# Patient Record
Sex: FEMALE | Race: WHITE | NOT HISPANIC OR LATINO | Employment: UNEMPLOYED | URBAN - METROPOLITAN AREA
[De-identification: names, ages, dates, MRNs, and addresses within clinical notes are randomized per-mention and may not be internally consistent; named-entity substitution may affect disease eponyms.]

---

## 2018-02-27 ENCOUNTER — APPOINTMENT (EMERGENCY)
Dept: CT IMAGING | Facility: HOSPITAL | Age: 28
End: 2018-02-27
Payer: COMMERCIAL

## 2018-02-27 ENCOUNTER — HOSPITAL ENCOUNTER (EMERGENCY)
Facility: HOSPITAL | Age: 28
Discharge: HOME | End: 2018-02-27
Attending: EMERGENCY MEDICINE | Admitting: EMERGENCY MEDICINE
Payer: COMMERCIAL

## 2018-02-27 VITALS
HEART RATE: 78 BPM | RESPIRATION RATE: 18 BRPM | SYSTOLIC BLOOD PRESSURE: 131 MMHG | OXYGEN SATURATION: 100 % | TEMPERATURE: 98.7 F | DIASTOLIC BLOOD PRESSURE: 60 MMHG

## 2018-02-27 DIAGNOSIS — T14.8XXA HEMATOMA: ICD-10-CM

## 2018-02-27 DIAGNOSIS — R10.9 ABDOMINAL PAIN: Primary | ICD-10-CM

## 2018-02-27 LAB
ALBUMIN SERPL BCP-MCNC: 3.7 G/DL (ref 3.5–5)
ALP SERPL-CCNC: 62 U/L (ref 46–116)
ALT SERPL W P-5'-P-CCNC: 41 U/L (ref 12–78)
ANION GAP SERPL CALCULATED.3IONS-SCNC: 9 MMOL/L (ref 4–13)
AST SERPL W P-5'-P-CCNC: 20 U/L (ref 5–45)
BASOPHILS # BLD AUTO: 0.02 THOUSANDS/ΜL (ref 0–0.1)
BASOPHILS NFR BLD AUTO: 0 % (ref 0–1)
BILIRUB SERPL-MCNC: 1 MG/DL (ref 0.2–1)
BILIRUB UR QL STRIP: NEGATIVE
BUN SERPL-MCNC: 9 MG/DL (ref 5–25)
CALCIUM SERPL-MCNC: 8.9 MG/DL (ref 8.3–10.1)
CHLORIDE SERPL-SCNC: 106 MMOL/L (ref 100–108)
CLARITY UR: CLEAR
CO2 SERPL-SCNC: 27 MMOL/L (ref 21–32)
COLOR UR: YELLOW
CREAT SERPL-MCNC: 0.79 MG/DL (ref 0.6–1.3)
EOSINOPHIL # BLD AUTO: 0.09 THOUSAND/ΜL (ref 0–0.61)
EOSINOPHIL NFR BLD AUTO: 1 % (ref 0–6)
ERYTHROCYTE [DISTWIDTH] IN BLOOD BY AUTOMATED COUNT: 13.4 % (ref 11.6–15.1)
EXT PREG TEST URINE: NEGATIVE
GFR SERPL CREATININE-BSD FRML MDRD: 104 ML/MIN/1.73SQ M
GLUCOSE SERPL-MCNC: 80 MG/DL (ref 65–140)
GLUCOSE UR STRIP-MCNC: NEGATIVE MG/DL
HCT VFR BLD AUTO: 37.5 % (ref 34.8–46.1)
HGB BLD-MCNC: 12.6 G/DL (ref 11.5–15.4)
HGB UR QL STRIP.AUTO: NEGATIVE
KETONES UR STRIP-MCNC: NEGATIVE MG/DL
LEUKOCYTE ESTERASE UR QL STRIP: NEGATIVE
LIPASE SERPL-CCNC: 97 U/L (ref 73–393)
LYMPHOCYTES # BLD AUTO: 1.97 THOUSANDS/ΜL (ref 0.6–4.47)
LYMPHOCYTES NFR BLD AUTO: 23 % (ref 14–44)
MCH RBC QN AUTO: 28.1 PG (ref 26.8–34.3)
MCHC RBC AUTO-ENTMCNC: 33.6 G/DL (ref 31.4–37.4)
MCV RBC AUTO: 84 FL (ref 82–98)
MONOCYTES # BLD AUTO: 0.57 THOUSAND/ΜL (ref 0.17–1.22)
MONOCYTES NFR BLD AUTO: 7 % (ref 4–12)
NEUTROPHILS # BLD AUTO: 5.81 THOUSANDS/ΜL (ref 1.85–7.62)
NEUTS SEG NFR BLD AUTO: 69 % (ref 43–75)
NITRITE UR QL STRIP: NEGATIVE
PH UR STRIP.AUTO: 5.5 [PH] (ref 4.5–8)
PLATELET # BLD AUTO: 247 THOUSANDS/UL (ref 149–390)
PMV BLD AUTO: 8.8 FL (ref 8.9–12.7)
POTASSIUM SERPL-SCNC: 4 MMOL/L (ref 3.5–5.3)
PROT SERPL-MCNC: 7.5 G/DL (ref 6.4–8.2)
PROT UR STRIP-MCNC: NEGATIVE MG/DL
RBC # BLD AUTO: 4.48 MILLION/UL (ref 3.81–5.12)
SODIUM SERPL-SCNC: 142 MMOL/L (ref 136–145)
SP GR UR STRIP.AUTO: 1.01 (ref 1–1.03)
UROBILINOGEN UR QL STRIP.AUTO: 0.2 E.U./DL
WBC # BLD AUTO: 8.46 THOUSAND/UL (ref 4.31–10.16)

## 2018-02-27 PROCEDURE — 96361 HYDRATE IV INFUSION ADD-ON: CPT

## 2018-02-27 PROCEDURE — 81025 URINE PREGNANCY TEST: CPT | Performed by: EMERGENCY MEDICINE

## 2018-02-27 PROCEDURE — 99284 EMERGENCY DEPT VISIT MOD MDM: CPT

## 2018-02-27 PROCEDURE — 96375 TX/PRO/DX INJ NEW DRUG ADDON: CPT

## 2018-02-27 PROCEDURE — 74177 CT ABD & PELVIS W/CONTRAST: CPT

## 2018-02-27 PROCEDURE — 83690 ASSAY OF LIPASE: CPT | Performed by: EMERGENCY MEDICINE

## 2018-02-27 PROCEDURE — 36415 COLL VENOUS BLD VENIPUNCTURE: CPT | Performed by: EMERGENCY MEDICINE

## 2018-02-27 PROCEDURE — 96374 THER/PROPH/DIAG INJ IV PUSH: CPT

## 2018-02-27 PROCEDURE — 85025 COMPLETE CBC W/AUTO DIFF WBC: CPT | Performed by: EMERGENCY MEDICINE

## 2018-02-27 PROCEDURE — 80053 COMPREHEN METABOLIC PANEL: CPT | Performed by: EMERGENCY MEDICINE

## 2018-02-27 PROCEDURE — 81003 URINALYSIS AUTO W/O SCOPE: CPT

## 2018-02-27 RX ORDER — ONDANSETRON 2 MG/ML
4 INJECTION INTRAMUSCULAR; INTRAVENOUS ONCE
Status: COMPLETED | OUTPATIENT
Start: 2018-02-27 | End: 2018-02-27

## 2018-02-27 RX ORDER — KETOROLAC TROMETHAMINE 30 MG/ML
15 INJECTION, SOLUTION INTRAMUSCULAR; INTRAVENOUS ONCE
Status: COMPLETED | OUTPATIENT
Start: 2018-02-27 | End: 2018-02-27

## 2018-02-27 RX ADMIN — IOHEXOL 100 ML: 350 INJECTION, SOLUTION INTRAVENOUS at 15:54

## 2018-02-27 RX ADMIN — SODIUM CHLORIDE 1000 ML: 0.9 INJECTION, SOLUTION INTRAVENOUS at 15:04

## 2018-02-27 RX ADMIN — ONDANSETRON 4 MG: 2 INJECTION INTRAMUSCULAR; INTRAVENOUS at 15:11

## 2018-02-27 RX ADMIN — KETOROLAC TROMETHAMINE 15 MG: 30 INJECTION, SOLUTION INTRAMUSCULAR at 15:11

## 2018-02-27 NOTE — ED PROVIDER NOTES
History  Chief Complaint   Patient presents with    Abdominal Pain     Pt  c/o right upper quadrant and left lower quadrant pain that radiates to back  Reports diarrhea and nausea since yesterday  History provided by:  Patient   used: No    Abdominal Pain   Associated symptoms: nausea    Associated symptoms: no chest pain, no chills, no cough, no diarrhea, no dysuria, no fever, no shortness of breath, no sore throat and no vomiting      Patient is a 43-year-old female presenting to emergency department with abdominal pain  Started yesterday  Her quadrant left lower quadrant  Associated with nausea  No vomiting  Episode of diarrhea yesterday  No bowel movement since then  No urine complaints  Has had multiple surgeries due to pregnancy and endometriosis  Had hysterectomy  No fevers  No Lightheadedness  No back pain  MDM will check abdominal labs to evaluate for pancreatitis, gallbladder pathology, CT abdomen pelvis to evaluate for obstructions as patient has had multiple surgeries in the past      None       Past Medical History:   Diagnosis Date    Endometriosis        Past Surgical History:   Procedure Laterality Date    HYSTERECTOMY         History reviewed  No pertinent family history  I have reviewed and agree with the history as documented  Social History   Substance Use Topics    Smoking status: Never Smoker    Smokeless tobacco: Never Used    Alcohol use No        Review of Systems   Constitutional: Negative for chills, diaphoresis and fever  HENT: Negative for congestion and sore throat  Respiratory: Negative for cough, shortness of breath, wheezing and stridor  Cardiovascular: Negative for chest pain, palpitations and leg swelling  Gastrointestinal: Positive for abdominal pain and nausea  Negative for blood in stool, diarrhea and vomiting  Genitourinary: Negative for dysuria, frequency and urgency     Musculoskeletal: Negative for neck pain and neck stiffness  Skin: Negative for pallor and rash  Neurological: Negative for dizziness, syncope, weakness, light-headedness and headaches  All other systems reviewed and are negative  Physical Exam  ED Triage Vitals [02/27/18 1343]   Temperature Pulse Respirations Blood Pressure SpO2   98 7 °F (37 1 °C) 78 18 131/60 100 %      Temp Source Heart Rate Source Patient Position - Orthostatic VS BP Location FiO2 (%)   Oral Monitor Sitting Left arm --      Pain Score       7           Orthostatic Vital Signs  Vitals:    02/27/18 1343   BP: 131/60   Pulse: 78   Patient Position - Orthostatic VS: Sitting       Physical Exam   Constitutional: She is oriented to person, place, and time  She appears well-developed and well-nourished  HENT:   Head: Normocephalic and atraumatic  Eyes: EOM are normal  Pupils are equal, round, and reactive to light  Neck: Normal range of motion  Neck supple  Cardiovascular: Normal rate, regular rhythm, normal heart sounds and intact distal pulses  Pulmonary/Chest: Effort normal and breath sounds normal  No respiratory distress  Abdominal: Soft  Bowel sounds are normal  There is tenderness  Left lower quadrant and right upper quadrant   Musculoskeletal: Normal range of motion  She exhibits no edema or tenderness  Neurological: She is alert and oriented to person, place, and time  Skin: Skin is warm and dry  Capillary refill takes less than 2 seconds  No rash noted  No erythema  Vitals reviewed        ED Medications  Medications   ondansetron (ZOFRAN) injection 4 mg (4 mg Intravenous Given 2/27/18 1511)   sodium chloride 0 9 % bolus 1,000 mL (0 mL Intravenous Stopped 2/27/18 1715)   ketorolac (TORADOL) injection 15 mg (15 mg Intravenous Given 2/27/18 1511)   iohexol (OMNIPAQUE) 350 MG/ML injection (SINGLE-DOSE) 100 mL (100 mL Intravenous Given 2/27/18 1554)       Diagnostic Studies  Results Reviewed     Procedure Component Value Units Date/Time    Comprehensive metabolic panel [[id number]] Collected:  02/27/18 1503    Lab Status:  Final result Specimen:  Blood from Arm, Right Updated:  02/27/18 1530     Sodium 142 mmol/L      Potassium 4 0 mmol/L      Chloride 106 mmol/L      CO2 27 mmol/L      Anion Gap 9 mmol/L      BUN 9 mg/dL      Creatinine 0 79 mg/dL      Glucose 80 mg/dL      Calcium 8 9 mg/dL      AST 20 U/L      ALT 41 U/L      Alkaline Phosphatase 62 U/L      Total Protein 7 5 g/dL      Albumin 3 7 g/dL      Total Bilirubin 1 00 mg/dL      eGFR 104 ml/min/1 73sq m     Narrative:         National Kidney Disease Education Program recommendations are as follows:  GFR calculation is accurate only with a steady state creatinine  Chronic Kidney disease less than 60 ml/min/1 73 sq  meters  Kidney failure less than 15 ml/min/1 73 sq  meters      Lipase [[id number]]  (Normal) Collected:  02/27/18 1503    Lab Status:  Final result Specimen:  Blood from Arm, Right Updated:  02/27/18 1530     Lipase 97 u/L     CBC and differential [[id number]]  (Abnormal) Collected:  02/27/18 1503    Lab Status:  Final result Specimen:  Blood from Arm, Right Updated:  02/27/18 1525     WBC 8 46 Thousand/uL      RBC 4 48 Million/uL      Hemoglobin 12 6 g/dL      Hematocrit 37 5 %      MCV 84 fL      MCH 28 1 pg      MCHC 33 6 g/dL      RDW 13 4 %      MPV 8 8 (L) fL      Platelets 166 Thousands/uL      Neutrophils Relative 69 %      Lymphocytes Relative 23 %      Monocytes Relative 7 %      Eosinophils Relative 1 %      Basophils Relative 0 %      Neutrophils Absolute 5 81 Thousands/µL      Lymphocytes Absolute 1 97 Thousands/µL      Monocytes Absolute 0 57 Thousand/µL      Eosinophils Absolute 0 09 Thousand/µL      Basophils Absolute 0 02 Thousands/µL     POCT pregnancy, urine [[id number]]  (Normal) Resulted:  02/27/18 1511    Lab Status:  Final result Updated:  02/27/18 1511     EXT PREG TEST UR (Ref: Negative) negative    ED Urine Macroscopic [[id number]]  (Normal) Collected:  02/27/18 1510    Lab Status:  Final result Specimen:  Urine Updated:  02/27/18 1509     Color, UA Yellow     Clarity, UA Clear     pH, UA 5 5     Leukocytes, UA Negative     Nitrite, UA Negative     Protein, UA Negative mg/dl      Glucose, UA Negative mg/dl      Ketones, UA Negative mg/dl      Urobilinogen, UA 0 2 E U /dl      Bilirubin, UA Negative     Blood, UA Negative     Specific Gravity, UA 1 010    Narrative:       CLINITEK RESULT                 CT abdomen pelvis with contrast   Final Result by Ronna Fernández MD (02/27 1617)      7 8 x 2 5 x 5 9 cm fluid pocket identified posterior to the left gluteus muscles  Tiny left lower pole renal nonobstructing calculus  No hydronephrosis  Workstation performed: [id number]                Patient had a fall over 10 days ago  Not on blood thinners  Procedures  Procedures       Phone Contacts  ED Phone Contact    ED Course  ED Course as of Feb 27 1756   Tue Feb 27, 2018   1701 Bruising over the left buttocks  No cellulitic skin changes  No drainage  MDM  CritCare Time    Disposition  Final diagnoses:   Abdominal pain   Hematoma - gluteal     Time reflects when diagnosis was documented in both MDM as applicable and the Disposition within this note     Time User Action Codes Description Comment    2/27/2018  4:57 PM Ratna Cerda Add [R10 9] Abdominal pain     2/27/2018  4:59 PM Ratna Cerda Add [T14  8XXA] Hematoma     2/27/2018  4:59 PM Ratna Cerda Modify [T14  8XXA] Hematoma gluteal      ED Disposition     ED Disposition Condition Comment    Discharge  Pily Galeano discharge to home/self care      Condition at discharge: Good        Follow-up Information     Follow up With Specialties Details Why Contact Info Additional [address] Emergency Department Emergency Medicine  As needed, If symptoms worsen, vomiting, fevers, skin changes over the buttocks area [address] [facility] [address]  [phone] [facility], [address], [place], [address], [place] Gastroenterology In 1 week Abdominal pain [address] [address]  [phone]           There are no discharge medications for this patient  No discharge procedures on file      ED Provider  Electronically Signed by           Kiran Hylton MD  02/27/18 3683

## 2018-02-27 NOTE — DISCHARGE INSTRUCTIONS
Abdominal Pain   WHAT YOU NEED TO KNOW:   Abdominal pain can be dull, achy, or sharp  You may have pain in one area of your abdomen, or in your entire abdomen  Your pain may be caused by a condition such as constipation, food sensitivity or poisoning, infection, or a blockage  Abdominal pain can also be from a hernia, appendicitis, or an ulcer  Liver, gallbladder, or kidney conditions can also cause abdominal pain  The cause of your abdominal pain may be unknown  DISCHARGE INSTRUCTIONS:   Return to the emergency department if:   · You have new chest pain or shortness of breath  · You have pulsing pain in your upper abdomen or lower back that suddenly becomes constant  · Your pain is in the right lower abdominal area and worsens with movement  · You have a fever over 100 4°F (38°C) or shaking chills  · You are vomiting and cannot keep food or liquids down  · Your pain does not improve or gets worse over the next 8 to 12 hours  · You see blood in your vomit or bowel movements, or they look black and tarry  · Your skin or the whites of your eyes turn yellow  · You are a woman and have a large amount of vaginal bleeding that is not your monthly period  Contact your healthcare provider if:   · You have pain in your lower back  · You are a man and have pain in your testicles  · You have pain when you urinate  · You have questions or concerns about your condition or care  Follow up with your healthcare provider within 24 hours or as directed:  Write down your questions so you remember to ask them during your visits  Medicines:   · Medicines  may be given to calm your stomach and prevent vomiting or to decrease pain  Ask how to take pain medicine safely  · Take your medicine as directed  Contact your healthcare provider if you think your medicine is not helping or if you have side effects  Tell him of her if you are allergic to any medicine   Keep a list of the medicines, vitamins, and herbs you take  Include the amounts, and when and why you take them  Bring the list or the pill bottles to follow-up visits  Carry your medicine list with you in case of an emergency  © 2017 [facility] Information is for End User's use only and may not be sold, redistributed or otherwise used for commercial purposes  All illustrations and images included in CareNotes® are the copyrighted property of A D A M , Inc  or Bradly Cabezas  The above information is an  only  It is not intended as medical advice for individual conditions or treatments  Talk to your doctor, nurse or pharmacist before following any medical regimen to see if it is safe and effective for you

## 2018-04-21 ENCOUNTER — HOSPITAL ENCOUNTER (EMERGENCY)
Facility: HOSPITAL | Age: 28
Discharge: HOME | End: 2018-04-22
Attending: EMERGENCY MEDICINE | Admitting: EMERGENCY MEDICINE
Payer: COMMERCIAL

## 2018-04-21 ENCOUNTER — APPOINTMENT (EMERGENCY)
Dept: CT IMAGING | Facility: HOSPITAL | Age: 28
End: 2018-04-21
Payer: COMMERCIAL

## 2018-04-21 DIAGNOSIS — R10.9 LEFT FLANK PAIN: Primary | ICD-10-CM

## 2018-04-21 DIAGNOSIS — J18.9 PNEUMONIA: ICD-10-CM

## 2018-04-21 LAB
ALBUMIN SERPL BCP-MCNC: 3.9 G/DL (ref 3.5–5)
ALP SERPL-CCNC: 64 U/L (ref 46–116)
ALT SERPL W P-5'-P-CCNC: 52 U/L (ref 12–78)
ANION GAP SERPL CALCULATED.3IONS-SCNC: 10 MMOL/L (ref 4–13)
AST SERPL W P-5'-P-CCNC: 23 U/L (ref 5–45)
BASOPHILS # BLD AUTO: 0.02 THOUSANDS/ΜL (ref 0–0.1)
BASOPHILS NFR BLD AUTO: 0 % (ref 0–1)
BILIRUB SERPL-MCNC: 1 MG/DL (ref 0.2–1)
BILIRUB UR QL STRIP: NEGATIVE
BUN SERPL-MCNC: 10 MG/DL (ref 5–25)
CALCIUM SERPL-MCNC: 8.9 MG/DL (ref 8.3–10.1)
CHLORIDE SERPL-SCNC: 104 MMOL/L (ref 100–108)
CLARITY UR: CLEAR
CO2 SERPL-SCNC: 28 MMOL/L (ref 21–32)
COLOR UR: YELLOW
CREAT SERPL-MCNC: 0.85 MG/DL (ref 0.6–1.3)
EOSINOPHIL # BLD AUTO: 0.15 THOUSAND/ΜL (ref 0–0.61)
EOSINOPHIL NFR BLD AUTO: 2 % (ref 0–6)
ERYTHROCYTE [DISTWIDTH] IN BLOOD BY AUTOMATED COUNT: 13.2 % (ref 11.6–15.1)
EXT PREG TEST URINE: NEGATIVE
GFR SERPL CREATININE-BSD FRML MDRD: 96 ML/MIN/1.73SQ M
GLUCOSE SERPL-MCNC: 80 MG/DL (ref 65–140)
GLUCOSE UR STRIP-MCNC: NEGATIVE MG/DL
HCT VFR BLD AUTO: 37.9 % (ref 34.8–46.1)
HGB BLD-MCNC: 12.9 G/DL (ref 11.5–15.4)
HGB UR QL STRIP.AUTO: NEGATIVE
KETONES UR STRIP-MCNC: NEGATIVE MG/DL
LEUKOCYTE ESTERASE UR QL STRIP: NEGATIVE
LIPASE SERPL-CCNC: 86 U/L (ref 73–393)
LYMPHOCYTES # BLD AUTO: 2.49 THOUSANDS/ΜL (ref 0.6–4.47)
LYMPHOCYTES NFR BLD AUTO: 25 % (ref 14–44)
MCH RBC QN AUTO: 27.9 PG (ref 26.8–34.3)
MCHC RBC AUTO-ENTMCNC: 34 G/DL (ref 31.4–37.4)
MCV RBC AUTO: 82 FL (ref 82–98)
MONOCYTES # BLD AUTO: 0.58 THOUSAND/ΜL (ref 0.17–1.22)
MONOCYTES NFR BLD AUTO: 6 % (ref 4–12)
NEUTROPHILS # BLD AUTO: 6.6 THOUSANDS/ΜL (ref 1.85–7.62)
NEUTS SEG NFR BLD AUTO: 67 % (ref 43–75)
NITRITE UR QL STRIP: NEGATIVE
PH UR STRIP.AUTO: 5.5 [PH] (ref 4.5–8)
PLATELET # BLD AUTO: 246 THOUSANDS/UL (ref 149–390)
PMV BLD AUTO: 8.6 FL (ref 8.9–12.7)
POTASSIUM SERPL-SCNC: 3.7 MMOL/L (ref 3.5–5.3)
PROT SERPL-MCNC: 7.6 G/DL (ref 6.4–8.2)
PROT UR STRIP-MCNC: NEGATIVE MG/DL
RBC # BLD AUTO: 4.62 MILLION/UL (ref 3.81–5.12)
SODIUM SERPL-SCNC: 142 MMOL/L (ref 136–145)
SP GR UR STRIP.AUTO: 1.02 (ref 1–1.03)
UROBILINOGEN UR QL STRIP.AUTO: 0.2 E.U./DL
WBC # BLD AUTO: 9.84 THOUSAND/UL (ref 4.31–10.16)

## 2018-04-21 PROCEDURE — 74176 CT ABD & PELVIS W/O CONTRAST: CPT

## 2018-04-21 PROCEDURE — 81025 URINE PREGNANCY TEST: CPT | Performed by: EMERGENCY MEDICINE

## 2018-04-21 PROCEDURE — 81003 URINALYSIS AUTO W/O SCOPE: CPT

## 2018-04-21 PROCEDURE — 96374 THER/PROPH/DIAG INJ IV PUSH: CPT

## 2018-04-21 PROCEDURE — 36415 COLL VENOUS BLD VENIPUNCTURE: CPT

## 2018-04-21 PROCEDURE — 83690 ASSAY OF LIPASE: CPT | Performed by: EMERGENCY MEDICINE

## 2018-04-21 PROCEDURE — 85025 COMPLETE CBC W/AUTO DIFF WBC: CPT | Performed by: EMERGENCY MEDICINE

## 2018-04-21 PROCEDURE — 80053 COMPREHEN METABOLIC PANEL: CPT | Performed by: EMERGENCY MEDICINE

## 2018-04-21 RX ORDER — KETOROLAC TROMETHAMINE 30 MG/ML
30 INJECTION, SOLUTION INTRAMUSCULAR; INTRAVENOUS ONCE
Status: COMPLETED | OUTPATIENT
Start: 2018-04-21 | End: 2018-04-21

## 2018-04-21 RX ADMIN — KETOROLAC TROMETHAMINE 30 MG: 30 INJECTION, SOLUTION INTRAMUSCULAR at 22:51

## 2018-04-22 VITALS
RESPIRATION RATE: 18 BRPM | SYSTOLIC BLOOD PRESSURE: 109 MMHG | HEIGHT: 65 IN | WEIGHT: 241 LBS | BODY MASS INDEX: 40.15 KG/M2 | TEMPERATURE: 98.4 F | OXYGEN SATURATION: 100 % | DIASTOLIC BLOOD PRESSURE: 65 MMHG | HEART RATE: 72 BPM

## 2018-04-22 PROCEDURE — 99284 EMERGENCY DEPT VISIT MOD MDM: CPT

## 2018-04-22 RX ORDER — AZITHROMYCIN 250 MG/1
500 TABLET, FILM COATED ORAL ONCE
Status: COMPLETED | OUTPATIENT
Start: 2018-04-22 | End: 2018-04-22

## 2018-04-22 RX ORDER — AZITHROMYCIN 250 MG/1
TABLET, FILM COATED ORAL
Qty: 4 TABLET | Refills: 0 | Status: SHIPPED | OUTPATIENT
Start: 2018-04-22 | End: 2018-04-26

## 2018-04-22 RX ORDER — ACETAMINOPHEN 325 MG/1
650 TABLET ORAL ONCE
Status: COMPLETED | OUTPATIENT
Start: 2018-04-22 | End: 2018-04-22

## 2018-04-22 RX ADMIN — ACETAMINOPHEN 650 MG: 325 TABLET ORAL at 00:34

## 2018-04-22 RX ADMIN — AZITHROMYCIN 500 MG: 250 TABLET, FILM COATED ORAL at 01:05

## 2018-04-22 NOTE — ED PROVIDER NOTES
History  Chief Complaint   Patient presents with    Flank Pain     C/o left flank pain x1 week  C/o nausea with pain exacerbation  Hx of kidney stones  Denies hematuria  Pt with c/o left flank pain x 1wk, occasionally radiating around to the front of her abdomen  She denies n/v/d  Pt states that the symptoms are similar to her previous hx of kidney stones  She has been taking ibuprofen/naproxen/tylenol intermittently for pain relief  None       Past Medical History:   Diagnosis Date    Endometriosis     Kidney stone on left side        Past Surgical History:   Procedure Laterality Date     SECTION      HYSTERECTOMY      TUBAL LIGATION         History reviewed  No pertinent family history  I have reviewed and agree with the history as documented  Social History   Substance Use Topics    Smoking status: Never Smoker    Smokeless tobacco: Never Used    Alcohol use No        Review of Systems   Constitutional: Negative for chills and fever  Gastrointestinal: Positive for abdominal pain  Negative for diarrhea, nausea and vomiting  Genitourinary: Positive for flank pain  Negative for difficulty urinating, dysuria, frequency, urgency and vaginal bleeding  All other systems reviewed and are negative  Physical Exam  ED Triage Vitals [18]   Temperature Pulse Respirations Blood Pressure SpO2   98 4 °F (36 9 °C) 73 18 119/67 100 %      Temp Source Heart Rate Source Patient Position - Orthostatic VS BP Location FiO2 (%)   Oral Monitor Sitting Left arm --      Pain Score       9           Orthostatic Vital Signs  Vitals:    18 2147 18 2300 18 0000   BP: 119/67 111/61 120/72 109/65   Pulse: 73 67 64 72   Patient Position - Orthostatic VS: Sitting Lying Sitting Sitting       Physical Exam   Constitutional: She appears well-developed and well-nourished  No distress  HENT:   Head: Normocephalic and atraumatic     Eyes: Conjunctivae are normal  Pupils are equal, round, and reactive to light  Neck: Normal range of motion  Neck supple  Cardiovascular: Normal rate, regular rhythm and normal heart sounds  No murmur heard  Pulmonary/Chest: Effort normal and breath sounds normal  No respiratory distress  Abdominal: Soft  Bowel sounds are normal  She exhibits no distension  There is no tenderness  Musculoskeletal: Normal range of motion  She exhibits no edema or deformity  Left CVA tenderness   Neurological: She is alert  No cranial nerve deficit  Skin: Skin is warm and dry  No rash noted  She is not diaphoretic  No pallor  Psychiatric: She has a normal mood and affect  Her behavior is normal    Nursing note and vitals reviewed  ED Medications  Medications   ketorolac (TORADOL) injection 30 mg (30 mg Intravenous Given 4/21/18 2251)   acetaminophen (TYLENOL) tablet 650 mg (650 mg Oral Given 4/22/18 0034)   azithromycin (ZITHROMAX) tablet 500 mg (500 mg Oral Given 4/22/18 0105)       Diagnostic Studies  Results Reviewed     Procedure Component Value Units Date/Time    Comprehensive metabolic panel [[id number]] Collected:  04/21/18 2147    Lab Status:  Final result Specimen:  Blood from Arm, Right Updated:  04/21/18 2213     Sodium 142 mmol/L      Potassium 3 7 mmol/L      Chloride 104 mmol/L      CO2 28 mmol/L      Anion Gap 10 mmol/L      BUN 10 mg/dL      Creatinine 0 85 mg/dL      Glucose 80 mg/dL      Calcium 8 9 mg/dL      AST 23 U/L      ALT 52 U/L      Alkaline Phosphatase 64 U/L      Total Protein 7 6 g/dL      Albumin 3 9 g/dL      Total Bilirubin 1 00 mg/dL      eGFR 96 ml/min/1 73sq m     Narrative:         National Kidney Disease Education Program recommendations are as follows:  GFR calculation is accurate only with a steady state creatinine  Chronic Kidney disease less than 60 ml/min/1 73 sq  meters  Kidney failure less than 15 ml/min/1 73 sq  meters      Lipase [[id number]]  (Normal) Collected:  04/21/18 2147    Lab Status: Final result Specimen:  Blood from Arm, Right Updated:  04/21/18 2213     Lipase 86 u/L     CBC and differential [[id number]]  (Abnormal) Collected:  04/21/18 2147    Lab Status:  Final result Specimen:  Blood from Arm, Right Updated:  04/21/18 2153     WBC 9 84 Thousand/uL      RBC 4 62 Million/uL      Hemoglobin 12 9 g/dL      Hematocrit 37 9 %      MCV 82 fL      MCH 27 9 pg      MCHC 34 0 g/dL      RDW 13 2 %      MPV 8 6 (L) fL      Platelets 465 Thousands/uL      Neutrophils Relative 67 %      Lymphocytes Relative 25 %      Monocytes Relative 6 %      Eosinophils Relative 2 %      Basophils Relative 0 %      Neutrophils Absolute 6 60 Thousands/µL      Lymphocytes Absolute 2 49 Thousands/µL      Monocytes Absolute 0 58 Thousand/µL      Eosinophils Absolute 0 15 Thousand/µL      Basophils Absolute 0 02 Thousands/µL     POCT pregnancy, urine [[id number]]  (Normal) Resulted:  04/21/18 2146    Lab Status:  Final result Specimen:  Urine Updated:  04/21/18 2146     EXT PREG TEST UR (Ref: Negative) negative    ED Urine Macroscopic [[id number]]  (Normal) Collected:  04/21/18 2146    Lab Status:  Final result Specimen:  Urine Updated:  04/21/18 2145     Color, UA Yellow     Clarity, UA Clear     pH, UA 5 5     Leukocytes, UA Negative     Nitrite, UA Negative     Protein, UA Negative mg/dl      Glucose, UA Negative mg/dl      Ketones, UA Negative mg/dl      Urobilinogen, UA 0 2 E U /dl      Bilirubin, UA Negative     Blood, UA Negative     Specific Gravity, UA 1 025    Narrative:       CLINITEK RESULT                 CT renal stone study abdomen pelvis without contrast    (Results Pending)              Procedures  Procedures       Phone Contacts  ED Phone Contact    ED Course  ED Course                                MDM  Number of Diagnoses or Management Options  Left flank pain:   Pneumonia:   Diagnosis management comments: FINDINGS:  Lung bases: Patchy basilar opacities include groundglass attenuation with differential of atelectasis  or pneumonia  ABDOMEN:  Liver: Hepatic steatosis is present  Gallbladder and bile ducts: Unremarkable  No calcified stones  No ductal dilation  Pancreas: Unremarkable  No ductal dilation  Spleen: Unremarkable  No splenomegaly  Adrenals: Unremarkable  No mass  Kidneys and ureters: Unremarkable  No obstructing stones  No hydronephrosis  Stomach and bowel: Unremarkable  No obstruction  No mucosal thickening  Appendix: No findings to suggest acute appendicitis  PELVIS:  Bladder: Unremarkable  No stones  Reproductive: Unremarkable as visualized  ABDOMEN and PELVIS:  Intraperitoneal space: Unremarkable  No free air  No significant fluid collection  Bones/joints: No acute fracture  No dislocation  Soft tissues: Subcutaneous opacity in the left gluteal region may reflect a subcutaneous contusion in  the setting of trauma/previous trauma  Vasculature: Unremarkable  No abdominal aortic aneurysm  Lymph nodes: Unremarkable  No enlarged lymph nodes  IMPRESSION:  1  Subcutaneous opacity in the left gluteal region may reflect a subcutaneous contusion in the setting  of trauma/previous trauma  Incidental findings that may require followup:  1  2  Patchy basilar opacities include groundglass attenuation with differential of atelectasis or  Pneumonia  Pt states that she fell down a flight of steps in feb and sustained a contusion to her left buttock  Pt denies any recent falls  She also states that she has had a cough/fever  Will start pt on zithromax  Will d/c to home  Recommended tylenol/motrin for pain          Amount and/or Complexity of Data Reviewed  Clinical lab tests: ordered and reviewed  Tests in the radiology section of CPT®: ordered and reviewed      CritCare Time    Disposition  Final diagnoses:   Left flank pain   Pneumonia     Time reflects when diagnosis was documented in both MDM as applicable and the Disposition within this note     Time User Action Codes Description Comment 4/22/2018 12:52 AM Kathy Moreno [R10 9] Left flank pain     4/22/2018 12:53 AM Kathy Moreno [J18 9] Pneumonia       ED Disposition     ED Disposition Condition Comment    Discharge  Calvin Solis discharge to home/self care  Condition at discharge: Stable        Follow-up Information     Follow up With Specialties Details Why Benton Oconnor  Call in 1 day for follow up, to get information on a primay care physician [phone]          Discharge Medication List as of 4/22/2018 12:55 AM      START taking these medications    Details   azithromycin (ZITHROMAX) 250 mg tablet Take 1 tablet daily x 4 days, Print           No discharge procedures on file      ED Provider  Electronically Signed by           Cam Sibley DO  04/22/18 5270

## 2018-04-22 NOTE — DISCHARGE INSTRUCTIONS
Flank Pain   WHAT YOU NEED TO KNOW:   Flank pain is felt in the area below your ribcage and above your hip bones, often in the lower back  Your pain may be dull or so severe that you cannot get comfortable  The pain may stay in one area or radiate to another area  It may worsen and lighten in waves  Flank pain is often a sign of problems with your urinary tract, such as a kidney stone or infection  DISCHARGE INSTRUCTIONS:   Return to the emergency department if:   · You have a fever  · Your heart is fluttering or jumping  · You see blood in your urine  · Your pain radiates into your lower abdomen and genital area  · You have intense pain in your low back next to your spine  · You are much more tired than usual and have no desire to eat  · You have a headache and your muscles jerk  Contact your healthcare provider if:   · You have an upset stomach and are vomiting  · You have to urinate more often, and with urgency  · Your pain worsens or does not improve, and you cannot get comfortable  · You pass a stone when you urinate  · You have questions or concerns about your condition or care  Medicines: The following medicines may be ordered for you:  · Pain medicine  may help decrease or relieve your pain  Do not wait until the pain is severe before you take your medicine  · Antibiotics  may help treat a urinary tract infection caused by bacteria  · Take your medicine as directed  Contact your healthcare provider if you think your medicine is not helping or if you have side effects  Tell him of her if you are allergic to any medicine  Keep a list of the medicines, vitamins, and herbs you take  Include the amounts, and when and why you take them  Bring the list or the pill bottles to follow-up visits  Carry your medicine list with you in case of an emergency    Follow up with your healthcare provider in 1 to 2 days or as directed:  Write down your questions so you remember to ask them during your visits  © 2017 [address] [employer] Information is for End User's use only and may not be sold, redistributed or otherwise used for commercial purposes  All illustrations and images included in CareNotes® are the copyrighted property of A D A M , Inc  or Bradly Cabez[employer]  The above information is an  only  It is not intended as medical advice for individual conditions or treatments  Talk to your doctor, nurse or pharmacist before following any medical regimen to see if it is safe and effective for you  Community Acquired Pneumonia   WHAT YOU NEED TO KNOW:   Community-acquired pneumonia (CAP) is a lung infection that you get outside of a hospital or nursing home setting  Your lungs become inflamed and cannot work well  CAP may be caused by bacteria, viruses, or fungi  DISCHARGE INSTRUCTIONS:   Return to the emergency department if:   · You are confused and cannot think clearly  · You have increased trouble breathing  · Your lips or fingernails turn gray or blue  Contact your healthcare provider if:   · Your symptoms do not get better, or they get worse  · You are urinating less, or not at all  · You have questions or concerns about your condition or care  Medicines:   · Medicines  may be given to treat a bacterial, viral, or fungal infection  You may also be given medicines to dilate your bronchial tubes to help you breathe more easily  · Take your medicine as directed  Contact your healthcare provider if you think your medicine is not helping or if you have side effects  Tell him or her if you are allergic to any medicine  Keep a list of the medicines, vitamins, and herbs you take  Include the amounts, and when and why you take them  Bring the list or the pill bottles to follow-up visits  Carry your medicine list with you in case of an emergency  Follow up with your healthcare provider within 3 days or as directed:   You may need another x-ray  Write down your questions so you remember to ask them during your visits  Deep breathing and coughing:  Deep breathing helps open the air passages in your lungs  Coughing helps bring up mucus from your lungs  Take a deep breath and hold the breath as long as you can  Then push the air out of your lungs with a deep, strong cough  Spit out any mucus you have coughed up  Take 10 deep breaths in a row every hour that you are awake  Remember to follow each deep breath with a cough  Do not smoke or allow others to smoke around you:  Nicotine and other chemicals in cigarettes and cigars can cause lung damage  Ask your healthcare provider for information if you currently smoke and need help to quit  E-cigarettes or smokeless tobacco still contain nicotine  Talk to your healthcare provider before you use these products  Manage CAP at home:   · Breathe warm, moist air  This helps loosen mucus  Loosely place a warm, wet washcloth over your nose and mouth  A room humidifier may also help make the air moist     · Drink liquids as directed  Ask your healthcare provider how much liquid to drink each day and which liquids to drink  Liquids help make mucus thin and easier to get out of your body  · Gently tap your chest   This helps loosen mucus so it is easier to cough  Lie with your head lower than your chest several times a day and tap your chest      · Get plenty of rest   Rest helps your body heal   Prevent CAP:   · Wash your hands often with soap and water  Carry germ-killing hand gel with you  You can use the gel to clean your hands when soap and water are not available  Do not touch your eyes, nose, or mouth unless you have washed your hands first      · Clean surfaces often  Clean doorknobs, countertops, cell phones, and other surfaces that are touched often  · Always cover your mouth when you cough    Cough into a tissue or your shirtsleeve so you do not spread germs from your hands     · Try to avoid people who have a cold or the flu  If you are sick, stay away from others as much as possible  · Ask about vaccines  You may need a vaccine to help prevent pneumonia  Get an influenza (flu) vaccine every year as soon as it becomes available  © 2017 [address] [employer] Information is for End User's use only and may not be sold, redistributed or otherwise used for commercial purposes  All illustrations and images included in CareNotes® are the copyrighted property of A D A [employer] , Inc  or [address]  The above information is an  only  It is not intended as medical advice for individual conditions or treatments  Talk to your doctor, nurse or pharmacist before following any medical regimen to see if it is safe and effective for you

## 2018-04-22 NOTE — ED NOTES
Pt stated "am I going to get anything else for pain when this (toradol) doesn't work?"  Made pt aware that was a provider decision  Pt stated "I've been here a while and if I'm going to wait around for nothing then I might as well go home and be in pain there "  Pt stated "I've had Toradol before and it does nothing for my pain "  Provider aware, no orders to follow       Susandavi Willson RN  04/21/18 [id number]

## 2019-04-02 ENCOUNTER — OFFICE VISIT (OUTPATIENT)
Dept: FAMILY MEDICINE CLINIC | Facility: CLINIC | Age: 29
End: 2019-04-02
Payer: COMMERCIAL

## 2019-04-02 VITALS
WEIGHT: 227.25 LBS | DIASTOLIC BLOOD PRESSURE: 68 MMHG | RESPIRATION RATE: 16 BRPM | SYSTOLIC BLOOD PRESSURE: 100 MMHG | BODY MASS INDEX: 37.86 KG/M2 | TEMPERATURE: 100.7 F | HEART RATE: 101 BPM | HEIGHT: 65 IN | OXYGEN SATURATION: 98 %

## 2019-04-02 DIAGNOSIS — R11.0 NAUSEA: ICD-10-CM

## 2019-04-02 DIAGNOSIS — E66.9 OBESITY (BMI 35.0-39.9 WITHOUT COMORBIDITY): ICD-10-CM

## 2019-04-02 DIAGNOSIS — J02.0 STREP THROAT: Primary | ICD-10-CM

## 2019-04-02 PROCEDURE — 3008F BODY MASS INDEX DOCD: CPT | Performed by: FAMILY MEDICINE

## 2019-04-02 PROCEDURE — 99203 OFFICE O/P NEW LOW 30 MIN: CPT | Performed by: FAMILY MEDICINE

## 2019-04-02 PROCEDURE — 1036F TOBACCO NON-USER: CPT | Performed by: FAMILY MEDICINE

## 2019-04-02 RX ORDER — AMOXICILLIN AND CLAVULANATE POTASSIUM 875; 125 MG/1; MG/1
1 TABLET, FILM COATED ORAL EVERY 12 HOURS SCHEDULED
Qty: 20 TABLET | Refills: 0 | Status: SHIPPED | OUTPATIENT
Start: 2019-04-02 | End: 2019-04-12

## 2019-04-02 RX ORDER — SUCRALFATE 1 G/1
1 TABLET ORAL
COMMUNITY
Start: 2018-08-22 | End: 2019-04-02

## 2019-04-02 RX ORDER — PANTOPRAZOLE SODIUM 20 MG/1
40 TABLET, DELAYED RELEASE ORAL
COMMUNITY
Start: 2018-08-22 | End: 2019-04-02

## 2019-04-02 RX ORDER — TRAMADOL HYDROCHLORIDE 50 MG/1
50 TABLET ORAL EVERY 6 HOURS PRN
COMMUNITY
End: 2020-03-09 | Stop reason: SDUPTHER

## 2019-04-02 RX ORDER — ONDANSETRON 4 MG/1
4 TABLET, ORALLY DISINTEGRATING ORAL EVERY 8 HOURS PRN
COMMUNITY
Start: 2018-08-21 | End: 2019-04-02 | Stop reason: SDUPTHER

## 2019-04-02 RX ORDER — RANITIDINE 150 MG/1
150 TABLET ORAL
COMMUNITY
Start: 2018-08-21 | End: 2019-04-02

## 2019-04-02 RX ORDER — ONDANSETRON 4 MG/1
4 TABLET, ORALLY DISINTEGRATING ORAL EVERY 8 HOURS PRN
Qty: 20 TABLET | Refills: 0 | Status: SHIPPED | OUTPATIENT
Start: 2019-04-02 | End: 2021-05-26

## 2019-04-02 RX ORDER — IBUPROFEN 600 MG/1
TABLET ORAL
COMMUNITY
Start: 2018-03-05 | End: 2020-09-16

## 2019-04-03 ENCOUNTER — TELEPHONE (OUTPATIENT)
Dept: FAMILY MEDICINE CLINIC | Facility: CLINIC | Age: 29
End: 2019-04-03

## 2019-05-06 RX ORDER — FLUCONAZOLE 150 MG/1
TABLET ORAL
Refills: 1 | COMMUNITY
Start: 2019-04-02 | End: 2021-05-26

## 2019-05-07 ENCOUNTER — HOSPITAL ENCOUNTER (OUTPATIENT)
Dept: RADIOLOGY | Facility: IMAGING CENTER | Age: 29
Discharge: HOME | End: 2019-05-07
Payer: COMMERCIAL

## 2019-05-07 ENCOUNTER — OFFICE VISIT (OUTPATIENT)
Dept: FAMILY MEDICINE CLINIC | Facility: CLINIC | Age: 29
End: 2019-05-07
Payer: COMMERCIAL

## 2019-05-07 ENCOUNTER — TRANSCRIBE ORDERS (OUTPATIENT)
Dept: ADMINISTRATIVE | Facility: HOSPITAL | Age: 29
End: 2019-05-07

## 2019-05-07 VITALS
WEIGHT: 229.13 LBS | SYSTOLIC BLOOD PRESSURE: 108 MMHG | HEART RATE: 74 BPM | OXYGEN SATURATION: 99 % | TEMPERATURE: 98.2 F | RESPIRATION RATE: 16 BRPM | BODY MASS INDEX: 38.17 KG/M2 | HEIGHT: 65 IN | DIASTOLIC BLOOD PRESSURE: 70 MMHG

## 2019-05-07 DIAGNOSIS — M25.562 CHRONIC ARTHRALGIAS OF KNEES AND HIPS: ICD-10-CM

## 2019-05-07 DIAGNOSIS — G89.29 CHRONIC ARTHRALGIAS OF KNEES AND HIPS: ICD-10-CM

## 2019-05-07 DIAGNOSIS — M54.41 CHRONIC BILATERAL LOW BACK PAIN WITH BILATERAL SCIATICA: ICD-10-CM

## 2019-05-07 DIAGNOSIS — G89.29 CHRONIC BILATERAL LOW BACK PAIN WITH BILATERAL SCIATICA: ICD-10-CM

## 2019-05-07 DIAGNOSIS — N80.9 ENDOMETRIOSIS: ICD-10-CM

## 2019-05-07 DIAGNOSIS — R53.83 FATIGUE, UNSPECIFIED TYPE: ICD-10-CM

## 2019-05-07 DIAGNOSIS — E78.49 OTHER HYPERLIPIDEMIA: ICD-10-CM

## 2019-05-07 DIAGNOSIS — M25.551 CHRONIC ARTHRALGIAS OF KNEES AND HIPS: ICD-10-CM

## 2019-05-07 DIAGNOSIS — M54.42 CHRONIC BILATERAL LOW BACK PAIN WITH BILATERAL SCIATICA: ICD-10-CM

## 2019-05-07 DIAGNOSIS — E55.9 VITAMIN D INSUFFICIENCY: ICD-10-CM

## 2019-05-07 DIAGNOSIS — Z00.00 HEALTHCARE MAINTENANCE: Primary | ICD-10-CM

## 2019-05-07 DIAGNOSIS — M25.561 CHRONIC ARTHRALGIAS OF KNEES AND HIPS: ICD-10-CM

## 2019-05-07 DIAGNOSIS — M25.552 CHRONIC ARTHRALGIAS OF KNEES AND HIPS: ICD-10-CM

## 2019-05-07 PROCEDURE — 72110 X-RAY EXAM L-2 SPINE 4/>VWS: CPT

## 2019-05-07 PROCEDURE — 3725F SCREEN DEPRESSION PERFORMED: CPT | Performed by: FAMILY MEDICINE

## 2019-05-07 PROCEDURE — 99395 PREV VISIT EST AGE 18-39: CPT | Performed by: FAMILY MEDICINE

## 2019-05-07 PROCEDURE — 73502 X-RAY EXAM HIP UNI 2-3 VIEWS: CPT

## 2019-05-07 RX ORDER — VALACYCLOVIR HYDROCHLORIDE 1 G/1
1000 TABLET, FILM COATED ORAL AS NEEDED
Refills: 3 | COMMUNITY
Start: 2019-04-30 | End: 2021-11-17

## 2019-05-07 RX ORDER — PREDNISONE 10 MG/1
TABLET ORAL
Qty: 32 TABLET | Refills: 0 | Status: SHIPPED | OUTPATIENT
Start: 2019-05-07 | End: 2019-06-06 | Stop reason: ALTCHOICE

## 2019-05-07 RX ORDER — FENOPROFEN CALCIUM 600 MG
600 TABLET ORAL 3 TIMES DAILY
Refills: 2 | COMMUNITY
Start: 2019-05-01 | End: 2021-05-26

## 2019-05-08 LAB
25(OH)D3 SERPL-MCNC: 24 NG/ML (ref 30–100)
ALBUMIN SERPL-MCNC: 4.2 G/DL (ref 3.6–5.1)
ALBUMIN/GLOB SERPL: 1.5 (CALC) (ref 1–2.5)
ALP SERPL-CCNC: 62 U/L (ref 33–115)
ALT SERPL-CCNC: 34 U/L (ref 6–29)
ANA SER QL IF: NEGATIVE
AST SERPL-CCNC: 23 U/L (ref 10–30)
BASOPHILS # BLD AUTO: 43 CELLS/UL (ref 0–200)
BASOPHILS NFR BLD AUTO: 0.5 %
BILIRUB SERPL-MCNC: 0.8 MG/DL (ref 0.2–1.2)
BUN SERPL-MCNC: 11 MG/DL (ref 7–25)
BUN/CREAT SERPL: ABNORMAL (CALC) (ref 6–22)
CALCIUM SERPL-MCNC: 9.3 MG/DL (ref 8.6–10.2)
CHLORIDE SERPL-SCNC: 105 MMOL/L (ref 98–110)
CHOLEST SERPL-MCNC: 142 MG/DL
CHOLEST/HDLC SERPL: 3.1 (CALC)
CO2 SERPL-SCNC: 28 MMOL/L (ref 20–32)
CREAT SERPL-MCNC: 0.67 MG/DL (ref 0.5–1.1)
CRP SERPL-MCNC: 24 MG/L
EOSINOPHIL # BLD AUTO: 102 CELLS/UL (ref 15–500)
EOSINOPHIL NFR BLD AUTO: 1.2 %
ERYTHROCYTE [DISTWIDTH] IN BLOOD BY AUTOMATED COUNT: 13.3 % (ref 11–15)
GLOBULIN SER CALC-MCNC: 2.8 G/DL (CALC) (ref 1.9–3.7)
GLUCOSE SERPL-MCNC: 80 MG/DL (ref 65–99)
HCT VFR BLD AUTO: 38 % (ref 35–45)
HDLC SERPL-MCNC: 46 MG/DL
HGB BLD-MCNC: 12.8 G/DL (ref 11.7–15.5)
LDLC SERPL CALC-MCNC: 76 MG/DL (CALC)
LYMPHOCYTES # BLD AUTO: 1921 CELLS/UL (ref 850–3900)
LYMPHOCYTES NFR BLD AUTO: 22.6 %
MCH RBC QN AUTO: 28.6 PG (ref 27–33)
MCHC RBC AUTO-ENTMCNC: 33.7 G/DL (ref 32–36)
MCV RBC AUTO: 84.8 FL (ref 80–100)
MONOCYTES # BLD AUTO: 485 CELLS/UL (ref 200–950)
MONOCYTES NFR BLD AUTO: 5.7 %
NEUTROPHILS # BLD AUTO: 5950 CELLS/UL (ref 1500–7800)
NEUTROPHILS NFR BLD AUTO: 70 %
NONHDLC SERPL-MCNC: 96 MG/DL (CALC)
PLATELET # BLD AUTO: 230 THOUSAND/UL (ref 140–400)
PMV BLD REES-ECKER: 9.4 FL (ref 7.5–12.5)
POTASSIUM SERPL-SCNC: 4.4 MMOL/L (ref 3.5–5.3)
PROT SERPL-MCNC: 7 G/DL (ref 6.1–8.1)
RBC # BLD AUTO: 4.48 MILLION/UL (ref 3.8–5.1)
RHEUMATOID FACT SERPL-ACNC: <14 IU/ML
SL AMB EGFR AFRICAN AMERICAN: 141 ML/MIN/1.73M2
SL AMB EGFR NON AFRICAN AMERICAN: 121 ML/MIN/1.73M2
SODIUM SERPL-SCNC: 140 MMOL/L (ref 135–146)
TRIGL SERPL-MCNC: 113 MG/DL
TSH SERPL-ACNC: 0.43 MIU/L
URATE SERPL-MCNC: 4.6 MG/DL (ref 2.5–7)
WBC # BLD AUTO: 8.5 THOUSAND/UL (ref 3.8–10.8)

## 2019-05-13 DIAGNOSIS — E55.9 VITAMIN D DEFICIENCY: Primary | ICD-10-CM

## 2019-05-15 RX ORDER — ERGOCALCIFEROL 1.25 MG/1
50000 CAPSULE ORAL WEEKLY
Qty: 12 CAPSULE | Refills: 1 | Status: SHIPPED | OUTPATIENT
Start: 2019-05-15 | End: 2021-05-26

## 2019-06-06 ENCOUNTER — OFFICE VISIT (OUTPATIENT)
Dept: FAMILY MEDICINE CLINIC | Facility: CLINIC | Age: 29
End: 2019-06-06
Payer: COMMERCIAL

## 2019-06-06 VITALS
HEART RATE: 88 BPM | RESPIRATION RATE: 16 BRPM | HEIGHT: 65 IN | OXYGEN SATURATION: 99 % | TEMPERATURE: 98.2 F | BODY MASS INDEX: 37.65 KG/M2 | SYSTOLIC BLOOD PRESSURE: 110 MMHG | WEIGHT: 226 LBS | DIASTOLIC BLOOD PRESSURE: 70 MMHG

## 2019-06-06 DIAGNOSIS — F41.9 ANXIETY: ICD-10-CM

## 2019-06-06 DIAGNOSIS — E55.9 VITAMIN D INSUFFICIENCY: ICD-10-CM

## 2019-06-06 DIAGNOSIS — E04.1 THYROID NODULE: Primary | ICD-10-CM

## 2019-06-06 PROCEDURE — 99214 OFFICE O/P EST MOD 30 MIN: CPT | Performed by: FAMILY MEDICINE

## 2019-06-06 RX ORDER — DULOXETIN HYDROCHLORIDE 20 MG/1
20 CAPSULE, DELAYED RELEASE ORAL DAILY
Qty: 30 CAPSULE | Refills: 0 | Status: SHIPPED | OUTPATIENT
Start: 2019-06-06 | End: 2021-05-26

## 2019-06-13 ENCOUNTER — OFFICE VISIT (OUTPATIENT)
Dept: FAMILY MEDICINE CLINIC | Facility: CLINIC | Age: 29
End: 2019-06-13
Payer: COMMERCIAL

## 2019-06-13 VITALS
WEIGHT: 224 LBS | DIASTOLIC BLOOD PRESSURE: 74 MMHG | HEIGHT: 65 IN | TEMPERATURE: 97 F | HEART RATE: 76 BPM | RESPIRATION RATE: 16 BRPM | OXYGEN SATURATION: 98 % | BODY MASS INDEX: 37.32 KG/M2 | SYSTOLIC BLOOD PRESSURE: 112 MMHG

## 2019-06-13 DIAGNOSIS — L25.9 CONTACT DERMATITIS, UNSPECIFIED CONTACT DERMATITIS TYPE, UNSPECIFIED TRIGGER: ICD-10-CM

## 2019-06-13 DIAGNOSIS — B37.89 CANDIDIASIS OF BREAST: Primary | ICD-10-CM

## 2019-06-13 PROCEDURE — 99214 OFFICE O/P EST MOD 30 MIN: CPT | Performed by: PHYSICIAN ASSISTANT

## 2019-06-13 PROCEDURE — 1036F TOBACCO NON-USER: CPT | Performed by: PHYSICIAN ASSISTANT

## 2019-06-13 PROCEDURE — 3008F BODY MASS INDEX DOCD: CPT | Performed by: PHYSICIAN ASSISTANT

## 2019-06-13 RX ORDER — CLOTRIMAZOLE 1 %
CREAM (GRAM) TOPICAL 2 TIMES DAILY
Qty: 30 G | Refills: 0 | Status: SHIPPED | OUTPATIENT
Start: 2019-06-13 | End: 2021-05-26

## 2019-06-13 RX ORDER — TRIAMCINOLONE ACETONIDE 5 MG/G
CREAM TOPICAL 2 TIMES DAILY
Qty: 30 G | Refills: 0 | Status: SHIPPED | OUTPATIENT
Start: 2019-06-13 | End: 2021-06-23

## 2019-07-16 ENCOUNTER — OFFICE VISIT (OUTPATIENT)
Dept: FAMILY MEDICINE CLINIC | Facility: CLINIC | Age: 29
End: 2019-07-16
Payer: COMMERCIAL

## 2019-07-16 VITALS
WEIGHT: 224 LBS | OXYGEN SATURATION: 98 % | RESPIRATION RATE: 16 BRPM | SYSTOLIC BLOOD PRESSURE: 120 MMHG | HEART RATE: 74 BPM | HEIGHT: 65 IN | BODY MASS INDEX: 37.32 KG/M2 | TEMPERATURE: 97.7 F | DIASTOLIC BLOOD PRESSURE: 76 MMHG

## 2019-07-16 DIAGNOSIS — E04.1 THYROID NODULE: ICD-10-CM

## 2019-07-16 DIAGNOSIS — F41.9 ANXIETY: Primary | ICD-10-CM

## 2019-07-16 DIAGNOSIS — E55.9 VITAMIN D DEFICIENCY: ICD-10-CM

## 2019-07-16 DIAGNOSIS — N94.10 DYSPAREUNIA IN FEMALE: ICD-10-CM

## 2019-07-16 DIAGNOSIS — N80.9 ENDOMETRIOSIS: ICD-10-CM

## 2019-07-16 PROCEDURE — 1036F TOBACCO NON-USER: CPT | Performed by: FAMILY MEDICINE

## 2019-07-16 PROCEDURE — 3008F BODY MASS INDEX DOCD: CPT | Performed by: FAMILY MEDICINE

## 2019-07-16 PROCEDURE — 99214 OFFICE O/P EST MOD 30 MIN: CPT | Performed by: FAMILY MEDICINE

## 2019-07-16 NOTE — PROGRESS NOTES
Assessment/Plan:      Diagnoses and all orders for this visit:    Anxiety  Comments:  She is going to try was Cymbalta again  Continue with psychiatrist and   Info on psychiatrists in the area given  Return in one month for follow-up    Thyroid nodule  Comments:  Script for U/S given  Orders:  -     US thyroid; Future    Vitamin D deficiency  Comments:  Uncontrolled  Encouraged pt to take 50,000 U of Vitamin D once a week    Endometriosis  Comments:  Continue to follow up with gynecologist   I will consider gabapentin  Dyspareunia in female  Comments:  Continue to follow up with Gynecology  Subjective:     Patient ID: Bryan Barksdale is a 34 y o  female  Pt is not taking the Cymbalta  She states that she took it for a week, and it made her feel "amped up" and "jittery"  She states that her anxiety is under control the most part, except for when she has to leave her house by herself  She is hesitant to try a different medication  She is looking for a new psychiatrist  She has been seeing her current one on and off for about a year  She does feel that seeing a  and counselor is helpful  She feels "stuck" in her mother's home because she has been taking care of her mom and is now thousands of dollars in debt  Pt lost the script of for the thyroid u/s  Requests a new one  Pt states that she still has joint pain in the knees and hips  She already is taking Tramadol for endometriosis that mildly helps with the joint pain  She would like to start getting the medication refilled here because of convenience  She previously had hysterectomy for endometriosis, but still get severe pain, especially in the area of her bowels and during intercourse  Bloodwork still shows low Vitamin D  She is not taking supplements, but is open to doing it  Review of Systems   Constitutional: Negative for chills and fever  HENT: Negative for trouble swallowing      Eyes: Negative for visual disturbance  Respiratory: Negative for cough and shortness of breath  Cardiovascular: Negative for chest pain, palpitations and leg swelling  Gastrointestinal: Negative for abdominal pain, constipation and diarrhea  Endocrine: Negative for cold intolerance and heat intolerance  Genitourinary: Negative for difficulty urinating and dysuria  Musculoskeletal: Positive for arthralgias and myalgias  Negative for gait problem  Skin: Negative for rash  Neurological: Negative for dizziness, tremors, seizures and headaches  Hematological: Negative for adenopathy  Psychiatric/Behavioral: Negative for behavioral problems  Objective:     Physical Exam   Constitutional: She is oriented to person, place, and time  She appears well-developed and well-nourished  HENT:   Head: Normocephalic and atraumatic  Eyes: Pupils are equal, round, and reactive to light  EOM are normal    Neck: Normal range of motion  Neck supple  Cardiovascular: Normal rate, regular rhythm and normal heart sounds  Pulmonary/Chest: Effort normal and breath sounds normal    Abdominal: Soft  Bowel sounds are normal    Musculoskeletal: Normal range of motion  She exhibits no edema  Lymphadenopathy:     She has no cervical adenopathy  Neurological: She is alert and oriented to person, place, and time  No cranial nerve deficit  Skin: Skin is warm  Psychiatric: She has a normal mood and affect  Nursing note and vitals reviewed

## 2019-11-20 ENCOUNTER — OFFICE VISIT (OUTPATIENT)
Dept: FAMILY MEDICINE CLINIC | Facility: CLINIC | Age: 29
End: 2019-11-20
Payer: COMMERCIAL

## 2019-11-20 VITALS
TEMPERATURE: 97.7 F | WEIGHT: 220.2 LBS | SYSTOLIC BLOOD PRESSURE: 110 MMHG | HEIGHT: 65 IN | BODY MASS INDEX: 36.69 KG/M2 | DIASTOLIC BLOOD PRESSURE: 76 MMHG | OXYGEN SATURATION: 98 % | HEART RATE: 85 BPM | RESPIRATION RATE: 16 BRPM

## 2019-11-20 DIAGNOSIS — J06.9 ACUTE UPPER RESPIRATORY INFECTION: Primary | ICD-10-CM

## 2019-11-20 PROBLEM — J02.0 STREP THROAT: Status: RESOLVED | Noted: 2019-04-02 | Resolved: 2019-11-20

## 2019-11-20 PROCEDURE — 1036F TOBACCO NON-USER: CPT | Performed by: PHYSICIAN ASSISTANT

## 2019-11-20 PROCEDURE — 99213 OFFICE O/P EST LOW 20 MIN: CPT | Performed by: PHYSICIAN ASSISTANT

## 2019-11-20 RX ORDER — PANTOPRAZOLE SODIUM 20 MG/1
40 TABLET, DELAYED RELEASE ORAL DAILY
COMMUNITY
Start: 2018-08-22 | End: 2021-06-23

## 2019-11-20 RX ORDER — FAMOTIDINE 40 MG/1
TABLET, FILM COATED ORAL
Refills: 0 | COMMUNITY
Start: 2019-11-06 | End: 2021-06-23

## 2019-11-20 RX ORDER — CETIRIZINE HYDROCHLORIDE 5 MG/1
5 TABLET ORAL DAILY
COMMUNITY
Start: 2019-06-21 | End: 2021-06-23

## 2019-11-20 RX ORDER — CONJUGATED ESTROGENS 0.62 MG/G
CREAM VAGINAL
Refills: 3 | COMMUNITY
Start: 2019-08-20 | End: 2021-01-19

## 2019-11-20 RX ORDER — ELAGOLIX 150 MG/1
1 TABLET, FILM COATED ORAL DAILY
Refills: 3 | COMMUNITY
Start: 2019-10-25 | End: 2021-09-08

## 2019-11-20 RX ORDER — ALUMINA, MAGNESIA, AND SIMETHICONE 2400; 2400; 240 MG/30ML; MG/30ML; MG/30ML
5 SUSPENSION ORAL EVERY 6 HOURS PRN
COMMUNITY
Start: 2019-11-06 | End: 2021-06-23

## 2019-11-20 RX ORDER — METHOCARBAMOL 750 MG/1
TABLET, FILM COATED ORAL
Refills: 0 | COMMUNITY
Start: 2019-10-09 | End: 2021-06-23

## 2019-11-20 RX ORDER — CYCLOBENZAPRINE HCL 10 MG
10 TABLET ORAL 3 TIMES DAILY
Refills: 1 | COMMUNITY
Start: 2019-10-11 | End: 2021-06-23

## 2019-11-20 NOTE — PROGRESS NOTES
Assessment/Plan:    -over-the-counter generic Sudafed 30 mg 2 tablets every 6 hours as needed  -over-the-counter generic Mucinex for cough as needed as package directs  -increase clear liquids  -nasal decongestant spray for 3-5 days  -patient advised to call if there is no improvement next week or if any symptoms increase    M*Modal software was used to dictate this note  It may contain errors with dictating incorrect words/spelling  Please contact provider directly for any questions  Diagnoses and all orders for this visit:    Acute upper respiratory infection    Other orders  -     aluminum-magnesium hydroxide-simethicone ([address] [place] [address]) 400-400-40 MG/5ML suspension; Take 5 mL by mouth every 6 (six) hours as needed  -     cetirizine (ZyrTEC) 5 MG tablet; Take 5 mg by mouth daily  -     cyclobenzaprine (FLEXERIL) 10 mg tablet; Take 10 mg by mouth 3 (three) times a day  -     ORILISSA 150 MG TABS; Take 1 tablet by mouth daily  -     PREMARIN vaginal cream; INSERT 1 2 (ONE HALF) APPLICATORFUL TWICE A WEEK BY VAGINAL ROUTE   -     famotidine (PEPCID) 40 MG tablet; TAKE 1 TABLET BY MOUTH TWICE DAILY AS NEEDED FOR HEARTBURN  -     methocarbamol (ROBAXIN) 750 mg tablet; TAKE 1 TABLET BY MOUTH 4 TIMES DAILY AS NEEDED FOR MUSCLE SPASM  -     pantoprazole (PROTONIX) 20 mg tablet; Take 40 mg by mouth daily          Subjective:      Patient ID: Guido Quinn is a 34 y o  female  Patient presents today for evaluation of upper respiratory symptoms that started over the past 4 days  She states that for she did have a fever of 101° which has now resolved  She continues with nasal congestion  She does have a cough  Intermittently she does notice some green mucus  She does have a scratchy throat  She states her daughter was recently ill who recently started         The following portions of the patient's history were reviewed and updated as appropriate:   She  has a past medical history of Anxiety, Endometriosis, and Kidney stone on left side  She   Patient Active Problem List    Diagnosis Date Noted    Acute upper respiratory infection 2019    Dyspareunia in female 2019    Candidiasis of breast 2019    Anxiety 2019    Vitamin D deficiency 2019    Thyroid nodule 2019    Healthcare maintenance 2019    Chronic arthralgias of knees and hips 2019    Fatigue 2019    Nausea 2019    Chronic pelvic pain in female 10/10/2016    Endometriosis 10/10/2016     She  has a past surgical history that includes Hysterectomy;  section; and Tubal ligation  Her family history includes Coronary artery disease in her mother; No Known Problems in her father  She  reports that she has never smoked  She has never used smokeless tobacco  She reports that she drinks alcohol  She reports that she does not use drugs    Current Outpatient Medications   Medication Sig Dispense Refill    aluminum-magnesium hydroxide-simethicone (MAALOX ADVANCED MAX ST) 400-400-40 MG/5ML suspension Take 5 mL by mouth every 6 (six) hours as needed      cetirizine (ZyrTEC) 5 MG tablet Take 5 mg by mouth daily      famotidine (PEPCID) 40 MG tablet TAKE 1 TABLET BY MOUTH TWICE DAILY AS NEEDED FOR HEARTBURN  0    ibuprofen (MOTRIN) 600 mg tablet       ondansetron (ZOFRAN-ODT) 4 mg disintegrating tablet Take 1 tablet (4 mg total) by mouth every 8 (eight) hours as needed for nausea 20 tablet 0    ORILISSA 150 MG TABS Take 1 tablet by mouth daily  3    traMADol (ULTRAM) 50 mg tablet Take 50 mg by mouth every 6 (six) hours as needed      valACYclovir (VALTREX) 1,000 mg tablet   3    clotrimazole (LOTRIMIN) 1 % cream Apply topically 2 (two) times a day (Patient not taking: Reported on 2019) 30 g 0    cyclobenzaprine (FLEXERIL) 10 mg tablet Take 10 mg by mouth 3 (three) times a day  1    DULoxetine (CYMBALTA) 20 mg capsule Take 1 capsule (20 mg total) by mouth daily (Patient not taking: Reported on 7/16/2019) 30 capsule 0    ergocalciferol (VITAMIN D2) 50,000 units Take 1 capsule (50,000 Units total) by mouth once a week (Patient not taking: Reported on 7/16/2019) 12 capsule 1    fenoprofen (NALFON) 600 MG Take 600 mg by mouth 3 (three) times a day  2    fluconazole (DIFLUCAN) 150 mg tablet   1    leuprolide (LUPRON DEPOT 3 MONTH KIT) 11 25 mg injection Inject 11 25 mg into a muscle every 3 (three) months      methocarbamol (ROBAXIN) 750 mg tablet TAKE 1 TABLET BY MOUTH 4 TIMES DAILY AS NEEDED FOR MUSCLE SPASM  0    pantoprazole (PROTONIX) 20 mg tablet Take 40 mg by mouth daily      PREMARIN vaginal cream INSERT 1 2 (ONE HALF) APPLICATORFUL TWICE A WEEK BY VAGINAL ROUTE   3    triamcinolone (KENALOG) 0 5 % cream Apply topically 2 (two) times a day to rash on both legs  Avoid face or genital region  Maximum use for 2 weeks  (Patient not taking: Reported on 7/16/2019) 30 g 0     No current facility-administered medications for this visit        Current Outpatient Medications on File Prior to Visit   Medication Sig    aluminum-magnesium hydroxide-simethicone (MAALOX ADVANCED MAX ST) 400-400-40 MG/5ML suspension Take 5 mL by mouth every 6 (six) hours as needed    cetirizine (ZyrTEC) 5 MG tablet Take 5 mg by mouth daily    famotidine (PEPCID) 40 MG tablet TAKE 1 TABLET BY MOUTH TWICE DAILY AS NEEDED FOR HEARTBURN    ibuprofen (MOTRIN) 600 mg tablet     ondansetron (ZOFRAN-ODT) 4 mg disintegrating tablet Take 1 tablet (4 mg total) by mouth every 8 (eight) hours as needed for nausea    ORILISSA 150 MG TABS Take 1 tablet by mouth daily    traMADol (ULTRAM) 50 mg tablet Take 50 mg by mouth every 6 (six) hours as needed    valACYclovir (VALTREX) 1,000 mg tablet     clotrimazole (LOTRIMIN) 1 % cream Apply topically 2 (two) times a day (Patient not taking: Reported on 7/16/2019)    cyclobenzaprine (FLEXERIL) 10 mg tablet Take 10 mg by mouth 3 (three) times a day    DULoxetine (CYMBALTA) 20 mg capsule Take 1 capsule (20 mg total) by mouth daily (Patient not taking: Reported on 7/16/2019)    ergocalciferol (VITAMIN D2) 50,000 units Take 1 capsule (50,000 Units total) by mouth once a week (Patient not taking: Reported on 7/16/2019)    fenoprofen (NALFON) 600 MG Take 600 mg by mouth 3 (three) times a day    fluconazole (DIFLUCAN) 150 mg tablet     leuprolide (LUPRON DEPOT 3 MONTH KIT) 11 25 mg injection Inject 11 25 mg into a muscle every 3 (three) months    methocarbamol (ROBAXIN) 750 mg tablet TAKE 1 TABLET BY MOUTH 4 TIMES DAILY AS NEEDED FOR MUSCLE SPASM    pantoprazole (PROTONIX) 20 mg tablet Take 40 mg by mouth daily    PREMARIN vaginal cream INSERT 1 2 (ONE HALF) APPLICATORFUL TWICE A WEEK BY VAGINAL ROUTE   triamcinolone (KENALOG) 0 5 % cream Apply topically 2 (two) times a day to rash on both legs  Avoid face or genital region  Maximum use for 2 weeks  (Patient not taking: Reported on 7/16/2019)     No current facility-administered medications on file prior to visit  She is allergic to other and reglan [metoclopramide]       Review of Systems   Constitutional:        As stated in HPI   HENT:        As stated in HPI   Respiratory:        As stated in HPI         Objective:      /76 (BP Location: Left arm, Patient Position: Sitting, Cuff Size: Large)   Pulse 85   Temp 97 7 °F (36 5 °C) (Tympanic)   Resp 16   Ht 5' 5" (1 651 m)   Wt 99 9 kg (220 lb 3 2 oz)   SpO2 98%   BMI 36 64 kg/m²          Physical Exam   Constitutional: She appears well-developed and well-nourished  No distress  HENT:   Head: Normocephalic and atraumatic  Right Ear: External ear normal    Left Ear: External ear normal    Mouth/Throat: Oropharynx is clear and moist    Neck: Neck supple  Cardiovascular: Normal rate, regular rhythm and normal heart sounds  No murmur heard  Pulmonary/Chest: Effort normal and breath sounds normal  No respiratory distress   She has no wheezes  She has no rales  Lymphadenopathy:     She has no cervical adenopathy  Neurological: She is alert  Skin: Skin is warm  Psychiatric: She has a normal mood and affect

## 2020-03-05 ENCOUNTER — TELEPHONE (OUTPATIENT)
Dept: OBGYN CLINIC | Facility: CLINIC | Age: 30
End: 2020-03-05

## 2020-03-05 NOTE — TELEPHONE ENCOUNTER
Patient sent a message via patient portal, regarding tramadol 50 mg tablet , Feb  Was the last month of refills, for the tramadol, Im not sure if you want to prescribe them the same and try to get the insurance  To cover them or something else, The prescription is due to be filled on the 7th or 8th,

## 2020-03-06 DIAGNOSIS — N80.9 ENDOMETRIOSIS: Primary | ICD-10-CM

## 2020-03-09 DIAGNOSIS — N80.9 ENDOMETRIOSIS: Primary | ICD-10-CM

## 2020-03-09 NOTE — TELEPHONE ENCOUNTER
Spoke with patient she states she called the pharmacy and checked her [facility] application, and their are no prescriptions their , she states the script she picked up in February states their are no refills

## 2020-03-10 DIAGNOSIS — N80.9 ENDOMETRIOSIS DETERMINED BY LAPAROSCOPY: Primary | ICD-10-CM

## 2020-03-10 RX ORDER — TRAMADOL HYDROCHLORIDE 50 MG/1
50 TABLET ORAL EVERY 6 HOURS PRN
Qty: 30 TABLET | Refills: 1 | Status: SHIPPED | OUTPATIENT
Start: 2020-03-10 | End: 2020-06-02 | Stop reason: SDUPTHER

## 2020-03-10 RX ORDER — TRAMADOL HYDROCHLORIDE 50 MG/1
50 TABLET ORAL EVERY 6 HOURS PRN
Qty: 30 TABLET | Refills: 3 | Status: SHIPPED | OUTPATIENT
Start: 2020-03-10 | End: 2020-04-27 | Stop reason: SDUPTHER

## 2020-03-10 NOTE — PROGRESS NOTES
I renewed her tramadol  We contacted her pharmacy  She did not have refills nor did she refill this medication

## 2020-03-10 NOTE — TELEPHONE ENCOUNTER
Spoke with Foster Gallardo at [facility], he did say they never received the prescription, for the tramadol

## 2020-03-12 ENCOUNTER — TELEPHONE (OUTPATIENT)
Dept: OBGYN CLINIC | Facility: CLINIC | Age: 30
End: 2020-03-12

## 2020-03-12 NOTE — TELEPHONE ENCOUNTER
[facility] called and left a voice message that the prescription for tramadol was , sent in twice so, they did cancel one of the orders

## 2020-04-06 ENCOUNTER — TELEPHONE (OUTPATIENT)
Dept: OBGYN CLINIC | Facility: CLINIC | Age: 30
End: 2020-04-06

## 2020-04-07 DIAGNOSIS — N80.9 ENDOMETRIOSIS DETERMINED BY LAPAROSCOPY: Primary | ICD-10-CM

## 2020-04-17 RX ORDER — TRAMADOL HYDROCHLORIDE 50 MG/1
50 TABLET ORAL EVERY 8 HOURS PRN
Qty: 90 TABLET | Refills: 0 | OUTPATIENT
Start: 2020-04-17

## 2020-04-27 ENCOUNTER — TELEPHONE (OUTPATIENT)
Dept: OBGYN CLINIC | Facility: CLINIC | Age: 30
End: 2020-04-27

## 2020-04-27 DIAGNOSIS — N80.9 ENDOMETRIOSIS DETERMINED BY LAPAROSCOPY: ICD-10-CM

## 2020-04-27 DIAGNOSIS — R10.2 PELVIC PAIN: Primary | ICD-10-CM

## 2020-04-27 DIAGNOSIS — N80.9 ENDOMETRIOSIS: ICD-10-CM

## 2020-04-27 RX ORDER — TRAMADOL HYDROCHLORIDE 50 MG/1
50 TABLET ORAL EVERY 6 HOURS PRN
Qty: 30 TABLET | Refills: 3 | Status: SHIPPED | OUTPATIENT
Start: 2020-04-27 | End: 2020-07-02 | Stop reason: SDUPTHER

## 2020-04-28 ENCOUNTER — TELEMEDICINE (OUTPATIENT)
Dept: OBGYN CLINIC | Facility: CLINIC | Age: 30
End: 2020-04-28
Payer: COMMERCIAL

## 2020-04-28 DIAGNOSIS — R10.2 CHRONIC PELVIC PAIN IN FEMALE: Primary | ICD-10-CM

## 2020-04-28 DIAGNOSIS — G89.29 CHRONIC PELVIC PAIN IN FEMALE: Primary | ICD-10-CM

## 2020-04-28 DIAGNOSIS — N80.9 ENDOMETRIOSIS DETERMINED BY LAPAROSCOPY: ICD-10-CM

## 2020-04-28 PROCEDURE — 99213 OFFICE O/P EST LOW 20 MIN: CPT | Performed by: OBSTETRICS & GYNECOLOGY

## 2020-05-29 DIAGNOSIS — N80.9 ENDOMETRIOSIS DETERMINED BY LAPAROSCOPY: ICD-10-CM

## 2020-05-29 DIAGNOSIS — R10.2 PELVIC PAIN: ICD-10-CM

## 2020-06-01 ENCOUNTER — TELEPHONE (OUTPATIENT)
Dept: OBGYN CLINIC | Facility: CLINIC | Age: 30
End: 2020-06-01

## 2020-06-02 DIAGNOSIS — N80.9 ENDOMETRIOSIS: ICD-10-CM

## 2020-06-02 RX ORDER — TRAMADOL HYDROCHLORIDE 50 MG/1
50 TABLET ORAL EVERY 6 HOURS PRN
Qty: 30 TABLET | Refills: 0 | Status: SHIPPED | OUTPATIENT
Start: 2020-06-02 | End: 2020-11-16 | Stop reason: SDUPTHER

## 2020-06-02 RX ORDER — TRAMADOL HYDROCHLORIDE 50 MG/1
TABLET ORAL
Qty: 30 TABLET | Refills: 0 | OUTPATIENT
Start: 2020-06-02

## 2020-06-11 DIAGNOSIS — R10.2 PELVIC PAIN: ICD-10-CM

## 2020-06-11 DIAGNOSIS — N80.9 ENDOMETRIOSIS: Primary | ICD-10-CM

## 2020-06-11 DIAGNOSIS — N80.9 ENDOMETRIOSIS DETERMINED BY LAPAROSCOPY: ICD-10-CM

## 2020-06-12 RX ORDER — TRAMADOL HYDROCHLORIDE 50 MG/1
50 TABLET ORAL EVERY 6 HOURS PRN
Qty: 30 TABLET | Refills: 3 | Status: CANCELLED | OUTPATIENT
Start: 2020-06-12

## 2020-06-15 ENCOUNTER — TELEPHONE (OUTPATIENT)
Dept: OBGYN CLINIC | Facility: CLINIC | Age: 30
End: 2020-06-15

## 2020-06-17 ENCOUNTER — TELEPHONE (OUTPATIENT)
Dept: OBGYN CLINIC | Facility: CLINIC | Age: 30
End: 2020-06-17

## 2020-06-19 DIAGNOSIS — R10.2 CHRONIC PELVIC PAIN IN FEMALE: Primary | ICD-10-CM

## 2020-06-19 DIAGNOSIS — G89.29 CHRONIC PELVIC PAIN IN FEMALE: Primary | ICD-10-CM

## 2020-06-19 RX ORDER — GABAPENTIN 300 MG/1
300 CAPSULE ORAL 3 TIMES DAILY
Qty: 90 CAPSULE | Refills: 6 | Status: SHIPPED | OUTPATIENT
Start: 2020-06-19 | End: 2021-02-12

## 2020-06-23 ENCOUNTER — TELEPHONE (OUTPATIENT)
Dept: OBGYN CLINIC | Facility: CLINIC | Age: 30
End: 2020-06-23

## 2020-06-30 DIAGNOSIS — R10.2 PELVIC PAIN: ICD-10-CM

## 2020-06-30 DIAGNOSIS — N80.9 ENDOMETRIOSIS: ICD-10-CM

## 2020-06-30 DIAGNOSIS — N80.9 ENDOMETRIOSIS DETERMINED BY LAPAROSCOPY: ICD-10-CM

## 2020-06-30 NOTE — TELEPHONE ENCOUNTER
Pt called and states she wants refills for tramadol  She is no longer taking the gabapentin as it was making her very tired and does not want to be like that when taking care of her kids  She was trying to get in with pain management but a lot of them do not deal with abd pain/endometriosis, or they were not taking her insurance  Not sure what else you recommend for her at this point  She states she would take tramdol 3x a day which helped with her symptoms  Please advise

## 2020-07-01 NOTE — TELEPHONE ENCOUNTER
Spoke with patient, reassured her  The she has been frustrated with this long-term pelvic pain    She agreed to see Dr Maggie Lopez

## 2020-07-01 NOTE — TELEPHONE ENCOUNTER
Pt called made aware of recommendation and gave information for Dr Grace Koo, pt is unhappy with her care here, feels like she has been abandoned  And is in severe pain and would like to speak with directly      Offered to schedule appt, pt declined

## 2020-07-02 RX ORDER — TRAMADOL HYDROCHLORIDE 50 MG/1
50 TABLET ORAL EVERY 6 HOURS PRN
Qty: 30 TABLET | Refills: 3 | Status: SHIPPED | OUTPATIENT
Start: 2020-07-02 | End: 2020-11-16 | Stop reason: CLARIF

## 2020-07-02 NOTE — TELEPHONE ENCOUNTER
Pt called stating, pharmacy said insurance denied the prescription for tramadol   I called [address], pharmacist said insurance is denying the medication for excess use

## 2020-07-02 NOTE — TELEPHONE ENCOUNTER
Patient will need to use Tylenol and Motrin over the weekend    She can also use some of the gabapentin I ordered

## 2020-09-16 ENCOUNTER — HOSPITAL ENCOUNTER (EMERGENCY)
Facility: HOSPITAL | Age: 30
Discharge: HOME | End: 2020-09-16
Attending: INTERNAL MEDICINE | Admitting: INTERNAL MEDICINE
Payer: COMMERCIAL

## 2020-09-16 VITALS
TEMPERATURE: 98.4 F | OXYGEN SATURATION: 100 % | DIASTOLIC BLOOD PRESSURE: 66 MMHG | RESPIRATION RATE: 20 BRPM | SYSTOLIC BLOOD PRESSURE: 117 MMHG | HEART RATE: 73 BPM | WEIGHT: 220 LBS | BODY MASS INDEX: 37.56 KG/M2 | HEIGHT: 64 IN

## 2020-09-16 DIAGNOSIS — K02.9 DENTAL CARIES: Primary | ICD-10-CM

## 2020-09-16 PROCEDURE — 99283 EMERGENCY DEPT VISIT LOW MDM: CPT

## 2020-09-16 PROCEDURE — 99284 EMERGENCY DEPT VISIT MOD MDM: CPT | Performed by: INTERNAL MEDICINE

## 2020-09-16 RX ORDER — IBUPROFEN 600 MG/1
600 TABLET ORAL EVERY 6 HOURS PRN
Qty: 30 TABLET | Refills: 0 | Status: SHIPPED | OUTPATIENT
Start: 2020-09-16 | End: 2021-05-26

## 2020-09-16 RX ORDER — IBUPROFEN 600 MG/1
600 TABLET ORAL ONCE
Status: COMPLETED | OUTPATIENT
Start: 2020-09-16 | End: 2020-09-16

## 2020-09-16 RX ORDER — AMOXICILLIN 250 MG/1
500 CAPSULE ORAL ONCE
Status: COMPLETED | OUTPATIENT
Start: 2020-09-16 | End: 2020-09-16

## 2020-09-16 RX ORDER — AMOXICILLIN 500 MG/1
500 CAPSULE ORAL 3 TIMES DAILY
Qty: 21 CAPSULE | Refills: 0 | Status: SHIPPED | OUTPATIENT
Start: 2020-09-16 | End: 2020-09-23

## 2020-09-16 RX ADMIN — IBUPROFEN 600 MG: 600 TABLET, FILM COATED ORAL at 17:23

## 2020-09-16 RX ADMIN — AMOXICILLIN 500 MG: 250 CAPSULE ORAL at 17:23

## 2020-09-16 NOTE — ED PROVIDER NOTES
History  Chief Complaint   Patient presents with    Dental Problem     pt presents to ed via walk in with left lower dental pain/jaw states she has some bad teeth but can't get into the dentist till november x2 days has been hurting really bad      This is a 34years old came for having dental pain  Patient stated that this is going on for few days and most of the pain is on the above left days  Patient denies any fever  Patient denies any facial swelling but she has pain on the left ear  Patient has no other symptoms denies any shortness of breath  Patient called have dentist before and she has appointment on November 27th  Prior to Admission Medications   Prescriptions Last Dose Informant Patient Reported? Taking?    DULoxetine (CYMBALTA) 20 mg capsule   No No   Sig: Take 1 capsule (20 mg total) by mouth daily   Patient not taking: Reported on 7/16/2019   ORILISSA 150 MG TABS   Yes No   Sig: Take 1 tablet by mouth daily   PREMARIN vaginal cream   Yes No   Sig: INSERT 1 2 (ONE HALF) APPLICATORFUL TWICE A WEEK BY VAGINAL ROUTE    aluminum-magnesium hydroxide-simethicone (MAALOX ADVANCED MAX ST) 400-400-40 MG/5ML suspension   Yes No   Sig: Take 5 mL by mouth every 6 (six) hours as needed   cetirizine (ZyrTEC) 5 MG tablet   Yes No   Sig: Take 5 mg by mouth daily   clotrimazole (LOTRIMIN) 1 % cream   No No   Sig: Apply topically 2 (two) times a day   Patient not taking: Reported on 7/16/2019   cyclobenzaprine (FLEXERIL) 10 mg tablet   Yes No   Sig: Take 10 mg by mouth 3 (three) times a day   ergocalciferol (VITAMIN D2) 50,000 units  Self No No   Sig: Take 1 capsule (50,000 Units total) by mouth once a week   Patient not taking: Reported on 7/16/2019   famotidine (PEPCID) 40 MG tablet   Yes No   Sig: TAKE 1 TABLET BY MOUTH TWICE DAILY AS NEEDED FOR HEARTBURN   fenoprofen (NALFON) 600 MG  Self Yes No   Sig: Take 600 mg by mouth 3 (three) times a day   fluconazole (DIFLUCAN) 150 mg tablet  Self Yes No gabapentin (NEURONTIN) 300 mg capsule   No No   Sig: Take 1 capsule (300 mg total) by mouth 3 (three) times a day Please instruct patient to start with one table PO daily for one day, then one tablet BID for one day, then she may do one tablet TID for maintenance  ibuprofen (MOTRIN) 600 mg tablet  Self Yes No   leuprolide (LUPRON DEPOT 3 MONTH KIT) 11 25 mg injection  Self Yes No   Sig: Inject 11 25 mg into a muscle every 3 (three) months   methocarbamol (ROBAXIN) 750 mg tablet   Yes No   Sig: TAKE 1 TABLET BY MOUTH 4 TIMES DAILY AS NEEDED FOR MUSCLE SPASM   ondansetron (ZOFRAN-ODT) 4 mg disintegrating tablet  Self No No   Sig: Take 1 tablet (4 mg total) by mouth every 8 (eight) hours as needed for nausea   pantoprazole (PROTONIX) 20 mg tablet   Yes No   Sig: Take 40 mg by mouth daily   traMADol (ULTRAM) 50 mg tablet   No No   Sig: Take 1 tablet (50 mg total) by mouth every 6 (six) hours as needed (as needed for pain)   traMADol (ULTRAM) 50 mg tablet   No No   Sig: Take 1 tablet (50 mg total) by mouth every 6 (six) hours as needed for moderate pain   triamcinolone (KENALOG) 0 5 % cream   No No   Sig: Apply topically 2 (two) times a day to rash on both legs  Avoid face or genital region  Maximum use for 2 weeks     Patient not taking: Reported on 2019   valACYclovir (VALTREX) 1,000 mg tablet  Self Yes No      Facility-Administered Medications: None       Past Medical History:   Diagnosis Date    Anxiety     Chicken pox     Depression     Endometriosis     GERD (gastroesophageal reflux disease)     Headache     Occasional     HSV-1 infection     IBS (irritable bowel syndrome)     Kidney stone on left side     Multiple thyroid nodules     Obesity     Renal calculi        Past Surgical History:   Procedure Laterality Date     SECTION      x2    HYSTERECTOMY      robotic total laparoscopic hysterectomy with BS    LAPAROSCOPIC ENDOMETRIOSIS FULGURATION  2018    laparoscopic excision of endometriosis, fulguration of endometriosis, lysis of adhesions    LAPAROSCOPY  05/06/2014    with I&D     MOLE REMOVAL      face - benign     SKIN CANCER EXCISION      abdomen    THYROID CYST EXCISION      TONSILLECTOMY      TUBAL LIGATION  02/15/2016    With lysis of adhesion and peritoneal biopsy    WISDOM TOOTH EXTRACTION         Family History   Problem Relation Age of Onset    Coronary artery disease Mother     Varicose Veins Mother     Other Mother         breast cyst - removed     Cholelithiasis Mother         had cholecystectomy    Alcohol abuse Father     Cholelithiasis Maternal Grandmother         had cholecystectomy    Uterine cancer Paternal Grandmother     Thyroid disease Maternal Aunt     Breast cancer Family         Before age 52    [place] Bryan Uterine cancer Family     Obesity Family     Hypertension Family     Brain cancer Family     Gallbladder disease Family      I have reviewed and agree with the history as documented  E-Cigarette/Vaping    E-Cigarette Use Never User      E-Cigarette/Vaping Substances     Social History     Tobacco Use    Smoking status: Never Smoker    Smokeless tobacco: Never Used   Substance Use Topics    Alcohol use: Yes     Comment: rarely    Drug use: No       Review of Systems   Constitutional: Negative for fatigue and fever  HENT: Positive for dental problem and ear pain  Negative for ear discharge, facial swelling, mouth sores, sinus pressure and sinus pain  Respiratory: Negative for cough and shortness of breath  Cardiovascular: Negative for chest pain  Physical Exam  Physical Exam  Constitutional:       Appearance: Normal appearance  HENT:      Head: Normocephalic  Nose: Nose normal       Mouth/Throat:      Mouth: Mucous membranes are moist       Pharynx: No oropharyngeal exudate or posterior oropharyngeal erythema  Comments: At tooth #15 tenderness of tooth and gum is swollen tender , inflamed    Neck: Musculoskeletal: Normal range of motion and neck supple  Skin:     General: Skin is warm  Neurological:      Mental Status: She is alert and oriented to person, place, and time  Vital Signs  ED Triage Vitals [09/16/20 1708]   Temperature Pulse Respirations Blood Pressure SpO2   98 4 °F (36 9 °C) 73 20 117/66 100 %      Temp Source Heart Rate Source Patient Position - Orthostatic VS BP Location FiO2 (%)   Tympanic Monitor Sitting Left arm --      Pain Score       8           Vitals:    09/16/20 1708   BP: 117/66   Pulse: 73   Patient Position - Orthostatic VS: Sitting         Visual Acuity      ED Medications  Medications   amoxicillin (AMOXIL) capsule 500 mg (500 mg Oral Given 9/16/20 1723)   ibuprofen (MOTRIN) tablet 600 mg (600 mg Oral Given 9/16/20 1723)       Diagnostic Studies  Results Reviewed     None                 No orders to display              Procedures  Procedures         ED Course                           SBIRT 20yo+      Most Recent Value   SBIRT (22 yo +)   In order to provide better care to our patients, we are screening all of our patients for alcohol and drug use  Would it be okay to ask you these screening questions? Yes Filed at: 09/16/2020 1724   Initial Alcohol Screen: US AUDIT-C    1  How often do you have a drink containing alcohol?  0 Filed at: 09/16/2020 1724   2  How many drinks containing alcohol do you have on a typical day you are drinking? 0 Filed at: 09/16/2020 1724   3a  Male UNDER 65: How often do you have five or more drinks on one occasion? 0 Filed at: 09/16/2020 1724   3b  FEMALE Any Age, or MALE 65+: How often do you have 4 or more drinks on one occassion? 0 Filed at: 09/16/2020 1724   Audit-C Score  0 Filed at: 09/16/2020 1724   GERARDO: How many times in the past year have you    Used an illegal drug or used a prescription medication for non-medical reasons?   Never Filed at: 09/16/2020 1724                  MDM    Disposition  Final diagnoses:   Dental caries     Time reflects when diagnosis was documented in both MDM as applicable and the Disposition within this note     Time User Action Codes Description Comment    9/16/2020  5:22 PM Geovanni De La Rosa Add [K02 9] Dental caries       ED Disposition     ED Disposition Condition Date/Time Comment    Discharge Stable Wed Sep 16, 2020  5:22 PM Alexus Shah discharge to home/self care              Follow-up Information     Follow up With Specialties Details Why Contact Info        follow up with your dentist           Discharge Medication List as of 9/16/2020  5:31 PM      START taking these medications    Details   amoxicillin (AMOXIL) 500 mg capsule Take 1 capsule (500 mg total) by mouth 3 (three) times a day for 7 days, Starting Wed 9/16/2020, Until Wed 9/23/2020, Normal         CONTINUE these medications which have CHANGED    Details   ibuprofen (MOTRIN) 600 mg tablet Take 1 tablet (600 mg total) by mouth every 6 (six) hours as needed for mild pain for up to 30 doses, Starting Wed 9/16/2020, Normal         CONTINUE these medications which have NOT CHANGED    Details   aluminum-magnesium hydroxide-simethicone (MAALOX ADVANCED MAX ST) 400-400-40 MG/5ML suspension Take 5 mL by mouth every 6 (six) hours as needed, Starting Wed 11/6/2019, Historical Med      cetirizine (ZyrTEC) 5 MG tablet Take 5 mg by mouth daily, Starting Fri 6/21/2019, Historical Med      clotrimazole (LOTRIMIN) 1 % cream Apply topically 2 (two) times a day, Starting Thu 6/13/2019, Normal      cyclobenzaprine (FLEXERIL) 10 mg tablet Take 10 mg by mouth 3 (three) times a day, Starting Fri 10/11/2019, Historical Med      DULoxetine (CYMBALTA) 20 mg capsule Take 1 capsule (20 mg total) by mouth daily, Starting Thu 6/6/2019, Normal      ergocalciferol (VITAMIN D2) 50,000 units Take 1 capsule (50,000 Units total) by mouth once a week, Starting Wed 5/15/2019, Normal      famotidine (PEPCID) 40 MG tablet TAKE 1 TABLET BY MOUTH TWICE DAILY AS NEEDED FOR HEARTBURN, Historical Med      fenoprofen (NALFON) 600 MG Take 600 mg by mouth 3 (three) times a day, Starting Wed 5/1/2019, Historical Med      fluconazole (DIFLUCAN) 150 mg tablet Starting Tue 4/2/2019, Historical Med      gabapentin (NEURONTIN) 300 mg capsule Take 1 capsule (300 mg total) by mouth 3 (three) times a day Please instruct patient to start with one table PO daily for one day, then one tablet BID for one day, then she may do one tablet TID for maintenance , Starting Fri 6/19/2020, Normal      leuprolide (LUPRON DEPOT 3 MONTH KIT) 11 25 mg injection Inject 11 25 mg into a muscle every 3 (three) months, Historical Med      methocarbamol (ROBAXIN) 750 mg tablet TAKE 1 TABLET BY MOUTH 4 TIMES DAILY AS NEEDED FOR MUSCLE SPASM, Historical Med      ondansetron (ZOFRAN-ODT) 4 mg disintegrating tablet Take 1 tablet (4 mg total) by mouth every 8 (eight) hours as needed for nausea, Starting Tue 4/2/2019, Normal      ORILISSA 150 MG TABS Take 1 tablet by mouth daily, Starting Fri 10/25/2019, Historical Med      pantoprazole (PROTONIX) 20 mg tablet Take 40 mg by mouth daily, Starting Wed 8/22/2018, Historical Med      PREMARIN vaginal cream INSERT 1 2 (ONE HALF) APPLICATORFUL TWICE A WEEK BY VAGINAL ROUTE , Historical Med      !! traMADol (ULTRAM) 50 mg tablet Take 1 tablet (50 mg total) by mouth every 6 (six) hours as needed (as needed for pain), Starting Tue 6/2/2020, Normal      !! traMADol (ULTRAM) 50 mg tablet Take 1 tablet (50 mg total) by mouth every 6 (six) hours as needed for moderate pain, Starting Thu 7/2/2020, Normal      triamcinolone (KENALOG) 0 5 % cream Apply topically 2 (two) times a day to rash on both legs  Avoid face or genital region  Maximum use for 2 weeks  , Starting Thu 6/13/2019, Normal      valACYclovir (VALTREX) 1,000 mg tablet Starting Tue 4/30/2019, Historical Med       !! - Potential duplicate medications found  Please discuss with provider          No discharge procedures on file     PDMP Review     None          ED Provider  Electronically Signed by           Adrian Cheatham MD  09/17/20 2171

## 2020-09-22 ENCOUNTER — TELEPHONE (OUTPATIENT)
Dept: FAMILY MEDICINE CLINIC | Facility: CLINIC | Age: 30
End: 2020-09-22

## 2020-11-16 ENCOUNTER — TELEPHONE (OUTPATIENT)
Dept: OBGYN CLINIC | Facility: CLINIC | Age: 30
End: 2020-11-16

## 2020-11-16 DIAGNOSIS — N80.9 ENDOMETRIOSIS: ICD-10-CM

## 2020-11-16 DIAGNOSIS — N80.9 ENDOMETRIOSIS DETERMINED BY LAPAROSCOPY: ICD-10-CM

## 2020-11-16 DIAGNOSIS — R10.2 PELVIC PAIN: ICD-10-CM

## 2020-11-16 RX ORDER — TRAMADOL HYDROCHLORIDE 50 MG/1
50 TABLET ORAL EVERY 6 HOURS PRN
Qty: 30 TABLET | Refills: 0 | Status: SHIPPED | OUTPATIENT
Start: 2020-11-16 | End: 2021-02-16 | Stop reason: SDUPTHER

## 2020-11-16 RX ORDER — TRAMADOL HYDROCHLORIDE 50 MG/1
50 TABLET ORAL EVERY 6 HOURS PRN
Qty: 30 TABLET | Refills: 0 | Status: SHIPPED | OUTPATIENT
Start: 2020-11-16 | End: 2020-11-16 | Stop reason: SDUPTHER

## 2020-12-23 ENCOUNTER — TELEPHONE (OUTPATIENT)
Dept: OBGYN CLINIC | Facility: CLINIC | Age: 30
End: 2020-12-23

## 2020-12-23 NOTE — TELEPHONE ENCOUNTER
As per Dr Francois Lu note patient need to go for 2nd opinion pain specialist secondary to her endometriosis and persistent pain I can prescribe naproxen for her I usually do not prescribe Ultram we can try Jadon Rodriguez if cover by insurance  Please ask patietn to check with her insurance

## 2020-12-28 NOTE — TELEPHONE ENCOUNTER
Pt called back and states Naproxen does not help with the pain  She states she did everything Dr Naima Livingston had recommend her to do, physical therapy, pain specialist, [facility]  She states when she saw the pain specialist they recommended for her to have surgery, which she does not want to do  She feels like nothing helps take the pain away  Not sure what else to do at this point and feels like everyone is just leaving her to deal with the pain  Not sure if you know of any doctors that specialize in treatment of endometriosis or where she should go from here?

## 2021-01-05 NOTE — TELEPHONE ENCOUNTER
Pt called requesting medication for pain, tried calling pt after speaking to the doctor, patient must be seen in office to discuss pain management

## 2021-01-15 ENCOUNTER — TELEPHONE (OUTPATIENT)
Dept: OBGYN CLINIC | Facility: CLINIC | Age: 31
End: 2021-01-15

## 2021-01-15 ENCOUNTER — OFFICE VISIT (OUTPATIENT)
Dept: FAMILY MEDICINE CLINIC | Facility: CLINIC | Age: 31
End: 2021-01-15
Payer: COMMERCIAL

## 2021-01-15 ENCOUNTER — TELEPHONE (OUTPATIENT)
Dept: FAMILY MEDICINE CLINIC | Facility: CLINIC | Age: 31
End: 2021-01-15

## 2021-01-15 VITALS
BODY MASS INDEX: 40.63 KG/M2 | SYSTOLIC BLOOD PRESSURE: 116 MMHG | OXYGEN SATURATION: 99 % | TEMPERATURE: 98 F | HEART RATE: 91 BPM | HEIGHT: 64 IN | WEIGHT: 238 LBS | DIASTOLIC BLOOD PRESSURE: 78 MMHG | RESPIRATION RATE: 18 BRPM

## 2021-01-15 DIAGNOSIS — F41.9 ANXIETY: ICD-10-CM

## 2021-01-15 DIAGNOSIS — G89.29 CHRONIC BILATERAL LOW BACK PAIN WITH BILATERAL SCIATICA: ICD-10-CM

## 2021-01-15 DIAGNOSIS — N80.9 ENDOMETRIOSIS: ICD-10-CM

## 2021-01-15 DIAGNOSIS — M54.41 CHRONIC BILATERAL LOW BACK PAIN WITH BILATERAL SCIATICA: ICD-10-CM

## 2021-01-15 DIAGNOSIS — E66.01 MORBID OBESITY WITH BMI OF 40.0-44.9, ADULT (HCC): ICD-10-CM

## 2021-01-15 DIAGNOSIS — N83.202 CYST OF LEFT OVARY: Primary | ICD-10-CM

## 2021-01-15 DIAGNOSIS — M54.42 CHRONIC BILATERAL LOW BACK PAIN WITH BILATERAL SCIATICA: ICD-10-CM

## 2021-01-15 DIAGNOSIS — Z00.00 HEALTHCARE MAINTENANCE: Primary | ICD-10-CM

## 2021-01-15 DIAGNOSIS — F32.A DEPRESSION, UNSPECIFIED DEPRESSION TYPE: ICD-10-CM

## 2021-01-15 PROCEDURE — 99395 PREV VISIT EST AGE 18-39: CPT | Performed by: NURSE PRACTITIONER

## 2021-01-15 RX ORDER — MELOXICAM 7.5 MG/1
7.5 TABLET ORAL DAILY
Qty: 30 TABLET | Refills: 0 | Status: SHIPPED | OUTPATIENT
Start: 2021-01-15 | End: 2021-05-26

## 2021-01-15 RX ORDER — GABAPENTIN 300 MG/1
300 CAPSULE ORAL
Qty: 30 CAPSULE | Refills: 0 | Status: SHIPPED | OUTPATIENT
Start: 2021-01-15 | End: 2021-02-12

## 2021-01-15 RX ORDER — KETOROLAC TROMETHAMINE 10 MG/1
10 TABLET, FILM COATED ORAL EVERY 6 HOURS PRN
Qty: 12 TABLET | Refills: 2 | Status: SHIPPED | OUTPATIENT
Start: 2021-01-15 | End: 2021-05-26

## 2021-01-15 RX ORDER — SERTRALINE HYDROCHLORIDE 25 MG/1
25 TABLET, FILM COATED ORAL DAILY
Qty: 30 TABLET | Refills: 5 | Status: SHIPPED | OUTPATIENT
Start: 2021-01-15 | End: 2021-05-26

## 2021-01-15 RX ORDER — SERTRALINE HYDROCHLORIDE 25 MG/1
25 TABLET, FILM COATED ORAL DAILY
Qty: 30 TABLET | Refills: 5 | Status: CANCELLED | OUTPATIENT
Start: 2021-01-15

## 2021-01-15 NOTE — TELEPHONE ENCOUNTER
Patient was seen at [facility] for ovarian cyst pain,  She was not prescribed any thing for pain and would like something called in to her pharmacy, please advise

## 2021-01-15 NOTE — ASSESSMENT & PLAN NOTE
- Not well controlled  - Will order Zoloft 25 mg  Advised patient to take half tablet for 3-4 days and then take whole tablet  - Information provided to patient about local therapists  - Referred placed for behavioral health  - Will follow up in 3-4 weeks

## 2021-01-15 NOTE — TELEPHONE ENCOUNTER
Pt called after hours answering service stating she was seen in [facility] ED today for ovarian cyst and she is having pain  Requests call from on call provider       Tiger connect message sent to Dr Dave Pal

## 2021-01-15 NOTE — TELEPHONE ENCOUNTER
Pt was seen by you today, left our office and went to the emergency room  She just called saying she has an ovarian cyst asking for pain medication  She said they gave her something for the pain in the ER but nothing else      [phone]

## 2021-01-15 NOTE — PROGRESS NOTES
This is a 16-year-old white female who is many years status post hysterectomy who is now was seen in the emergency department at [facility] for left lower quadrant pain looks to be in and ovarian cyst possibly hemorrhagic  Her pain scale is a 3-4  She is afebrile  She does not feel dizzy or lightheaded  We will now: The prescription for Toradol to take 1 tab every 6 hours for pain she has started to rest   Symptoms worsen she is go to the emergency department

## 2021-01-15 NOTE — ASSESSMENT & PLAN NOTE
- Not well controlled  - Will order gabapentin and meloxicam   - Advised patient to see pain management specialist    - Will continue to monitor

## 2021-01-15 NOTE — PROGRESS NOTES
Assessment/Plan:    Healthcare maintenance  - Discussed immunizations, screenings, healthy diet, exercise, and safety measures  Anxiety  - Not well controlled  - Will order Zoloft 25 mg  Advised patient to take half tablet for 3-4 days and then take whole tablet  - Information provided to patient about local therapists  - Referred placed for behavioral health  - Will follow up in 3-4 weeks  Depression  - Not well controlled  - Will order Zoloft 25 mg  Advised patient to take half tablet for 3-4 days and then take whole tablet  - Information provided to patient about local therapists  - Referred placed for behavioral health  - Will follow up in 3-4 weeks  Chronic bilateral low back pain with bilateral sciatica  - Not well controlled  - Will order gabapentin and meloxicam   - Advised patient to see pain management specialist    - Will continue to monitor  Endometriosis  - Not well controlled  - Continue seeing OB-GYN and pain management  - Will continue to monitor  Problem List Items Addressed This Visit        Nervous and Auditory    Chronic bilateral low back pain with bilateral sciatica     - Not well controlled  - Will order gabapentin and meloxicam   - Advised patient to see pain management specialist    - Will continue to monitor  Relevant Medications    gabapentin (NEURONTIN) 300 mg capsule    meloxicam (MOBIC) 7 5 mg tablet       Other    Endometriosis     - Not well controlled  - Continue seeing OB-GYN and pain management  - Will continue to monitor  Healthcare maintenance - Primary     - Discussed immunizations, screenings, healthy diet, exercise, and safety measures  Anxiety     - Not well controlled  - Will order Zoloft 25 mg  Advised patient to take half tablet for 3-4 days and then take whole tablet  - Information provided to patient about local therapists  - Referred placed for behavioral health  - Will follow up in 3-4 weeks  Relevant Medications    sertraline (ZOLOFT) 25 mg tablet    Other Relevant Orders    Ambulatory referral to Behavioral Health    Depression     - Not well controlled  - Will order Zoloft 25 mg  Advised patient to take half tablet for 3-4 days and then take whole tablet  - Information provided to patient about local therapists  - Referred placed for behavioral health  - Will follow up in 3-4 weeks  Relevant Medications    sertraline (ZOLOFT) 25 mg tablet      Other Visit Diagnoses     Morbid obesity with BMI of 40 0-44 9, adult ([facility] [address] )                Subjective:      Patient ID: Saundra Saldaña is a 31 y o  female  Patient presents today for annual visit  She has multiple medical problems  She is having a hard time recently because her best friend just passed away  She has been dealing with some anxiety and depression before this but this event exacerbated her symptoms  She states she is having trouble sleeping  She would like to see a psychiatrist but her insurance requires her to go to therapy first  She also has a history of back pain as well as pelvic pain related to endometriosis, status post hysterectomy  Her OB-GYN was managing her pain but referred her to a pain management specialist  She has an appointment next week  The following portions of the patient's history were reviewed and updated as appropriate: allergies, current medications, past family history, past medical history, past social history, past surgical history and problem list     Review of Systems   Constitutional: Negative for fatigue and fever  HENT: Negative for congestion and trouble swallowing  Eyes: Negative for pain and visual disturbance  Respiratory: Negative for cough and shortness of breath  Cardiovascular: Negative for chest pain and palpitations  Gastrointestinal: Negative for abdominal distention and blood in stool  Endocrine: Negative for cold intolerance and heat intolerance  Genitourinary: Positive for dysuria and pelvic pain (r/t endometriosis)  Negative for difficulty urinating  Musculoskeletal: Positive for arthralgias  Negative for gait problem  Skin: Negative for rash  Neurological: Negative for dizziness, syncope and headaches  Hematological: Negative for adenopathy  Psychiatric/Behavioral: Positive for dysphoric mood  Negative for self-injury and suicidal ideas  The patient is nervous/anxious  Objective:      /78   Pulse 91   Temp 98 °F (36 7 °C)   Resp 18   Ht 5' 4" (1 626 m)   Wt 108 kg (238 lb)   SpO2 99%   BMI 40 85 kg/m²          Physical Exam  Vitals signs and nursing note reviewed  Constitutional:       Appearance: Normal appearance  HENT:      Head: Normocephalic and atraumatic  Right Ear: Tympanic membrane and external ear normal       Left Ear: Tympanic membrane and external ear normal       Nose: No congestion  Eyes:      Extraocular Movements: Extraocular movements intact  Pupils: Pupils are equal, round, and reactive to light  Neck:      Musculoskeletal: Normal range of motion  Cardiovascular:      Rate and Rhythm: Normal rate and regular rhythm  Heart sounds: Normal heart sounds  Pulmonary:      Effort: Pulmonary effort is normal       Breath sounds: Normal breath sounds  Abdominal:      General: Bowel sounds are normal       Palpations: Abdomen is soft  Musculoskeletal: Normal range of motion  Right lower leg: No edema  Left lower leg: No edema  Lymphadenopathy:      Cervical: No cervical adenopathy  Skin:     General: Skin is warm and dry  Neurological:      Mental Status: She is alert and oriented to person, place, and time  Cranial Nerves: No cranial nerve deficit  Psychiatric:         Mood and Affect: Mood normal          Behavior: Behavior normal        BMI Counseling: Body mass index is 40 85 kg/m²   The BMI is above normal  Nutrition recommendations include reducing portion sizes, decreasing overall calorie intake and 3-5 servings of fruits/vegetables daily  Exercise recommendations include moderate aerobic physical activity for 150 minutes/week

## 2021-01-18 NOTE — TELEPHONE ENCOUNTER
Pt advised will discuss with pt at her appt tomorrow  States she ended up speaking to the doctor on call and they prescribed torodol for her

## 2021-01-18 NOTE — TELEPHONE ENCOUNTER
We cannot call any pain medication for the patient we will discuss with her management option at the visit if she would like pain  medication need to be seen by pain clinic

## 2021-01-19 ENCOUNTER — CONSULT (OUTPATIENT)
Dept: OBGYN CLINIC | Facility: CLINIC | Age: 31
End: 2021-01-19
Payer: COMMERCIAL

## 2021-01-19 VITALS
BODY MASS INDEX: 40.63 KG/M2 | SYSTOLIC BLOOD PRESSURE: 116 MMHG | HEIGHT: 64 IN | WEIGHT: 238 LBS | DIASTOLIC BLOOD PRESSURE: 74 MMHG

## 2021-01-19 DIAGNOSIS — N80.9 ENDOMETRIOSIS: Primary | ICD-10-CM

## 2021-01-19 PROCEDURE — 99213 OFFICE O/P EST LOW 20 MIN: CPT | Performed by: OBSTETRICS & GYNECOLOGY

## 2021-01-19 RX ORDER — LEVONORGESTREL AND ETHINYL ESTRADIOL 100-20(84)
1 KIT ORAL DAILY
Qty: 91 TABLET | Refills: 1 | Status: SHIPPED | OUTPATIENT
Start: 2021-01-19 | End: 2021-05-26

## 2021-01-19 NOTE — PROGRESS NOTES
Assessment/Plan:     Diagnoses and all orders for this visit:    Endometriosis  -     [place]-Eth [address] 91-Day 0 1-0 02 & 0 01 MG TABS; Take 1 tablet by mouth daily      17-year-old female  Prolonged history of endometriosis  Prior to   Had hysterectomy with bilateral salpingectomy secondary to endometriosis  Prior multiple laparoscopy  Was taking tramadol for her pain  Plan  Trial of continues OCP  Referred to Pain Management Clinic  Acupuncture considered  If continues OCP failed then consider trial of Aygestin for her endometriosis  Plan explained and discussed with patient in details all questions answered and patient was satisfied    Subjective:      Patient ID: Ratna Mc is a 31 y o  female      HPI  17-year-old female presents to the office today to discuss management for endometriosis patient has prior to  patient had hysterectomy with bilateral salpingectomy multiple laparoscopy confirm chocolate cyst and endometriosis patient recently was in the emergency room secondary to her pelvic pain ultrasound performed and show 3  5 cm cyst on the right ovary possibility of endometrioma explained and discussed with patient in details  Patient tried Lupron in the past   It secondary to insurance coverage was taking tramadol for her pain explained to the patient unable to continue tramadol and I would recommend to try continues OCP risk benefit side effect explained and discussed with patient in details if patient's symptoms felt to control then consider trial of Aygestin for 3 more months if fail then will consider BSO followed by HRT and dissection of endometriosis plan explained and discussed with patient in details all patient questions answered patient was satisfied time spent with patient was 20 minutes more than 50% of the time was face-to-face counseling reviewing her chart and discussing management option        The following portions of the patient's history were reviewed and updated as appropriate: allergies, current medications, past family history, past medical history, past social history, past surgical history and problem list     Review of Systems      Objective:      /74 (BP Location: Left arm, Patient Position: Sitting, Cuff Size: Standard)   Ht 5' 4" (1 626 m)   Wt 108 kg (238 lb)   BMI 40 85 kg/m²          Physical Exam

## 2021-01-22 ENCOUNTER — TELEPHONE (OUTPATIENT)
Dept: FAMILY MEDICINE CLINIC | Facility: CLINIC | Age: 31
End: 2021-01-22

## 2021-01-22 NOTE — TELEPHONE ENCOUNTER
Pt states her appt was with GYN to discuss pain management options  She did  see 2  pain management doctors in the past who were unable to help her with her GYN related pain  GYN offered her birthcontrol for her endometerosis as pt had surgery in the past that did not help  I informed pt per Sandrine Ball we do not order pain medication but I will check if there is another provider we might be able to refer her to     Please advise

## 2021-01-22 NOTE — TELEPHONE ENCOUNTER
Pc from pt states she went to her obgyn for Edometrosis  She states they can only prescibe birth control  Pt states they can't give her anything for the pain  Pt is requesting something for her pain  Please advise  [facility]  Pts ph #[phone]

## 2021-01-22 NOTE — TELEPHONE ENCOUNTER
Patricia Jolley, can you please review note below  You seen her on 1/15/21 for the pain   Please advise

## 2021-01-22 NOTE — TELEPHONE ENCOUNTER
I cannot give her anything for pain  She was given a prescription for Toradol 10 mg Q6H prn on 1/15 from an OB doctor  He gave her two refills of this medication  She should still have some  Also, she was supposed to have an appointment with pain management this week so they can manage/prescribe her medication

## 2021-01-25 DIAGNOSIS — N80.9 ENDOMETRIOSIS: Primary | ICD-10-CM

## 2021-01-25 DIAGNOSIS — R10.2 CHRONIC PELVIC PAIN IN FEMALE: ICD-10-CM

## 2021-01-25 DIAGNOSIS — G89.29 CHRONIC PELVIC PAIN IN FEMALE: ICD-10-CM

## 2021-01-25 NOTE — TELEPHONE ENCOUNTER
Patient has 2 active orders for referrals to pain management  I do not see in her chart that she has been seen by anyone, unless she went somewhere other than [facility] or [facility]  Please encourage patient to make appointment with pain management  Phone number is [phone]

## 2021-01-25 NOTE — TELEPHONE ENCOUNTER
Magalie Zelaya called back & stated the pain specialist we referred her to " does not treat the abdominal region or pelvis" Please advise pt what to do  [phone]

## 2021-01-25 NOTE — TELEPHONE ENCOUNTER
I spoke with pt and she is going to call the number given for pain management and see if they deal with endometriosis pain

## 2021-01-26 ENCOUNTER — TELEMEDICINE (OUTPATIENT)
Dept: FAMILY MEDICINE CLINIC | Facility: CLINIC | Age: 31
End: 2021-01-26
Payer: COMMERCIAL

## 2021-01-26 VITALS — HEIGHT: 64 IN | WEIGHT: 238 LBS | BODY MASS INDEX: 40.63 KG/M2

## 2021-01-26 DIAGNOSIS — J01.00 ACUTE NON-RECURRENT MAXILLARY SINUSITIS: Primary | ICD-10-CM

## 2021-01-26 PROCEDURE — 3008F BODY MASS INDEX DOCD: CPT | Performed by: FAMILY MEDICINE

## 2021-01-26 PROCEDURE — 99213 OFFICE O/P EST LOW 20 MIN: CPT | Performed by: FAMILY MEDICINE

## 2021-01-26 PROCEDURE — 1036F TOBACCO NON-USER: CPT | Performed by: FAMILY MEDICINE

## 2021-01-26 RX ORDER — AZITHROMYCIN 250 MG/1
TABLET, FILM COATED ORAL
Qty: 6 TABLET | Refills: 0 | Status: SHIPPED | OUTPATIENT
Start: 2021-01-26 | End: 2021-01-30

## 2021-01-26 RX ORDER — FLUTICASONE PROPIONATE 50 MCG
2 SPRAY, SUSPENSION (ML) NASAL DAILY
Qty: 16 G | Refills: 0 | Status: SHIPPED | OUTPATIENT
Start: 2021-01-26 | End: 2021-05-26

## 2021-01-26 NOTE — ASSESSMENT & PLAN NOTE
She was given prescriptions for  Flonase and HERMANN-Yeison  Was discussed about increase oral hydration and take Tylenol or ibuprofen  If symptoms worse call  Back  Luna Serum

## 2021-01-26 NOTE — PROGRESS NOTES
Virtual Regular Visit      Assessment/Plan:    Problem List Items Addressed This Visit        Respiratory    Acute non-recurrent maxillary sinusitis - Primary       She was given prescriptions for  Flonase and Z-Yeison  Was discussed about increase oral hydration and take Tylenol or ibuprofen  If symptoms worse call  Back            Relevant Medications    azithromycin (ZITHROMAX) 250 mg tablet    fluticasone (FLONASE) 50 mcg/act nasal spray               Reason for visit is   Chief Complaint   Patient presents with    Nasal Congestion    Cough    Sore Throat    Virtual Regular Visit        Encounter provider Bhavesh Villagomez MD    Provider located at [address] [place] [address] [place] [address]  [address] [facility],[address]   [address]  [address] [place] [id number]      Recent Visits  Date Type Provider Dept   01/22/21 Telephone Redia Estrin recent visits within past 7 days and meeting all other requirements     Today's Visits  Date Type Provider Dept   01/26/21 Les Delarosa MD [facility]   Showing today's visits and meeting all other requirements     Future Appointments  No visits were found meeting these conditions  Showing future appointments within next 150 days and meeting all other requirements        The patient was identified by name and date of birth  Hesham Weinstein was informed that this is a telemedicine visit and that the visit is being conducted through [address] [place] [address] and patient was informed that this is not a secure, HIPAA-compliant platform  She agrees to proceed     My office door was closed  No one else was in the room  She acknowledged consent and understanding of privacy and security of the video platform  The patient has agreed to participate and understands they can discontinue the visit at any time  Patient is aware this is a billable service       Subjective  Hesham Weinstein is a 31 y o  female    Complaint of upper respiratory symptoms including nasal congestion, postnasal drip and sinus pressure  Denies any fever chill  Only mild cough  Denies any shortness of breath  She was tested for COVID and came back negative         Past Medical History:   Diagnosis Date    Anxiety     Chicken pox     Depression     Endometriosis     GERD (gastroesophageal reflux disease)     Headache     Occasional     HSV-1 infection     IBS (irritable bowel syndrome)     Kidney stone on left side     Multiple thyroid nodules     Obesity     Renal calculi        Past Surgical History:   Procedure Laterality Date     SECTION      x2    HYSTERECTOMY      robotic total laparoscopic hysterectomy with BS    LAPAROSCOPIC ENDOMETRIOSIS FULGURATION  2018    laparoscopic excision of endometriosis, fulguration of endometriosis, lysis of adhesions    LAPAROSCOPY  2014    with I&D     MOLE REMOVAL      face - benign     SKIN CANCER EXCISION      abdomen    THYROID CYST EXCISION      TONSILLECTOMY      TUBAL LIGATION  02/15/2016    With lysis of adhesion and peritoneal biopsy    WISDOM TOOTH EXTRACTION         Current Outpatient Medications   Medication Sig Dispense Refill    aluminum-magnesium hydroxide-simethicone (MAALOX ADVANCED MAX ST) 400-400-40 MG/5ML suspension Take 5 mL by mouth every 6 (six) hours as needed      azithromycin (ZITHROMAX) 250 mg tablet Take 2 tablets today then 1 tablet daily x 4 days 6 tablet 0    cetirizine (ZyrTEC) 5 MG tablet Take 5 mg by mouth daily      clotrimazole (LOTRIMIN) 1 % cream Apply topically 2 (two) times a day (Patient not taking: Reported on 2019) 30 g 0    cyclobenzaprine (FLEXERIL) 10 mg tablet Take 10 mg by mouth 3 (three) times a day  1    DULoxetine (CYMBALTA) 20 mg capsule Take 1 capsule (20 mg total) by mouth daily (Patient not taking: Reported on 2019) 30 capsule 0    ergocalciferol (VITAMIN D2) 50,000 units Take 1 capsule (50,000 Units total) by mouth once a week (Patient not taking: Reported on 7/16/2019) 12 capsule 1    famotidine (PEPCID) 40 MG tablet TAKE 1 TABLET BY MOUTH TWICE DAILY AS NEEDED FOR HEARTBURN  0    fenoprofen (NALFON) 600 MG Take 600 mg by mouth 3 (three) times a day  2    fluconazole (DIFLUCAN) 150 mg tablet   1    fluticasone (FLONASE) 50 mcg/act nasal spray 2 sprays into each nostril daily 16 g 0    gabapentin (NEURONTIN) 300 mg capsule Take 1 capsule (300 mg total) by mouth 3 (three) times a day Please instruct patient to start with one table PO daily for one day, then one tablet BID for one day, then she may do one tablet TID for maintenance   (Patient not taking: Reported on 1/15/2021) 90 capsule 6    gabapentin (NEURONTIN) 300 mg capsule Take 1 capsule (300 mg total) by mouth daily at bedtime (Patient not taking: Reported on 1/26/2021) 30 capsule 0    ibuprofen (MOTRIN) 600 mg tablet Take 1 tablet (600 mg total) by mouth every 6 (six) hours as needed for mild pain for up to 30 doses (Patient not taking: Reported on 1/15/2021) 30 tablet 0    ketorolac (TORADOL) 10 mg tablet Take 1 tablet (10 mg total) by mouth every 6 (six) hours as needed for moderate pain (Patient not taking: Reported on 1/26/2021) 12 tablet 2    leuprolide (LUPRON DEPOT 3 MONTH KIT) 11 25 mg injection Inject 11 25 mg into a muscle every 3 (three) months      Levonorgest-Eth Estrad 91-Day 0 1-0 02 & 0 01 MG TABS Take 1 tablet by mouth daily (Patient not taking: Reported on 1/26/2021) 91 tablet 1    meloxicam (MOBIC) 7 5 mg tablet Take 1 tablet (7 5 mg total) by mouth daily (Patient not taking: Reported on 1/26/2021) 30 tablet 0    methocarbamol (ROBAXIN) 750 mg tablet TAKE 1 TABLET BY MOUTH 4 TIMES DAILY AS NEEDED FOR MUSCLE SPASM  0    ondansetron (ZOFRAN-ODT) 4 mg disintegrating tablet Take 1 tablet (4 mg total) by mouth every 8 (eight) hours as needed for nausea (Patient not taking: Reported on 1/15/2021) 20 tablet 0    ORILISSA 150 MG TABS Take 1 tablet by mouth daily  3    pantoprazole (PROTONIX) 20 mg tablet Take 40 mg by mouth daily      sertraline (ZOLOFT) 25 mg tablet Take 1 tablet (25 mg total) by mouth daily (Patient not taking: Reported on 1/26/2021) 30 tablet 5    traMADol (ULTRAM) 50 mg tablet Take 1 tablet (50 mg total) by mouth every 6 (six) hours as needed (as needed for pain) (Patient not taking: Reported on 1/15/2021) 30 tablet 0    triamcinolone (KENALOG) 0 5 % cream Apply topically 2 (two) times a day to rash on both legs  Avoid face or genital region  Maximum use for 2 weeks  (Patient not taking: Reported on 7/16/2019) 30 g 0    valACYclovir (VALTREX) 1,000 mg tablet   3     No current facility-administered medications for this visit  Allergies   Allergen Reactions    Other Blisters     Dermabond    Reglan [Metoclopramide]        Review of Systems   Constitutional: Negative for activity change, chills and fever  HENT: Positive for congestion, postnasal drip, rhinorrhea and sinus pressure  Respiratory: Positive for cough  Negative for apnea  Gastrointestinal: Negative for diarrhea and vomiting  Skin: Negative for rash  Neurological: Negative for dizziness  Video Exam    Vitals:    01/26/21 1652   Weight: 108 kg (238 lb)   Height: 5' 4" (1 626 m)       Physical Exam  Constitutional:       Appearance: Normal appearance  HENT:      Head: Normocephalic and atraumatic  Mouth/Throat:      Pharynx: Oropharynx is clear  Eyes:      Conjunctiva/sclera: Conjunctivae normal    Neck:      Musculoskeletal: Normal range of motion  Pulmonary:      Effort: No respiratory distress  Musculoskeletal:      Right lower leg: No edema  Left lower leg: No edema  Skin:     Findings: No rash  Neurological:      Mental Status: She is alert  Mental status is at baseline     Psychiatric:         Mood and Affect: Mood normal           I spent 15 minutes with patient today in which greater than 50% of the time was spent in counseling/coordination of care regarding   Possible side effect of medications and supportive management  VIRTUAL VISIT DISCLAIMER    Susan Faust acknowledges that she has consented to an online visit or consultation  She understands that the online visit is based solely on information provided by her, and that, in the absence of a face-to-face physical evaluation by the physician, the diagnosis she receives is both limited and provisional in terms of accuracy and completeness  This is not intended to replace a full medical face-to-face evaluation by the physician  Susan Faust understands and accepts these terms

## 2021-01-29 DIAGNOSIS — N80.9 ENDOMETRIOSIS: ICD-10-CM

## 2021-01-31 RX ORDER — TRAMADOL HYDROCHLORIDE 50 MG/1
TABLET ORAL
Qty: 30 TABLET | Refills: 0 | OUTPATIENT
Start: 2021-01-31

## 2021-02-05 ENCOUNTER — TELEPHONE (OUTPATIENT)
Dept: FAMILY MEDICINE CLINIC | Facility: CLINIC | Age: 31
End: 2021-02-05

## 2021-02-12 ENCOUNTER — OFFICE VISIT (OUTPATIENT)
Dept: FAMILY MEDICINE CLINIC | Facility: CLINIC | Age: 31
End: 2021-02-12
Payer: COMMERCIAL

## 2021-02-12 VITALS
OXYGEN SATURATION: 98 % | BODY MASS INDEX: 41.35 KG/M2 | RESPIRATION RATE: 16 BRPM | DIASTOLIC BLOOD PRESSURE: 78 MMHG | HEIGHT: 64 IN | TEMPERATURE: 98.5 F | HEART RATE: 65 BPM | SYSTOLIC BLOOD PRESSURE: 118 MMHG | WEIGHT: 242.2 LBS

## 2021-02-12 DIAGNOSIS — R10.2 CHRONIC PELVIC PAIN IN FEMALE: ICD-10-CM

## 2021-02-12 DIAGNOSIS — R19.8 ALTERNATING CONSTIPATION AND DIARRHEA: ICD-10-CM

## 2021-02-12 DIAGNOSIS — F41.9 ANXIETY AND DEPRESSION: Primary | ICD-10-CM

## 2021-02-12 DIAGNOSIS — G89.29 CHRONIC PELVIC PAIN IN FEMALE: ICD-10-CM

## 2021-02-12 DIAGNOSIS — F32.A ANXIETY AND DEPRESSION: Primary | ICD-10-CM

## 2021-02-12 PROCEDURE — 99214 OFFICE O/P EST MOD 30 MIN: CPT | Performed by: NURSE PRACTITIONER

## 2021-02-12 RX ORDER — HYDROXYZINE HYDROCHLORIDE 25 MG/1
25 TABLET, FILM COATED ORAL EVERY 6 HOURS PRN
Qty: 30 TABLET | Refills: 1 | Status: SHIPPED | OUTPATIENT
Start: 2021-02-12 | End: 2021-05-26

## 2021-02-12 RX ORDER — GABAPENTIN 100 MG/1
100 CAPSULE ORAL
Qty: 30 CAPSULE | Refills: 1 | Status: SHIPPED | OUTPATIENT
Start: 2021-02-12 | End: 2021-09-08

## 2021-02-12 RX ORDER — DICYCLOMINE HYDROCHLORIDE 10 MG/1
10 CAPSULE ORAL
Qty: 120 CAPSULE | Refills: 1 | Status: SHIPPED | OUTPATIENT
Start: 2021-02-12 | End: 2021-05-26

## 2021-02-12 NOTE — ASSESSMENT & PLAN NOTE
- Not well controlled  - Pain related to endometriosis vs IBS  - Prescription sent for Bentyl  - If no improvement of symptoms, consider referral to GI   - Will continue to monitor

## 2021-02-12 NOTE — ASSESSMENT & PLAN NOTE
- Not well controlled  - She was taking 300 mg gabapentin hs but reports it is difficult to wake up in the morning  Will decrease to 100 mg hs   - Encouraged patient to make another appointment with her GYN to discuss her pain control   - Will continue to follow up

## 2021-02-12 NOTE — PROGRESS NOTES
Assessment/Plan:    Anxiety and depression  - Not well controlled  - Suggested increasing Zoloft but she declines at this time  - She states that sometimes she needs to take benadryl when she is having anxiety so she can sleep  - Will add hydroxyzine prn  Advised of side effects   - Referral sent for behavioral health  Encouraged patient to call and make an appointment    - Recommended follow up in 1 month  Chronic pelvic pain in female  - Not well controlled  - She was taking 300 mg gabapentin hs but reports it is difficult to wake up in the morning  Will decrease to 100 mg hs   - Encouraged patient to make another appointment with her GYN to discuss her pain control   - Will continue to follow up  Alternating constipation and diarrhea  - Not well controlled  - Pain related to endometriosis vs IBS  - Prescription sent for Bentyl  - If no improvement of symptoms, consider referral to GI   - Will continue to monitor  Diagnoses and all orders for this visit:    Anxiety and depression  -     hydrOXYzine HCL (ATARAX) 25 mg tablet; Take 1 tablet (25 mg total) by mouth every 6 (six) hours as needed for itching  -     Ambulatory referral to [facility]; Future    Alternating constipation and diarrhea  -     dicyclomine (BENTYL) 10 mg capsule; Take 1 capsule (10 mg total) by mouth 4 (four) times a day (before meals and at bedtime)    Chronic pelvic pain in female  -     gabapentin (NEURONTIN) 100 mg capsule; Take 1 capsule (100 mg total) by mouth daily at bedtime        Subjective:      Patient ID: Alfred Valle is a 31 y o  female  Patient presents today for follow up visit  She was seen last with complaints of anxiety, depression, and pelvic pain related to endometriosis s/p hysterectomy  She was started on Zoloft  She is still taking but reports still having some episodes of anxiety  She is also still experiencing abdominal and pelvic pain   She states that her endometriosis extended to her bowel so whenever she has bowel issues like constipation or diarrhea, she has severe pain  Her GYN doctor was managing her pain but is on medical leave  The covering physician referred her to pain management  She was told by pain management that they don't manage any GYN or pelvic pain  She has tried pelvic PT in the past with no improvement in her pain  The following portions of the patient's history were reviewed and updated as appropriate: allergies, current medications, past family history, past medical history, past social history, past surgical history and problem list     Review of Systems   Constitutional: Negative for fatigue and fever  HENT: Negative for trouble swallowing  Eyes: Negative for visual disturbance  Respiratory: Negative for cough and shortness of breath  Cardiovascular: Negative for chest pain and palpitations  Gastrointestinal: Positive for abdominal pain, constipation and diarrhea  Endocrine: Negative for cold intolerance and heat intolerance  Genitourinary: Positive for pelvic pain  Negative for difficulty urinating and dysuria  Musculoskeletal: Negative for gait problem  Skin: Negative for rash  Neurological: Negative for dizziness, syncope and headaches  Hematological: Negative for adenopathy  Psychiatric/Behavioral: Negative for behavioral problems  Objective:      /78 (BP Location: Left arm, Patient Position: Sitting, Cuff Size: Large)   Pulse 65   Temp 98 5 °F (36 9 °C) (Tympanic)   Resp 16   Ht 5' 4" (1 626 m)   Wt 110 kg (242 lb 3 2 oz)   SpO2 98%   BMI 41 57 kg/m²          Physical Exam  Vitals signs and nursing note reviewed  Constitutional:       Appearance: Normal appearance  HENT:      Head: Normocephalic and atraumatic  Right Ear: External ear normal       Left Ear: External ear normal       Nose: No congestion  Eyes:      Extraocular Movements: Extraocular movements intact  Conjunctiva/sclera: Conjunctivae normal    Neck:      Musculoskeletal: Normal range of motion  Cardiovascular:      Rate and Rhythm: Normal rate and regular rhythm  Heart sounds: Normal heart sounds  Pulmonary:      Effort: Pulmonary effort is normal       Breath sounds: Normal breath sounds  Abdominal:      General: Bowel sounds are normal       Palpations: Abdomen is soft  Musculoskeletal: Normal range of motion  Lymphadenopathy:      Cervical: No cervical adenopathy  Skin:     General: Skin is warm and dry  Neurological:      Mental Status: She is alert and oriented to person, place, and time  Cranial Nerves: No cranial nerve deficit     Psychiatric:         Mood and Affect: Mood normal          Behavior: Behavior normal

## 2021-02-12 NOTE — ASSESSMENT & PLAN NOTE
- Not well controlled  - Suggested increasing Zoloft but she declines at this time  - She states that sometimes she needs to take benadryl when she is having anxiety so she can sleep  - Will add hydroxyzine prn  Advised of side effects   - Referral sent for behavioral health  Encouraged patient to call and make an appointment    - Recommended follow up in 1 month

## 2021-02-16 ENCOUNTER — OFFICE VISIT (OUTPATIENT)
Dept: OBGYN CLINIC | Facility: CLINIC | Age: 31
End: 2021-02-16
Payer: COMMERCIAL

## 2021-02-16 VITALS
SYSTOLIC BLOOD PRESSURE: 118 MMHG | BODY MASS INDEX: 41.32 KG/M2 | HEIGHT: 64 IN | DIASTOLIC BLOOD PRESSURE: 74 MMHG | WEIGHT: 242 LBS

## 2021-02-16 DIAGNOSIS — M25.552 CHRONIC ARTHRALGIAS OF KNEES AND HIPS: ICD-10-CM

## 2021-02-16 DIAGNOSIS — M25.562 CHRONIC ARTHRALGIAS OF KNEES AND HIPS: ICD-10-CM

## 2021-02-16 DIAGNOSIS — N83.202 CYST OF LEFT OVARY: ICD-10-CM

## 2021-02-16 DIAGNOSIS — N80.9 ENDOMETRIOSIS: Primary | ICD-10-CM

## 2021-02-16 DIAGNOSIS — M25.551 CHRONIC ARTHRALGIAS OF KNEES AND HIPS: ICD-10-CM

## 2021-02-16 DIAGNOSIS — G89.29 CHRONIC ARTHRALGIAS OF KNEES AND HIPS: ICD-10-CM

## 2021-02-16 DIAGNOSIS — M25.561 CHRONIC ARTHRALGIAS OF KNEES AND HIPS: ICD-10-CM

## 2021-02-16 PROCEDURE — 3008F BODY MASS INDEX DOCD: CPT | Performed by: OBSTETRICS & GYNECOLOGY

## 2021-02-16 PROCEDURE — 1036F TOBACCO NON-USER: CPT | Performed by: OBSTETRICS & GYNECOLOGY

## 2021-02-16 PROCEDURE — 99213 OFFICE O/P EST LOW 20 MIN: CPT | Performed by: OBSTETRICS & GYNECOLOGY

## 2021-02-16 RX ORDER — TRAMADOL HYDROCHLORIDE 50 MG/1
50 TABLET ORAL EVERY 6 HOURS PRN
Qty: 30 TABLET | Refills: 0 | Status: SHIPPED | OUTPATIENT
Start: 2021-02-16 | End: 2021-02-24 | Stop reason: SDUPTHER

## 2021-02-17 NOTE — PROGRESS NOTES
Assessment/Plan:         Diagnoses and all orders for this visit:    Endometriosis  -     traMADol (ULTRAM) 50 mg tablet; Take 1 tablet (50 mg total) by mouth every 6 (six) hours as needed (as needed for pain)  -     Ambulatory referral to Pain Management; Future    Chronic arthralgias of knees and hips  -     Sedimentation rate, automated; Future  -     TYREE Screen w/ Reflex to Titer/Pattern; Future  -     Rheumatoid Factor (IgA, IgG, IgM); Future  -     C-reactive protein; Future          Subjective:      Patient ID: Sadie Salcido is a 31 y o  female  The patient is a 82-year-old  2 para  who presents with an exacerbation of her chronic pelvic pain secondary to endometriosis  She has a longstanding history of endometriosis and pelvic pain which began shortly after menarche  She underwent hysterectomy approximately 3 years ago with conservation of the ovaries because of her very young age  She continues with pelvic pain which has not improved with the use of Lupron or orlissa  She does not tolerate birth control pills well, and because of her problems with hormones, she does not want Depo-Provera or Nexplanon  In  she was seen in the emergency room at [facility] for sudden onset of severe left lower quadrant pain  The pain is intermittently extremely severe causing her to double over  It causes dyspareunia and some nausea  She was found to have a left ovarian cyst, which appears to be hemorrhagic cyst consistent with endometriosis  She returns today for re-evaluation of the cyst and approximately 1 month out, the cyst is still present in the left lower quadrant  She was given a prescription for analgesia and told to return in 6-8 weeks for re-evaluation of the ovarian cyst     She also has noticed increasingly severe arthralgias and myalgias with back pain that is worse 1st thing in the morning    She says her joints are so stiff that it takes her almost 10 minutes to get out of bed and assumes standing position  This is a relatively recent problem, but she has been attributing her leg in knee ankle pain to the endometriosis  Although it is possible to have referred pain from endometriosis, it seems that her symptoms are more severe than would be expected from endometriosis pain alone  We will order some autoimmune rheumatoid blood tests for evaluation and possible referral       The following portions of the patient's history were reviewed and updated as appropriate: allergies, current medications, past family history, past medical history, past social history, past surgical history and problem list     Review of Systems   Constitutional: Negative for chills, diaphoresis, fatigue, fever and unexpected weight change  HENT: Negative for congestion, sinus pressure, sinus pain, tinnitus and trouble swallowing  Eyes: Negative for visual disturbance  Respiratory: Negative for cough, chest tightness and shortness of breath  Cardiovascular: Negative for chest pain, palpitations and leg swelling  Gastrointestinal: Negative for abdominal distention, abdominal pain, anal bleeding, constipation, diarrhea, nausea, rectal pain and vomiting  Endocrine: Negative for heat intolerance  Genitourinary: Positive for dyspareunia, pelvic pain and vaginal pain  Negative for difficulty urinating, dysuria, flank pain, frequency, genital sores, hematuria and urgency  Musculoskeletal: Negative for arthralgias, back pain and joint swelling  Skin: Negative for rash  Allergic/Immunologic: Negative for environmental allergies and food allergies  Neurological: Negative for headaches  Hematological: Negative for adenopathy  Does not bruise/bleed easily  Psychiatric/Behavioral: Negative for decreased concentration and dysphoric mood  The patient is not nervous/anxious            Objective:      /74 (BP Location: Left arm)   Ht 5' 4" (1 626 m)   Wt 110 kg (242 lb)   BMI 41 54 kg/m²          Physical Exam  Vitals signs and nursing note reviewed  Exam conducted with a chaperone present  Constitutional:       General: She is not in acute distress  Appearance: Normal appearance  She is normal weight  She is not ill-appearing  HENT:      Head: Normocephalic  Nose: Nose normal       Mouth/Throat:      Mouth: Mucous membranes are moist       Pharynx: Oropharynx is clear  Eyes:      Conjunctiva/sclera: Conjunctivae normal       Pupils: Pupils are equal, round, and reactive to light  Neck:      Musculoskeletal: Neck supple  Cardiovascular:      Rate and Rhythm: Normal rate and regular rhythm  Pulses: Normal pulses  Pulmonary:      Effort: Pulmonary effort is normal       Breath sounds: Normal breath sounds  Abdominal:      General: Abdomen is flat  Bowel sounds are normal       Palpations: Abdomen is soft  Genitourinary:     General: Normal vulva  Exam position: Lithotomy position  Avi stage (genital): 5       Vagina: Normal       Uterus: Absent  Adnexa: Right adnexa normal and left adnexa normal       Rectum: Normal    Musculoskeletal: Normal range of motion  Skin:     General: Skin is warm and dry  Neurological:      General: No focal deficit present  Mental Status: She is alert     Psychiatric:         Mood and Affect: Mood normal

## 2021-02-23 DIAGNOSIS — N80.9 ENDOMETRIOSIS: ICD-10-CM

## 2021-02-24 RX ORDER — TRAMADOL HYDROCHLORIDE 50 MG/1
50 TABLET ORAL EVERY 6 HOURS PRN
Qty: 30 TABLET | Refills: 0 | Status: SHIPPED | OUTPATIENT
Start: 2021-02-24 | End: 2021-03-08 | Stop reason: SDUPTHER

## 2021-03-08 DIAGNOSIS — N80.9 ENDOMETRIOSIS: ICD-10-CM

## 2021-03-08 RX ORDER — TRAMADOL HYDROCHLORIDE 50 MG/1
50 TABLET ORAL EVERY 6 HOURS PRN
Qty: 30 TABLET | Refills: 0 | Status: SHIPPED | OUTPATIENT
Start: 2021-03-08 | End: 2021-05-12 | Stop reason: SDUPTHER

## 2021-03-08 NOTE — TELEPHONE ENCOUNTER
Patient called and said she was supposed to have an appt for her endometriosis at [facility] in [place], but due to ride complications she is unable to go  She is asking for medication to be sent for pain up until her new appt on 3/22  Patient is aware this is the last time we will fill this medication

## 2021-03-16 ENCOUNTER — TELEPHONE (OUTPATIENT)
Dept: FAMILY MEDICINE CLINIC | Facility: CLINIC | Age: 31
End: 2021-03-16

## 2021-04-13 ENCOUNTER — OFFICE VISIT (OUTPATIENT)
Dept: FAMILY MEDICINE CLINIC | Facility: CLINIC | Age: 31
End: 2021-04-13
Payer: COMMERCIAL

## 2021-04-13 VITALS
SYSTOLIC BLOOD PRESSURE: 120 MMHG | WEIGHT: 250.4 LBS | OXYGEN SATURATION: 96 % | DIASTOLIC BLOOD PRESSURE: 60 MMHG | RESPIRATION RATE: 18 BRPM | TEMPERATURE: 99 F | HEART RATE: 77 BPM | HEIGHT: 64 IN | BODY MASS INDEX: 42.75 KG/M2

## 2021-04-13 DIAGNOSIS — F41.9 ANXIETY AND DEPRESSION: ICD-10-CM

## 2021-04-13 DIAGNOSIS — N80.9 ENDOMETRIOSIS: Primary | ICD-10-CM

## 2021-04-13 DIAGNOSIS — E04.1 THYROID NODULE: ICD-10-CM

## 2021-04-13 DIAGNOSIS — E78.49 OTHER HYPERLIPIDEMIA: ICD-10-CM

## 2021-04-13 DIAGNOSIS — F32.A ANXIETY AND DEPRESSION: ICD-10-CM

## 2021-04-13 PROCEDURE — 99213 OFFICE O/P EST LOW 20 MIN: CPT | Performed by: NURSE PRACTITIONER

## 2021-04-13 PROCEDURE — 3008F BODY MASS INDEX DOCD: CPT | Performed by: NURSE PRACTITIONER

## 2021-04-13 PROCEDURE — 1036F TOBACCO NON-USER: CPT | Performed by: NURSE PRACTITIONER

## 2021-04-13 NOTE — ASSESSMENT & PLAN NOTE
- Currently stable on no medications  - Patient does have "off" days  States she didn't notice a difference on Zoloft  Explained we can increase dose  She does not wish to restart at this time  Advised patient to contact office if she changes her mind  - Will continue to monitor

## 2021-04-13 NOTE — ASSESSMENT & PLAN NOTE
- Stable  - Advised patient to follow up with [facility] and her current GYN regarding surgery options  She is also due for her yearly GYN visit  - Will continue to monitor

## 2021-04-13 NOTE — PROGRESS NOTES
Assessment/Plan:    Endometriosis  - Stable  - Advised patient to follow up with [facility] and her current GYN regarding surgery options  She is also due for her yearly GYN visit  - Will continue to monitor  Anxiety and depression  - Currently stable on no medications  - Patient does have "off" days  States she didn't notice a difference on Zoloft  Explained we can increase dose  She does not wish to restart at this time  Advised patient to contact office if she changes her mind  - Will continue to monitor  Diagnoses and all orders for this visit:    Endometriosis  -     CBC and differential; Future  -     Comprehensive metabolic panel; Future    Anxiety and depression    Thyroid nodule  -     TSH, 3rd generation with Free T4 reflex; Future    Other hyperlipidemia  -     Lipid Panel with Direct LDL reflex; Future        Subjective:      Patient ID: Ricardo Zhang is a 31 y o  female  Patient presents today for follow up appointment  She has a history of chronic pelvic pain related to endometriosis s/p hysterectomy  She was seen at [facility] for a second opinion  They recommended she see pain management  Patient has tried to pursue this in the past but pain management does not deal with pelvic/GYN pain  They also recommended she try Lupron injections  Patient is currently waiting to see if her insurance will cover it  She states her pain is currently a 7/10 but is manageable with prescribed tramadol  She is also willing to pursue additional surgery if it will make her pain better  The following portions of the patient's history were reviewed and updated as appropriate: allergies, current medications, past family history, past medical history, past social history, past surgical history and problem list     Review of Systems   Constitutional: Negative for fatigue and fever  HENT: Negative for trouble swallowing  Eyes: Negative for visual disturbance  Respiratory: Negative for cough and shortness of breath  Cardiovascular: Negative for chest pain and palpitations  Gastrointestinal: Positive for abdominal pain  Negative for blood in stool  Endocrine: Negative for cold intolerance and heat intolerance  Genitourinary: Positive for pelvic pain  Negative for difficulty urinating and dysuria  Musculoskeletal: Negative for gait problem  Skin: Negative for rash  Neurological: Negative for dizziness, syncope and headaches  Hematological: Negative for adenopathy  Psychiatric/Behavioral: Negative for behavioral problems  Objective:      /60 (BP Location: Left arm, Patient Position: Sitting, Cuff Size: Large)   Pulse 77   Temp 99 °F (37 2 °C) (Tympanic)   Resp 18   Ht 5' 4" (1 626 m)   Wt 114 kg (250 lb 6 4 oz)   SpO2 96%   BMI 42 98 kg/m²          Physical Exam  Vitals signs and nursing note reviewed  Constitutional:       Appearance: Normal appearance  She is well-developed  HENT:      Head: Normocephalic and atraumatic  Right Ear: External ear normal       Left Ear: External ear normal    Eyes:      Conjunctiva/sclera: Conjunctivae normal    Neck:      Musculoskeletal: Normal range of motion  Cardiovascular:      Rate and Rhythm: Normal rate and regular rhythm  Heart sounds: Normal heart sounds  Pulmonary:      Effort: Pulmonary effort is normal       Breath sounds: Normal breath sounds  Abdominal:      General: Bowel sounds are normal       Palpations: Abdomen is soft  Musculoskeletal: Normal range of motion  Skin:     General: Skin is warm and dry  Neurological:      Mental Status: She is alert and oriented to person, place, and time  Cranial Nerves: No cranial nerve deficit     Psychiatric:         Mood and Affect: Mood normal          Behavior: Behavior normal

## 2021-05-12 ENCOUNTER — TELEPHONE (OUTPATIENT)
Dept: OBGYN CLINIC | Facility: CLINIC | Age: 31
End: 2021-05-12

## 2021-05-12 DIAGNOSIS — N80.9 ENDOMETRIOSIS: ICD-10-CM

## 2021-05-12 RX ORDER — TRAMADOL HYDROCHLORIDE 50 MG/1
50 TABLET ORAL EVERY 6 HOURS PRN
Qty: 30 TABLET | Refills: 0 | Status: SHIPPED | OUTPATIENT
Start: 2021-05-12 | End: 2021-05-26

## 2021-05-12 NOTE — TELEPHONE ENCOUNTER
Patient called regarding pain management for Endometriosis  Patient recently had appendectomy on 05/04/2021- which showed endometriosis spreading to there as well, patient has hysterectomy in the past     Patient has seen specialist at [facility] with options for lupron ( which insurance denied) or possibility another surgery  They did give  her one script for tramadal than and will not refill it  Patient stated she is seeing pain management with [facility] Issac Dnucan in two weeks  Patient stated [facility] a [facility]  We will assist with that       Please advise

## 2021-05-19 ENCOUNTER — OFFICE VISIT (OUTPATIENT)
Dept: OBGYN CLINIC | Facility: CLINIC | Age: 31
End: 2021-05-19
Payer: COMMERCIAL

## 2021-05-19 VITALS
WEIGHT: 250 LBS | BODY MASS INDEX: 42.68 KG/M2 | DIASTOLIC BLOOD PRESSURE: 80 MMHG | HEIGHT: 64 IN | SYSTOLIC BLOOD PRESSURE: 130 MMHG

## 2021-05-19 DIAGNOSIS — N80.9 ENDOMETRIOSIS DETERMINED BY LAPAROSCOPY: Primary | ICD-10-CM

## 2021-05-19 PROCEDURE — 99213 OFFICE O/P EST LOW 20 MIN: CPT | Performed by: OBSTETRICS & GYNECOLOGY

## 2021-05-19 NOTE — PROGRESS NOTES
Assessment/Plan:         There are no diagnoses linked to this encounter  Subjective:      Patient ID: Kole Ahn is a 31 y o  female  The following portions of the patient's history were reviewed and updated as appropriate: allergies, current medications, past family history, past medical history, past social history, past surgical history and problem list       Patient is a 71-year-old  2 para 2 who has undergone a hysterectomy for severe endometriosis  Since her last visit in the office she was found to have appendicitis and endometriosis was noted on the mucosal surface of the appendix  She continues to complain of pain  She was seen at the [facility] and an attempt was made to prescribe Lupron, but her insurance did not approve the 6 month course  They are now in the process of trying to establish or orlyssa, which she has taken in the past with mixed results  She complains of inguinal and rectal pain which is becoming more and more severe  She is on a waiting list to be seen by pain management, and hopefully they will be able to help her  She is considering additional surgery, as she was told that there was a implantations of endometriosis on her bowel  She should return in 3 months or as needed  Pelvic Pain  The patient's primary symptoms include pelvic pain  Pertinent negatives include no abdominal pain, back pain, chills, constipation, diarrhea, dysuria, fever, flank pain, frequency, headaches, hematuria, nausea, rash, urgency or vomiting  Review of Systems   Constitutional: Negative for chills, diaphoresis, fatigue, fever and unexpected weight change  HENT: Negative for congestion, sinus pressure, sinus pain, tinnitus and trouble swallowing  Eyes: Negative for visual disturbance  Respiratory: Negative for cough, chest tightness and shortness of breath  Cardiovascular: Negative for chest pain, palpitations and leg swelling  Gastrointestinal: Negative for abdominal distention, abdominal pain, anal bleeding, constipation, diarrhea, nausea, rectal pain and vomiting  Endocrine: Negative for heat intolerance  Genitourinary: Positive for pelvic pain  Negative for difficulty urinating, dysuria, flank pain, frequency, genital sores, hematuria and urgency  Musculoskeletal: Negative for arthralgias, back pain and joint swelling  Skin: Negative for rash  Allergic/Immunologic: Negative for environmental allergies and food allergies  Neurological: Negative for headaches  Hematological: Negative for adenopathy  Does not bruise/bleed easily  Psychiatric/Behavioral: Negative for decreased concentration and dysphoric mood  The patient is not nervous/anxious  Objective:      /80 (BP Location: Left arm)   Ht 5' 4" (1 626 m)   Wt 113 kg (250 lb)   BMI 42 91 kg/m²          Physical Exam  Vitals signs and nursing note reviewed  Exam conducted with a chaperone present  Constitutional:       Appearance: Normal appearance  She is normal weight  HENT:      Head: Normocephalic and atraumatic  Nose: Nose normal    Eyes:      Conjunctiva/sclera: Conjunctivae normal    Pulmonary:      Effort: Pulmonary effort is normal    Abdominal:      General: Abdomen is flat  Palpations: Abdomen is soft  Musculoskeletal: Normal range of motion  Skin:     General: Skin is warm and dry  Neurological:      General: No focal deficit present  Mental Status: She is alert  Mental status is at baseline  Psychiatric:         Mood and Affect: Mood normal          Behavior: Behavior normal          Thought Content:  Thought content normal          Judgment: Judgment normal

## 2021-05-21 ENCOUNTER — IMMUNIZATIONS (OUTPATIENT)
Dept: FAMILY MEDICINE CLINIC | Facility: HOSPITAL | Age: 31
End: 2021-05-21

## 2021-05-21 DIAGNOSIS — Z23 ENCOUNTER FOR IMMUNIZATION: Primary | ICD-10-CM

## 2021-05-21 PROCEDURE — 0001A SARS-COV-2 / COVID-19 MRNA VACCINE (PFIZER-BIONTECH) 30 MCG: CPT

## 2021-05-21 PROCEDURE — 91300 SARS-COV-2 / COVID-19 MRNA VACCINE (PFIZER-BIONTECH) 30 MCG: CPT

## 2021-05-26 ENCOUNTER — TELEPHONE (OUTPATIENT)
Dept: INTERNAL MEDICINE CLINIC | Age: 31
End: 2021-05-26

## 2021-05-26 ENCOUNTER — OFFICE VISIT (OUTPATIENT)
Dept: INTERNAL MEDICINE CLINIC | Age: 31
End: 2021-05-26
Payer: COMMERCIAL

## 2021-05-26 VITALS
WEIGHT: 245 LBS | HEIGHT: 66 IN | DIASTOLIC BLOOD PRESSURE: 64 MMHG | TEMPERATURE: 98.3 F | HEART RATE: 87 BPM | SYSTOLIC BLOOD PRESSURE: 116 MMHG | OXYGEN SATURATION: 99 % | BODY MASS INDEX: 39.37 KG/M2

## 2021-05-26 DIAGNOSIS — L03.90 CELLULITIS OF SKIN: Primary | ICD-10-CM

## 2021-05-26 DIAGNOSIS — N80.9 ENDOMETRIOSIS: ICD-10-CM

## 2021-05-26 DIAGNOSIS — E04.1 THYROID NODULE: ICD-10-CM

## 2021-05-26 DIAGNOSIS — M25.552 PAIN OF BOTH HIP JOINTS: ICD-10-CM

## 2021-05-26 DIAGNOSIS — M25.551 PAIN OF BOTH HIP JOINTS: ICD-10-CM

## 2021-05-26 DIAGNOSIS — R53.83 FATIGUE, UNSPECIFIED TYPE: ICD-10-CM

## 2021-05-26 PROCEDURE — 99214 OFFICE O/P EST MOD 30 MIN: CPT | Performed by: PHYSICIAN ASSISTANT

## 2021-05-26 PROCEDURE — 1036F TOBACCO NON-USER: CPT | Performed by: PHYSICIAN ASSISTANT

## 2021-05-26 PROCEDURE — 3725F SCREEN DEPRESSION PERFORMED: CPT | Performed by: PHYSICIAN ASSISTANT

## 2021-05-26 PROCEDURE — 3008F BODY MASS INDEX DOCD: CPT | Performed by: PHYSICIAN ASSISTANT

## 2021-05-26 RX ORDER — TRAMADOL HYDROCHLORIDE 50 MG/1
50 TABLET ORAL EVERY 12 HOURS PRN
Qty: 60 TABLET | Refills: 0 | Status: SHIPPED | OUTPATIENT
Start: 2021-05-26 | End: 2021-06-23 | Stop reason: SDUPTHER

## 2021-05-26 RX ORDER — CEPHALEXIN 500 MG/1
500 CAPSULE ORAL EVERY 6 HOURS SCHEDULED
Qty: 40 CAPSULE | Refills: 0 | Status: SHIPPED | OUTPATIENT
Start: 2021-05-26 | End: 2021-06-05

## 2021-05-26 NOTE — TELEPHONE ENCOUNTER
Patient called and stated that [address] in [place] [address] will not fill her Tramadol Prescription until we call and speak with the pharmacist  Can someone please look into this?

## 2021-05-26 NOTE — PROGRESS NOTES
Assessment/Plan:         Diagnoses and all orders for this visit:    Cellulitis of skin  Comments:  mild erythema of skin - keflex course, pt to call surgery to report this  f/u in 3-4 days if not improved  probiotic daily    Orders:  -     Comprehensive metabolic panel; Future  -     TSH, 3rd generation; Future  -     cephalexin (KEFLEX) 500 mg capsule; Take 1 capsule (500 mg total) by mouth every 6 (six) hours for 10 days    Endometriosis  Comments:  discussed tx and risk with narcotics  goal to wean off, decrease to q 12hrs prn  continue to follow with [facility]  Orders:  -     TSH, 3rd generation; Future  -     Lyme Total Antibody Profile with reflex to WB; Future  -     Cortisol Level, AM Specimen; Future  -     traMADol (ULTRAM) 50 mg tablet; Take 1 tablet (50 mg total) by mouth every 12 (twelve) hours as needed for moderate pain    Fatigue, unspecified type  -     TSH, 3rd generation; Future  -     Lyme Total Antibody Profile with reflex to WB; Future  -     Cortisol Level, AM Specimen; Future    Pain of both hip joints    Thyroid nodule  Comments:  no recent u/s  overdue, pt to schedule   Orders:  -     US thyroid; Future        Discussed goals of tx for endometriosis that does not include narcotics, to wean down on tramadol  Start [facility] and f/u with [facility]  If narcotics unable to be weaned she will need to find pain management but we can taper off in mean time  F/u in 3-4 weeks after labs and u/s done       Subjective:      Patient ID: Aretha Chavez is a 31 y o  female      30 y/o with hx of endometriosis, s/p appendectomy (5/3/21) with endometriosis  (recently dx surgically on biopsy)  Pt has been on tramadol in past due to this, follows with [facility] and sees gyn locally (Dr Nancy Kelly)    S/p hysterectomy (2016) for endometriosis    Both ovaries still present    C/o mild redness around incision site on L side lower abdomen  Itching sensation, "divit" in skin in area of surgery  Pt to report to surgeon   May use benadryl cream topically       The following portions of the patient's history were reviewed and updated as appropriate: allergies, current medications, past family history, past medical history, past social history, past surgical history and problem list     Review of Systems   Constitutional: Negative for appetite change, chills, diaphoresis, fatigue and fever  HENT: Negative for congestion, sinus pressure and sore throat  Respiratory: Negative for cough, shortness of breath and wheezing  Cardiovascular: Negative for chest pain and leg swelling  Gastrointestinal: Positive for abdominal pain (pelvic lower pain)  Genitourinary: Positive for menstrual problem and pelvic pain  Musculoskeletal: Positive for arthralgias (b/l hip pain, R buttock pain)  Negative for back pain  Skin: Negative for rash  Neurological: Negative for dizziness, light-headedness and headaches (ha noted after covid vaccine - resolved)  Psychiatric/Behavioral: Negative for dysphoric mood and sleep disturbance  The patient is not nervous/anxious            Past Medical History:   Diagnosis Date    Anxiety     Chicken pox     Depression     Endometriosis     GERD (gastroesophageal reflux disease)     Headache     Occasional     HSV-1 infection     IBS (irritable bowel syndrome)     Kidney stone on left side     Multiple thyroid nodules     Obesity     Renal calculi          Current Outpatient Medications:     aluminum-magnesium hydroxide-simethicone (MAALOX ADVANCED MAX ST) 400-400-40 MG/5ML suspension, Take 5 mL by mouth every 6 (six) hours as needed, Disp: , Rfl:     cephalexin (KEFLEX) 500 mg capsule, Take 1 capsule (500 mg total) by mouth every 6 (six) hours for 10 days, Disp: 40 capsule, Rfl: 0    cetirizine (ZyrTEC) 5 MG tablet, Take 5 mg by mouth daily, Disp: , Rfl:     cyclobenzaprine (FLEXERIL) 10 mg tablet, Take 10 mg by mouth 3 (three) times a day, Disp: , Rfl: 1    famotidine (PEPCID) 40 MG tablet, TAKE 1 TABLET BY MOUTH TWICE DAILY AS NEEDED FOR HEARTBURN, Disp: , Rfl: 0    gabapentin (NEURONTIN) 100 mg capsule, Take 1 capsule (100 mg total) by mouth daily at bedtime, Disp: 30 capsule, Rfl: 1    methocarbamol (ROBAXIN) 750 mg tablet, TAKE 1 TABLET BY MOUTH 4 TIMES DAILY AS NEEDED FOR MUSCLE SPASM, Disp: , Rfl: 0    ORILISSA 150 MG TABS, Take 1 tablet by mouth daily, Disp: , Rfl: 3    pantoprazole (PROTONIX) 20 mg tablet, Take 40 mg by mouth daily, Disp: , Rfl:     traMADol (ULTRAM) 50 mg tablet, Take 1 tablet (50 mg total) by mouth every 12 (twelve) hours as needed for moderate pain, Disp: 60 tablet, Rfl: 0    triamcinolone (KENALOG) 0 5 % cream, Apply topically 2 (two) times a day to rash on both legs  Avoid face or genital region  Maximum use for 2 weeks   (Patient not taking: Reported on 2019), Disp: 30 g, Rfl: 0    valACYclovir (VALTREX) 1,000 mg tablet, , Disp: , Rfl: 3    Allergies   Allergen Reactions    Other Blisters     Dermabond    Reglan [Metoclopramide]        Social History   Past Surgical History:   Procedure Laterality Date     SECTION      x2    HYSTERECTOMY      robotic total laparoscopic hysterectomy with BS    LAPAROSCOPIC ENDOMETRIOSIS FULGURATION  2018    laparoscopic excision of endometriosis, fulguration of endometriosis, lysis of adhesions    LAPAROSCOPY  2014    with I&D     MOLE REMOVAL      face - benign     SKIN CANCER EXCISION      abdomen    THYROID CYST EXCISION      TONSILLECTOMY      TUBAL LIGATION  02/15/2016    With lysis of adhesion and peritoneal biopsy    WISDOM TOOTH EXTRACTION       Family History   Problem Relation Age of Onset    Coronary artery disease Mother     Varicose Veins Mother     Other Mother         breast cyst - removed     Cholelithiasis Mother         had cholecystectomy    Alcohol abuse Father    Liz [place]ira Cholelithiasis Maternal Grandmother         had cholecystectomy    Uterine cancer Paternal Grandmother     Breast cancer Family         Before age 52     Uterine cancer Family     Obesity Family     Hypertension Family     Brain cancer Family     Gallbladder disease Family        Objective:  /64 (BP Location: Left arm, Patient Position: Sitting, Cuff Size: Large)   Pulse 87   Temp 98 3 °F (36 8 °C) (Temporal)   Ht 5' 5 55" (1 665 m) Comment: shoes off  Wt 111 kg (245 lb) Comment: shoes off  SpO2 99%   BMI 40 09 kg/m²        Physical Exam  Vitals signs reviewed  Constitutional:       General: She is not in acute distress  HENT:      Head: Normocephalic and atraumatic  Right Ear: Tympanic membrane, ear canal and external ear normal       Left Ear: Tympanic membrane, ear canal and external ear normal    Eyes:      General:         Right eye: No discharge  Left eye: No discharge  Extraocular Movements: Extraocular movements intact  Conjunctiva/sclera: Conjunctivae normal       Pupils: Pupils are equal, round, and reactive to light  Neck:      Musculoskeletal: Normal range of motion  Cardiovascular:      Rate and Rhythm: Normal rate and regular rhythm  Pulmonary:      Effort: Pulmonary effort is normal       Breath sounds: Normal breath sounds  No wheezing, rhonchi or rales  Abdominal:      General: Bowel sounds are normal  There is no distension  Musculoskeletal: Normal range of motion  Right lower leg: No edema  Left lower leg: No edema  Lymphadenopathy:      Cervical: No cervical adenopathy  Skin:     Findings: Erythema (mild erythema around healed incision site (L abdominal wall) ) present  No rash  Neurological:      General: No focal deficit present  Mental Status: She is alert and oriented to person, place, and time     Psychiatric:         Mood and Affect: Mood normal          Behavior: Behavior normal

## 2021-06-16 ENCOUNTER — IMMUNIZATIONS (OUTPATIENT)
Dept: FAMILY MEDICINE CLINIC | Facility: HOSPITAL | Age: 31
End: 2021-06-16

## 2021-06-16 DIAGNOSIS — Z23 ENCOUNTER FOR IMMUNIZATION: Primary | ICD-10-CM

## 2021-06-16 PROCEDURE — 0002A SARS-COV-2 / COVID-19 MRNA VACCINE (PFIZER-BIONTECH) 30 MCG: CPT

## 2021-06-16 PROCEDURE — 91300 SARS-COV-2 / COVID-19 MRNA VACCINE (PFIZER-BIONTECH) 30 MCG: CPT

## 2021-06-23 ENCOUNTER — OFFICE VISIT (OUTPATIENT)
Dept: INTERNAL MEDICINE CLINIC | Age: 31
End: 2021-06-23
Payer: COMMERCIAL

## 2021-06-23 VITALS
WEIGHT: 251 LBS | TEMPERATURE: 98 F | HEART RATE: 86 BPM | OXYGEN SATURATION: 100 % | BODY MASS INDEX: 39.39 KG/M2 | HEIGHT: 67 IN | SYSTOLIC BLOOD PRESSURE: 114 MMHG | DIASTOLIC BLOOD PRESSURE: 78 MMHG

## 2021-06-23 DIAGNOSIS — G43.009 MIGRAINE WITHOUT AURA AND WITHOUT STATUS MIGRAINOSUS, NOT INTRACTABLE: Primary | ICD-10-CM

## 2021-06-23 DIAGNOSIS — N80.9 ENDOMETRIOSIS: ICD-10-CM

## 2021-06-23 PROCEDURE — 1036F TOBACCO NON-USER: CPT | Performed by: PHYSICIAN ASSISTANT

## 2021-06-23 PROCEDURE — 3008F BODY MASS INDEX DOCD: CPT | Performed by: PHYSICIAN ASSISTANT

## 2021-06-23 PROCEDURE — 99214 OFFICE O/P EST MOD 30 MIN: CPT | Performed by: PHYSICIAN ASSISTANT

## 2021-06-23 RX ORDER — SUMATRIPTAN 50 MG/1
50 TABLET, FILM COATED ORAL ONCE AS NEEDED
Qty: 9 TABLET | Refills: 0 | Status: SHIPPED | OUTPATIENT
Start: 2021-06-23 | End: 2021-11-17

## 2021-06-23 RX ORDER — TRAMADOL HYDROCHLORIDE 50 MG/1
50 TABLET ORAL EVERY 12 HOURS PRN
Qty: 60 TABLET | Refills: 0 | Status: SHIPPED | OUTPATIENT
Start: 2021-06-23 | End: 2021-07-21 | Stop reason: SDUPTHER

## 2021-06-23 RX ORDER — TRAMADOL HYDROCHLORIDE 50 MG/1
50 TABLET ORAL EVERY 6 HOURS PRN
COMMUNITY
End: 2021-06-23 | Stop reason: SDUPTHER

## 2021-06-23 NOTE — PROGRESS NOTES
Assessment/Plan:         Diagnoses and all orders for this visit:    Migraine without aura and without status migrainosus, not intractable  Comments:  trial imitrex  may use excedrin if mild/moderate   Orders:  -     SUMAtriptan (IMITREX) 50 mg tablet; Take 1 tablet (50 mg total) by mouth once as needed for migraine for up to 1 dose    Endometriosis  Comments:  discussed tx and risk with narcotics  goal to wean off, decrease to q 12hrs prn  continue to follow with [place] [facility]  Orders:  -     traMADol (ULTRAM) 50 mg tablet; Take 1 tablet (50 mg total) by mouth every 12 (twelve) hours as needed for moderate pain    Other orders  -     Discontinue: traMADol (ULTRAM) 50 mg tablet; Take 50 mg by mouth every 6 (six) hours as needed (Patient not taking: Reported on 6/23/2021)          Subjective:      Patient ID: Lisa Odonnell is a 31 y o  female  F/u for endometriosis, recent appendicitis with appendectomy  She is currently taking orlissa per her gynecologist  Had discussed lupron but was not approved by insurance     Pt with hx of migraines, pt states they began after having c section with her son "spinal headaches"   Ever since then reports migraines occur   Less than 1 per month when it is severe enough to stop what she is doing and go rest      The following portions of the patient's history were reviewed and updated as appropriate: allergies, current medications, past family history, past medical history, past social history, past surgical history and problem list     Review of Systems   Constitutional: Negative for activity change, appetite change, diaphoresis and fatigue  HENT: Negative for congestion and sore throat  Eyes: Negative for photophobia and redness  Respiratory: Negative for cough and shortness of breath  Cardiovascular: Negative for chest pain and leg swelling  Gastrointestinal: Negative for abdominal pain, constipation, diarrhea and nausea     Genitourinary: Negative for dysuria and frequency  Musculoskeletal: Negative for arthralgias, back pain and gait problem  Skin: Negative for rash  Neurological: Negative for dizziness, light-headedness and headaches  Psychiatric/Behavioral: Negative for sleep disturbance  The patient is not nervous/anxious            Past Medical History:   Diagnosis Date    Anxiety     Chicken pox     Depression     Endometriosis     GERD (gastroesophageal reflux disease)     Headache     Occasional     HSV-1 infection     IBS (irritable bowel syndrome)     Kidney stone on left side     Multiple thyroid nodules     Obesity     Renal calculi          Current Outpatient Medications:     ORILISSA 150 MG TABS, Take 1 tablet by mouth daily, Disp: , Rfl: 3    traMADol (ULTRAM) 50 mg tablet, Take 1 tablet (50 mg total) by mouth every 12 (twelve) hours as needed for moderate pain, Disp: 60 tablet, Rfl: 0    valACYclovir (VALTREX) 1,000 mg tablet, , Disp: , Rfl: 3    gabapentin (NEURONTIN) 100 mg capsule, Take 1 capsule (100 mg total) by mouth daily at bedtime, Disp: 30 capsule, Rfl: 1    SUMAtriptan (IMITREX) 50 mg tablet, Take 1 tablet (50 mg total) by mouth once as needed for migraine for up to 1 dose, Disp: 9 tablet, Rfl: 0    Allergies   Allergen Reactions    Other Blisters     Dermabond    Reglan [Metoclopramide]        Social History   Past Surgical History:   Procedure Laterality Date     SECTION      x2    HYSTERECTOMY      robotic total laparoscopic hysterectomy with BS    LAPAROSCOPIC ENDOMETRIOSIS FULGURATION  2018    laparoscopic excision of endometriosis, fulguration of endometriosis, lysis of adhesions    LAPAROSCOPY  2014    with I&D     MOLE REMOVAL      face - benign     SKIN CANCER EXCISION      abdomen    THYROID CYST EXCISION      TONSILLECTOMY      TUBAL LIGATION  02/15/2016    With lysis of adhesion and peritoneal biopsy    WISDOM TOOTH EXTRACTION       Family History   Problem Relation Age of Onset    Coronary artery disease Mother     Varicose Veins Mother     Other Mother         breast cyst - removed     Cholelithiasis Mother         had cholecystectomy    Alcohol abuse Father     Cholelithiasis Maternal Grandmother         had cholecystectomy    Uterine cancer Paternal Grandmother     Breast cancer Family         Before age 52     Uterine cancer Family     Obesity Family     Hypertension Family     Brain cancer Family     Gallbladder disease Family        Objective:  /78 (BP Location: Left arm, Patient Position: Sitting, Cuff Size: Standard)   Pulse 86   Temp 98 °F (36 7 °C) (Temporal)   Ht 5' 6 5" (1 689 m)   Wt 114 kg (251 lb)   SpO2 100%   BMI 39 91 kg/m²        Physical Exam  Vitals reviewed  Constitutional:       General: She is not in acute distress  HENT:      Head: Normocephalic  Right Ear: Tympanic membrane, ear canal and external ear normal       Left Ear: Tympanic membrane, ear canal and external ear normal    Eyes:      General:         Right eye: No discharge  Left eye: No discharge  Extraocular Movements: Extraocular movements intact  Conjunctiva/sclera: Conjunctivae normal       Pupils: Pupils are equal, round, and reactive to light  Cardiovascular:      Rate and Rhythm: Normal rate and regular rhythm  Pulmonary:      Effort: Pulmonary effort is normal       Breath sounds: Normal breath sounds  No wheezing, rhonchi or rales  Abdominal:      General: There is no distension  Comments: L abdominal wall incision healed, no erythema or rash   Musculoskeletal:         General: Normal range of motion  Right lower leg: No edema  Left lower leg: No edema  Skin:     General: Skin is warm  Findings: No erythema or rash  Neurological:      General: No focal deficit present  Mental Status: She is alert and oriented to person, place, and time     Psychiatric:         Mood and Affect: Mood normal  Behavior: Behavior normal

## 2021-07-07 ENCOUNTER — TELEPHONE (OUTPATIENT)
Dept: FAMILY MEDICINE CLINIC | Facility: CLINIC | Age: 31
End: 2021-07-07

## 2021-07-07 ENCOUNTER — APPOINTMENT (OUTPATIENT)
Dept: LAB | Facility: IMAGING CENTER | Age: 31
End: 2021-07-07
Payer: COMMERCIAL

## 2021-07-07 DIAGNOSIS — M25.551 CHRONIC ARTHRALGIAS OF KNEES AND HIPS: ICD-10-CM

## 2021-07-07 DIAGNOSIS — N80.9 ENDOMETRIOSIS: ICD-10-CM

## 2021-07-07 DIAGNOSIS — M25.561 CHRONIC ARTHRALGIAS OF KNEES AND HIPS: ICD-10-CM

## 2021-07-07 DIAGNOSIS — R53.83 FATIGUE, UNSPECIFIED TYPE: ICD-10-CM

## 2021-07-07 DIAGNOSIS — L03.90 CELLULITIS OF SKIN: ICD-10-CM

## 2021-07-07 DIAGNOSIS — E04.1 THYROID NODULE: ICD-10-CM

## 2021-07-07 DIAGNOSIS — G89.29 CHRONIC ARTHRALGIAS OF KNEES AND HIPS: ICD-10-CM

## 2021-07-07 DIAGNOSIS — E78.49 OTHER HYPERLIPIDEMIA: ICD-10-CM

## 2021-07-07 DIAGNOSIS — M25.562 CHRONIC ARTHRALGIAS OF KNEES AND HIPS: ICD-10-CM

## 2021-07-07 DIAGNOSIS — M25.552 CHRONIC ARTHRALGIAS OF KNEES AND HIPS: ICD-10-CM

## 2021-07-07 LAB
ALBUMIN SERPL BCP-MCNC: 3.4 G/DL (ref 3.5–5)
ALP SERPL-CCNC: 52 U/L (ref 46–116)
ALT SERPL W P-5'-P-CCNC: 25 U/L (ref 12–78)
ANION GAP SERPL CALCULATED.3IONS-SCNC: 5 MMOL/L (ref 4–13)
AST SERPL W P-5'-P-CCNC: 11 U/L (ref 5–45)
BASOPHILS # BLD AUTO: 0.01 THOUSANDS/ΜL (ref 0–0.1)
BASOPHILS NFR BLD AUTO: 0 % (ref 0–1)
BILIRUB SERPL-MCNC: 0.92 MG/DL (ref 0.2–1)
BUN SERPL-MCNC: 15 MG/DL (ref 5–25)
CALCIUM ALBUM COR SERPL-MCNC: 9.2 MG/DL (ref 8.3–10.1)
CALCIUM SERPL-MCNC: 8.7 MG/DL (ref 8.3–10.1)
CHLORIDE SERPL-SCNC: 112 MMOL/L (ref 100–108)
CHOLEST SERPL-MCNC: 127 MG/DL (ref 50–200)
CO2 SERPL-SCNC: 24 MMOL/L (ref 21–32)
CORTIS AM PEAK SERPL-MCNC: 19.1 UG/DL (ref 4.2–22.4)
CREAT SERPL-MCNC: 0.74 MG/DL (ref 0.6–1.3)
CRP SERPL QL: 10.3 MG/L
EOSINOPHIL # BLD AUTO: 0.01 THOUSAND/ΜL (ref 0–0.61)
EOSINOPHIL NFR BLD AUTO: 0 % (ref 0–6)
ERYTHROCYTE [DISTWIDTH] IN BLOOD BY AUTOMATED COUNT: 13.5 % (ref 11.6–15.1)
ERYTHROCYTE [SEDIMENTATION RATE] IN BLOOD: 14 MM/HOUR (ref 0–19)
GFR SERPL CREATININE-BSD FRML MDRD: 111 ML/MIN/1.73SQ M
GLUCOSE P FAST SERPL-MCNC: 85 MG/DL (ref 65–99)
HCT VFR BLD AUTO: 36.9 % (ref 34.8–46.1)
HDLC SERPL-MCNC: 43 MG/DL
HGB BLD-MCNC: 12.2 G/DL (ref 11.5–15.4)
IMM GRANULOCYTES # BLD AUTO: 0.02 THOUSAND/UL (ref 0–0.2)
IMM GRANULOCYTES NFR BLD AUTO: 0 % (ref 0–2)
LDLC SERPL CALC-MCNC: 69 MG/DL (ref 0–100)
LYMPHOCYTES # BLD AUTO: 2 THOUSANDS/ΜL (ref 0.6–4.47)
LYMPHOCYTES NFR BLD AUTO: 26 % (ref 14–44)
MCH RBC QN AUTO: 29.8 PG (ref 26.8–34.3)
MCHC RBC AUTO-ENTMCNC: 33.1 G/DL (ref 31.4–37.4)
MCV RBC AUTO: 90 FL (ref 82–98)
MONOCYTES # BLD AUTO: 0.52 THOUSAND/ΜL (ref 0.17–1.22)
MONOCYTES NFR BLD AUTO: 7 % (ref 4–12)
NEUTROPHILS # BLD AUTO: 5.07 THOUSANDS/ΜL (ref 1.85–7.62)
NEUTS SEG NFR BLD AUTO: 67 % (ref 43–75)
NRBC BLD AUTO-RTO: 0 /100 WBCS
PLATELET # BLD AUTO: 204 THOUSANDS/UL (ref 149–390)
PMV BLD AUTO: 9.1 FL (ref 8.9–12.7)
POTASSIUM SERPL-SCNC: 3.9 MMOL/L (ref 3.5–5.3)
PROT SERPL-MCNC: 6.7 G/DL (ref 6.4–8.2)
RBC # BLD AUTO: 4.09 MILLION/UL (ref 3.81–5.12)
SODIUM SERPL-SCNC: 141 MMOL/L (ref 136–145)
TRIGL SERPL-MCNC: 77 MG/DL
TSH SERPL DL<=0.05 MIU/L-ACNC: 0.81 UIU/ML (ref 0.36–3.74)
WBC # BLD AUTO: 7.63 THOUSAND/UL (ref 4.31–10.16)

## 2021-07-07 PROCEDURE — 36415 COLL VENOUS BLD VENIPUNCTURE: CPT

## 2021-07-07 PROCEDURE — 85025 COMPLETE CBC W/AUTO DIFF WBC: CPT

## 2021-07-07 PROCEDURE — 80053 COMPREHEN METABOLIC PANEL: CPT

## 2021-07-07 PROCEDURE — 82533 TOTAL CORTISOL: CPT

## 2021-07-07 PROCEDURE — 86430 RHEUMATOID FACTOR TEST QUAL: CPT

## 2021-07-07 PROCEDURE — 86618 LYME DISEASE ANTIBODY: CPT

## 2021-07-07 PROCEDURE — 86140 C-REACTIVE PROTEIN: CPT

## 2021-07-07 PROCEDURE — 80061 LIPID PANEL: CPT

## 2021-07-07 PROCEDURE — 85652 RBC SED RATE AUTOMATED: CPT

## 2021-07-07 PROCEDURE — 86431 RHEUMATOID FACTOR QUANT: CPT

## 2021-07-07 PROCEDURE — 86038 ANTINUCLEAR ANTIBODIES: CPT

## 2021-07-07 PROCEDURE — 84443 ASSAY THYROID STIM HORMONE: CPT

## 2021-07-08 ENCOUNTER — PATIENT MESSAGE (OUTPATIENT)
Dept: INTERNAL MEDICINE CLINIC | Age: 31
End: 2021-07-08

## 2021-07-08 LAB
B BURGDOR IGG+IGM SER-ACNC: -1
CRYOGLOB RF SER-ACNC: ABNORMAL [IU]/ML
RHEUMATOID FACT SER QL LA: POSITIVE

## 2021-07-09 LAB — RYE IGE QN: NEGATIVE

## 2021-07-21 DIAGNOSIS — N80.9 ENDOMETRIOSIS: ICD-10-CM

## 2021-07-22 RX ORDER — TRAMADOL HYDROCHLORIDE 50 MG/1
50 TABLET ORAL EVERY 12 HOURS PRN
Qty: 60 TABLET | Refills: 0 | Status: SHIPPED | OUTPATIENT
Start: 2021-07-22 | End: 2021-08-19 | Stop reason: SDUPTHER

## 2021-08-19 DIAGNOSIS — N80.9 ENDOMETRIOSIS: ICD-10-CM

## 2021-08-23 RX ORDER — TRAMADOL HYDROCHLORIDE 50 MG/1
50 TABLET ORAL EVERY 12 HOURS PRN
Qty: 60 TABLET | Refills: 0 | Status: SHIPPED | OUTPATIENT
Start: 2021-08-23 | End: 2021-09-08

## 2021-09-08 ENCOUNTER — OFFICE VISIT (OUTPATIENT)
Dept: INTERNAL MEDICINE CLINIC | Age: 31
End: 2021-09-08
Payer: COMMERCIAL

## 2021-09-08 VITALS
SYSTOLIC BLOOD PRESSURE: 112 MMHG | HEART RATE: 62 BPM | WEIGHT: 260 LBS | TEMPERATURE: 97.7 F | BODY MASS INDEX: 41.34 KG/M2 | DIASTOLIC BLOOD PRESSURE: 68 MMHG | OXYGEN SATURATION: 99 %

## 2021-09-08 DIAGNOSIS — R76.8 RHEUMATOID FACTOR POSITIVE: ICD-10-CM

## 2021-09-08 DIAGNOSIS — N80.9 ENDOMETRIOSIS: Primary | ICD-10-CM

## 2021-09-08 DIAGNOSIS — K21.9 GASTROESOPHAGEAL REFLUX DISEASE, UNSPECIFIED WHETHER ESOPHAGITIS PRESENT: ICD-10-CM

## 2021-09-08 DIAGNOSIS — M25.50 ARTHRALGIA, UNSPECIFIED JOINT: ICD-10-CM

## 2021-09-08 PROCEDURE — 99214 OFFICE O/P EST MOD 30 MIN: CPT | Performed by: PHYSICIAN ASSISTANT

## 2021-09-08 RX ORDER — ACETAMINOPHEN AND CODEINE PHOSPHATE 300; 30 MG/1; MG/1
1 TABLET ORAL EVERY 8 HOURS PRN
Qty: 45 TABLET | Refills: 0 | Status: SHIPPED | OUTPATIENT
Start: 2021-09-08 | End: 2021-09-21 | Stop reason: SDUPTHER

## 2021-09-08 RX ORDER — MELOXICAM 15 MG/1
15 TABLET ORAL DAILY
Qty: 30 TABLET | Refills: 1 | Status: SHIPPED | OUTPATIENT
Start: 2021-09-08 | End: 2021-10-26

## 2021-09-08 RX ORDER — OMEPRAZOLE 20 MG/1
20 CAPSULE, DELAYED RELEASE ORAL DAILY
Qty: 30 CAPSULE | Refills: 3 | Status: SHIPPED | OUTPATIENT
Start: 2021-09-08 | End: 2021-10-26

## 2021-09-08 NOTE — PROGRESS NOTES
Assessment/Plan:         Diagnoses and all orders for this visit:    Endometriosis  Comments:  f/u with pain mgmt as scheduled  urged to f/u with gyn   d/c tramadol as this was not helpful per pt  trial tylenol #3    Orders:  -     acetaminophen-codeine (TYLENOL #3) 300-30 mg per tablet; Take 1 tablet by mouth every 8 (eight) hours as needed for moderate pain    Arthralgia, unspecified joint  -     meloxicam (MOBIC) 15 mg tablet; Take 1 tablet (15 mg total) by mouth daily  -     acetaminophen-codeine (TYLENOL #3) 300-30 mg per tablet; Take 1 tablet by mouth every 8 (eight) hours as needed for moderate pain    Rheumatoid factor positive  Comments:  awaiting rheum consultation   Orders:  -     meloxicam (MOBIC) 15 mg tablet; Take 1 tablet (15 mg total) by mouth daily    Gastroesophageal reflux disease, unspecified whether esophagitis present  Comments:  +ppi, await gi eval  hgb stable   Orders:  -     omeprazole (PriLOSEC) 20 mg delayed release capsule; Take 1 capsule (20 mg total) by mouth daily        Pt urged to f/u with gyn to discuss add'l tx options   She stopped orlissa 2-3 weeks ago     Subjective:      Patient ID: Minerva Arenas is a 31 y o  female      30 Y/O female with hx of endometriosis s/p hysterectomy 2016, s/p appy with endometrial tissue (3/21)  Pt continues to struggle with pain - lower abdominal   Pt states she stopped the orlissa a few days ago bc it wasn't helping   She has been on tramadol for a year or so, started at [facility] and at that time pt reported it was helpful for pain  She is tearful during exam discussing this   Improved with lupron in past but not covered by insurance  No relief with gabapentin      Pt will be following with Dr Freire Ours (dr Saw Dsouza retiring)  Was following with gyn at [facility] endometriosis center but has not had f/u there   Pain mgmt at [facility] apt scheduled   Pt went through pelvic floor PT at [facility] approx 1-1 5 yrs ago but states it didn't help     C/o lower abdominal pain, persistent and daily   Went to ER in august for the same  Pt notes BRBPR intermittent - denies constipation  Still ongoing at times  hgb stable (*8/21)      The following portions of the patient's history were reviewed and updated as appropriate: allergies, current medications, past family history, past medical history, past social history, past surgical history and problem list     Review of Systems   Constitutional: Negative for activity change, appetite change, chills, fatigue and fever  HENT: Negative for congestion, postnasal drip and sore throat  Eyes: Negative for redness  Respiratory: Negative for cough, shortness of breath and wheezing  Cardiovascular: Negative for chest pain and leg swelling  Gastrointestinal: Positive for abdominal distention, abdominal pain and blood in stool  Negative for constipation, diarrhea and nausea  Genitourinary: Negative for dysuria and frequency  Musculoskeletal: Positive for arthralgias, back pain and neck pain  Skin: Negative for rash  Neurological: Negative for dizziness, light-headedness and headaches  Psychiatric/Behavioral: Positive for dysphoric mood  The patient is nervous/anxious            Past Medical History:   Diagnosis Date    Anxiety     Chicken pox     Depression     Endometriosis     GERD (gastroesophageal reflux disease)     Headache     Occasional     HSV-1 infection     IBS (irritable bowel syndrome)     Kidney stone on left side     Multiple thyroid nodules     Obesity     Renal calculi          Current Outpatient Medications:     SUMAtriptan (IMITREX) 50 mg tablet, Take 1 tablet (50 mg total) by mouth once as needed for migraine for up to 1 dose, Disp: 9 tablet, Rfl: 0    valACYclovir (VALTREX) 1,000 mg tablet, , Disp: , Rfl: 3    acetaminophen-codeine (TYLENOL #3) 300-30 mg per tablet, Take 1 tablet by mouth every 8 (eight) hours as needed for moderate pain, Disp: 45 tablet, Rfl: 0    meloxicam (MOBIC) 15 mg tablet, Take 1 tablet (15 mg total) by mouth daily, Disp: 30 tablet, Rfl: 1    omeprazole (PriLOSEC) 20 mg delayed release capsule, Take 1 capsule (20 mg total) by mouth daily, Disp: 30 capsule, Rfl: 3    Allergies   Allergen Reactions    Other Blisters     Dermabond    Reglan [Metoclopramide]        Social History   Past Surgical History:   Procedure Laterality Date     SECTION      x2    HYSTERECTOMY      robotic total laparoscopic hysterectomy with BS    LAPAROSCOPIC ENDOMETRIOSIS FULGURATION  2018    laparoscopic excision of endometriosis, fulguration of endometriosis, lysis of adhesions    LAPAROSCOPY  2014    with I&D     MOLE REMOVAL      face - benign     SKIN CANCER EXCISION      abdomen    THYROID CYST EXCISION      TONSILLECTOMY      TUBAL LIGATION  02/15/2016    With lysis of adhesion and peritoneal biopsy    WISDOM TOOTH EXTRACTION       Family History   Problem Relation Age of Onset    Coronary artery disease Mother     Varicose Veins Mother     Other Mother         breast cyst - removed     Cholelithiasis Mother         had cholecystectomy    Alcohol abuse Father     Cholelithiasis Maternal Grandmother         had cholecystectomy    Uterine cancer Paternal Grandmother     Breast cancer Family         Before age 52    Keara Cardona Uterine cancer Family     Obesity Family     Hypertension Family     Brain cancer Family     Gallbladder disease Family        Objective:  /68 (BP Location: Left arm, Patient Position: Sitting, Cuff Size: Large)   Pulse 62   Temp 97 7 °F (36 5 °C) (Temporal)   Wt 118 kg (260 lb)   SpO2 99%   BMI 41 34 kg/m²        Physical Exam  Vitals reviewed  Constitutional:       General: She is not in acute distress  HENT:      Head: Normocephalic and atraumatic  Eyes:      General:         Right eye: No discharge  Left eye: No discharge        Conjunctiva/sclera: Conjunctivae normal       Pupils: Pupils are equal, round, and reactive to light  Cardiovascular:      Rate and Rhythm: Normal rate and regular rhythm  Pulmonary:      Effort: Pulmonary effort is normal  No respiratory distress  Breath sounds: No wheezing or rhonchi  Musculoskeletal:         General: Swelling (b/l hands) present  Normal range of motion  Right lower leg: No edema  Left lower leg: No edema  Skin:     Findings: No erythema or rash  Neurological:      General: No focal deficit present  Mental Status: She is alert and oriented to person, place, and time

## 2021-09-21 DIAGNOSIS — M25.50 ARTHRALGIA, UNSPECIFIED JOINT: ICD-10-CM

## 2021-09-21 DIAGNOSIS — N80.9 ENDOMETRIOSIS: ICD-10-CM

## 2021-09-22 RX ORDER — ACETAMINOPHEN AND CODEINE PHOSPHATE 300; 30 MG/1; MG/1
1 TABLET ORAL EVERY 8 HOURS PRN
Qty: 45 TABLET | Refills: 0 | Status: SHIPPED | OUTPATIENT
Start: 2021-09-22 | End: 2021-10-05 | Stop reason: SDUPTHER

## 2021-10-05 DIAGNOSIS — M25.50 ARTHRALGIA, UNSPECIFIED JOINT: ICD-10-CM

## 2021-10-05 DIAGNOSIS — N80.9 ENDOMETRIOSIS: ICD-10-CM

## 2021-10-05 RX ORDER — ACETAMINOPHEN AND CODEINE PHOSPHATE 300; 30 MG/1; MG/1
1 TABLET ORAL EVERY 8 HOURS PRN
Qty: 45 TABLET | Refills: 0 | Status: SHIPPED | OUTPATIENT
Start: 2021-10-05 | End: 2021-10-19 | Stop reason: SDUPTHER

## 2021-10-11 ENCOUNTER — CONSULT (OUTPATIENT)
Dept: GASTROENTEROLOGY | Facility: CLINIC | Age: 31
End: 2021-10-11
Payer: COMMERCIAL

## 2021-10-11 VITALS
SYSTOLIC BLOOD PRESSURE: 112 MMHG | HEIGHT: 66 IN | OXYGEN SATURATION: 98 % | BODY MASS INDEX: 41.78 KG/M2 | DIASTOLIC BLOOD PRESSURE: 70 MMHG | WEIGHT: 260 LBS | HEART RATE: 80 BPM | TEMPERATURE: 98.9 F

## 2021-10-11 DIAGNOSIS — K92.1 HEMATOCHEZIA: ICD-10-CM

## 2021-10-11 DIAGNOSIS — K92.2 LOWER GI BLEED: Primary | ICD-10-CM

## 2021-10-11 PROCEDURE — 99244 OFF/OP CNSLTJ NEW/EST MOD 40: CPT | Performed by: PHYSICIAN ASSISTANT

## 2021-10-19 DIAGNOSIS — M25.50 ARTHRALGIA, UNSPECIFIED JOINT: ICD-10-CM

## 2021-10-19 DIAGNOSIS — N80.9 ENDOMETRIOSIS: ICD-10-CM

## 2021-10-20 RX ORDER — ACETAMINOPHEN AND CODEINE PHOSPHATE 300; 30 MG/1; MG/1
1 TABLET ORAL EVERY 8 HOURS PRN
Qty: 45 TABLET | Refills: 0 | Status: SHIPPED | OUTPATIENT
Start: 2021-10-20 | End: 2021-11-02 | Stop reason: SDUPTHER

## 2021-10-26 ENCOUNTER — OFFICE VISIT (OUTPATIENT)
Dept: INTERNAL MEDICINE CLINIC | Facility: CLINIC | Age: 31
End: 2021-10-26
Payer: COMMERCIAL

## 2021-10-26 ENCOUNTER — TELEPHONE (OUTPATIENT)
Dept: INTERNAL MEDICINE CLINIC | Age: 31
End: 2021-10-26

## 2021-10-26 VITALS
BODY MASS INDEX: 41.62 KG/M2 | SYSTOLIC BLOOD PRESSURE: 110 MMHG | DIASTOLIC BLOOD PRESSURE: 76 MMHG | WEIGHT: 259 LBS | RESPIRATION RATE: 16 BRPM | HEART RATE: 68 BPM | OXYGEN SATURATION: 98 % | TEMPERATURE: 99.1 F | HEIGHT: 66 IN

## 2021-10-26 DIAGNOSIS — R10.84 GENERALIZED ABDOMINAL PAIN: Primary | ICD-10-CM

## 2021-10-26 DIAGNOSIS — G89.29 CHRONIC PELVIC PAIN IN FEMALE: ICD-10-CM

## 2021-10-26 DIAGNOSIS — R10.2 CHRONIC PELVIC PAIN IN FEMALE: ICD-10-CM

## 2021-10-26 DIAGNOSIS — N80.9 ENDOMETRIOSIS: ICD-10-CM

## 2021-10-26 PROBLEM — R11.0 NAUSEA: Status: RESOLVED | Noted: 2019-04-02 | Resolved: 2021-10-26

## 2021-10-26 PROBLEM — J06.9 ACUTE UPPER RESPIRATORY INFECTION: Status: RESOLVED | Noted: 2019-11-20 | Resolved: 2021-10-26

## 2021-10-26 PROBLEM — J01.00 ACUTE NON-RECURRENT MAXILLARY SINUSITIS: Status: RESOLVED | Noted: 2021-01-26 | Resolved: 2021-10-26

## 2021-10-26 LAB
SL AMB  POCT GLUCOSE, UA: NORMAL
SL AMB LEUKOCYTE ESTERASE,UA: NORMAL
SL AMB POCT BILIRUBIN,UA: NORMAL
SL AMB POCT BLOOD,UA: NORMAL
SL AMB POCT CLARITY,UA: CLEAR
SL AMB POCT COLOR,UA: YELLOW
SL AMB POCT KETONES,UA: NORMAL
SL AMB POCT NITRITE,UA: NORMAL
SL AMB POCT PH,UA: 6
SL AMB POCT SPECIFIC GRAVITY,UA: 1.02
SL AMB POCT URINE PROTEIN: NORMAL
SL AMB POCT UROBILINOGEN: 0.2

## 2021-10-26 PROCEDURE — 81003 URINALYSIS AUTO W/O SCOPE: CPT | Performed by: INTERNAL MEDICINE

## 2021-10-26 PROCEDURE — 99213 OFFICE O/P EST LOW 20 MIN: CPT | Performed by: INTERNAL MEDICINE

## 2021-10-26 RX ORDER — METHOCARBAMOL 500 MG/1
500 TABLET, FILM COATED ORAL EVERY 6 HOURS PRN
Qty: 20 TABLET | Refills: 0 | Status: SHIPPED | OUTPATIENT
Start: 2021-10-26 | End: 2021-11-17

## 2021-10-26 RX ORDER — MELOXICAM 15 MG/1
15 TABLET ORAL DAILY PRN
Qty: 20 TABLET | Refills: 0 | Status: SHIPPED | OUTPATIENT
Start: 2021-10-26 | End: 2021-11-17

## 2021-10-26 RX ORDER — PREGABALIN 50 MG/1
CAPSULE ORAL
COMMUNITY
Start: 2021-10-19 | End: 2021-11-30

## 2021-10-26 RX ORDER — ONDANSETRON 4 MG/1
4 TABLET, ORALLY DISINTEGRATING ORAL EVERY 6 HOURS PRN
Qty: 20 TABLET | Refills: 0 | Status: SHIPPED | OUTPATIENT
Start: 2021-10-26 | End: 2021-11-30

## 2021-11-02 DIAGNOSIS — M25.50 ARTHRALGIA, UNSPECIFIED JOINT: ICD-10-CM

## 2021-11-02 DIAGNOSIS — N80.9 ENDOMETRIOSIS: ICD-10-CM

## 2021-11-02 RX ORDER — ACETAMINOPHEN AND CODEINE PHOSPHATE 300; 30 MG/1; MG/1
1 TABLET ORAL EVERY 8 HOURS PRN
Qty: 45 TABLET | Refills: 0 | Status: SHIPPED | OUTPATIENT
Start: 2021-11-02 | End: 2021-11-17

## 2021-11-11 ENCOUNTER — TELEPHONE (OUTPATIENT)
Dept: INTERNAL MEDICINE CLINIC | Age: 31
End: 2021-11-11

## 2021-11-17 ENCOUNTER — OFFICE VISIT (OUTPATIENT)
Dept: INTERNAL MEDICINE CLINIC | Age: 31
End: 2021-11-17
Payer: COMMERCIAL

## 2021-11-17 VITALS
SYSTOLIC BLOOD PRESSURE: 106 MMHG | WEIGHT: 261 LBS | TEMPERATURE: 98.6 F | DIASTOLIC BLOOD PRESSURE: 66 MMHG | HEIGHT: 66 IN | HEART RATE: 95 BPM | BODY MASS INDEX: 41.95 KG/M2 | OXYGEN SATURATION: 99 %

## 2021-11-17 DIAGNOSIS — G89.29 CHRONIC PELVIC PAIN IN FEMALE: ICD-10-CM

## 2021-11-17 DIAGNOSIS — R10.84 GENERALIZED ABDOMINAL PAIN: ICD-10-CM

## 2021-11-17 DIAGNOSIS — R10.2 CHRONIC PELVIC PAIN IN FEMALE: ICD-10-CM

## 2021-11-17 DIAGNOSIS — N80.9 ENDOMETRIOSIS: Primary | ICD-10-CM

## 2021-11-17 DIAGNOSIS — S39.012D LUMBAR STRAIN, SUBSEQUENT ENCOUNTER: ICD-10-CM

## 2021-11-17 PROCEDURE — 99213 OFFICE O/P EST LOW 20 MIN: CPT | Performed by: PHYSICIAN ASSISTANT

## 2021-11-17 RX ORDER — LIDOCAINE 50 MG/G
1 PATCH TOPICAL EVERY 24 HOURS
COMMUNITY
Start: 2021-11-14 | End: 2021-11-21

## 2021-11-17 RX ORDER — ETODOLAC 400 MG/1
400 TABLET, FILM COATED ORAL 2 TIMES DAILY
Qty: 60 TABLET | Refills: 1 | Status: SHIPPED | OUTPATIENT
Start: 2021-11-17 | End: 2021-11-23 | Stop reason: SDUPTHER

## 2021-11-17 RX ORDER — METHOCARBAMOL 750 MG/1
750 TABLET, FILM COATED ORAL EVERY 6 HOURS PRN
Qty: 50 TABLET | Refills: 0 | Status: SHIPPED | OUTPATIENT
Start: 2021-11-17 | End: 2022-04-14 | Stop reason: SDUPTHER

## 2021-11-19 ENCOUNTER — TELEPHONE (OUTPATIENT)
Dept: INTERNAL MEDICINE CLINIC | Age: 31
End: 2021-11-19

## 2021-11-23 ENCOUNTER — TELEPHONE (OUTPATIENT)
Dept: INTERNAL MEDICINE CLINIC | Age: 31
End: 2021-11-23

## 2021-11-23 DIAGNOSIS — S39.012D LUMBAR STRAIN, SUBSEQUENT ENCOUNTER: ICD-10-CM

## 2021-11-23 DIAGNOSIS — N80.9 ENDOMETRIOSIS: ICD-10-CM

## 2021-11-23 RX ORDER — ETODOLAC 400 MG/1
400 TABLET, FILM COATED ORAL 2 TIMES DAILY
Qty: 60 TABLET | Refills: 1 | Status: SHIPPED | OUTPATIENT
Start: 2021-11-23

## 2021-11-29 ENCOUNTER — TELEPHONE (OUTPATIENT)
Dept: GASTROENTEROLOGY | Facility: AMBULARY SURGERY CENTER | Age: 31
End: 2021-11-29

## 2021-11-30 ENCOUNTER — HOSPITAL ENCOUNTER (OUTPATIENT)
Dept: GASTROENTEROLOGY | Facility: AMBULARY SURGERY CENTER | Age: 31
Setting detail: OUTPATIENT SURGERY
Discharge: HOME | End: 2021-11-30
Payer: COMMERCIAL

## 2021-11-30 ENCOUNTER — ANESTHESIA EVENT (OUTPATIENT)
Dept: GASTROENTEROLOGY | Facility: AMBULARY SURGERY CENTER | Age: 31
End: 2021-11-30

## 2021-11-30 ENCOUNTER — ANESTHESIA (OUTPATIENT)
Dept: GASTROENTEROLOGY | Facility: AMBULARY SURGERY CENTER | Age: 31
End: 2021-11-30

## 2021-11-30 VITALS
SYSTOLIC BLOOD PRESSURE: 109 MMHG | DIASTOLIC BLOOD PRESSURE: 71 MMHG | HEIGHT: 65 IN | RESPIRATION RATE: 18 BRPM | TEMPERATURE: 96.9 F | WEIGHT: 252 LBS | BODY MASS INDEX: 41.99 KG/M2 | HEART RATE: 62 BPM | OXYGEN SATURATION: 99 %

## 2021-11-30 DIAGNOSIS — K92.2 LOWER GI BLEED: ICD-10-CM

## 2021-11-30 PROBLEM — E66.01 MORBID OBESITY (HCC): Chronic | Status: ACTIVE | Noted: 2021-11-30

## 2021-11-30 PROCEDURE — 45380 COLONOSCOPY AND BIOPSY: CPT | Performed by: INTERNAL MEDICINE

## 2021-11-30 PROCEDURE — 45385 COLONOSCOPY W/LESION REMOVAL: CPT | Performed by: INTERNAL MEDICINE

## 2021-11-30 PROCEDURE — 88305 TISSUE EXAM BY PATHOLOGIST: CPT | Performed by: PATHOLOGY

## 2021-11-30 RX ORDER — PROPOFOL 10 MG/ML
INJECTION, EMULSION INTRAVENOUS CONTINUOUS PRN
Status: DISCONTINUED | OUTPATIENT
Start: 2021-11-30 | End: 2021-11-30

## 2021-11-30 RX ORDER — SODIUM CHLORIDE, SODIUM LACTATE, POTASSIUM CHLORIDE, CALCIUM CHLORIDE 600; 310; 30; 20 MG/100ML; MG/100ML; MG/100ML; MG/100ML
INJECTION, SOLUTION INTRAVENOUS CONTINUOUS PRN
Status: DISCONTINUED | OUTPATIENT
Start: 2021-11-30 | End: 2021-11-30

## 2021-11-30 RX ORDER — LIDOCAINE HYDROCHLORIDE 10 MG/ML
INJECTION, SOLUTION EPIDURAL; INFILTRATION; INTRACAUDAL; PERINEURAL AS NEEDED
Status: DISCONTINUED | OUTPATIENT
Start: 2021-11-30 | End: 2021-11-30

## 2021-11-30 RX ORDER — PROPOFOL 10 MG/ML
INJECTION, EMULSION INTRAVENOUS AS NEEDED
Status: DISCONTINUED | OUTPATIENT
Start: 2021-11-30 | End: 2021-11-30

## 2021-11-30 RX ADMIN — LIDOCAINE HYDROCHLORIDE 100 MG: 10 INJECTION, SOLUTION EPIDURAL; INFILTRATION; INTRACAUDAL at 11:34

## 2021-11-30 RX ADMIN — SODIUM CHLORIDE, SODIUM LACTATE, POTASSIUM CHLORIDE, AND CALCIUM CHLORIDE: .6; .31; .03; .02 INJECTION, SOLUTION INTRAVENOUS at 11:21

## 2021-11-30 RX ADMIN — PROPOFOL 120 MG: 10 INJECTION, EMULSION INTRAVENOUS at 11:34

## 2021-11-30 RX ADMIN — PROPOFOL 150 MCG/KG/MIN: 10 INJECTION, EMULSION INTRAVENOUS at 11:34

## 2021-12-08 ENCOUNTER — OFFICE VISIT (OUTPATIENT)
Dept: INTERNAL MEDICINE CLINIC | Age: 31
End: 2021-12-08
Payer: COMMERCIAL

## 2021-12-08 VITALS
OXYGEN SATURATION: 98 % | TEMPERATURE: 98 F | SYSTOLIC BLOOD PRESSURE: 100 MMHG | HEART RATE: 64 BPM | BODY MASS INDEX: 42.82 KG/M2 | HEIGHT: 65 IN | DIASTOLIC BLOOD PRESSURE: 64 MMHG | WEIGHT: 257 LBS

## 2021-12-08 DIAGNOSIS — Z11.59 ENCOUNTER FOR HEPATITIS C SCREENING TEST FOR LOW RISK PATIENT: ICD-10-CM

## 2021-12-08 DIAGNOSIS — R10.2 CHRONIC PELVIC PAIN IN FEMALE: ICD-10-CM

## 2021-12-08 DIAGNOSIS — M25.50 ARTHRALGIA, UNSPECIFIED JOINT: ICD-10-CM

## 2021-12-08 DIAGNOSIS — N80.9 ENDOMETRIOSIS: Primary | ICD-10-CM

## 2021-12-08 DIAGNOSIS — G89.29 CHRONIC PELVIC PAIN IN FEMALE: ICD-10-CM

## 2021-12-08 DIAGNOSIS — Z13.228 SCREENING FOR METABOLIC DISORDER: ICD-10-CM

## 2021-12-08 DIAGNOSIS — S39.012A STRAIN OF LUMBAR REGION, INITIAL ENCOUNTER: ICD-10-CM

## 2021-12-08 PROCEDURE — 99214 OFFICE O/P EST MOD 30 MIN: CPT | Performed by: PHYSICIAN ASSISTANT

## 2021-12-08 RX ORDER — TRAMADOL HYDROCHLORIDE 50 MG/1
50 TABLET ORAL EVERY 12 HOURS PRN
Qty: 15 TABLET | Refills: 0 | Status: SHIPPED | OUTPATIENT
Start: 2021-12-08 | End: 2021-12-14 | Stop reason: SDUPTHER

## 2021-12-14 DIAGNOSIS — R10.2 CHRONIC PELVIC PAIN IN FEMALE: ICD-10-CM

## 2021-12-14 DIAGNOSIS — G89.29 CHRONIC PELVIC PAIN IN FEMALE: ICD-10-CM

## 2021-12-16 RX ORDER — TRAMADOL HYDROCHLORIDE 50 MG/1
50 TABLET ORAL EVERY 12 HOURS PRN
Qty: 20 TABLET | Refills: 0 | Status: SHIPPED | OUTPATIENT
Start: 2021-12-16 | End: 2021-12-28 | Stop reason: SDUPTHER

## 2021-12-28 DIAGNOSIS — G89.29 CHRONIC PELVIC PAIN IN FEMALE: ICD-10-CM

## 2021-12-28 DIAGNOSIS — R10.2 CHRONIC PELVIC PAIN IN FEMALE: ICD-10-CM

## 2021-12-29 RX ORDER — TRAMADOL HYDROCHLORIDE 50 MG/1
50 TABLET ORAL EVERY 12 HOURS PRN
Qty: 30 TABLET | Refills: 0 | Status: SHIPPED | OUTPATIENT
Start: 2021-12-29 | End: 2022-01-11 | Stop reason: SDUPTHER

## 2022-01-11 DIAGNOSIS — R10.2 CHRONIC PELVIC PAIN IN FEMALE: ICD-10-CM

## 2022-01-11 DIAGNOSIS — G89.29 CHRONIC PELVIC PAIN IN FEMALE: ICD-10-CM

## 2022-01-12 RX ORDER — TRAMADOL HYDROCHLORIDE 50 MG/1
50 TABLET ORAL EVERY 12 HOURS PRN
Qty: 30 TABLET | Refills: 0 | Status: SHIPPED | OUTPATIENT
Start: 2022-01-12 | End: 2022-01-25 | Stop reason: SDUPTHER

## 2022-01-17 ENCOUNTER — HOSPITAL ENCOUNTER (EMERGENCY)
Facility: HOSPITAL | Age: 32
Discharge: HOME | End: 2022-01-17
Attending: EMERGENCY MEDICINE
Payer: COMMERCIAL

## 2022-01-17 VITALS
DIASTOLIC BLOOD PRESSURE: 70 MMHG | SYSTOLIC BLOOD PRESSURE: 154 MMHG | HEART RATE: 97 BPM | OXYGEN SATURATION: 99 % | TEMPERATURE: 99.7 F | RESPIRATION RATE: 18 BRPM

## 2022-01-17 DIAGNOSIS — M54.50 ACUTE BILATERAL LOW BACK PAIN WITHOUT SCIATICA: Primary | ICD-10-CM

## 2022-01-17 PROCEDURE — 99284 EMERGENCY DEPT VISIT MOD MDM: CPT | Performed by: EMERGENCY MEDICINE

## 2022-01-17 PROCEDURE — 99283 EMERGENCY DEPT VISIT LOW MDM: CPT

## 2022-01-17 RX ORDER — ACETAMINOPHEN 325 MG/1
975 TABLET ORAL ONCE
Status: COMPLETED | OUTPATIENT
Start: 2022-01-17 | End: 2022-01-17

## 2022-01-17 RX ORDER — IBUPROFEN 600 MG/1
600 TABLET ORAL ONCE
Status: COMPLETED | OUTPATIENT
Start: 2022-01-17 | End: 2022-01-17

## 2022-01-17 RX ADMIN — IBUPROFEN 600 MG: 600 TABLET, FILM COATED ORAL at 17:49

## 2022-01-17 RX ADMIN — ACETAMINOPHEN 975 MG: 325 TABLET, FILM COATED ORAL at 17:49

## 2022-01-17 NOTE — ED PROVIDER NOTES
History  Chief Complaint   Patient presents with    Back Pain     PT injured back while carring an air conditioner down stairs a few days ago  36year old female reports that she injured her back approximately 1 month ago and had midline lower back pain that resolved on its own, 2 days ago while carrying an air conditioner she had a recurrence of her lower back pain described as pain that radiates to both legs but not below her knee is, patient denies any saddle anesthesia, focal weakness or deficits, no traumatic injury to her back, patient has never used IV drugs, is not running a fever, is not on any blood thinners, has no other red flags for back pain  Patient denies any constipation or diarrhea, incontinence of urine or stool, retention of urine or stool, and no other associated complaints including no chest pain, cough, shortness of breath, dizziness, headaches abdominal pain, nausea, vomiting, or other complaints  Patient is currently taking Robaxin for her lower back and has a Lidoderm patch, and was given tramadol as well  Prior to Admission Medications   Prescriptions Last Dose Informant Patient Reported?  Taking?   etodolac (LODINE) 400 MG tablet   No No   Sig: Take 1 tablet (400 mg total) by mouth 2 (two) times a day Take with food   methocarbamol (ROBAXIN) 750 mg tablet   No No   Sig: Take 1 tablet (750 mg total) by mouth every 6 (six) hours as needed for muscle spasms   traMADol (ULTRAM) 50 mg tablet   No No   Sig: Take 1 tablet (50 mg total) by mouth every 12 (twelve) hours as needed for moderate pain      Facility-Administered Medications: None       Past Medical History:   Diagnosis Date    Anxiety     Chicken pox     Depression     Endometriosis     GERD (gastroesophageal reflux disease)     Headache     Occasional     HSV-1 infection     IBS (irritable bowel syndrome)     Kidney stone     Kidney stone on left side     Multiple thyroid nodules     Obesity     Renal calculi        Past Surgical History:   Procedure Laterality Date     SECTION      x2    HYSTERECTOMY      robotic total laparoscopic hysterectomy with BS    LAPAROSCOPIC ENDOMETRIOSIS FULGURATION  2018    laparoscopic excision of endometriosis, fulguration of endometriosis, lysis of adhesions    LAPAROSCOPY  2014    with I&D     MOLE REMOVAL      face - benign     SKIN CANCER EXCISION      abdomen    THYROID CYST EXCISION      TONSILLECTOMY      TUBAL LIGATION  02/15/2016    With lysis of adhesion and peritoneal biopsy    US GUIDED INJECTION FOR RESEARCH STUDY  2015    WISDOM TOOTH EXTRACTION         Family History   Problem Relation Age of Onset    Coronary artery disease Mother     Varicose Veins Mother     Other Mother         breast cyst - removed     Cholelithiasis Mother         had cholecystectomy    Alcohol abuse Father     Cholelithiasis Maternal Grandmother         had cholecystectomy    Uterine cancer Paternal Grandmother     Breast cancer Family         Before age 52    Lyndsey Wests Uterine cancer Family     Obesity Family     Hypertension Family     Brain cancer Family     Gallbladder disease Family      I have reviewed and agree with the history as documented  E-Cigarette/Vaping    E-Cigarette Use Never User      E-Cigarette/Vaping Substances    Nicotine No     THC No     CBD No     Flavoring No     Other No     Unknown No      Social History     Tobacco Use    Smoking status: Never Smoker    Smokeless tobacco: Never Used   Vaping Use    Vaping Use: Never used   Substance Use Topics    Alcohol use: Yes     Comment: rarely    Drug use: No       Review of Systems    Physical Exam  Physical Exam  Vitals and nursing note reviewed  Constitutional:       Appearance: Normal appearance  HENT:      Head: Normocephalic and atraumatic        Right Ear: External ear normal       Left Ear: External ear normal       Mouth/Throat:      Mouth: Mucous membranes are moist       Pharynx: Oropharynx is clear  Eyes:      Extraocular Movements: Extraocular movements intact  Conjunctiva/sclera: Conjunctivae normal    Cardiovascular:      Rate and Rhythm: Normal rate and regular rhythm  Heart sounds: Normal heart sounds  Pulmonary:      Effort: Pulmonary effort is normal       Breath sounds: Normal breath sounds  Abdominal:      General: Abdomen is flat  There is no distension  Palpations: Abdomen is soft  Musculoskeletal:         General: No deformity  Normal range of motion  Cervical back: Normal range of motion  Comments: Patient has paraspinal muscle tenderness to palpation of the lumbar spinal region, no midline tenderness step-offs or crepitus noted  Skin:     General: Skin is warm and dry  Neurological:      General: No focal deficit present  Mental Status: She is alert  Gait: Gait normal       Comments: Circulation, sensation, motor function is intact in bilateral lower extremities, with straight leg raise test positive on both lower extremities, does not travel below her knees     Psychiatric:         Mood and Affect: Mood normal          Behavior: Behavior normal          Vital Signs  ED Triage Vitals [01/17/22 1612]   Temperature Pulse Respirations Blood Pressure SpO2   99 7 °F (37 6 °C) 97 18 154/70 99 %      Temp Source Heart Rate Source Patient Position - Orthostatic VS BP Location FiO2 (%)   Oral Monitor Standing Left arm --      Pain Score       --           Vitals:    01/17/22 1612   BP: 154/70   Pulse: 97   Patient Position - Orthostatic VS: Standing         Visual Acuity      ED Medications  Medications - No data to display    Diagnostic Studies  Results Reviewed     None                 No orders to display              Procedures  Procedures         ED Course         SBIRT 22yo+      Most Recent Value   SBIRT (22 yo +)    In order to provide better care to our patients, we are screening all of our patients for alcohol and drug use  Would it be okay to ask you these screening questions? Unable to answer at this time Filed at: 01/17/2022 1703              MDM  Number of Diagnoses or Management Options  Acute bilateral low back pain without sciatica  Diagnosis management comments: Patient has nontraumatic lower back pain with no red flags for back pain, exam is benign, patient has already been managing her symptoms with muscle relaxers, Lidoderm patch, and tramadol, will be encouraged to use ibuprofen Tylenol more frequently, follow-up with orthopedic surgery for physical therapy and further management  Patient is safe for discharge home, under these circumstances imaging is inappropriate and risks for steroids outweighs and potential benefit  Disposition  Final diagnoses:   None     ED Disposition     None      Follow-up Information    None         Patient's Medications   Discharge Prescriptions    No medications on file       No discharge procedures on file      PDMP Review       Value Time User    PDMP Reviewed  Yes 12/8/2021 10:08 AM Elias Kamara PA-C          ED Provider  Electronically Signed by           Hunter Arechiga MD  01/23/22 7808

## 2022-01-25 DIAGNOSIS — G89.29 CHRONIC PELVIC PAIN IN FEMALE: ICD-10-CM

## 2022-01-25 DIAGNOSIS — R10.2 CHRONIC PELVIC PAIN IN FEMALE: ICD-10-CM

## 2022-01-26 RX ORDER — TRAMADOL HYDROCHLORIDE 50 MG/1
50 TABLET ORAL EVERY 12 HOURS PRN
Qty: 30 TABLET | Refills: 0 | Status: SHIPPED | OUTPATIENT
Start: 2022-01-26 | End: 2022-02-08 | Stop reason: SDUPTHER

## 2022-02-08 DIAGNOSIS — G89.29 CHRONIC PELVIC PAIN IN FEMALE: ICD-10-CM

## 2022-02-08 DIAGNOSIS — R10.2 CHRONIC PELVIC PAIN IN FEMALE: ICD-10-CM

## 2022-02-09 RX ORDER — TRAMADOL HYDROCHLORIDE 50 MG/1
50 TABLET ORAL EVERY 12 HOURS PRN
Qty: 30 TABLET | Refills: 0 | Status: SHIPPED | OUTPATIENT
Start: 2022-02-09 | End: 2022-02-22 | Stop reason: SDUPTHER

## 2022-02-22 DIAGNOSIS — R10.2 CHRONIC PELVIC PAIN IN FEMALE: ICD-10-CM

## 2022-02-22 DIAGNOSIS — G89.29 CHRONIC PELVIC PAIN IN FEMALE: ICD-10-CM

## 2022-02-23 RX ORDER — TRAMADOL HYDROCHLORIDE 50 MG/1
50 TABLET ORAL EVERY 12 HOURS PRN
Qty: 30 TABLET | Refills: 0 | Status: SHIPPED | OUTPATIENT
Start: 2022-02-23 | End: 2022-03-10 | Stop reason: SDUPTHER

## 2022-03-08 ENCOUNTER — APPOINTMENT (OUTPATIENT)
Dept: LAB | Facility: IMAGING CENTER | Age: 32
End: 2022-03-08
Payer: COMMERCIAL

## 2022-03-08 DIAGNOSIS — N80.9 ENDOMETRIOSIS: ICD-10-CM

## 2022-03-08 DIAGNOSIS — R10.2 CHRONIC PELVIC PAIN IN FEMALE: ICD-10-CM

## 2022-03-08 DIAGNOSIS — Z11.59 ENCOUNTER FOR HEPATITIS C SCREENING TEST FOR LOW RISK PATIENT: ICD-10-CM

## 2022-03-08 DIAGNOSIS — M25.50 ARTHRALGIA, UNSPECIFIED JOINT: ICD-10-CM

## 2022-03-08 DIAGNOSIS — Z13.228 SCREENING FOR METABOLIC DISORDER: ICD-10-CM

## 2022-03-08 DIAGNOSIS — G89.29 CHRONIC PELVIC PAIN IN FEMALE: ICD-10-CM

## 2022-03-08 LAB
ALBUMIN SERPL BCP-MCNC: 3.7 G/DL (ref 3.5–5)
ALP SERPL-CCNC: 56 U/L (ref 46–116)
ALT SERPL W P-5'-P-CCNC: 36 U/L (ref 12–78)
ANION GAP SERPL CALCULATED.3IONS-SCNC: 4 MMOL/L (ref 4–13)
AST SERPL W P-5'-P-CCNC: 17 U/L (ref 5–45)
BASOPHILS # BLD AUTO: 0.01 THOUSANDS/ΜL (ref 0–0.1)
BASOPHILS NFR BLD AUTO: 0 % (ref 0–1)
BILIRUB SERPL-MCNC: 1.23 MG/DL (ref 0.2–1)
BUN SERPL-MCNC: 13 MG/DL (ref 5–25)
CALCIUM SERPL-MCNC: 8.7 MG/DL (ref 8.3–10.1)
CHLORIDE SERPL-SCNC: 109 MMOL/L (ref 100–108)
CHOLEST SERPL-MCNC: 146 MG/DL
CO2 SERPL-SCNC: 26 MMOL/L (ref 21–32)
CREAT SERPL-MCNC: 0.81 MG/DL (ref 0.6–1.3)
EOSINOPHIL # BLD AUTO: 0.03 THOUSAND/ΜL (ref 0–0.61)
EOSINOPHIL NFR BLD AUTO: 0 % (ref 0–6)
ERYTHROCYTE [DISTWIDTH] IN BLOOD BY AUTOMATED COUNT: 13.2 % (ref 11.6–15.1)
GFR SERPL CREATININE-BSD FRML MDRD: 98 ML/MIN/1.73SQ M
GLUCOSE P FAST SERPL-MCNC: 93 MG/DL (ref 65–99)
HCT VFR BLD AUTO: 37.7 % (ref 34.8–46.1)
HCV AB SER QL: NORMAL
HDLC SERPL-MCNC: 37 MG/DL
HGB BLD-MCNC: 12.7 G/DL (ref 11.5–15.4)
IMM GRANULOCYTES # BLD AUTO: 0.03 THOUSAND/UL (ref 0–0.2)
IMM GRANULOCYTES NFR BLD AUTO: 0 % (ref 0–2)
LDLC SERPL CALC-MCNC: 81 MG/DL (ref 0–100)
LYMPHOCYTES # BLD AUTO: 1.72 THOUSANDS/ΜL (ref 0.6–4.47)
LYMPHOCYTES NFR BLD AUTO: 22 % (ref 14–44)
MCH RBC QN AUTO: 28.3 PG (ref 26.8–34.3)
MCHC RBC AUTO-ENTMCNC: 33.7 G/DL (ref 31.4–37.4)
MCV RBC AUTO: 84 FL (ref 82–98)
MONOCYTES # BLD AUTO: 0.59 THOUSAND/ΜL (ref 0.17–1.22)
MONOCYTES NFR BLD AUTO: 7 % (ref 4–12)
NEUTROPHILS # BLD AUTO: 5.56 THOUSANDS/ΜL (ref 1.85–7.62)
NEUTS SEG NFR BLD AUTO: 71 % (ref 43–75)
NONHDLC SERPL-MCNC: 109 MG/DL
NRBC BLD AUTO-RTO: 0 /100 WBCS
PLATELET # BLD AUTO: 222 THOUSANDS/UL (ref 149–390)
PMV BLD AUTO: 9.2 FL (ref 8.9–12.7)
POTASSIUM SERPL-SCNC: 3.7 MMOL/L (ref 3.5–5.3)
PROT SERPL-MCNC: 7.8 G/DL (ref 6.4–8.2)
RBC # BLD AUTO: 4.48 MILLION/UL (ref 3.81–5.12)
SODIUM SERPL-SCNC: 139 MMOL/L (ref 136–145)
TRIGL SERPL-MCNC: 138 MG/DL
TSH SERPL DL<=0.05 MIU/L-ACNC: 0.55 UIU/ML (ref 0.36–3.74)
WBC # BLD AUTO: 7.94 THOUSAND/UL (ref 4.31–10.16)

## 2022-03-08 PROCEDURE — 80053 COMPREHEN METABOLIC PANEL: CPT

## 2022-03-08 PROCEDURE — 86803 HEPATITIS C AB TEST: CPT

## 2022-03-08 PROCEDURE — 85025 COMPLETE CBC W/AUTO DIFF WBC: CPT

## 2022-03-08 PROCEDURE — 36415 COLL VENOUS BLD VENIPUNCTURE: CPT

## 2022-03-08 PROCEDURE — 84443 ASSAY THYROID STIM HORMONE: CPT

## 2022-03-08 PROCEDURE — 80061 LIPID PANEL: CPT

## 2022-03-10 ENCOUNTER — OFFICE VISIT (OUTPATIENT)
Dept: INTERNAL MEDICINE CLINIC | Age: 32
End: 2022-03-10
Payer: COMMERCIAL

## 2022-03-10 VITALS
TEMPERATURE: 98.7 F | DIASTOLIC BLOOD PRESSURE: 76 MMHG | WEIGHT: 252 LBS | OXYGEN SATURATION: 98 % | SYSTOLIC BLOOD PRESSURE: 128 MMHG | BODY MASS INDEX: 41.99 KG/M2 | HEART RATE: 89 BPM | HEIGHT: 65 IN

## 2022-03-10 DIAGNOSIS — G89.29 CHRONIC PELVIC PAIN IN FEMALE: ICD-10-CM

## 2022-03-10 DIAGNOSIS — R00.2 PALPITATION: Primary | ICD-10-CM

## 2022-03-10 DIAGNOSIS — N80.9 ENDOMETRIOSIS: ICD-10-CM

## 2022-03-10 DIAGNOSIS — R10.2 CHRONIC PELVIC PAIN IN FEMALE: ICD-10-CM

## 2022-03-10 PROCEDURE — 99214 OFFICE O/P EST MOD 30 MIN: CPT | Performed by: PHYSICIAN ASSISTANT

## 2022-03-10 RX ORDER — TRAMADOL HYDROCHLORIDE 50 MG/1
50 TABLET ORAL EVERY 12 HOURS PRN
Qty: 60 TABLET | Refills: 0 | Status: SHIPPED | OUTPATIENT
Start: 2022-03-10 | End: 2022-04-07 | Stop reason: SDUPTHER

## 2022-03-10 NOTE — PATIENT INSTRUCTIONS
Potassium Content of Foods List   WHAT YOU NEED TO KNOW:   Potassium is a mineral that is found in most foods  Potassium helps to balance fluids and minerals in your body  It also helps your body maintain a normal blood pressure  Potassium helps your muscles contract and your nerves function normally  DISCHARGE INSTRUCTIONS:   Why you may need to change the amount of potassium you eat:   · You may need more potassium  if you have hypokalemia (low potassium levels) or high blood pressure  You may also need more potassium if you are taking diuretics  Diuretics and certain medicines cause your body to lose potassium  · You may need less potassium  in your diet if you have hyperkalemia (high potassium levels) or kidney disease  Potassium content of fruit:  The amount of potassium in milligrams (mg) contained in each fruit or serving of fruit is listed beside the item  · High-potassium foods (more than 200 mg per serving):      ? 1 medium banana (425)    ? ½ of a papaya (390)    ? ½ cup of prune juice (370)    ? ¼ cup of raisins (270)    ? 1 medium mark (325) or kiwi (240)    ? 1 small orange (240) or ½ cup of orange juice (235)    ? ½ cup of cubed cantaloupe (215) or diced honeydew melon (200)    ? 1 medium pear (200)    · Medium-potassium foods (50 to 200 mg per serving):      ? 1 medium peach (185)    ? 1 small apple or ½ cup of apple juice (150)    ? ½ cup of peaches canned in juice (120)    ? ½ cup of canned pineapple (100)    ? ½ cup of fresh, sliced strawberries (931)    ? ½ cup of watermelon (85)    · Low-potassium foods (less than 50 mg per serving):      ? ½ cup of cranberries (45) or cranberry juice cocktail (20)    ? ½ cup of nectar of papaya, mark, or pear (35)    Potassium content of vegetables:   · High-potassium foods (more than 200 mg per serving):      ? 1 medium baked potato, with skin (925)    ? 1 baked medium sweet potato, with skin (450)    ?  ½ cup of tomato or vegetable juice (275), or 1 medium raw tomato (290)    ? ½ cup of mushrooms (280)    ? ½ cup of fresh brussels sprouts (250)    ? ½ cup of cooked zucchini (220) or winter squash (250)    ? ¼ of a medium avocado (245)    ? ½ cup of broccoli (230)    · Medium-potassium foods (50 to 200 mg per serving):      ? ½ cup of corn (195)    ? ½ cup of fresh or cooked carrots (180)    ? ½ cup of fresh cauliflower (150)    ? ½ cup of asparagus (155)    ? ½ cup of canned peas (90)     ? 1 cup of lettuce, all types (100)    ? ½ cup of fresh green beans (90)    ? ½ cup of frozen green beans (85)    ? ½ cup of cucumber (80)    Potassium content of protein foods:   · High-potassium foods (more than 200 mg per serving):      ? ½ cup of cooked rodriguez beans (400) or lentils (365)    ? 1 cup of soy milk (300)    ? 3 ounces of baked or broiled salmon (319)    ? 3 ounces of roasted turkey, dark meat (250)    ? ¼ cup of sunflower seeds (241)    ? 3 ounces of cooked lean beef (224)    ? 2 tablespoons of smooth peanut butter (210)    · Medium-potassium foods (50 to 200 mg per serving):      ? 1 ounce of salted peanuts, almonds, or cashews (200)    ? 1 large egg (60 mg)    Potassium content of dairy foods:   · High-potassium foods (more than 200 mg per serving):      ? 6 ounces of yogurt (260 to 435)    ? 1 cup of nonfat, low-fat, or whole milk (350 to 380)    · Medium-potassium foods (50 to 200 mg per serving):      ? ½ cup of ricotta cheese (154)    ? ½ cup of vanilla ice cream (131)    ?  ½ cup of low-fat (2%) cottage cheese (110)    · Low-potassium foods (less than 50 mg per serving):      ? 1 ounce of cheese (20 to 30)      Potassium content of grains:   · 1 slice of white bread (30)    · ½ cup of white or brown rice (50)    · ½ cup of spaghetti or macaroni (30)    · 1 flour or corn tortilla (50)    · 1 four-inch waffle (50)    Potassium content of other foods:   · 1 tablespoon of molasses (295)    · 1½ ounces of chocolate (165)    · Some salt substitutes may contain a high amount of potassium  Check the food label to find the amount of potassium it contains  © Copyright [address] 2022 Information is for End User's use only and may not be sold, redistributed or otherwise used for commercial purposes  All illustrations and images included in CareNotes® are the copyrighted property of [employer]  or [facility] Tara Estrada   The above information is an  only  It is not intended as medical advice for individual conditions or treatments  Talk to your doctor, nurse or pharmacist before following any medical regimen to see if it is safe and effective for you          Probiotic   florastor or align

## 2022-03-10 NOTE — PROGRESS NOTES
Assessment/Plan:         Diagnoses and all orders for this visit:    Palpitation  Comments:  avoids caffiene  increase K+ in diet  increase water intake   check holter due to sx and fam hx svt  Orders:  -     Holter monitor; Future    Chronic pelvic pain in female  Comments:  reviewed controlled substances contract  pt with chronic pain endometriosis  Orders:  -     traMADol (ULTRAM) 50 mg tablet; Take 1 tablet (50 mg total) by mouth every 12 (twelve) hours as needed for moderate pain          Subjective:      Patient ID: Manfred Cabrera is a 36 y o  female  35 y/o female with hx of endometriosis and chronic pelvic pain  F/u today for pain management contract, pt has been taking tramadol and tries to limit use when able  She has has been compliant with medication   Pt recently saw pain management regarding the same   She is to make f/u with Dr Olman Parks  Was seen at [address] [place] [address] in past but pt states she has tried to make f/u     C/o intermittent palpitations, not daily over the past few weeks  Feels this could be stress related  Feels like "flip flop" denies sensation of racing heart  She states her mother has hx of svt and she is concerned for this reason  Denies etoh use  Denies caffeine use        The following portions of the patient's history were reviewed and updated as appropriate: allergies, current medications, past family history, past medical history, past social history, past surgical history and problem list     Review of Systems   Constitutional: Negative for appetite change, chills, diaphoresis, fatigue and fever  HENT: Negative for congestion and sore throat  Respiratory: Negative for cough and shortness of breath  Cardiovascular: Negative for chest pain and leg swelling  Gastrointestinal: Positive for abdominal distention (bloating after eating )  Negative for constipation, diarrhea and nausea  Musculoskeletal: Negative for arthralgias, back pain and gait problem     Skin: Negative for rash  Neurological: Negative for dizziness, light-headedness and headaches  Psychiatric/Behavioral: Negative for sleep disturbance  The patient is not nervous/anxious            Past Medical History:   Diagnosis Date    Anxiety     Chicken pox     Depression     Endometriosis     GERD (gastroesophageal reflux disease)     Headache     Occasional     HSV-1 infection     IBS (irritable bowel syndrome)     Kidney stone     Kidney stone on left side     Multiple thyroid nodules     Obesity     Renal calculi          Current Outpatient Medications:     etodolac (LODINE) 400 MG tablet, Take 1 tablet (400 mg total) by mouth 2 (two) times a day Take with food (Patient taking differently: Take 400 mg by mouth 2 (two) times a day Take with food- prn ), Disp: 60 tablet, Rfl: 1    methocarbamol (ROBAXIN) 750 mg tablet, Take 1 tablet (750 mg total) by mouth every 6 (six) hours as needed for muscle spasms, Disp: 50 tablet, Rfl: 0    traMADol (ULTRAM) 50 mg tablet, Take 1 tablet (50 mg total) by mouth every 12 (twelve) hours as needed for moderate pain, Disp: 60 tablet, Rfl: 0    Allergies   Allergen Reactions    Other Blisters     Dermabond    Reglan [Metoclopramide]        Social History   Past Surgical History:   Procedure Laterality Date     SECTION      x2    HYSTERECTOMY      robotic total laparoscopic hysterectomy with BS    LAPAROSCOPIC ENDOMETRIOSIS FULGURATION  2018    laparoscopic excision of endometriosis, fulguration of endometriosis, lysis of adhesions    LAPAROSCOPY  2014    with I&D     MOLE REMOVAL      face - benign     SKIN CANCER EXCISION      abdomen    THYROID CYST EXCISION      TONSILLECTOMY      TUBAL LIGATION  02/15/2016    With lysis of adhesion and peritoneal biopsy    US GUIDED INJECTION FOR RESEARCH STUDY  2015    WISDOM TOOTH EXTRACTION       Family History   Problem Relation Age of Onset    Coronary artery disease Mother    Bry Pert Varicose Veins Mother     Other Mother         breast cyst - removed     Cholelithiasis Mother         had cholecystectomy    Alcohol abuse Father     Cholelithiasis Maternal Grandmother         had cholecystectomy    Uterine cancer Paternal Grandmother     Breast cancer Family         Before age 52     Uterine cancer Family     Obesity Family     Hypertension Family     Brain cancer Family     Gallbladder disease Family        Objective:  /76   Pulse 89   Temp 98 7 °F (37 1 °C) (Temporal)   Ht 5' 5" (1 651 m)   Wt 114 kg (252 lb)   SpO2 98%   BMI 41 93 kg/m²        Physical Exam  Vitals reviewed  Constitutional:       General: She is not in acute distress  HENT:      Head: Normocephalic  Nose: Nose normal    Eyes:      Extraocular Movements: Extraocular movements intact  Conjunctiva/sclera: Conjunctivae normal       Pupils: Pupils are equal, round, and reactive to light  Cardiovascular:      Rate and Rhythm: Normal rate and regular rhythm  Heart sounds: No murmur heard  Pulmonary:      Effort: Pulmonary effort is normal  No respiratory distress  Breath sounds: No wheezing or rales  Musculoskeletal:      Right lower leg: No edema  Left lower leg: No edema  Skin:     General: Skin is warm and dry  Findings: No erythema or rash  Neurological:      General: No focal deficit present  Mental Status: She is alert and oriented to person, place, and time     Psychiatric:         Mood and Affect: Mood normal          Behavior: Behavior normal

## 2022-03-24 ENCOUNTER — HOSPITAL ENCOUNTER (OUTPATIENT)
Dept: NON INVASIVE DIAGNOSTICS | Facility: CLINIC | Age: 32
Discharge: HOME | End: 2022-03-24
Payer: COMMERCIAL

## 2022-03-24 DIAGNOSIS — R00.2 PALPITATION: ICD-10-CM

## 2022-03-24 PROCEDURE — 93225 XTRNL ECG REC<48 HRS REC: CPT

## 2022-03-24 PROCEDURE — 93226 XTRNL ECG REC<48 HR SCAN A/R: CPT

## 2022-03-31 PROCEDURE — 93227 XTRNL ECG REC<48 HR R&I: CPT | Performed by: INTERNAL MEDICINE

## 2022-04-06 ENCOUNTER — HOSPITAL ENCOUNTER (OUTPATIENT)
Dept: RADIOLOGY | Facility: IMAGING CENTER | Age: 32
Discharge: HOME | End: 2022-04-06
Payer: COMMERCIAL

## 2022-04-06 DIAGNOSIS — S39.012A STRAIN OF LUMBAR REGION, INITIAL ENCOUNTER: ICD-10-CM

## 2022-04-06 PROCEDURE — 72110 X-RAY EXAM L-2 SPINE 4/>VWS: CPT

## 2022-04-07 DIAGNOSIS — R10.2 CHRONIC PELVIC PAIN IN FEMALE: ICD-10-CM

## 2022-04-07 DIAGNOSIS — G89.29 CHRONIC PELVIC PAIN IN FEMALE: ICD-10-CM

## 2022-04-07 RX ORDER — TRAMADOL HYDROCHLORIDE 50 MG/1
50 TABLET ORAL EVERY 12 HOURS PRN
Qty: 60 TABLET | Refills: 0 | Status: SHIPPED | OUTPATIENT
Start: 2022-04-07 | End: 2022-05-04 | Stop reason: SDUPTHER

## 2022-04-14 DIAGNOSIS — R10.2 CHRONIC PELVIC PAIN IN FEMALE: ICD-10-CM

## 2022-04-14 DIAGNOSIS — G89.29 CHRONIC PELVIC PAIN IN FEMALE: ICD-10-CM

## 2022-04-14 DIAGNOSIS — R10.84 GENERALIZED ABDOMINAL PAIN: ICD-10-CM

## 2022-04-14 DIAGNOSIS — N80.9 ENDOMETRIOSIS: ICD-10-CM

## 2022-04-14 RX ORDER — METHOCARBAMOL 750 MG/1
750 TABLET, FILM COATED ORAL EVERY 6 HOURS PRN
Qty: 50 TABLET | Refills: 0 | Status: SHIPPED | OUTPATIENT
Start: 2022-04-14 | End: 2022-07-14 | Stop reason: SDUPTHER

## 2022-04-25 ENCOUNTER — TELEPHONE (OUTPATIENT)
Dept: INTERNAL MEDICINE CLINIC | Facility: OTHER | Age: 32
End: 2022-04-25

## 2022-05-04 ENCOUNTER — OFFICE VISIT (OUTPATIENT)
Dept: INTERNAL MEDICINE CLINIC | Age: 32
End: 2022-05-04
Payer: COMMERCIAL

## 2022-05-04 VITALS
SYSTOLIC BLOOD PRESSURE: 110 MMHG | TEMPERATURE: 98.1 F | BODY MASS INDEX: 41.48 KG/M2 | HEART RATE: 68 BPM | WEIGHT: 249 LBS | HEIGHT: 65 IN | OXYGEN SATURATION: 98 % | DIASTOLIC BLOOD PRESSURE: 76 MMHG

## 2022-05-04 DIAGNOSIS — G89.29 CHRONIC PELVIC PAIN IN FEMALE: ICD-10-CM

## 2022-05-04 DIAGNOSIS — N80.9 ENDOMETRIOSIS: ICD-10-CM

## 2022-05-04 DIAGNOSIS — R10.2 CHRONIC PELVIC PAIN IN FEMALE: ICD-10-CM

## 2022-05-04 DIAGNOSIS — R10.32 LLQ PAIN: Primary | ICD-10-CM

## 2022-05-04 PROCEDURE — 99213 OFFICE O/P EST LOW 20 MIN: CPT | Performed by: PHYSICIAN ASSISTANT

## 2022-05-04 RX ORDER — TRAMADOL HYDROCHLORIDE 50 MG/1
50 TABLET ORAL EVERY 12 HOURS PRN
Qty: 60 TABLET | Refills: 0 | Status: SHIPPED | OUTPATIENT
Start: 2022-05-04 | End: 2022-06-02 | Stop reason: SDUPTHER

## 2022-05-04 NOTE — PROGRESS NOTES
Assessment/Plan:         Diagnoses and all orders for this visit:    LLQ pain  Comments:  no acute findings on CT   recommend MRI pelvis due to hx endometriosis  Orders:  -     MRI pelvis w wo contrast; Future    Endometriosis  -     MRI pelvis w wo contrast; Future    Chronic pelvic pain in female  Comments:  reviewed controlled substances contract  pt with chronic pain endometriosis  Orders:  -     traMADol (ULTRAM) 50 mg tablet; Take 1 tablet (50 mg total) by mouth every 12 (twelve) hours as needed for moderate pain          Subjective:      Patient ID: Bella Maguire is a 36 y o  female  37 y/o female with hx of endometriosis s/p partial hysterectomy (age 23)  Pt had severe LLQ pain during her cycle and went to ER  CT ab/pelvis negative for acute findings    Pt reports intermittent low back pain - has had episodes of numbness in the R thigh associated with this   Xray 4/22 negative  Pt states it is painful when she sits on hard surface it is numb in her groin   States the pain is not present currently       The following portions of the patient's history were reviewed and updated as appropriate: allergies, current medications, past family history, past medical history, past social history, past surgical history and problem list     Review of Systems   Constitutional: Negative for activity change, appetite change, fatigue and fever  HENT: Negative for congestion and sore throat  Respiratory: Negative for cough, shortness of breath and wheezing  Cardiovascular: Negative for chest pain and leg swelling  Gastrointestinal: Positive for abdominal pain (improved)  Negative for constipation, diarrhea and nausea  Musculoskeletal: Negative for arthralgias and back pain  Skin: Negative for rash  Neurological: Negative for dizziness, light-headedness and headaches  Psychiatric/Behavioral: Negative for sleep disturbance  The patient is not nervous/anxious            Past Medical History:   Diagnosis Date    Anxiety     Chicken pox     Depression     Endometriosis     GERD (gastroesophageal reflux disease)     Headache     Occasional     HSV-1 infection     IBS (irritable bowel syndrome)     Kidney stone     Kidney stone on left side     Multiple thyroid nodules     Obesity     Renal calculi          Current Outpatient Medications:     etodolac (LODINE) 400 MG tablet, Take 1 tablet (400 mg total) by mouth 2 (two) times a day Take with food (Patient taking differently: Take 400 mg by mouth 2 (two) times a day Take with food- prn ), Disp: 60 tablet, Rfl: 1    methocarbamol (ROBAXIN) 750 mg tablet, Take 1 tablet (750 mg total) by mouth every 6 (six) hours as needed for muscle spasms, Disp: 50 tablet, Rfl: 0    traMADol (ULTRAM) 50 mg tablet, Take 1 tablet (50 mg total) by mouth every 12 (twelve) hours as needed for moderate pain, Disp: 60 tablet, Rfl: 0    Allergies   Allergen Reactions    Other Blisters     Dermabond    Reglan [Metoclopramide]        Social History   Past Surgical History:   Procedure Laterality Date     SECTION      x2    HYSTERECTOMY      robotic total laparoscopic hysterectomy with BS    LAPAROSCOPIC ENDOMETRIOSIS FULGURATION  2018    laparoscopic excision of endometriosis, fulguration of endometriosis, lysis of adhesions    LAPAROSCOPY  2014    with I&D     MOLE REMOVAL      face - benign     SKIN CANCER EXCISION      abdomen    THYROID CYST EXCISION      TONSILLECTOMY      TUBAL LIGATION  02/15/2016    With lysis of adhesion and peritoneal biopsy    US GUIDED INJECTION FOR RESEARCH STUDY  2015    WISDOM TOOTH EXTRACTION       Family History   Problem Relation Age of Onset    Coronary artery disease Mother     Varicose Veins Mother     Other Mother         breast cyst - removed     Cholelithiasis Mother         had cholecystectomy    Alcohol abuse Father     Cholelithiasis Maternal Grandmother         had cholecystectomy    Uterine cancer Paternal Grandmother     Breast cancer Family         Before age 52     Uterine cancer Family     Obesity Family     Hypertension Family     Brain cancer Family     Gallbladder disease Family        Objective:  /76 (BP Location: Left arm, Patient Position: Sitting, Cuff Size: Large)   Pulse 68   Temp 98 1 °F (36 7 °C) (Temporal)   Ht 5' 5" (1 651 m)   Wt 113 kg (249 lb)   SpO2 98% Comment: RA  BMI 41 44 kg/m²        Physical Exam  Vitals reviewed  Constitutional:       General: She is not in acute distress  HENT:      Head: Normocephalic and atraumatic  Eyes:      Extraocular Movements: Extraocular movements intact  Conjunctiva/sclera: Conjunctivae normal       Pupils: Pupils are equal, round, and reactive to light  Cardiovascular:      Rate and Rhythm: Normal rate and regular rhythm  Pulmonary:      Effort: Pulmonary effort is normal  No respiratory distress  Breath sounds: Normal breath sounds  No wheezing or rales  Musculoskeletal:         General: Normal range of motion  Right lower leg: No edema  Left lower leg: No edema  Neurological:      General: No focal deficit present  Mental Status: She is alert and oriented to person, place, and time

## 2022-06-02 DIAGNOSIS — R10.2 CHRONIC PELVIC PAIN IN FEMALE: ICD-10-CM

## 2022-06-02 DIAGNOSIS — G89.29 CHRONIC PELVIC PAIN IN FEMALE: ICD-10-CM

## 2022-06-02 RX ORDER — TRAMADOL HYDROCHLORIDE 50 MG/1
50 TABLET ORAL EVERY 12 HOURS PRN
Qty: 60 TABLET | Refills: 0 | Status: SHIPPED | OUTPATIENT
Start: 2022-06-02 | End: 2022-06-29 | Stop reason: SDUPTHER

## 2022-06-29 DIAGNOSIS — G89.29 CHRONIC PELVIC PAIN IN FEMALE: ICD-10-CM

## 2022-06-29 DIAGNOSIS — R10.2 CHRONIC PELVIC PAIN IN FEMALE: ICD-10-CM

## 2022-06-30 RX ORDER — TRAMADOL HYDROCHLORIDE 50 MG/1
50 TABLET ORAL EVERY 12 HOURS PRN
Qty: 60 TABLET | Refills: 0 | Status: SHIPPED | OUTPATIENT
Start: 2022-06-30 | End: 2022-07-29 | Stop reason: SDUPTHER

## 2022-07-12 ENCOUNTER — TELEPHONE (OUTPATIENT)
Dept: INTERNAL MEDICINE CLINIC | Age: 32
End: 2022-07-12

## 2022-07-12 DIAGNOSIS — G89.29 CHRONIC PELVIC PAIN IN FEMALE: ICD-10-CM

## 2022-07-12 DIAGNOSIS — R10.84 GENERALIZED ABDOMINAL PAIN: ICD-10-CM

## 2022-07-12 DIAGNOSIS — R10.2 CHRONIC PELVIC PAIN IN FEMALE: ICD-10-CM

## 2022-07-12 DIAGNOSIS — N80.9 ENDOMETRIOSIS: ICD-10-CM

## 2022-07-12 NOTE — TELEPHONE ENCOUNTER
Pt req Methocarbamol but has appt in 2 days  Spoek to pt  She is ok to wait until appt to get refills

## 2022-07-14 DIAGNOSIS — R10.84 GENERALIZED ABDOMINAL PAIN: ICD-10-CM

## 2022-07-14 DIAGNOSIS — R10.2 CHRONIC PELVIC PAIN IN FEMALE: ICD-10-CM

## 2022-07-14 DIAGNOSIS — G89.29 CHRONIC PELVIC PAIN IN FEMALE: ICD-10-CM

## 2022-07-14 DIAGNOSIS — N80.9 ENDOMETRIOSIS: ICD-10-CM

## 2022-07-14 RX ORDER — METHOCARBAMOL 750 MG/1
750 TABLET, FILM COATED ORAL EVERY 6 HOURS PRN
Qty: 60 TABLET | Refills: 1 | Status: SHIPPED | OUTPATIENT
Start: 2022-07-14 | End: 2022-10-26 | Stop reason: SDUPTHER

## 2022-07-14 NOTE — TELEPHONE ENCOUNTER
Pt  Rescheduled appt today since she was in too much pain  Pt   Asking for robaxin to be refilled    Please advise      Thank you

## 2022-07-20 ENCOUNTER — TELEPHONE (OUTPATIENT)
Dept: ADMINISTRATIVE | Facility: OTHER | Age: 32
End: 2022-07-20

## 2022-07-20 ENCOUNTER — OFFICE VISIT (OUTPATIENT)
Dept: INTERNAL MEDICINE CLINIC | Age: 32
End: 2022-07-20
Payer: COMMERCIAL

## 2022-07-20 ENCOUNTER — PATIENT MESSAGE (OUTPATIENT)
Dept: INTERNAL MEDICINE CLINIC | Age: 32
End: 2022-07-20

## 2022-07-20 VITALS
SYSTOLIC BLOOD PRESSURE: 110 MMHG | DIASTOLIC BLOOD PRESSURE: 78 MMHG | TEMPERATURE: 98.2 F | OXYGEN SATURATION: 100 % | BODY MASS INDEX: 41.48 KG/M2 | WEIGHT: 249 LBS | HEIGHT: 65 IN | HEART RATE: 55 BPM

## 2022-07-20 DIAGNOSIS — R53.83 FATIGUE, UNSPECIFIED TYPE: ICD-10-CM

## 2022-07-20 DIAGNOSIS — N80.9 ENDOMETRIOSIS: Primary | ICD-10-CM

## 2022-07-20 DIAGNOSIS — S39.012A STRAIN OF LUMBAR REGION, INITIAL ENCOUNTER: ICD-10-CM

## 2022-07-20 PROCEDURE — 99213 OFFICE O/P EST LOW 20 MIN: CPT | Performed by: PHYSICIAN ASSISTANT

## 2022-07-20 NOTE — LETTER
Procedure Request Form: Hysterectomy      Date Requested: 22  Patient: Cody Catena  Patient : 1990   Referring Provider: Art Lay, PA-C        Date of Procedure ______________________________       The above patient has informed us that they have completed their   most recent Hysterectomy at your facility  Please complete   this form and attach all corresponding procedure reports/results  Comments __________________________________________________________  ____________________________________________________________________  ____________________________________________________________________  ____________________________________________________________________    Facility Completing Procedure _________________________________________    Form Completed By (print name) _______________________________________      Signature __________________________________________________________      These reports are needed for  compliance  Please fax this completed form and a copy of the procedure report to our office located at [place] [address] as soon as possible to [phone] rickie Mccauley: Phone [phone]    We thank you for your assistance in treating our mutual patient

## 2022-07-20 NOTE — PROGRESS NOTES
Assessment/Plan:         Diagnoses and all orders for this visit:    Endometriosis  Comments:  pt needs MRI to further evaluate pain, would benefit from endo f/u - pt has had difficulty following up at [facility]  tramadol prn  Orders:  -     CBC and differential; Future  -     Comprehensive metabolic panel; Future    Strain of lumbar region, initial encounter  -     CBC and differential; Future  -     Comprehensive metabolic panel; Future    Fatigue, unspecified type  -     CBC and differential; Future  -     Comprehensive metabolic panel; Future            I would not like to increase pt pain meds, need to further eval location of endo and have further tx options for this reviewed      Subjective:      Patient ID: Ly Reyes is a 36 y o  female  Chief Complaint   Patient presents with    Follow-up     Continuous abd/pelvic pain  Feels that Tramadol is not helping anymore  Pt has hx hysterectomy  Never got MRI done from May 2022  Patient is a 36year old woman presenting for continued abdominal pain, without additional acute complaints  Patient describes the same sharp near constant abdominal pain located diffusely through her abdomen and pelvic region without radiation  The pain has remained approximately the same over the last few months  Patient has had minimal relief from tramadol, and has not found anything else that provides relief  Pain was noted to be worsened during bowel movements, ambulating, and lying down  Patient otherwise denies nausea, vomiting, diarrhea, constipation, melena, dizziness, lightheadedness, syncope, numbness, vaginal discharge, or other sites of pain  An order for a pelvic MRI was placed in a previous visit to assess possible endometrial growths but was not completed  Patient states she was not able to attend the first scheduled meeting due to familial obligations, and never scheduled a second appointment   Patient has not seen on OBGYN since 5/21 -  Ramón Tex  Unsure about f/u for apt with her  Pt has not returned to endo specialist at [facility] in recent past      Patient was provided with contact information for MRI scheduled, and was agreeable to completing MRI before management is altered  Otherwise, health screening is up to date  Tetanus booster was offered in office, patient declined at this time  The following portions of the patient's history were reviewed and updated as appropriate: allergies, current medications, past family history, past medical history, past social history, past surgical history and problem list     Review of Systems   Constitutional: Negative for chills and fever  HENT: Negative for ear pain and sore throat  Eyes: Negative for pain and visual disturbance  Respiratory: Negative for cough and shortness of breath  Cardiovascular: Negative for chest pain and palpitations  Gastrointestinal: Positive for abdominal pain  Negative for abdominal distention, blood in stool, constipation, diarrhea and vomiting  Endocrine: Negative for cold intolerance and heat intolerance  Genitourinary: Negative for dysuria and hematuria  Musculoskeletal: Negative for arthralgias and back pain  Skin: Negative for color change and rash  Allergic/Immunologic: Negative for immunocompromised state  Neurological: Negative for dizziness, seizures and syncope  All other systems reviewed and are negative          Past Medical History:   Diagnosis Date    Anxiety     Chicken pox     Depression     Endometriosis     GERD (gastroesophageal reflux disease)     Headache     Occasional     HSV-1 infection     IBS (irritable bowel syndrome)     Kidney stone     Kidney stone on left side     Multiple thyroid nodules     Obesity     Renal calculi          Current Outpatient Medications:     methocarbamol (ROBAXIN) 750 mg tablet, Take 1 tablet (750 mg total) by mouth every 6 (six) hours as needed for muscle spasms, Disp: 60 tablet, Rfl: 1    traMADol (ULTRAM) 50 mg tablet, Take 1 tablet (50 mg total) by mouth every 12 (twelve) hours as needed for moderate pain, Disp: 60 tablet, Rfl: 0    etodolac (LODINE) 400 MG tablet, Take 1 tablet (400 mg total) by mouth 2 (two) times a day Take with food (Patient not taking: Reported on 2022), Disp: 60 tablet, Rfl: 1    Allergies   Allergen Reactions    Other Blisters     Dermabond    Reglan [Metoclopramide]        Social History   Past Surgical History:   Procedure Laterality Date    APPENDECTOMY  2021     SECTION      x2    HYSTERECTOMY      robotic total laparoscopic hysterectomy with BS    LAPAROSCOPIC ENDOMETRIOSIS FULGURATION  2018    laparoscopic excision of endometriosis, fulguration of endometriosis, lysis of adhesions    LAPAROSCOPY  2014    with I&D     MOLE REMOVAL      face - benign     SKIN CANCER EXCISION      abdomen    THYROID CYST EXCISION      TONSILLECTOMY      TUBAL LIGATION  02/15/2016    With lysis of adhesion and peritoneal biopsy    US GUIDED INJECTION FOR RESEARCH STUDY  2015    WISDOM TOOTH EXTRACTION       Family History   Problem Relation Age of Onset    Coronary artery disease Mother     Varicose Veins Mother     Other Mother         breast cyst - removed     Cholelithiasis Mother         had cholecystectomy    Alcohol abuse Father     Cholelithiasis Maternal Grandmother         had cholecystectomy    Uterine cancer Paternal Grandmother     Breast cancer Family         Before age 52    [place] Uterine cancer Family     Obesity Family     Hypertension Family     Brain cancer Family     Gallbladder disease Family        Objective:  /78 (BP Location: Left arm, Patient Position: Sitting, Cuff Size: Large)   Pulse 55   Temp 98 2 °F (36 8 °C) (Temporal)   Ht 5' 5" (1 651 m)   Wt 113 kg (249 lb)   SpO2 100% Comment: RA  BMI 41 44 kg/m²     No results found for this or any previous visit (from the past 1344 hour(s))  Physical Exam  Vitals reviewed  Constitutional:       General: She is not in acute distress  Appearance: She is obese  She is not ill-appearing or diaphoretic  HENT:      Head: Normocephalic and atraumatic  Mouth/Throat:      Mouth: Mucous membranes are moist       Pharynx: No oropharyngeal exudate or posterior oropharyngeal erythema  Eyes:      General: No scleral icterus  Right eye: No discharge  Left eye: No discharge  Extraocular Movements: Extraocular movements intact  Conjunctiva/sclera: Conjunctivae normal    Cardiovascular:      Rate and Rhythm: Normal rate and regular rhythm  Pulses: Normal pulses  Heart sounds: Normal heart sounds  No murmur heard  No gallop  Pulmonary:      Effort: Pulmonary effort is normal       Breath sounds: Normal breath sounds  No wheezing, rhonchi or rales  Abdominal:      General: Bowel sounds are normal       Palpations: Abdomen is soft  There is no shifting dullness, hepatomegaly or splenomegaly  Tenderness: There is abdominal tenderness in the right upper quadrant, right lower quadrant and suprapubic area  There is no right CVA tenderness or left CVA tenderness  Hernia: No hernia is present  Musculoskeletal:      Cervical back: Normal range of motion  No rigidity or tenderness  Right lower leg: No edema  Left lower leg: No edema  Lymphadenopathy:      Cervical: No cervical adenopathy  Skin:     General: Skin is warm  Capillary Refill: Capillary refill takes less than 2 seconds  Neurological:      Mental Status: She is alert and oriented to person, place, and time

## 2022-07-20 NOTE — TELEPHONE ENCOUNTER
Upon review of the In Basket request and the patient's chart, initial outreach has been made via fax, please see Contacts section for details       Thank you  Diana Briseno

## 2022-07-20 NOTE — TELEPHONE ENCOUNTER
----- Message from Mo Hernandez sent at 7/20/2022 10:16 AM EDT -----  Regarding: pap  07/20/22 10:17 AM    Hello, our patient Rory Roca has had total abdominal hysterectomy completed/performed  Please assist in obtaining the exclusion documentation by making an External outreach to [address] [place] [address] located in [place]  The date of service is [id number]       Thank you,  Bala Alvares MA  [address] [facility]

## 2022-07-21 NOTE — TELEPHONE ENCOUNTER
Upon review of the In Basket request we received fax back form from [address] [facility],[address] stating they do not have any records of patient having procedure done  Any additional questions or concerns should be emailed to the Practice Liaisons via [email or web address] email, please do not reply via In Basket      Thank you  Nadege Rendon

## 2022-07-29 DIAGNOSIS — R10.2 CHRONIC PELVIC PAIN IN FEMALE: ICD-10-CM

## 2022-07-29 DIAGNOSIS — G89.29 CHRONIC PELVIC PAIN IN FEMALE: ICD-10-CM

## 2022-07-29 RX ORDER — TRAMADOL HYDROCHLORIDE 50 MG/1
50 TABLET ORAL EVERY 8 HOURS PRN
Qty: 90 TABLET | Refills: 0 | Status: SHIPPED | OUTPATIENT
Start: 2022-07-29 | End: 2022-08-30 | Stop reason: SDUPTHER

## 2022-07-30 ENCOUNTER — APPOINTMENT (OUTPATIENT)
Dept: LAB | Facility: IMAGING CENTER | Age: 32
End: 2022-07-30
Payer: COMMERCIAL

## 2022-07-30 DIAGNOSIS — S39.012A STRAIN OF LUMBAR REGION, INITIAL ENCOUNTER: ICD-10-CM

## 2022-07-30 DIAGNOSIS — R53.83 FATIGUE, UNSPECIFIED TYPE: ICD-10-CM

## 2022-07-30 DIAGNOSIS — N80.9 ENDOMETRIOSIS: ICD-10-CM

## 2022-07-30 LAB
ALBUMIN SERPL BCP-MCNC: 3.7 G/DL (ref 3.5–5)
ALP SERPL-CCNC: 52 U/L (ref 46–116)
ALT SERPL W P-5'-P-CCNC: 23 U/L (ref 12–78)
ANION GAP SERPL CALCULATED.3IONS-SCNC: 4 MMOL/L (ref 4–13)
AST SERPL W P-5'-P-CCNC: 15 U/L (ref 5–45)
BASOPHILS # BLD AUTO: 0.01 THOUSANDS/ΜL (ref 0–0.1)
BASOPHILS NFR BLD AUTO: 0 % (ref 0–1)
BILIRUB SERPL-MCNC: 1.02 MG/DL (ref 0.2–1)
BUN SERPL-MCNC: 11 MG/DL (ref 5–25)
CALCIUM SERPL-MCNC: 8.8 MG/DL (ref 8.3–10.1)
CHLORIDE SERPL-SCNC: 110 MMOL/L (ref 96–108)
CO2 SERPL-SCNC: 24 MMOL/L (ref 21–32)
CREAT SERPL-MCNC: 0.87 MG/DL (ref 0.6–1.3)
EOSINOPHIL # BLD AUTO: 0 THOUSAND/ΜL (ref 0–0.61)
EOSINOPHIL NFR BLD AUTO: 0 % (ref 0–6)
ERYTHROCYTE [DISTWIDTH] IN BLOOD BY AUTOMATED COUNT: 13.3 % (ref 11.6–15.1)
GFR SERPL CREATININE-BSD FRML MDRD: 90 ML/MIN/1.73SQ M
GLUCOSE P FAST SERPL-MCNC: 82 MG/DL (ref 65–99)
HCT VFR BLD AUTO: 39.3 % (ref 34.8–46.1)
HGB BLD-MCNC: 12.8 G/DL (ref 11.5–15.4)
IMM GRANULOCYTES # BLD AUTO: 0.06 THOUSAND/UL (ref 0–0.2)
IMM GRANULOCYTES NFR BLD AUTO: 1 % (ref 0–2)
LYMPHOCYTES # BLD AUTO: 2.44 THOUSANDS/ΜL (ref 0.6–4.47)
LYMPHOCYTES NFR BLD AUTO: 23 % (ref 14–44)
MCH RBC QN AUTO: 29.8 PG (ref 26.8–34.3)
MCHC RBC AUTO-ENTMCNC: 32.6 G/DL (ref 31.4–37.4)
MCV RBC AUTO: 91 FL (ref 82–98)
MONOCYTES # BLD AUTO: 0.69 THOUSAND/ΜL (ref 0.17–1.22)
MONOCYTES NFR BLD AUTO: 7 % (ref 4–12)
NEUTROPHILS # BLD AUTO: 7.49 THOUSANDS/ΜL (ref 1.85–7.62)
NEUTS SEG NFR BLD AUTO: 69 % (ref 43–75)
NRBC BLD AUTO-RTO: 0 /100 WBCS
PLATELET # BLD AUTO: 240 THOUSANDS/UL (ref 149–390)
PMV BLD AUTO: 9.4 FL (ref 8.9–12.7)
POTASSIUM SERPL-SCNC: 4 MMOL/L (ref 3.5–5.3)
PROT SERPL-MCNC: 7.1 G/DL (ref 6.4–8.4)
RBC # BLD AUTO: 4.3 MILLION/UL (ref 3.81–5.12)
SODIUM SERPL-SCNC: 138 MMOL/L (ref 135–147)
WBC # BLD AUTO: 10.69 THOUSAND/UL (ref 4.31–10.16)

## 2022-07-30 PROCEDURE — 80053 COMPREHEN METABOLIC PANEL: CPT

## 2022-07-30 PROCEDURE — 36415 COLL VENOUS BLD VENIPUNCTURE: CPT

## 2022-07-30 PROCEDURE — 85025 COMPLETE CBC W/AUTO DIFF WBC: CPT

## 2022-08-01 ENCOUNTER — HOSPITAL ENCOUNTER (OUTPATIENT)
Dept: RADIOLOGY | Facility: HOSPITAL | Age: 32
Discharge: HOME | End: 2022-08-01
Payer: COMMERCIAL

## 2022-08-01 DIAGNOSIS — N80.9 ENDOMETRIOSIS: ICD-10-CM

## 2022-08-01 DIAGNOSIS — R10.32 LLQ PAIN: ICD-10-CM

## 2022-08-01 PROCEDURE — A9585 GADOBUTROL INJECTION: HCPCS | Performed by: PHYSICIAN ASSISTANT

## 2022-08-01 PROCEDURE — G1004 CDSM NDSC: HCPCS

## 2022-08-01 PROCEDURE — 72197 MRI PELVIS W/O & W/DYE: CPT

## 2022-08-01 RX ADMIN — GADOBUTROL 11 ML: 604.72 INJECTION INTRAVENOUS at 14:48

## 2022-08-30 DIAGNOSIS — R10.2 CHRONIC PELVIC PAIN IN FEMALE: ICD-10-CM

## 2022-08-30 DIAGNOSIS — G89.29 CHRONIC PELVIC PAIN IN FEMALE: ICD-10-CM

## 2022-08-30 RX ORDER — TRAMADOL HYDROCHLORIDE 50 MG/1
50 TABLET ORAL EVERY 8 HOURS PRN
Qty: 90 TABLET | Refills: 0 | Status: SHIPPED | OUTPATIENT
Start: 2022-08-30 | End: 2022-09-28 | Stop reason: SDUPTHER

## 2022-09-07 ENCOUNTER — ANNUAL EXAM (OUTPATIENT)
Dept: OBGYN CLINIC | Facility: CLINIC | Age: 32
End: 2022-09-07
Payer: COMMERCIAL

## 2022-09-07 VITALS
WEIGHT: 241 LBS | DIASTOLIC BLOOD PRESSURE: 74 MMHG | BODY MASS INDEX: 40.15 KG/M2 | SYSTOLIC BLOOD PRESSURE: 124 MMHG | HEIGHT: 65 IN

## 2022-09-07 DIAGNOSIS — Z01.411 ENCOUNTER FOR GYNECOLOGICAL EXAMINATION WITH ABNORMAL FINDING: Primary | ICD-10-CM

## 2022-09-07 DIAGNOSIS — N80.9 ENDOMETRIOSIS DETERMINED BY LAPAROSCOPY: ICD-10-CM

## 2022-09-07 PROCEDURE — G0145 SCR C/V CYTO,THINLAYER,RESCR: HCPCS | Performed by: OBSTETRICS & GYNECOLOGY

## 2022-09-07 PROCEDURE — 99395 PREV VISIT EST AGE 18-39: CPT | Performed by: OBSTETRICS & GYNECOLOGY

## 2022-09-07 PROCEDURE — 0503F POSTPARTUM CARE VISIT: CPT | Performed by: OBSTETRICS & GYNECOLOGY

## 2022-09-07 NOTE — PROGRESS NOTES
Assessment/Plan:         There are no diagnoses linked to this encounter  Subjective:      Patient ID: Mina Dickens is a 36 y o  female  The patient is a 27-year-old  2 para 2002 patient was status post hysterectomy for severe endometriosis  Following hysterectomy she had a procedure to ablate endometrial implants throughout the pelvis  She then had a procedure to remove the appendix which was found to contain implants of endometriosis  She continues to have intermittently severe pelvic pain  She is on tramadol prescribed by her family physician which is sometimes not adequate to control episodic pain  She also uses NSAIDs, but they also are not sufficient to control intermittent very severe pain  She does on occasion go to the emergency room for analgesia, but would prefer not to continue doing that on a monthly basis  She has not done well on hormone therapies in the past and prefers not to restart something  She has taken Lupron prior to her hysterectomy and felt that she did the best on the Lupron treatment  She states that her insurance will no longer pay for Lupron because she has had extended treatment periods of Lupron and has already had a hysterectomy  She also has taken [place] the but has not had such positive results  That also is extremely expensive  She did not feel she improved significantly  Her mother has a history of pulmonary emboli which she believes was related to hormone treatment possibly Depo-Provera  The patient does not know if she has been tested for any of the inherited thrombophilias  She does not feel well on hormones, at any rate  In addition to hormones and tramadol, other treatment might include surgical intervention up to and including oophorectomy  She understands there is increasing risk to surgical intervention with each subsequent surgery    And the oophorectomy would not be guaranteed to relieve all of the pain as significant scarring in the pelvis can occur with endometriosis which might not be amenable to any hormonal manipulation or oophorectomy  In summary, her choices of treatment to be used alone or in combination include     1) hormone based therapy, including birth control pills, Depo-Provera, progesterone only birth control pills, Lupron or Orlyssa  She does not feel well on any of these treatments other than Lupron, and she is concerned that she may have an inherited thrombophilia  2) surgical intervention could be considered either in a repeat laporoscopic ablation or bilateral oophorectomy  Microscopic endometriosis was noted on the appendix at the time of removal   She is hesitant to undertake further surgery because of the risk and lack of guarantee  3) attempts could be made to improve her pain management with tramadol and or NSAIDs, either by changing doses or possibly with another pain management consult  Shlomo Harding had a normal examination today with a Pap smear of her cuff  Self-breast exam was reviewed  She should return in 1 year or as needed  The following portions of the patient's history were reviewed and updated as appropriate: allergies, current medications, past family history, past medical history, past social history, past surgical history and problem list     Review of Systems   Constitutional: Negative for chills, diaphoresis, fatigue, fever and unexpected weight change  HENT: Negative for congestion, sinus pressure, sinus pain, tinnitus and trouble swallowing  Eyes: Negative for visual disturbance  Respiratory: Negative for cough, chest tightness and shortness of breath  Cardiovascular: Negative for chest pain, palpitations and leg swelling  Gastrointestinal: Negative for abdominal distention, abdominal pain, anal bleeding, constipation, diarrhea, nausea, rectal pain and vomiting  Endocrine: Negative for heat intolerance  Genitourinary: Positive for pelvic pain   Negative for difficulty urinating, dysuria, flank pain, frequency, genital sores, hematuria and urgency  Musculoskeletal: Negative for arthralgias, back pain and joint swelling  Skin: Negative for rash  Allergic/Immunologic: Negative for environmental allergies and food allergies  Neurological: Negative for headaches  Hematological: Negative for adenopathy  Does not bruise/bleed easily  Psychiatric/Behavioral: Negative for decreased concentration and dysphoric mood  The patient is not nervous/anxious  Objective:      /74 (BP Location: Left arm)   Ht 5' 5" (1 651 m)   Wt 109 kg (241 lb)   BMI 40 10 kg/m²          Physical Exam  Vitals and nursing note reviewed  Exam conducted with a chaperone present  Constitutional:       General: She is not in acute distress  Appearance: Normal appearance  She is not ill-appearing  HENT:      Head: Normocephalic and atraumatic  Nose: Nose normal       Mouth/Throat:      Mouth: Mucous membranes are moist       Pharynx: Oropharynx is clear  Eyes:      Extraocular Movements: Extraocular movements intact  Cardiovascular:      Rate and Rhythm: Normal rate and regular rhythm  Pulses: Normal pulses  Heart sounds: Normal heart sounds  No murmur heard  No friction rub  Pulmonary:      Effort: Pulmonary effort is normal       Breath sounds: Normal breath sounds  Chest:   Breasts:      Right: Normal  No mass, tenderness or axillary adenopathy  Left: Normal  No mass, tenderness or axillary adenopathy  Abdominal:      General: Abdomen is flat  Bowel sounds are normal  There is no distension  Palpations: Abdomen is soft  There is no mass  Tenderness: There is no abdominal tenderness  There is no guarding  Hernia: No hernia is present  Genitourinary:     General: Normal vulva  Exam position: Lithotomy position  Avi stage (genital): 5  Labia:         Right: No rash, tenderness or lesion  Left: No rash, tenderness or lesion  Vagina: Normal       Uterus: Absent  Adnexa: Right adnexa normal and left adnexa normal         Right: No mass or tenderness  Left: No mass  Musculoskeletal:      Cervical back: Normal range of motion and neck supple  Lymphadenopathy:      Upper Body:      Right upper body: No axillary adenopathy  Left upper body: No axillary adenopathy  Skin:     General: Skin is warm and dry  Neurological:      General: No focal deficit present  Mental Status: She is alert and oriented to person, place, and time  Psychiatric:         Mood and Affect: Mood normal          Behavior: Behavior normal          Thought Content:  Thought content normal          Judgment: Judgment normal

## 2022-09-13 LAB
LAB AP GYN PRIMARY INTERPRETATION: NORMAL
Lab: NORMAL

## 2022-09-14 ENCOUNTER — TELEPHONE (OUTPATIENT)
Dept: OBGYN CLINIC | Facility: CLINIC | Age: 32
End: 2022-09-14

## 2022-09-14 NOTE — TELEPHONE ENCOUNTER
Pt called seen you in 09/07/22-  Discuss options to help treat endometriosis and pain management-    Pt stated she does not want surgery or go on hormones  pcp will not prescribed any other narcotics but tramadol ( she has a pain management contract with her) she is out of tramadol and in pain-  Suggested she call her pcp for refill ( tramadol 50mg prescribed on 08/30/22 quant 90#)  She stated that the contract was only for tramadol and that another provider could prescribe another narcotic to help     Also stated she has an appt with another gyn for second opinion    Please advise if you have any other recommendations

## 2022-09-22 ENCOUNTER — TELEPHONE (OUTPATIENT)
Dept: INTERNAL MEDICINE CLINIC | Facility: OTHER | Age: 32
End: 2022-09-22

## 2022-09-22 DIAGNOSIS — N80.9 ENDOMETRIOSIS: Primary | ICD-10-CM

## 2022-09-22 DIAGNOSIS — R10.2 CHRONIC PELVIC PAIN IN FEMALE: ICD-10-CM

## 2022-09-22 DIAGNOSIS — G89.29 CHRONIC PELVIC PAIN IN FEMALE: ICD-10-CM

## 2022-09-22 RX ORDER — KETOROLAC TROMETHAMINE 10 MG/1
10 TABLET, FILM COATED ORAL EVERY 8 HOURS PRN
Qty: 45 TABLET | Refills: 2 | Status: SHIPPED | OUTPATIENT
Start: 2022-09-22 | End: 2023-03-16

## 2022-09-28 DIAGNOSIS — R10.2 CHRONIC PELVIC PAIN IN FEMALE: ICD-10-CM

## 2022-09-28 DIAGNOSIS — G89.29 CHRONIC PELVIC PAIN IN FEMALE: ICD-10-CM

## 2022-09-28 RX ORDER — TRAMADOL HYDROCHLORIDE 50 MG/1
50 TABLET ORAL EVERY 8 HOURS PRN
Qty: 90 TABLET | Refills: 0 | Status: SHIPPED | OUTPATIENT
Start: 2022-09-28 | End: 2022-10-26 | Stop reason: SDUPTHER

## 2022-10-21 DIAGNOSIS — G89.29 CHRONIC PELVIC PAIN IN FEMALE: ICD-10-CM

## 2022-10-21 DIAGNOSIS — R10.2 CHRONIC PELVIC PAIN IN FEMALE: ICD-10-CM

## 2022-10-21 DIAGNOSIS — N80.9 ENDOMETRIOSIS: Primary | ICD-10-CM

## 2022-10-21 RX ORDER — AMITRIPTYLINE HYDROCHLORIDE 25 MG/1
25 TABLET, FILM COATED ORAL
Qty: 30 TABLET | Refills: 1 | Status: SHIPPED | OUTPATIENT
Start: 2022-10-21 | End: 2022-12-15 | Stop reason: SDUPTHER

## 2022-10-24 NOTE — TELEPHONE ENCOUNTER
I spoke to a colleague of mine Dr Alcides Llanes who would be willing to see her for workup and pain management    His phone number is [phone] independent

## 2022-10-26 DIAGNOSIS — G89.29 CHRONIC PELVIC PAIN IN FEMALE: ICD-10-CM

## 2022-10-26 DIAGNOSIS — N80.9 ENDOMETRIOSIS: ICD-10-CM

## 2022-10-26 DIAGNOSIS — R10.2 CHRONIC PELVIC PAIN IN FEMALE: ICD-10-CM

## 2022-10-26 DIAGNOSIS — R10.84 GENERALIZED ABDOMINAL PAIN: ICD-10-CM

## 2022-10-26 RX ORDER — METHOCARBAMOL 750 MG/1
750 TABLET, FILM COATED ORAL EVERY 6 HOURS PRN
Qty: 60 TABLET | Refills: 0 | Status: SHIPPED | OUTPATIENT
Start: 2022-10-26

## 2022-10-27 RX ORDER — TRAMADOL HYDROCHLORIDE 50 MG/1
50 TABLET ORAL EVERY 8 HOURS PRN
Qty: 90 TABLET | Refills: 0 | Status: SHIPPED | OUTPATIENT
Start: 2022-10-27

## 2022-11-16 DIAGNOSIS — G89.29 CHRONIC PELVIC PAIN IN FEMALE: ICD-10-CM

## 2022-11-16 DIAGNOSIS — N80.9 ENDOMETRIOSIS: ICD-10-CM

## 2022-11-16 DIAGNOSIS — R10.2 CHRONIC PELVIC PAIN IN FEMALE: ICD-10-CM

## 2022-11-16 DIAGNOSIS — R10.84 GENERALIZED ABDOMINAL PAIN: ICD-10-CM

## 2022-11-16 RX ORDER — METHOCARBAMOL 750 MG/1
750 TABLET, FILM COATED ORAL EVERY 6 HOURS PRN
Qty: 60 TABLET | Refills: 0 | Status: SHIPPED | OUTPATIENT
Start: 2022-11-16

## 2022-12-07 DIAGNOSIS — N80.9 ENDOMETRIOSIS: ICD-10-CM

## 2022-12-07 DIAGNOSIS — R10.84 GENERALIZED ABDOMINAL PAIN: ICD-10-CM

## 2022-12-07 DIAGNOSIS — G89.29 CHRONIC PELVIC PAIN IN FEMALE: ICD-10-CM

## 2022-12-07 DIAGNOSIS — R10.2 CHRONIC PELVIC PAIN IN FEMALE: ICD-10-CM

## 2022-12-07 RX ORDER — METHOCARBAMOL 750 MG/1
750 TABLET, FILM COATED ORAL EVERY 6 HOURS PRN
Qty: 60 TABLET | Refills: 0 | Status: SHIPPED | OUTPATIENT
Start: 2022-12-07

## 2022-12-09 ENCOUNTER — HOSPITAL ENCOUNTER (EMERGENCY)
Facility: HOSPITAL | Age: 32
Discharge: HOME | End: 2022-12-09
Attending: EMERGENCY MEDICINE

## 2022-12-09 ENCOUNTER — APPOINTMENT (EMERGENCY)
Dept: CT IMAGING | Facility: HOSPITAL | Age: 32
End: 2022-12-09

## 2022-12-09 VITALS
RESPIRATION RATE: 18 BRPM | SYSTOLIC BLOOD PRESSURE: 112 MMHG | TEMPERATURE: 97.9 F | HEART RATE: 79 BPM | DIASTOLIC BLOOD PRESSURE: 64 MMHG | OXYGEN SATURATION: 99 %

## 2022-12-09 DIAGNOSIS — K52.9 GASTROENTERITIS: Primary | ICD-10-CM

## 2022-12-09 LAB
ALBUMIN SERPL BCP-MCNC: 4.2 G/DL (ref 3.5–5)
ALP SERPL-CCNC: 46 U/L (ref 34–104)
ALT SERPL W P-5'-P-CCNC: 16 U/L (ref 7–52)
ANION GAP SERPL CALCULATED.3IONS-SCNC: 6 MMOL/L (ref 4–13)
AST SERPL W P-5'-P-CCNC: 13 U/L (ref 13–39)
BASOPHILS # BLD AUTO: 0.01 THOUSANDS/ÂΜL (ref 0–0.1)
BASOPHILS NFR BLD AUTO: 0 % (ref 0–1)
BILIRUB SERPL-MCNC: 0.76 MG/DL (ref 0.2–1)
BILIRUB UR QL STRIP: NEGATIVE
BUN SERPL-MCNC: 11 MG/DL (ref 5–25)
CALCIUM SERPL-MCNC: 8.8 MG/DL (ref 8.4–10.2)
CHLORIDE SERPL-SCNC: 108 MMOL/L (ref 96–108)
CLARITY UR: CLEAR
CO2 SERPL-SCNC: 25 MMOL/L (ref 21–32)
COLOR UR: YELLOW
CREAT SERPL-MCNC: 0.75 MG/DL (ref 0.6–1.3)
EOSINOPHIL # BLD AUTO: 0 THOUSAND/ÂΜL (ref 0–0.61)
EOSINOPHIL NFR BLD AUTO: 0 % (ref 0–6)
ERYTHROCYTE [DISTWIDTH] IN BLOOD BY AUTOMATED COUNT: 12.8 % (ref 11.6–15.1)
GFR SERPL CREATININE-BSD FRML MDRD: 108 ML/MIN/1.73SQ M
GLUCOSE SERPL-MCNC: 82 MG/DL (ref 65–140)
GLUCOSE UR STRIP-MCNC: NEGATIVE MG/DL
HCT VFR BLD AUTO: 37.1 % (ref 34.8–46.1)
HGB BLD-MCNC: 12.4 G/DL (ref 11.5–15.4)
HGB UR QL STRIP.AUTO: NEGATIVE
IMM GRANULOCYTES # BLD AUTO: 0.04 THOUSAND/UL (ref 0–0.2)
IMM GRANULOCYTES NFR BLD AUTO: 0 % (ref 0–2)
KETONES UR STRIP-MCNC: NEGATIVE MG/DL
LEUKOCYTE ESTERASE UR QL STRIP: NEGATIVE
LIPASE SERPL-CCNC: 17 U/L (ref 11–82)
LYMPHOCYTES # BLD AUTO: 1.73 THOUSANDS/ÂΜL (ref 0.6–4.47)
LYMPHOCYTES NFR BLD AUTO: 19 % (ref 14–44)
MCH RBC QN AUTO: 29.6 PG (ref 26.8–34.3)
MCHC RBC AUTO-ENTMCNC: 33.4 G/DL (ref 31.4–37.4)
MCV RBC AUTO: 89 FL (ref 82–98)
MONOCYTES # BLD AUTO: 0.59 THOUSAND/ÂΜL (ref 0.17–1.22)
MONOCYTES NFR BLD AUTO: 6 % (ref 4–12)
NEUTROPHILS # BLD AUTO: 6.93 THOUSANDS/ÂΜL (ref 1.85–7.62)
NEUTS SEG NFR BLD AUTO: 75 % (ref 43–75)
NITRITE UR QL STRIP: NEGATIVE
NRBC BLD AUTO-RTO: 0 /100 WBCS
PH UR STRIP.AUTO: 5.5 [PH]
PLATELET # BLD AUTO: 215 THOUSANDS/UL (ref 149–390)
PMV BLD AUTO: 8.7 FL (ref 8.9–12.7)
POTASSIUM SERPL-SCNC: 4 MMOL/L (ref 3.5–5.3)
PROT SERPL-MCNC: 7.1 G/DL (ref 6.4–8.4)
PROT UR STRIP-MCNC: NEGATIVE MG/DL
RBC # BLD AUTO: 4.19 MILLION/UL (ref 3.81–5.12)
SODIUM SERPL-SCNC: 139 MMOL/L (ref 135–147)
SP GR UR STRIP.AUTO: 1.03 (ref 1–1.03)
UROBILINOGEN UR STRIP-ACNC: <2 MG/DL
WBC # BLD AUTO: 9.3 THOUSAND/UL (ref 4.31–10.16)

## 2022-12-09 RX ORDER — MORPHINE SULFATE 4 MG/ML
4 INJECTION, SOLUTION INTRAMUSCULAR; INTRAVENOUS ONCE
Status: COMPLETED | OUTPATIENT
Start: 2022-12-09 | End: 2022-12-09

## 2022-12-09 RX ORDER — OXYCODONE HYDROCHLORIDE 5 MG/1
5 TABLET ORAL ONCE
Status: COMPLETED | OUTPATIENT
Start: 2022-12-09 | End: 2022-12-09

## 2022-12-09 RX ORDER — NAPROXEN 500 MG/1
500 TABLET ORAL 2 TIMES DAILY WITH MEALS
Qty: 30 TABLET | Refills: 0 | Status: SHIPPED | OUTPATIENT
Start: 2022-12-09

## 2022-12-09 RX ORDER — ONDANSETRON 4 MG/1
4 TABLET, FILM COATED ORAL EVERY 6 HOURS
Qty: 12 TABLET | Refills: 0 | Status: SHIPPED | OUTPATIENT
Start: 2022-12-09

## 2022-12-09 RX ORDER — ONDANSETRON 2 MG/ML
4 INJECTION INTRAMUSCULAR; INTRAVENOUS ONCE
Status: COMPLETED | OUTPATIENT
Start: 2022-12-09 | End: 2022-12-09

## 2022-12-09 RX ADMIN — ONDANSETRON 4 MG: 2 INJECTION INTRAMUSCULAR; INTRAVENOUS at 19:24

## 2022-12-09 RX ADMIN — MORPHINE SULFATE 4 MG: 4 INJECTION INTRAVENOUS at 19:25

## 2022-12-09 RX ADMIN — OXYCODONE HYDROCHLORIDE 5 MG: 5 TABLET ORAL at 21:56

## 2022-12-09 RX ADMIN — IOHEXOL 100 ML: 350 INJECTION, SOLUTION INTRAVENOUS at 21:06

## 2022-12-10 NOTE — ED PROVIDER NOTES
History  Chief Complaint   Patient presents with   • Abdominal Pain     Pt arrives c/o L lower abd pain since  today  Reports nausea without vomiting  81-year-old female presenting with abdominal pain that started at 3:30 in the afternoon today  Pain is in the lower left quadrant as well on the left flank  She states that the left flank pain comes and goes but the left lower quadrant abdominal pain is constant  Has been constant since earlier this afternoon  Denies vomiting but states that she is nauseous  Denies eating or drinking anything after lunch  Denies any traumas, urinary symptoms like burning or difficulty going  Denies any diarrhea, constipation, blood in the stool or urine, chest pain, shortness of breath  Denies any headaches or fevers  Prior to Admission Medications   Prescriptions Last Dose Informant Patient Reported?  Taking?   amitriptyline (ELAVIL) 25 mg tablet   No No   Sig: Take 1 tablet (25 mg total) by mouth daily at bedtime   ketorolac (TORADOL) 10 mg tablet   No No   Sig: Take 1 tablet (10 mg total) by mouth every 8 (eight) hours as needed for moderate pain   methocarbamol (ROBAXIN) 750 mg tablet   No No   Sig: Take 1 tablet (750 mg total) by mouth every 6 (six) hours as needed for muscle spasms   traMADol HCl 100 MG TABS   No No   Sig: Take 1 tablet by mouth 2 (two) times a day as needed (pelvic pain)      Facility-Administered Medications: None       Past Medical History:   Diagnosis Date   • Anxiety    • Chicken pox    • Depression    • Endometriosis    • GERD (gastroesophageal reflux disease)    • Headache     Occasional    • HSV-1 infection    • IBS (irritable bowel syndrome)    • Kidney stone    • Kidney stone on left side    • Multiple thyroid nodules    • Obesity    • Renal calculi        Past Surgical History:   Procedure Laterality Date   • APPENDECTOMY  2021   •  SECTION      x2   • HYSTERECTOMY      robotic total laparoscopic hysterectomy with BS   • LAPAROSCOPIC ENDOMETRIOSIS FULGURATION  09/04/2018    laparoscopic excision of endometriosis, fulguration of endometriosis, lysis of adhesions   • LAPAROSCOPY  05/06/2014    with I&D    • MOLE REMOVAL      face - benign    • SKIN CANCER EXCISION      abdomen   • THYROID CYST EXCISION     • TONSILLECTOMY     • TUBAL LIGATION  02/15/2016    With lysis of adhesion and peritoneal biopsy   • US GUIDED INJECTION FOR RESEARCH STUDY  05/30/2015   • WISDOM TOOTH EXTRACTION         Family History   Problem Relation Age of Onset   • Coronary artery disease Mother    • Varicose Veins Mother    • Other Mother         breast cyst - removed    • Cholelithiasis Mother         had cholecystectomy   • Alcohol abuse Father    • Cholelithiasis Maternal Grandmother         had cholecystectomy   • Uterine cancer Paternal Grandmother    • Breast cancer Family         Before age 52    • Uterine cancer Family    • Obesity Family    • Hypertension Family    • Brain cancer Family    • Gallbladder disease Family      I have reviewed and agree with the history as documented  E-Cigarette/Vaping   • E-Cigarette Use Never User      E-Cigarette/Vaping Substances   • Nicotine No    • THC No    • CBD No    • Flavoring No    • Other No    • Unknown No      Social History     Tobacco Use   • Smoking status: Never   • Smokeless tobacco: Never   Vaping Use   • Vaping Use: Never used   Substance Use Topics   • Alcohol use: Not Currently     Comment: rarely   • Drug use: No       Review of Systems   Constitutional: Negative for chills and fever  HENT: Negative for ear pain and sore throat  Eyes: Negative for pain and visual disturbance  Respiratory: Negative for cough and shortness of breath  Cardiovascular: Negative for chest pain, palpitations and leg swelling  Gastrointestinal: Positive for abdominal pain (L left lower quadrant, wrapping up to left flank) and nausea   Negative for abdominal distention, anal bleeding, blood in stool, constipation, diarrhea, rectal pain and vomiting  Genitourinary: Positive for flank pain (Left)  Negative for decreased urine volume, difficulty urinating, dyspareunia, dysuria, frequency, hematuria, pelvic pain, urgency, vaginal bleeding, vaginal discharge and vaginal pain  Musculoskeletal: Negative for arthralgias and back pain  Skin: Negative for color change and rash  Neurological: Negative for dizziness, seizures, syncope, weakness and headaches  All other systems reviewed and are negative  Physical Exam  Physical Exam  Vitals and nursing note reviewed  Constitutional:       General: She is not in acute distress  Appearance: She is well-developed  She is ill-appearing  HENT:      Head: Normocephalic and atraumatic  Eyes:      Conjunctiva/sclera: Conjunctivae normal    Cardiovascular:      Rate and Rhythm: Normal rate and regular rhythm  Heart sounds: Normal heart sounds  No murmur heard  No friction rub  No gallop  Pulmonary:      Effort: Pulmonary effort is normal  No respiratory distress  Breath sounds: Normal breath sounds  No stridor  No wheezing, rhonchi or rales  Chest:      Chest wall: No tenderness  Abdominal:      General: There is distension (Due to obesity)  There are no signs of injury  Palpations: Abdomen is soft  There is no shifting dullness, fluid wave, hepatomegaly, splenomegaly, mass or pulsatile mass  Tenderness: There is generalized abdominal tenderness and tenderness in the left upper quadrant and left lower quadrant  There is left CVA tenderness and guarding  There is no right CVA tenderness  Hernia: No hernia is present  Musculoskeletal:         General: No swelling  Cervical back: Neck supple  Skin:     General: Skin is warm and dry  Capillary Refill: Capillary refill takes less than 2 seconds  Coloration: Skin is not cyanotic, jaundiced, mottled or pale  Findings: No erythema or rash  Neurological:      Mental Status: She is alert     Psychiatric:         Mood and Affect: Mood normal          Vital Signs  ED Triage Vitals   Temperature Pulse Respirations Blood Pressure SpO2   12/09/22 1727 12/09/22 1727 12/09/22 1727 12/09/22 1727 12/09/22 1727   97 9 °F (36 6 °C) 76 16 112/64 100 %      Temp Source Heart Rate Source Patient Position - Orthostatic VS BP Location FiO2 (%)   12/09/22 1727 12/09/22 1727 12/09/22 1727 12/09/22 1727 --   Oral Monitor Sitting Right arm       Pain Score       12/09/22 1925       8           Vitals:    12/09/22 1727 12/09/22 2157   BP: 112/64    Pulse: 76 79   Patient Position - Orthostatic VS: Sitting          Visual Acuity      ED Medications  Medications   morphine injection 4 mg (4 mg Intravenous Given 12/9/22 1925)   ondansetron (ZOFRAN) injection 4 mg (4 mg Intravenous Given 12/9/22 1924)   iohexol (OMNIPAQUE) 350 MG/ML injection (SINGLE-DOSE) 100 mL (100 mL Intravenous Given 12/9/22 2106)   oxyCODONE (ROXICODONE) IR tablet 5 mg (5 mg Oral Given 12/9/22 2156)       Diagnostic Studies  Results Reviewed     Procedure Component Value Units Date/Time    UA w Reflex to Microscopic w Reflex to Culture [[id number]] Collected: 12/09/22 2126    Lab Status: Final result Specimen: Urine, Clean Catch Updated: 12/09/22 2136     Color, UA Yellow     Clarity, UA Clear     Specific [place], UA 1 030     pH, UA 5 5     Leukocytes, UA Negative     Nitrite, UA Negative     Protein, UA Negative mg/dl      Glucose, UA Negative mg/dl      Ketones, UA Negative mg/dl      Urobilinogen, UA <2 0 mg/dl      Bilirubin, UA Negative     Occult Blood, UA Negative    Comprehensive metabolic panel [[id number]] Collected: 12/09/22 1933    Lab Status: Final result Specimen: Blood from Arm, Right Updated: 12/09/22 2011     Sodium 139 mmol/L      Potassium 4 0 mmol/L      Chloride 108 mmol/L      CO2 25 mmol/L      ANION GAP 6 mmol/L      BUN 11 mg/dL      Creatinine 0 75 mg/dL      Glucose 82 mg/dL Calcium 8 8 mg/dL      AST 13 U/L      ALT 16 U/L      Alkaline Phosphatase 46 U/L      Total Protein 7 1 g/dL      Albumin 4 2 g/dL      Total Bilirubin 0 76 mg/dL      eGFR 108 ml/min/1 73sq m     Narrative:      [place] guidelines for Chronic Kidney Disease (CKD):   •  Stage 1 with normal or high GFR (GFR > 90 mL/min/1 73 square meters)  •  Stage 2 Mild CKD (GFR = 60-89 mL/min/1 73 square meters)  •  Stage 3A Moderate CKD (GFR = 45-59 mL/min/1 73 square meters)  •  Stage 3B Moderate CKD (GFR = 30-44 mL/min/1 73 square meters)  •  Stage 4 Severe CKD (GFR = 15-29 mL/min/1 73 square meters)  •  Stage 5 End Stage CKD (GFR <15 mL/min/1 73 square meters)  Note: GFR calculation is accurate only with a steady state creatinine    Lipase [[id number]]  (Normal) Collected: 12/09/22 1933    Lab Status: Final result Specimen: Blood from Arm, Right Updated: 12/09/22 2011     Lipase 17 u/L     CBC and differential [[id number]]  (Abnormal) Collected: 12/09/22 1933    Lab Status: Final result Specimen: Blood from Arm, Right Updated: 12/09/22 1945     WBC 9 30 Thousand/uL      RBC 4 19 Million/uL      Hemoglobin 12 4 g/dL      Hematocrit 37 1 %      MCV 89 fL      MCH 29 6 pg      MCHC 33 4 g/dL      RDW 12 8 %      MPV 8 7 fL      Platelets 037 Thousands/uL      nRBC 0 /100 WBCs      Neutrophils Relative 75 %      Immat GRANS % 0 %      Lymphocytes Relative 19 %      Monocytes Relative 6 %      Eosinophils Relative 0 %      Basophils Relative 0 %      Neutrophils Absolute 6 93 Thousands/µL      Immature Grans Absolute 0 04 Thousand/uL      Lymphocytes Absolute 1 73 Thousands/µL      Monocytes Absolute 0 59 Thousand/µL      Eosinophils Absolute 0 00 Thousand/µL      Basophils Absolute 0 01 Thousands/µL                  CT abdomen pelvis with contrast   Final Result by Ирниа Dixon MD (12/09 2136)      Mild splenomegaly otherwise no evidence of acute intra-abdominal or pelvic pathology Workstation performed: [id number]                    Procedures  Procedures         ED Course                                             MDM  Number of Diagnoses or Management Options  Gastroenteritis  Diagnosis management comments: 79-year-old female presenting with left lower quadrant pain and left flank pain that started this afternoon  Patient states that she is nauseous but has not vomited  Denies any other symptoms  Pain controlled with Zofran and morphine  Abdominal labs CT abdomen pelvis with contrast for diagnosis  Lab work benign - patient in severe pain will give her oxycodone for pain  Patient is not driving and informed that this medication may make her drowsy so she should not drive or operate heavy machinery  CT showed no acute process  Patient does admit to have a history of endometriosis that she feels is what is causing this pain  Will have patient follow up with new gynecologist for pain relief or possible surgical intervention  Patient's vitals, lab/imaging results, diagnosis, and treatment plan were discussed with the patient  All new/changed medications were discussed with patient, specifically, route of administration, how often and when to take, and where they can be picked up  Strict return precautions as well as close follow up with PCP was discussed with the patient and the patient was agreeable to my recommendations  Patient verbally acknowledged understanding of the above communications         Amount and/or Complexity of Data Reviewed  Clinical lab tests: ordered and reviewed  Tests in the radiology section of CPT®: ordered  Tests in the medicine section of CPT®: ordered and reviewed        Disposition  Final diagnoses:   Gastroenteritis     Time reflects when diagnosis was documented in both MDM as applicable and the Disposition within this note     Time User Action Codes Description Comment    12/9/2022  9:42 PM Isaura Dugan Add [K52 9] Gastroenteritis       ED Disposition     ED Disposition   Discharge    Condition   Stable    Date/Time   Fri Dec 9, 2022  9:41 PM    Comment   Benton Kalina discharge to home/self care                 Follow-up Information     Follow up With Specialties Details Why Contact Info Additional [address] Emergency Department Emergency Medicine Go to  If symptoms worsen [address] [facility] [address] [facility] Emergency Department, [address], [place], [place], [address] [facility], PA-C Physician Assistant Schedule an appointment as soon as possible for a visit  As needed [address] [phone]       [address]  at [facility] Gynecology Schedule an appointment as soon as possible for a visit   [address] [address] [facility] [address] [phone]  [facility], Curt Saldana [facility] [address], [place], [place], [address] [facility]          Discharge Medication List as of 12/9/2022  9:56 PM      START taking these medications    Details   naproxen (Naprosyn) 500 mg tablet Take 1 tablet (500 mg total) by mouth 2 (two) times a day with meals, Starting Fri 12/9/2022, Normal      ondansetron (ZOFRAN) 4 mg tablet Take 1 tablet (4 mg total) by mouth every 6 (six) hours, Starting Fri 12/9/2022, Normal         CONTINUE these medications which have NOT CHANGED    Details   ketorolac (TORADOL) 10 mg tablet Take 1 tablet (10 mg total) by mouth every 8 (eight) hours as needed for moderate pain, Starting Thu 9/22/2022, Normal      amitriptyline (ELAVIL) 25 mg tablet Take 1 tablet (25 mg total) by mouth daily at bedtime, Starting Fri 10/21/2022, Normal      methocarbamol (ROBAXIN) 750 mg tablet Take 1 tablet (750 mg total) by mouth every 6 (six) hours as needed for muscle spasms, Starting Wed 12/7/2022, Normal      traMADol HCl 100 MG TABS Take 1 tablet by mouth 2 (two) times a day as needed (pelvic pain), Starting Fri 11/18/2022, Normal                 PDMP Review       Value Time User    PDMP Reviewed  Yes 10/27/2022 10:03 AM Rayna Martin PA-C          ED Provider  Electronically Signed by           Diana Jaffe PA-C  12/14/22 0856

## 2022-12-15 ENCOUNTER — OFFICE VISIT (OUTPATIENT)
Dept: INTERNAL MEDICINE CLINIC | Age: 32
End: 2022-12-15

## 2022-12-15 VITALS
HEART RATE: 63 BPM | BODY MASS INDEX: 40.32 KG/M2 | DIASTOLIC BLOOD PRESSURE: 72 MMHG | WEIGHT: 242 LBS | SYSTOLIC BLOOD PRESSURE: 100 MMHG | TEMPERATURE: 98.3 F | HEIGHT: 65 IN | OXYGEN SATURATION: 98 %

## 2022-12-15 DIAGNOSIS — R10.2 CHRONIC PELVIC PAIN IN FEMALE: ICD-10-CM

## 2022-12-15 DIAGNOSIS — F32.A ANXIETY AND DEPRESSION: ICD-10-CM

## 2022-12-15 DIAGNOSIS — G89.29 CHRONIC PELVIC PAIN IN FEMALE: ICD-10-CM

## 2022-12-15 DIAGNOSIS — N80.9 ENDOMETRIOSIS: Primary | ICD-10-CM

## 2022-12-15 DIAGNOSIS — F41.9 ANXIETY AND DEPRESSION: ICD-10-CM

## 2022-12-15 RX ORDER — AMITRIPTYLINE HYDROCHLORIDE 25 MG/1
25 TABLET, FILM COATED ORAL
Qty: 30 TABLET | Refills: 3 | Status: SHIPPED | OUTPATIENT
Start: 2022-12-15

## 2022-12-15 RX ORDER — TRAMADOL HYDROCHLORIDE 100 MG/1
1 TABLET, COATED ORAL EVERY 12 HOURS PRN
Qty: 60 TABLET | Refills: 0 | Status: SHIPPED | OUTPATIENT
Start: 2022-12-15

## 2022-12-15 RX ORDER — TRAMADOL HYDROCHLORIDE 100 MG/1
1 TABLET, COATED ORAL 2 TIMES DAILY PRN
Qty: 60 TABLET | Refills: 0 | Status: CANCELLED | OUTPATIENT
Start: 2022-12-15

## 2022-12-15 NOTE — PROGRESS NOTES
Assessment/Plan:         Diagnoses and all orders for this visit:    Endometriosis  Comments:  pain medicine contract reviewed and signed with pt  awaiting pelvic pain clinic apt - on wait list  reviewed pdmp  Orders:  -     amitriptyline (ELAVIL) 25 mg tablet; Take 1 tablet (25 mg total) by mouth daily at bedtime    Chronic pelvic pain in female  -     traMADol HCl 100 MG TABS; Take 1 tablet by mouth every 12 (twelve) hours as needed (pelvic pain)  -     amitriptyline (ELAVIL) 25 mg tablet; Take 1 tablet (25 mg total) by mouth daily at bedtime    Anxiety and depression  Comments:  continue low dose elavil for depression and endometriosis          BMI Counseling: Body mass index is 40 27 kg/m²  The BMI is above normal  Nutrition recommendations include encouraging healthy choices of fruits and vegetables and consuming healthier snacks  Exercise recommendations include exercising 3-5 times per week  Rationale for BMI follow-up plan is due to patient being overweight or obese  Subjective:      Patient ID: Ly Reyes is a 36 y o  female      37 y/o female with hx of endometriosis, IBS, gerd    Pt went to ER for L lower abdominal pain / L flank pain which started suddenly when she was grocery shopping   She states the pain started in a small area but then suddenly extended to lower abdomen   Pt had CT which showed L sided non obstructing calculus and no acute abnl   Pt states the ER dr said he thought it was that she passed a small kidney stone   Pt states since that time her pain has improved significantly    She has not been able to get in with pelvic pain clinic and states she was told she was on a waiting list   She has been taking the tramadol bid prn         The following portions of the patient's history were reviewed and updated as appropriate: allergies, current medications, past family history, past medical history, past social history, past surgical history and problem list     Review of Systems   Constitutional: Negative for activity change, appetite change, chills, diaphoresis, fatigue and fever  HENT: Negative for congestion and sore throat  Eyes: Negative for pain and redness  Respiratory: Negative for cough, shortness of breath and wheezing  Cardiovascular: Negative for chest pain and palpitations  Gastrointestinal: Positive for abdominal pain  Negative for constipation, diarrhea, nausea and vomiting  Skin: Negative for rash  Neurological: Negative for dizziness, light-headedness and headaches  Hematological: Does not bruise/bleed easily  Psychiatric/Behavioral: Positive for dysphoric mood (grief over loss of grandfather ) and sleep disturbance  The patient is not nervous/anxious            Past Medical History:   Diagnosis Date   • Anxiety    • Chicken pox    • Depression    • Endometriosis    • GERD (gastroesophageal reflux disease)    • Headache     Occasional    • HSV-1 infection    • IBS (irritable bowel syndrome)    • Kidney stone    • Kidney stone on left side    • Multiple thyroid nodules    • Obesity    • Renal calculi          Current Outpatient Medications:   •  amitriptyline (ELAVIL) 25 mg tablet, Take 1 tablet (25 mg total) by mouth daily at bedtime, Disp: 30 tablet, Rfl: 3  •  ketorolac (TORADOL) 10 mg tablet, Take 1 tablet (10 mg total) by mouth every 8 (eight) hours as needed for moderate pain, Disp: 45 tablet, Rfl: 2  •  methocarbamol (ROBAXIN) 750 mg tablet, Take 1 tablet (750 mg total) by mouth every 6 (six) hours as needed for muscle spasms, Disp: 60 tablet, Rfl: 0  •  naproxen (Naprosyn) 500 mg tablet, Take 1 tablet (500 mg total) by mouth 2 (two) times a day with meals, Disp: 30 tablet, Rfl: 0  •  ondansetron (ZOFRAN) 4 mg tablet, Take 1 tablet (4 mg total) by mouth every 6 (six) hours, Disp: 12 tablet, Rfl: 0  •  traMADol HCl 100 MG TABS, Take 1 tablet by mouth every 12 (twelve) hours as needed (pelvic pain), Disp: 60 tablet, Rfl: 0    Allergies Allergen Reactions   • Other Blisters     Dermabond   • Reglan [Metoclopramide]        Social History   Past Surgical History:   Procedure Laterality Date   • APPENDECTOMY  2021   •  SECTION      x2   • HYSTERECTOMY      robotic total laparoscopic hysterectomy with BS   • LAPAROSCOPIC ENDOMETRIOSIS FULGURATION  2018    laparoscopic excision of endometriosis, fulguration of endometriosis, lysis of adhesions   • LAPAROSCOPY  2014    with I&D    • MOLE REMOVAL      face - benign    • SKIN CANCER EXCISION      abdomen   • THYROID CYST EXCISION     • TONSILLECTOMY     • TUBAL LIGATION  02/15/2016    With lysis of adhesion and peritoneal biopsy   • US GUIDED INJECTION FOR RESEARCH STUDY  2015   • WISDOM TOOTH EXTRACTION       Family History   Problem Relation Age of Onset   • Coronary artery disease Mother    • Varicose Veins Mother    • Other Mother         breast cyst - removed    • Cholelithiasis Mother         had cholecystectomy   • Alcohol abuse Father    • Cholelithiasis Maternal Grandmother         had cholecystectomy   • Uterine cancer Paternal Grandmother    • Breast cancer Family         Before age 52    • Uterine cancer Family    • Obesity Family    • Hypertension Family    • Brain cancer Family    • Gallbladder disease Family        Objective:  /72 (BP Location: Left arm, Patient Position: Sitting, Cuff Size: Large)   Pulse 63   Temp 98 3 °F (36 8 °C) (Temporal)   Ht 5' 5" (1 651 m)   Wt 110 kg (242 lb)   SpO2 98% Comment: room air  BMI 40 27 kg/m²        Physical Exam  Vitals reviewed  Constitutional:       General: She is not in acute distress  Eyes:      General:         Right eye: No discharge  Left eye: No discharge  Conjunctiva/sclera: Conjunctivae normal    Cardiovascular:      Rate and Rhythm: Normal rate and regular rhythm  Pulmonary:      Effort: Pulmonary effort is normal  No respiratory distress  Breath sounds:  No wheezing or rales    Skin:     General: Skin is warm  Findings: No erythema or rash  Neurological:      General: No focal deficit present  Mental Status: She is alert and oriented to person, place, and time

## 2022-12-27 DIAGNOSIS — G89.29 CHRONIC PELVIC PAIN IN FEMALE: ICD-10-CM

## 2022-12-27 DIAGNOSIS — R10.2 CHRONIC PELVIC PAIN IN FEMALE: ICD-10-CM

## 2022-12-27 DIAGNOSIS — R10.84 GENERALIZED ABDOMINAL PAIN: ICD-10-CM

## 2022-12-27 DIAGNOSIS — N80.9 ENDOMETRIOSIS: ICD-10-CM

## 2022-12-27 RX ORDER — METHOCARBAMOL 750 MG/1
750 TABLET, FILM COATED ORAL EVERY 6 HOURS PRN
Qty: 60 TABLET | Refills: 0 | Status: SHIPPED | OUTPATIENT
Start: 2022-12-27

## 2023-01-13 DIAGNOSIS — R10.2 CHRONIC PELVIC PAIN IN FEMALE: ICD-10-CM

## 2023-01-13 DIAGNOSIS — G89.29 CHRONIC PELVIC PAIN IN FEMALE: ICD-10-CM

## 2023-01-13 DIAGNOSIS — N80.9 ENDOMETRIOSIS: ICD-10-CM

## 2023-01-13 DIAGNOSIS — R10.84 GENERALIZED ABDOMINAL PAIN: ICD-10-CM

## 2023-01-13 RX ORDER — METHOCARBAMOL 750 MG/1
750 TABLET, FILM COATED ORAL EVERY 6 HOURS PRN
Qty: 60 TABLET | Refills: 0 | Status: SHIPPED | OUTPATIENT
Start: 2023-01-13

## 2023-01-13 RX ORDER — AMITRIPTYLINE HYDROCHLORIDE 25 MG/1
25 TABLET, FILM COATED ORAL
Qty: 30 TABLET | Refills: 0 | Status: CANCELLED | OUTPATIENT
Start: 2023-01-13

## 2023-01-13 RX ORDER — TRAMADOL HYDROCHLORIDE 100 MG/1
1 TABLET, COATED ORAL EVERY 12 HOURS PRN
Qty: 60 TABLET | Refills: 0 | Status: SHIPPED | OUTPATIENT
Start: 2023-01-13

## 2023-01-26 DIAGNOSIS — N80.9 ENDOMETRIOSIS: ICD-10-CM

## 2023-01-26 DIAGNOSIS — G89.29 CHRONIC PELVIC PAIN IN FEMALE: ICD-10-CM

## 2023-01-26 DIAGNOSIS — R10.2 CHRONIC PELVIC PAIN IN FEMALE: ICD-10-CM

## 2023-01-26 DIAGNOSIS — R76.8 RHEUMATOID FACTOR POSITIVE: Primary | ICD-10-CM

## 2023-01-26 DIAGNOSIS — R53.83 FATIGUE, UNSPECIFIED TYPE: ICD-10-CM

## 2023-01-30 DIAGNOSIS — N80.9 ENDOMETRIOSIS: ICD-10-CM

## 2023-01-30 DIAGNOSIS — R10.84 GENERALIZED ABDOMINAL PAIN: ICD-10-CM

## 2023-01-30 DIAGNOSIS — R10.2 CHRONIC PELVIC PAIN IN FEMALE: ICD-10-CM

## 2023-01-30 DIAGNOSIS — G89.29 CHRONIC PELVIC PAIN IN FEMALE: ICD-10-CM

## 2023-01-31 RX ORDER — METHOCARBAMOL 750 MG/1
750 TABLET, FILM COATED ORAL EVERY 6 HOURS PRN
Qty: 60 TABLET | Refills: 0 | Status: SHIPPED | OUTPATIENT
Start: 2023-01-31

## 2023-02-09 ENCOUNTER — TELEPHONE (OUTPATIENT)
Dept: INTERNAL MEDICINE CLINIC | Facility: OTHER | Age: 33
End: 2023-02-09

## 2023-02-10 DIAGNOSIS — R10.2 CHRONIC PELVIC PAIN IN FEMALE: ICD-10-CM

## 2023-02-10 DIAGNOSIS — G89.29 CHRONIC PELVIC PAIN IN FEMALE: ICD-10-CM

## 2023-02-10 RX ORDER — TRAMADOL HYDROCHLORIDE 100 MG/1
1 TABLET, COATED ORAL EVERY 12 HOURS PRN
Qty: 60 TABLET | Refills: 0 | Status: SHIPPED | OUTPATIENT
Start: 2023-02-10

## 2023-02-15 DIAGNOSIS — R10.2 CHRONIC PELVIC PAIN IN FEMALE: ICD-10-CM

## 2023-02-15 DIAGNOSIS — N80.9 ENDOMETRIOSIS: ICD-10-CM

## 2023-02-15 DIAGNOSIS — G89.29 CHRONIC PELVIC PAIN IN FEMALE: ICD-10-CM

## 2023-02-15 DIAGNOSIS — R10.84 GENERALIZED ABDOMINAL PAIN: ICD-10-CM

## 2023-02-15 RX ORDER — METHOCARBAMOL 750 MG/1
750 TABLET, FILM COATED ORAL EVERY 6 HOURS PRN
Qty: 60 TABLET | Refills: 0 | Status: SHIPPED | OUTPATIENT
Start: 2023-02-15

## 2023-03-01 DIAGNOSIS — N80.9 ENDOMETRIOSIS: ICD-10-CM

## 2023-03-01 DIAGNOSIS — R10.84 GENERALIZED ABDOMINAL PAIN: ICD-10-CM

## 2023-03-01 DIAGNOSIS — G89.29 CHRONIC PELVIC PAIN IN FEMALE: ICD-10-CM

## 2023-03-01 DIAGNOSIS — R10.2 CHRONIC PELVIC PAIN IN FEMALE: ICD-10-CM

## 2023-03-01 RX ORDER — METHOCARBAMOL 750 MG/1
750 TABLET, FILM COATED ORAL EVERY 6 HOURS PRN
Qty: 60 TABLET | Refills: 0 | Status: SHIPPED | OUTPATIENT
Start: 2023-03-01 | End: 2023-03-14 | Stop reason: SDUPTHER

## 2023-03-02 ENCOUNTER — OFFICE VISIT (OUTPATIENT)
Dept: INTERNAL MEDICINE CLINIC | Age: 33
End: 2023-03-02

## 2023-03-02 VITALS
WEIGHT: 251.1 LBS | TEMPERATURE: 98.5 F | BODY MASS INDEX: 41.84 KG/M2 | HEART RATE: 75 BPM | SYSTOLIC BLOOD PRESSURE: 122 MMHG | HEIGHT: 65 IN | DIASTOLIC BLOOD PRESSURE: 78 MMHG | OXYGEN SATURATION: 99 %

## 2023-03-02 DIAGNOSIS — G89.29 CHRONIC PELVIC PAIN IN FEMALE: ICD-10-CM

## 2023-03-02 DIAGNOSIS — N94.10 DYSPAREUNIA IN FEMALE: Primary | ICD-10-CM

## 2023-03-02 DIAGNOSIS — R10.2 CHRONIC PELVIC PAIN IN FEMALE: ICD-10-CM

## 2023-03-02 DIAGNOSIS — E66.01 MORBID OBESITY (HCC): Chronic | ICD-10-CM

## 2023-03-02 NOTE — PROGRESS NOTES
Assessment/Plan:     Diagnoses and all orders for this visit:    Dyspareunia in female  Pain due to pelvic inflammatory or pelvic endometrial problems  Morbid obesity ([address] )  Discussed about the weight loss  Chronic pelvic pain in female    Continue with the present management and follow-up with the gynecologist         [employer] software was used to dictate this note  It may contain errors with dictating incorrect words or incorrect spelling  Please contact the provider directly with any questions  Subjective:   Chief Complaint   Patient presents with   • Follow-up     F/u per Keturah  Looking for help with pain management with endometriosis   Been to multiple different GYN offices, [place], and [facility], and pain management   Currently taking tramadol since 2012 and it is not helping anymore   Not interested in hormone treatments or use of medical marijuana         Patient ID: Javi Mcdonald is a 29 y o  female  Patient is here to get a stronger pain medication for her chronic pelvic pain which she was told it is because of the endometriosis she has seen multiple pain management doctor and gynecologist and although they said that it is endometriosis that did not wanted to give any narcotic pain medication I had a detailed discussion with her and also I reviewed the notes from the previous visits  I have a pelvic pain when she gets constipation, urinate or with the sexual intercourse, also the area is tender to touch  Denying any fever or chills he is a status post hysterectomy is followed up by the gynecologist regularly  He is scheduled to get the pelvic ultrasound  Physical examination she has some tenderness but no guarding or rigidity in the suprapubic and right and lower lower quadrant  I discussed with her that I cannot give any further narcotics to her she should take her tramadol only as needed and in between she can try Aleve or other nonsteroidal anti-inflammatory medications  Follow-up with the pain management if she can  Said she was seen in the emergency room and they gave her the oxycodone that helped her a lot and she is looking for this prescription at this point I do not see any reason for that  Also she tried medical marijuana but she was not able to tolerated    Highly suggest that she should lose weight and follow-up with the PCP today's ultrasound which she is telling also followed up by the gynecologist we will not be able to prescribe narcotics for this problem      The following portions of the patient's history were reviewed and updated as appropriate: allergies, current medications, past family history, past medical history, past social history, past surgical history and problem list     Review of Systems   Constitutional: Positive for fatigue  Negative for chills  HENT: Negative for congestion, ear pain, hearing loss, postnasal drip, sinus pressure, sore throat and voice change  Eyes: Negative for pain, discharge and visual disturbance  Respiratory: Negative for cough, chest tightness and shortness of breath  Cardiovascular: Negative for chest pain, palpitations and leg swelling  Gastrointestinal: Negative for abdominal pain, blood in stool, diarrhea, nausea and rectal pain  Genitourinary: Positive for pelvic pain (Chronic pelvic pain secondary to endometriosis seen multiple gynecologist pain management)  Negative for difficulty urinating, dysuria and urgency  Musculoskeletal: Positive for back pain  Negative for arthralgias and joint swelling  Skin: Negative for rash  Allergic/Immunologic: Negative for environmental allergies and food allergies  Neurological: Negative for dizziness, tremors, weakness, numbness and headaches  Hematological: Negative for adenopathy  Psychiatric/Behavioral: Negative for behavioral problems and hallucinations           Past Medical History:   Diagnosis Date   • Anxiety    • Chicken pox    • Depression    • Endometriosis    • GERD (gastroesophageal reflux disease)    • Headache     Occasional    • HSV-1 infection    • IBS (irritable bowel syndrome)    • Kidney stone    • Kidney stone on left side    • Multiple thyroid nodules    • Obesity    • Renal calculi          Current Outpatient Medications:   •  ketorolac (TORADOL) 10 mg tablet, Take 1 tablet (10 mg total) by mouth every 8 (eight) hours as needed for moderate pain, Disp: 45 tablet, Rfl: 2  •  methocarbamol (ROBAXIN) 750 mg tablet, Take 1 tablet (750 mg total) by mouth every 6 (six) hours as needed for muscle spasms, Disp: 60 tablet, Rfl: 0  •  ondansetron (ZOFRAN) 4 mg tablet, Take 1 tablet (4 mg total) by mouth every 6 (six) hours, Disp: 12 tablet, Rfl: 0  •  traMADol HCl 100 MG TABS, Take 1 tablet by mouth every 12 (twelve) hours as needed (pelvic pain), Disp: 60 tablet, Rfl: 0  •  amitriptyline (ELAVIL) 25 mg tablet, Take 1 tablet (25 mg total) by mouth daily at bedtime (Patient not taking: Reported on 3/2/2023), Disp: 30 tablet, Rfl: 3  •  naproxen (Naprosyn) 500 mg tablet, Take 1 tablet (500 mg total) by mouth 2 (two) times a day with meals (Patient not taking: Reported on 3/2/2023), Disp: 30 tablet, Rfl: 0    Allergies   Allergen Reactions   • Other Blisters     Dermabond   • Reglan [Metoclopramide]        Social History   Past Surgical History:   Procedure Laterality Date   • APPENDECTOMY  2021   •  SECTION      x2   • HYSTERECTOMY      robotic total laparoscopic hysterectomy with BS   • LAPAROSCOPIC ENDOMETRIOSIS FULGURATION  2018    laparoscopic excision of endometriosis, fulguration of endometriosis, lysis of adhesions   • LAPAROSCOPY  2014    with I&D    • MOLE REMOVAL      face - benign    • SKIN CANCER EXCISION      abdomen   • THYROID CYST EXCISION     • TONSILLECTOMY     • TUBAL LIGATION  02/15/2016    With lysis of adhesion and peritoneal biopsy   • [address] [place] [address] STUDY  2015   • WISDOM TOOTH EXTRACTION       Family History   Problem Relation Age of Onset   • Coronary artery disease Mother    • Varicose Veins Mother    • Other Mother         breast cyst - removed    • Cholelithiasis Mother         had cholecystectomy   • Alcohol abuse Father    • Cholelithiasis Maternal Grandmother         had cholecystectomy   • Uterine cancer Paternal Grandmother    • Breast cancer Family         Before age 52    • Uterine cancer Family    • Obesity Family    • Hypertension Family    • Brain cancer Family    • Gallbladder disease Family        Objective:  /78 (BP Location: Right arm, Patient Position: Sitting, Cuff Size: Standard)   Pulse 75   Temp 98 5 °F (36 9 °C) (Temporal)   Ht 5' 5" (1 651 m)   Wt 114 kg (251 lb 1 6 oz)   SpO2 99% Comment: room air  BMI 41 79 kg/m²        Physical Exam  Constitutional:       Appearance: Normal appearance  She is well-developed  She is obese  HENT:      Right Ear: External ear normal    Eyes:      Conjunctiva/sclera: Conjunctivae normal       Pupils: Pupils are equal, round, and reactive to light  Neck:      Thyroid: No thyromegaly  Vascular: No JVD  Cardiovascular:      Rate and Rhythm: Normal rate and regular rhythm  Heart sounds: Normal heart sounds  Pulmonary:      Breath sounds: Normal breath sounds  Abdominal:      General: Bowel sounds are normal       Palpations: Abdomen is soft  Tenderness: There is abdominal tenderness in the suprapubic area  Musculoskeletal:         General: Normal range of motion  Cervical back: Normal range of motion  Lymphadenopathy:      Cervical: No cervical adenopathy  Skin:     General: Skin is dry  Neurological:      Mental Status: She is alert and oriented to person, place, and time  Deep Tendon Reflexes: Reflexes are normal and symmetric     Psychiatric:         Mood and Affect: Mood normal          Behavior: Behavior normal

## 2023-03-07 ENCOUNTER — TELEPHONE (OUTPATIENT)
Dept: INTERNAL MEDICINE CLINIC | Age: 33
End: 2023-03-07

## 2023-03-07 DIAGNOSIS — N80.9 ENDOMETRIOSIS: Primary | ICD-10-CM

## 2023-03-07 DIAGNOSIS — R10.2 CHRONIC PELVIC PAIN IN FEMALE: ICD-10-CM

## 2023-03-07 DIAGNOSIS — G89.29 CHRONIC PELVIC PAIN IN FEMALE: ICD-10-CM

## 2023-03-07 RX ORDER — OXYCODONE HYDROCHLORIDE AND ACETAMINOPHEN 5; 325 MG/1; MG/1
1 TABLET ORAL EVERY 8 HOURS PRN
Qty: 30 TABLET | Refills: 0 | Status: SHIPPED | OUTPATIENT
Start: 2023-03-07 | End: 2023-03-16 | Stop reason: SDUPTHER

## 2023-03-07 NOTE — TELEPHONE ENCOUNTER
Patient is calling to let you know that she did get the Ultrasound of the pelvis done  The results are in care everywhere for you to see      Patient was asking if she can get something stronger for the pain that she is having since the Tramadol doesn't work for her at all    Please call her back at the mobile phone [phone] [facility]

## 2023-03-07 NOTE — TELEPHONE ENCOUNTER
Reviewed the ultrasound results and patient will be seen in the gynecologist ultrasound was recommended by the gynecologist at the [facility]  Continue to have this pelvic pain and the review of this ultrasound as the tramadol is not helping her I will prescribe her Percocet 1 every 8 hours as needed for the pain and she can stop taking the tramadol

## 2023-03-08 ENCOUNTER — TELEPHONE (OUTPATIENT)
Dept: GYNECOLOGIC ONCOLOGY | Facility: CLINIC | Age: 33
End: 2023-03-08

## 2023-03-08 ENCOUNTER — OFFICE VISIT (OUTPATIENT)
Dept: OBGYN CLINIC | Facility: CLINIC | Age: 33
End: 2023-03-08

## 2023-03-08 VITALS
DIASTOLIC BLOOD PRESSURE: 76 MMHG | SYSTOLIC BLOOD PRESSURE: 110 MMHG | WEIGHT: 244 LBS | HEIGHT: 64 IN | BODY MASS INDEX: 41.66 KG/M2

## 2023-03-08 DIAGNOSIS — N80.359: Primary | ICD-10-CM

## 2023-03-08 NOTE — PROGRESS NOTES
Assessment/Plan:         Diagnoses and all orders for this visit:    Endometriosis of pelvic sidewall  -     Ambulatory Referral to Gynecologic Oncology; Future          Subjective:      Patient ID: Marcela Clark is a 29 y o  female  The patient is a 72-year-old  2 para 2-0-0-2 female who underwent robotic hysterectomy in 2016 for endometriosis  She had completed childbearing, but because of her young age, her ovaries were conserved  This was followed with 6 months of Lupron  She had increasingly severe pelvic pain since that time  She is not eligible through her insurance for additional Lupron treatments  An ultrasound performed at [facility] revealed a left ovarian mass with an ovary that appeared to be adherent to the pelvic sidewall  The right ovary appeared to be adhered to the bowel  The lesion was also noted on the rectosigmoid which included the serosa and possibly the muscularis of the bowel  Patient came to this office to discuss surgical intervention to resect the endometriosis  She continues to hope to conserve her ovaries  A copy of the ultrasound is attached below  After discussion, it was recommended that she seek consultation with GYN oncology because of the involvement of the bowel and possible involvement of the ureter and the disease process  She was amenable and a GYN oncology referral was generated  INDICATIONS     pelvic pain   endometriosis   dyspareunia   hysterectomy     ADNEXA     The left ovary appeared normal and measured 3 2 x 3 1 x 3 6 cm with a   volume of 18 7 cc  It was observed adhering to the sidewall  adhered   to bladder The right ovary appeared normal and measured 1 4 x 0 9 x   0 6 cm with a volume of 0 4 cc  It was observed adhering to the   sidewall  adhered to bowel     MASSES     1)  Left ovarian mass  The mass measured 2 80 x 2 60 x 2 30 cm  Color   doppler flow was performed   endometrioma     EXAM TYPES transvaginal GYN (43773) (Quantity: 1)   pelvic, GYN complete (11659) (Quantity: 1)     GENERAL COMMENT     Thank you for allowing me to see your patient, Frank Soler   at the [facility]  As you know she is   a  34 y o  [id number]   Indication(s): Known extensive endometriosis, status post   hysterectomy, status post multiple laparoscopic surgeries; chronic   pelvic pain/dyspareunia/dyschezia  Pertinent past medical/surgical/reproductive history: Prior    delivery x2     The pelvis was evaluated by transabdominal and transvaginal imaging  The where available, images were compared to prior studies  Comprehensive imaging of the pelvis was performed utilizing  2D/3D   imaging with multiple sagittal/axial/coronal planes interrogating all   pelvic structures  Anatomic targets/views included the uterus   (size/contour, myometrial characteristics,  junctional zone,   endometrial echo, uterine cavity), cervix, Pouch of Curtis (cul de   sac), bilateral adnexal regions and bladder   When appropriate,   uterine and ovarian sliding signs were performed to assess for pelvic   adhesive disease and the anterior/posterior compartments were   assessed for the presence of superficial or deep infiltrating   endometriosis  When appropriate, care was taken to assess/document   areas of tenderness to transducer pressure  Pertinent findings noted:     Uterus and cervix: Surgically absent     Vaginal cuff: No evidence of endometriosis within the vaginal cuff   proper   Immediately posterior  and inferior to the vaginal cuff, in   the region of the vaginal fornix, a hypoechoic stellate shaped lesion   is noted, compatible with a focal region of endometriosis, measuring   7 x 4 x 6 mm ("[place] lesion")       Right ovary: Morphologically normal     Left ovary: Unilocular cystic mass, measuring 2 8 x 2 6 x 2 3 cm with   groundglass internal echoes, compatible with an endometrioma  Residual ovarian tissue is clearly seen  Posterior compartment: A hypoechoic, elliptical shaped lesion is   noted along the anterior rectosigmoid, immediately posterior and   superior to the vaginal cuff, measuring 3 1 x 0 9 x 1 2 cm   This   lesion appears to emanate from the anterior rectosigmoid and, at a   minimum, involves the bowel serosa and possibly the muscularis  Furthermore, this region is densely adhesed to the vaginal cuff  Interestingly, there are no endometrial implants noted in the region   of the uterosacral ligaments  Anterior compartment: No discrete sonographic evidence of   endometriosis involving the bladder base or dome  Kidneys/Ureters: No evidence of hydronephrosis or hydroureter     Pain assessment: Tenderness is elicited to transducer pressure in the   region of the left and right ovaries, moderate to severe nature  Mobility assessment: Significant pelvic adhesive disease is   suspected, with sonographic suspicion of adhesion of the right ovary   to the bowel and bladder; adhesion of the left ovary to the bladder   and pelvic sidewall; and, as noted above, adhesion of the anterior   rectosigmoid to the vaginal cuff with a large collection of   endometriosis along the anterior rectosigmoid (see further   description above)           Summary of sonographic findings:   Comprehensive pelvic imaging today is suggestive of a significant   recurrence of endometriosis, along with associated pelvic adhesive   disease   An endometrioma is noted in the left ovary as well as an   endometriotic "[place] lesion" in the region of the vaginal fornix   and, finally, a large collection of endometriosis along the anterior   rectosigmoid with involvement of the intestinal serosa, and possibly,   muscularis   Associated pelvic adhesive disease is noted involving   both ovaries, as well as the anterior rectosigmoid to the region of   the vaginal cuff   Tenderness is elicited to transducer pressure in   all of the aforementioned regions, not unexpectedly  The following portions of the patient's history were reviewed and updated as appropriate: allergies, current medications, past family history, past medical history, past social history, past surgical history and problem list     Review of Systems   Constitutional: Negative for chills, diaphoresis, fatigue, fever and unexpected weight change  HENT: Negative for congestion, sinus pressure, sinus pain, tinnitus and trouble swallowing  Eyes: Negative for visual disturbance  Respiratory: Negative for cough, chest tightness and shortness of breath  Cardiovascular: Negative for chest pain, palpitations and leg swelling  Gastrointestinal: Negative for abdominal distention, abdominal pain, anal bleeding, constipation, diarrhea, nausea, rectal pain and vomiting  Endocrine: Negative for heat intolerance  Genitourinary: Negative for difficulty urinating, dysuria, flank pain, frequency, genital sores, hematuria and urgency  Musculoskeletal: Negative for arthralgias, back pain and joint swelling  Skin: Negative for rash  Allergic/Immunologic: Negative for environmental allergies and food allergies  Neurological: Negative for headaches  Hematological: Negative for adenopathy  Does not bruise/bleed easily  Psychiatric/Behavioral: Negative for decreased concentration and dysphoric mood  The patient is not nervous/anxious  Objective:      /76 (BP Location: Left arm, Patient Position: Sitting, Cuff Size: Large)   Ht 5' 4" (1 626 m)   Wt 111 kg (244 lb)   BMI 41 88 kg/m²          Physical Exam  Vitals and nursing note reviewed  Exam conducted with a chaperone present  Constitutional:       Appearance: Normal appearance  She is normal weight  HENT:      Head: Normocephalic and atraumatic        Nose: Nose normal    Eyes:      Conjunctiva/sclera: Conjunctivae normal    Pulmonary:      Effort: Pulmonary effort is normal    Abdominal:      General: Abdomen is flat  Palpations: Abdomen is soft  Musculoskeletal:         General: Normal range of motion  Skin:     General: Skin is warm and dry  Neurological:      General: No focal deficit present  Mental Status: She is alert  Mental status is at baseline  Psychiatric:         Mood and Affect: Mood normal          Behavior: Behavior normal          Thought Content:  Thought content normal          Judgment: Judgment normal

## 2023-03-08 NOTE — TELEPHONE ENCOUNTER
Left patient message to set up consult 4/7 with NT in [facility] or next available in [facility], which is likely late April   Reason:Endometriosis of pelvic sidewall

## 2023-03-14 DIAGNOSIS — R10.84 GENERALIZED ABDOMINAL PAIN: ICD-10-CM

## 2023-03-14 DIAGNOSIS — G89.29 CHRONIC PELVIC PAIN IN FEMALE: ICD-10-CM

## 2023-03-14 DIAGNOSIS — N80.9 ENDOMETRIOSIS: ICD-10-CM

## 2023-03-14 DIAGNOSIS — R10.2 CHRONIC PELVIC PAIN IN FEMALE: ICD-10-CM

## 2023-03-14 RX ORDER — METHOCARBAMOL 750 MG/1
750 TABLET, FILM COATED ORAL EVERY 6 HOURS PRN
Qty: 60 TABLET | Refills: 0 | Status: SHIPPED | OUTPATIENT
Start: 2023-03-14

## 2023-03-15 ENCOUNTER — TELEPHONE (OUTPATIENT)
Dept: INTERNAL MEDICINE CLINIC | Age: 33
End: 2023-03-15

## 2023-03-15 DIAGNOSIS — N80.9 ENDOMETRIOSIS: ICD-10-CM

## 2023-03-15 DIAGNOSIS — R10.2 CHRONIC PELVIC PAIN IN FEMALE: ICD-10-CM

## 2023-03-15 DIAGNOSIS — G89.29 CHRONIC PELVIC PAIN IN FEMALE: ICD-10-CM

## 2023-03-15 RX ORDER — OXYCODONE HYDROCHLORIDE AND ACETAMINOPHEN 5; 325 MG/1; MG/1
1 TABLET ORAL EVERY 8 HOURS PRN
Qty: 30 TABLET | Refills: 0 | Status: CANCELLED | OUTPATIENT
Start: 2023-03-15

## 2023-03-15 NOTE — TELEPHONE ENCOUNTER
3/15/23  Pt LMOM  On refill line - to have percocet refilled- refused by Felicia Mehta- hernán Rebollar pt should see OBGYN- pt states they will not fill- OB states pt shoud have filled by family dr or pain management -- since percocet was just filled by Dr Sharda Cerrato - message sent to him to decide ds

## 2023-03-15 NOTE — TELEPHONE ENCOUNTER
Patient requested a refill through My chart yesterday for the refill of this medication  Per Ross Quiroga she will not give her any refills of this medication  She only gave her the refill on the of the Robaxin that she wanted  Dr Griffith Cancer it is up to you if you want this patient to have this refill or not      Please advise

## 2023-03-15 NOTE — TELEPHONE ENCOUNTER
Patient stating that her GYN will not fill it for her and stated that she needs to get it filled through her PCP  She stated that Dr Farrah Petty filled it for her last time

## 2023-03-16 DIAGNOSIS — R10.2 CHRONIC PELVIC PAIN IN FEMALE: ICD-10-CM

## 2023-03-16 DIAGNOSIS — N80.9 ENDOMETRIOSIS: ICD-10-CM

## 2023-03-16 DIAGNOSIS — G89.29 CHRONIC PELVIC PAIN IN FEMALE: ICD-10-CM

## 2023-03-16 RX ORDER — OXYCODONE HYDROCHLORIDE AND ACETAMINOPHEN 5; 325 MG/1; MG/1
1 TABLET ORAL EVERY 8 HOURS PRN
Qty: 30 TABLET | Refills: 0 | Status: CANCELLED | OUTPATIENT
Start: 2023-03-16

## 2023-03-16 RX ORDER — OXYCODONE HYDROCHLORIDE AND ACETAMINOPHEN 5; 325 MG/1; MG/1
1 TABLET ORAL EVERY 8 HOURS PRN
Qty: 30 TABLET | Refills: 0 | Status: SHIPPED | OUTPATIENT
Start: 2023-03-16 | End: 2023-03-23 | Stop reason: SDUPTHER

## 2023-03-23 DIAGNOSIS — R10.2 CHRONIC PELVIC PAIN IN FEMALE: ICD-10-CM

## 2023-03-23 DIAGNOSIS — G89.29 CHRONIC PELVIC PAIN IN FEMALE: ICD-10-CM

## 2023-03-23 DIAGNOSIS — N80.9 ENDOMETRIOSIS: ICD-10-CM

## 2023-03-27 DIAGNOSIS — R10.2 CHRONIC PELVIC PAIN IN FEMALE: ICD-10-CM

## 2023-03-27 DIAGNOSIS — G89.29 CHRONIC PELVIC PAIN IN FEMALE: ICD-10-CM

## 2023-03-27 DIAGNOSIS — N80.9 ENDOMETRIOSIS: ICD-10-CM

## 2023-03-27 DIAGNOSIS — R10.84 GENERALIZED ABDOMINAL PAIN: ICD-10-CM

## 2023-03-27 RX ORDER — METHOCARBAMOL 750 MG/1
750 TABLET, FILM COATED ORAL EVERY 6 HOURS PRN
Qty: 60 TABLET | Refills: 0 | Status: SHIPPED | OUTPATIENT
Start: 2023-03-27

## 2023-03-27 RX ORDER — OXYCODONE HYDROCHLORIDE AND ACETAMINOPHEN 5; 325 MG/1; MG/1
1 TABLET ORAL EVERY 8 HOURS PRN
Qty: 30 TABLET | Refills: 0 | Status: SHIPPED | OUTPATIENT
Start: 2023-03-27 | End: 2023-04-06 | Stop reason: SDUPTHER

## 2023-03-31 ENCOUNTER — CLINICAL SUPPORT (OUTPATIENT)
Dept: INTERNAL MEDICINE CLINIC | Facility: OTHER | Age: 33
End: 2023-03-31

## 2023-03-31 DIAGNOSIS — F11.90 CHRONIC, CONTINUOUS USE OF OPIOIDS: Primary | ICD-10-CM

## 2023-04-04 DIAGNOSIS — G89.29 CHRONIC PELVIC PAIN IN FEMALE: ICD-10-CM

## 2023-04-04 DIAGNOSIS — N80.9 ENDOMETRIOSIS: ICD-10-CM

## 2023-04-04 DIAGNOSIS — R10.2 CHRONIC PELVIC PAIN IN FEMALE: ICD-10-CM

## 2023-04-04 LAB
7AMINOCLONAZEPAM SAL QL CFM: NEGATIVE NG/ML
AMPHET SAL QL CFM: NEGATIVE NG/ML
BUPRENORPHINE SAL QL SCN: NEGATIVE NG/ML
CARBOXYTHC SAL QL CFM: NEGATIVE NG/ML
CCP IGG SERPL-ACNC: NEGATIVE
COCAINE SAL QL CFM: NEGATIVE NG/ML
CODEINE SAL QL CFM: NEGATIVE NG/ML
DXO+LEVORPHANOL SAL QL CFM: NEGATIVE NG/ML
EDDP SAL QL CFM: NEGATIVE NG/ML
GABAPENTIN SAL QL CFM: NEGATIVE NG/ML
HYDROCODONE SAL QL CFM: NEGATIVE NG/ML
LEUKEMIA MARKERS BLD-IMP: NEGATIVE NG/ML
M PROTEIN 3 UR ELPH-MCNC: NORMAL NG/ML
M TB TUBERC IGNF/MITOGEN IGNF CONTROL: NEGATIVE NG/ML
METHADONE SAL QL CFM: NEGATIVE NG/ML
MORPHINE SAL QL CFM: NEGATIVE NG/ML
NALTREXOL SAL QL CFM: NEGATIVE NG/ML
NALTREXONE SAL QL CFM: NEGATIVE NG/ML
OXYMORPHONE SAL QL CFM: NEGATIVE NG/ML
OXYMORPHONE SAL QL CFM: NEGATIVE NG/ML
PREGABALIN SAL QL CFM: NEGATIVE NG/ML
RESULT ALL_PRESCRIBED MEDS AND SPECIAL INSTRUCTIONS: NORMAL
SL AMB 6-MAM (HEROIN METABOLITE) QUANTIFICATION: NEGATIVE NG/ML
SL AMB ALPRAZOLAM QUANTIFICATION: NEGATIVE NG/ML
SL AMB CLONAZEPAM QUANTIFICATION: NEGATIVE NG/ML
SL AMB DIAZEPAM QUANTIFICATION: NEGATIVE NG/ML
SL AMB FENTANYL QUANTIFICATION: NEGATIVE NG/ML
SL AMB N-DESMETHYL-TRAMADOL QUANTIFICATION SALIVA: NEGATIVE NG/ML
SL AMB NORBUPRENORPHINE QUANTIFICATION: NEGATIVE NG/ML
SL AMB NORDIAZEPAM QUANTIFICATION: NEGATIVE NG/ML
SL AMB NORFENTANYL QUANTIFICATION: NEGATIVE NG/ML
SL AMB NORHYDROCODONE QUANTIFICATION: NEGATIVE NG/ML
SL AMB NORMEPERIDINE QUANTIFICATION: NEGATIVE NG/ML
SL AMB NOROXYCODONE QUANTIFICATION: NORMAL NG/ML
SL AMB OXAZEPAM QUANTIFICATION: NEGATIVE NG/ML
SL AMB RITALINIC ACID QUANTIFICATION: NEGATIVE
SL AMB TEMAZEPAM QUANTIFICATION: NEGATIVE NG/ML
SL AMB TEMAZEPAM QUANTIFICATION: NEGATIVE NG/ML
SL AMB TRAMADOL QUANTIFICATION: NEGATIVE NG/ML
SQUAMOUS #/AREA URNS HPF: NEGATIVE NG/ML
TAPENTADOL SAL QL CFM: NEGATIVE NG/ML

## 2023-04-04 RX ORDER — OXYCODONE HYDROCHLORIDE AND ACETAMINOPHEN 5; 325 MG/1; MG/1
1 TABLET ORAL EVERY 8 HOURS PRN
Qty: 30 TABLET | Refills: 0 | Status: CANCELLED | OUTPATIENT
Start: 2023-04-04

## 2023-04-05 ENCOUNTER — TELEPHONE (OUTPATIENT)
Dept: GYNECOLOGIC ONCOLOGY | Facility: CLINIC | Age: 33
End: 2023-04-05

## 2023-04-05 DIAGNOSIS — R10.2 CHRONIC PELVIC PAIN IN FEMALE: ICD-10-CM

## 2023-04-05 DIAGNOSIS — N80.9 ENDOMETRIOSIS: ICD-10-CM

## 2023-04-05 DIAGNOSIS — G89.29 CHRONIC PELVIC PAIN IN FEMALE: ICD-10-CM

## 2023-04-05 RX ORDER — OXYCODONE HYDROCHLORIDE AND ACETAMINOPHEN 5; 325 MG/1; MG/1
1 TABLET ORAL EVERY 8 HOURS PRN
Qty: 90 TABLET | Refills: 0 | Status: CANCELLED | OUTPATIENT
Start: 2023-04-05 | End: 2023-05-05

## 2023-04-06 RX ORDER — OXYCODONE HYDROCHLORIDE AND ACETAMINOPHEN 5; 325 MG/1; MG/1
1 TABLET ORAL EVERY 8 HOURS PRN
Qty: 30 TABLET | Refills: 0 | Status: SHIPPED | OUTPATIENT
Start: 2023-04-06

## 2023-04-07 ENCOUNTER — TELEPHONE (OUTPATIENT)
Age: 33
End: 2023-04-07

## 2023-04-07 ENCOUNTER — CONSULT (OUTPATIENT)
Dept: GYNECOLOGIC ONCOLOGY | Facility: CLINIC | Age: 33
End: 2023-04-07

## 2023-04-07 VITALS
HEIGHT: 64 IN | WEIGHT: 250 LBS | SYSTOLIC BLOOD PRESSURE: 130 MMHG | HEART RATE: 85 BPM | DIASTOLIC BLOOD PRESSURE: 72 MMHG | TEMPERATURE: 97.4 F | BODY MASS INDEX: 42.68 KG/M2 | OXYGEN SATURATION: 99 %

## 2023-04-07 DIAGNOSIS — N80.9 ENDOMETRIOSIS: Primary | ICD-10-CM

## 2023-04-07 DIAGNOSIS — R10.2 CHRONIC PELVIC PAIN IN FEMALE: ICD-10-CM

## 2023-04-07 DIAGNOSIS — Z01.818 PRE-OP TESTING: ICD-10-CM

## 2023-04-07 DIAGNOSIS — Z12.11 COLON CANCER SCREENING: ICD-10-CM

## 2023-04-07 DIAGNOSIS — N80.359: ICD-10-CM

## 2023-04-07 DIAGNOSIS — G89.29 CHRONIC PELVIC PAIN IN FEMALE: ICD-10-CM

## 2023-04-07 RX ORDER — SODIUM CHLORIDE, SODIUM LACTATE, POTASSIUM CHLORIDE, CALCIUM CHLORIDE 600; 310; 30; 20 MG/100ML; MG/100ML; MG/100ML; MG/100ML
125 INJECTION, SOLUTION INTRAVENOUS CONTINUOUS
OUTPATIENT
Start: 2023-04-07

## 2023-04-07 RX ORDER — METRONIDAZOLE 500 MG/1
500 TABLET ORAL ONCE
Qty: 1 TABLET | Refills: 0 | Status: SHIPPED | OUTPATIENT
Start: 2023-04-07 | End: 2023-04-07

## 2023-04-07 RX ORDER — NEOMYCIN SULFATE 500 MG/1
1000 TABLET ORAL 3 TIMES DAILY
Qty: 6 TABLET | Refills: 0 | Status: SHIPPED | OUTPATIENT
Start: 2023-04-07 | End: 2023-04-08

## 2023-04-07 RX ORDER — GABAPENTIN 100 MG/1
200 CAPSULE ORAL ONCE
OUTPATIENT
Start: 2023-04-07 | End: 2023-04-07

## 2023-04-07 RX ORDER — HEPARIN SODIUM 5000 [USP'U]/ML
5000 INJECTION, SOLUTION INTRAVENOUS; SUBCUTANEOUS
OUTPATIENT
Start: 2023-04-07 | End: 2023-04-08

## 2023-04-07 RX ORDER — CEFAZOLIN SODIUM 2 G/50ML
2000 SOLUTION INTRAVENOUS ONCE
OUTPATIENT
Start: 2023-04-07 | End: 2023-04-07

## 2023-04-07 RX ORDER — POLYETHYLENE GLYCOL 3350 17 G/17G
POWDER, FOR SOLUTION ORAL
Qty: 238 G | Refills: 0 | Status: SHIPPED | OUTPATIENT
Start: 2023-04-07

## 2023-04-07 RX ORDER — ACETAMINOPHEN 325 MG/1
975 TABLET ORAL ONCE
OUTPATIENT
Start: 2023-04-07 | End: 2023-04-07

## 2023-04-07 RX ORDER — METRONIDAZOLE 500 MG/100ML
500 INJECTION, SOLUTION INTRAVENOUS EVERY 8 HOURS
OUTPATIENT
Start: 2023-04-07

## 2023-04-07 NOTE — ASSESSMENT & PLAN NOTE
72-year-old para 2 with morbid obesity, BMI 42 kg/m², longstanding endometriosis, chronic pelvic pain  She has had 2  sections, 3 previous laparoscopies with fulguration of endometriosis, robotic assisted laparoscopic hysterectomy in 2016, Lupron [place], hormonal suppression  Recent ultrasound is suggestive of rectosigmoid endometriosis, pelvic adhesive disease  She is interested in definitive operative management  I reviewed the ultrasound, CT abdomen pelvis images  Her performance status is 0   1   We reviewed definitive surgical management for endometriosis which is inclusive of bilateral oophorectomy, resection of pelvic endometriosis  2   Referral to colorectal surgery for preoperative colonoscopy to assess transmural involvement of the rectosigmoid colon  3   I discussed the risks and benefits of robotic assisted total laparoscopic bilateral oophorectomy, possible exploratory laparotomy, possible colectomy, all other indicated procedures  She understands the risks and benefits of the operation and agrees to proceed as outlined  She will require a preoperative antibiotic and mechanical bowel prep  4   We discussed postoperative hormone replacement therapy  She understands the benefits of adjuvant hormone replacement therapy to reduce menopausal symptoms, improve cardiovascular health and bone health  She is amenable to starting postoperative HRT  We will consider combined versus estrogen only HRT depending on endometriosis burden  Thank you for the courtesy of this consultation  All questions were answered by the end of the visit

## 2023-04-07 NOTE — PROGRESS NOTES
Assessment/Plan:    Problem List Items Addressed This Visit        Other    Chronic pelvic pain in female    Relevant Orders    Case request operating room: SALPINGO-OOPHORECTOMY, LAPAROSCOPIC W/ROBOTICS, POSSIBLE EXPLORATORY LAPAROTOMY (Completed)    Ambulatory referral to Colorectal Surgery    Endometriosis - Primary     29-year-old para 2 with morbid obesity, BMI 42 kg/m², longstanding endometriosis, chronic pelvic pain  She has had 2  sections, 3 previous laparoscopies with fulguration of endometriosis, robotic assisted laparoscopic hysterectomy in 2016, Lupron, Orilissa, hormonal suppression  Recent ultrasound is suggestive of rectosigmoid endometriosis, pelvic adhesive disease  She is interested in definitive operative management  I reviewed the ultrasound, CT abdomen pelvis images  Her performance status is 0   1   We reviewed definitive surgical management for endometriosis which is inclusive of bilateral oophorectomy, resection of pelvic endometriosis  2   Referral to colorectal surgery for preoperative colonoscopy to assess transmural involvement of the rectosigmoid colon  3   I discussed the risks and benefits of robotic assisted total laparoscopic bilateral oophorectomy, possible exploratory laparotomy, possible colectomy, all other indicated procedures  She understands the risks and benefits of the operation and agrees to proceed as outlined  She will require a preoperative antibiotic and mechanical bowel prep  4   We discussed postoperative hormone replacement therapy  She understands the benefits of adjuvant hormone replacement therapy to reduce menopausal symptoms, improve cardiovascular health and bone health  She is amenable to starting postoperative HRT  We will consider combined versus estrogen only HRT depending on endometriosis burden  Thank you for the courtesy of this consultation  All questions were answered by the end of the visit           Relevant Medications neomycin (MYCIFRADIN) 500 mg tablet    metroNIDAZOLE (FLAGYL) 500 mg tablet    polyethylene glycol (MiraLax) 17 GM/SCOOP powder    Other Relevant Orders    Case request operating room: SALPINGO-OOPHORECTOMY, LAPAROSCOPIC W/ROBOTICS, POSSIBLE EXPLORATORY LAPAROTOMY (Completed)    Type and screen    Comprehensive metabolic panel    CBC and Platelet    Protime-INR    HEMOGLOBIN A1C W/ EAG ESTIMATION        EKG 12 lead    XR chest pa & lateral    Ambulatory referral to Colorectal Surgery   Other Visit Diagnoses     Endometriosis of pelvic sidewall                  CHIEF COMPLAINT: Endometriosis, chronic pelvic pain          Patient ID: Raeann Solis is a 29 y o  female  29-year-old para 2 with a long history of endometriosis and chronic pelvic pain  She currently follows with Dr Eleanor Broussard at [facility]  She has had multiple prior therapies for endometriosis including 3 prior laparoscopic surgeries with fulguration, Lupron for 6 months, Orilissa, pelvic PT, vaginal Valium, hormonal suppression  She has significant pain with bowel movements, no vaginal bleeding  Her quality of life is poor due to pelvic pain  She takes oxycodone and Robaxin for low back pain  Pelvic pain focused ultrasound on 3/7/2023 revealed significant recurrence of endometriosis with an anterior rectosigmoid lesion, ovaries scarred to the pelvic sidewall and bladder  Prior pelvic imaging on 2022 with CT abdomen pelvis did not reveal measurable disease  There was splenomegaly  MRI pelvis 2022 for pelvic pain revealed a chronic focal irregularity of the bladder wall which is unchanged from 2016  No visible evidence of recurrent endometriosis  She has had other pelvic surgeries including laparoscopic appendectomy which was positive for endometriosis, 2 previous  sections  She is just looking for some relief of the pain and is willing to undergo definitive operative management    She is referred as a consultation from Dr Moraima Johnson to discuss treatment options for her severe pelvic endometriosis  Review of Systems   Constitutional: Negative for activity change and unexpected weight change  HENT: Negative  Eyes: Negative  Respiratory: Negative  Cardiovascular: Negative  Gastrointestinal: Positive for abdominal distention and abdominal pain  Endocrine: Negative  Genitourinary: Positive for pelvic pain  Negative for vaginal bleeding  Musculoskeletal: Positive for back pain  Skin: Negative  Allergic/Immunologic: Negative  Neurological: Negative  Hematological: Negative  Psychiatric/Behavioral: Negative  Current Outpatient Medications   Medication Sig Dispense Refill   • methocarbamol (ROBAXIN) 750 mg tablet Take 1 tablet (750 mg total) by mouth every 6 (six) hours as needed for muscle spasms 60 tablet 0   • metroNIDAZOLE (FLAGYL) 500 mg tablet Take 1 tablet (500 mg total) by mouth once for 1 dose Take at 9 PM the night before the procedure 1 tablet 0   • neomycin (MYCIFRADIN) 500 mg tablet Take 2 tablets (1,000 mg total) by mouth 3 (three) times a day for 3 doses Take at 1 PM, 4 PM, and 9 PM the day before procedure  6 tablet 0   • ondansetron (ZOFRAN) 4 mg tablet Take 1 tablet (4 mg total) by mouth every 6 (six) hours 12 tablet 0   • oxyCODONE-acetaminophen (Percocet) 5-325 mg per tablet Take 1 tablet by mouth every 8 (eight) hours as needed for moderate pain Max Daily Amount: 3 tablets 30 tablet 0   • polyethylene glycol (MiraLax) 17 GM/SCOOP powder Mix with 64 oz Gatorade, begin 4 PM day before surgery per bowel prep instructions  238 g 0     No current facility-administered medications for this visit         Allergies   Allergen Reactions   • Other Blisters     Dermabond   • Reglan [Metoclopramide]        Past Medical History:   Diagnosis Date   • Anxiety    • Chicken pox    • Depression    • Endometriosis    • GERD (gastroesophageal reflux disease)    • Headache "Occasional    • HSV-1 infection    • IBS (irritable bowel syndrome)    • Kidney stone    • Kidney stone on left side    • Multiple thyroid nodules    • Obesity    • Renal calculi        Past Surgical History:   Procedure Laterality Date   • APPENDECTOMY  2021   •  SECTION      x2   • HYSTERECTOMY      robotic total laparoscopic hysterectomy with BS   • LAPAROSCOPIC ENDOMETRIOSIS FULGURATION  2018    laparoscopic excision of endometriosis, fulguration of endometriosis, lysis of adhesions   • LAPAROSCOPY  2014    with I&D    • MOLE REMOVAL      face - benign    • SKIN CANCER EXCISION      abdomen   • THYROID CYST EXCISION     • TONSILLECTOMY     • TUBAL LIGATION  02/15/2016    With lysis of adhesion and peritoneal biopsy   • US GUIDED INJECTION FOR RESEARCH STUDY  2015   • WISDOM TOOTH EXTRACTION         OB History        2    Para   2    Term   2            AB        Living   2       SAB        IAB        Ectopic        Multiple        Live Births                     Family History   Problem Relation Age of Onset   • Coronary artery disease Mother    • Varicose Veins Mother    • Other Mother         breast cyst - removed    • Cholelithiasis Mother         had cholecystectomy   • Alcohol abuse Father    • Cholelithiasis Maternal Grandmother         had cholecystectomy   • Uterine cancer Paternal Grandmother    • Breast cancer Family         Before age 52    • Uterine cancer Family    • Obesity Family    • Hypertension Family    • Brain cancer Family    • Gallbladder disease Family        The following portions of the patient's history were reviewed and updated as appropriate: allergies, current medications, past family history, past medical history, past social history, past surgical history and problem list       Objective:    Blood pressure 130/72, pulse 85, temperature (!) 97 4 °F (36 3 °C), temperature source Temporal, height 5' 4\" (1 626 m), weight 113 kg (250 lb), " SpO2 99 %  Body mass index is 42 91 kg/m²  Physical Exam  Vitals reviewed  Exam conducted with a chaperone present  Constitutional:       General: She is not in acute distress  Appearance: Normal appearance  She is well-developed  She is obese  She is not ill-appearing, toxic-appearing or diaphoretic  HENT:      Head: Normocephalic and atraumatic  Eyes:      General: No scleral icterus  Extraocular Movements: Extraocular movements intact  Conjunctiva/sclera: Conjunctivae normal    Neck:      Thyroid: No thyromegaly  Cardiovascular:      Rate and Rhythm: Normal rate and regular rhythm  Heart sounds: Normal heart sounds  Pulmonary:      Effort: Pulmonary effort is normal       Breath sounds: Normal breath sounds  Abdominal:      General: There is no distension  Palpations: Abdomen is soft  There is no mass  Tenderness: There is no abdominal tenderness  There is no guarding or rebound  Hernia: No hernia is present  Genitourinary:     Comments: The external female genitalia is normal  The bartholin's, uretheral and skenes glands are normal  The urethral meatus is normal (midline with no lesions)  Anus without fissure or lesion  Speculum exam reveals a grossly normal vagina  No masses, lesions,discharge or bleeding  No significant cystocele or rectocele noted  Bimanual exam notes a surgical absent cervix, uterus and adnexal structures  No masses or fullness  Bladder is without fullness, mass or tenderness  There was tenderness to palpation of the proximal rectum  Musculoskeletal:         General: No swelling or tenderness  Cervical back: Normal range of motion and neck supple  Right lower leg: No edema  Left lower leg: No edema  Lymphadenopathy:      Cervical: No cervical adenopathy  Skin:     General: Skin is warm and dry  Coloration: Skin is not jaundiced or pale  Findings: No lesion or rash     Neurological:      General: No focal deficit present  Mental Status: She is alert and oriented to person, place, and time  Mental status is at baseline  Cranial Nerves: No cranial nerve deficit  Motor: No weakness  Gait: Gait normal    Psychiatric:         Mood and Affect: Mood normal          Behavior: Behavior normal          Thought Content:  Thought content normal          Judgment: Judgment normal            No results found for:   Lab Results   Component Value Date    WBC 9 30 12/09/2022    HGB 12 4 12/09/2022    HCT 37 1 12/09/2022    MCV 89 12/09/2022     12/09/2022     Lab Results   Component Value Date     02/26/2014    K 4 0 12/09/2022     12/09/2022    CO2 25 12/09/2022    ANIONGAP 7 02/26/2014    BUN 11 12/09/2022    CREATININE 0 75 12/09/2022    GLUCOSE 97 02/26/2014    GLUF 82 07/30/2022    CALCIUM 8 8 12/09/2022    CORRECTEDCA 9 2 07/07/2021    AST 13 12/09/2022    ALT 16 12/09/2022    ALKPHOS 46 12/09/2022    EGFR 108 12/09/2022

## 2023-04-07 NOTE — LETTER
Whit Paniagua  [address] [id number]        FLEXIBLE COLONOSCOPY INSTRUCTIONS  PLEASE NOTE    AS OF JUNE 1, 2014, OUR OFFICE REQUIRES 72 HOURS NOTICE OF CANCELLATION/RESCHEDULE OF A PROCEDURE TO AVOID INCURRING A MISSED APPOINTMENT FEE  Your Colonoscopy Procedure has been scheduled at:  [address] [address] [phone]    The Date of your Procedure is: April 24, 2023     Dr Claudean Snowman  will be performing the procedure  Report to the [facility] 45 minutes prior to the procedure  The total time at the facility will be approximately 1 1/2 hours  Please bring to your procedure at [facility]:   1  Insurance Cards and referrals if required by [employer] company   2  Valid Photo ID   3  Power of  Form if required    Use the bowel preparation as directed  Check with your family doctor if you are taking a blood thinner (Coumadin, Plavix, Xarelto, Pradaxa, Gingko biloba, Ginseng, Feverfew, St  Josep's Wort)  We suggest stopping these for 3 days  Special instructions may be needed if you are taking aspirin or any aspirin-containing medication  Check with your family physician  If you are on DIABETIC MEDICATION (tablets or insulin) your doctor may make changes in your preparation  Take all medications usual unless otherwise instructed  Feel free to call the physician's office to answer any questions or address any concerns you have regarding your procedure or preparation  A nurse will be happy to assist you  Because anesthesia is given to you during the procedure, the center requires that you be discharged under supervision of an adult to take you home after the procedure is performed  They will also need to sign as a witness on your discharge instructions  Public Transportation such as VAST, BUS, TAXI is Not Acceptable      It is important you notify our office of any insurance changes prior to your procedure and, if necessary, supply us with referrals from your primary care physician  COLONOSCOPY PREPARATION INSTRUCTIONS    Purchase (prescription not required):  · 238 gram bottle of Miralax® (Glycolax®)  · 4 Dulcolax® (Bisacodyl) Laxative Tablets  · 64 oz  bottle of Gatorade® or your preference of a non-carbonated clear liquid - NOT RED OR PURPLE     One Day Prior to Colonoscopy Procedure  · Nothing to eat the day before your procedure, only clear liquids  · It is important that you drink plenty of clear liquids throughout the day to prevent dehydration  Clear Liquids include:  o Water/Iced Tea/Lemonade/Gatorade®/Black Coffee or tea (no milk or creamers  o Soft drinks: orange, ginger ale, cola, Pepsi®, Sprite®, 7Up®  o Leonardo-Aid® (lemonade or orange flavors only)  o Strained fruit juices without pulp such as apple, white grape, white cranberry  o Jell-O®, lemon, lime or orange (no fruit or toppings)  o Popsicles, [place] Ice (No Ice Cream, sherbets, or fruit bars)  o Chicken or beef bouillon/broth  DO NOT EAT OR DRINK ANYTHING RED OR PURPLE  DO NOT DRINK ANY ALCOHOLIC BEVERAGES  DIABETIC PATIENTS: Consult your physician    At 4:00 pm, take (2) Dulcolax® (Bisacodyl) Laxative Tablets  Swallow the tablets whole with an 8 oz  glass of water  At 8:00 pm, take the additional (2) Dulcolax® (Bisacodyl) Laxative Tablets with 8 oz  of water  The package may direct you not to exceed (2) tablets at any time but for the purpose of this examination, you should take (4) total     Mix the 238 gm of Miralax® in 64 oz  of Gatorade® and shake the solution until the Miralax® is dissolved  You will drink half (32 oz) of this solution this evening, beginning between 4 and 6 o'clock  Drink 8 oz glassfuls at your own pace  It may take several hours to drink the solution  Remember to stay close to toilet facilities      DAY OF COLONOSCOPY PROCEDURE    Five (5) hours before your procedure, drink the other half (32 oz) of the Miralax®/Gatorade® mixture within a two (2) hour period  This may require you to get up very early if you are scheduled for an early procedure  NOTHING IS TO BE TAKEN BY MOUTH 3 HOURS PRIOR TO PROCEDURE  If you use an inhaler, please bring it with you to your procedure

## 2023-04-07 NOTE — TELEPHONE ENCOUNTER
Ref Dr Nixon Mcclain colonoscopy prior to surgery/ possible joint case       NP, no prior fc, no blood thinners, no cardiac issues, BMI 42     Scheduled [place] 4/24 [facility]   PW to be handed to patient by Ankur Gutierrez

## 2023-04-21 DIAGNOSIS — N80.9 ENDOMETRIOSIS: ICD-10-CM

## 2023-04-21 DIAGNOSIS — G89.29 CHRONIC PELVIC PAIN IN FEMALE: ICD-10-CM

## 2023-04-21 DIAGNOSIS — R10.2 CHRONIC PELVIC PAIN IN FEMALE: ICD-10-CM

## 2023-04-24 DIAGNOSIS — G89.29 CHRONIC PELVIC PAIN IN FEMALE: ICD-10-CM

## 2023-04-24 DIAGNOSIS — R10.2 CHRONIC PELVIC PAIN IN FEMALE: ICD-10-CM

## 2023-04-24 DIAGNOSIS — R10.84 GENERALIZED ABDOMINAL PAIN: ICD-10-CM

## 2023-04-24 DIAGNOSIS — N80.9 ENDOMETRIOSIS: ICD-10-CM

## 2023-04-24 RX ORDER — METHOCARBAMOL 750 MG/1
750 TABLET, FILM COATED ORAL EVERY 6 HOURS PRN
Qty: 60 TABLET | Refills: 0 | Status: SHIPPED | OUTPATIENT
Start: 2023-04-24

## 2023-04-25 ENCOUNTER — TELEPHONE (OUTPATIENT)
Dept: SURGICAL ONCOLOGY | Facility: CLINIC | Age: 33
End: 2023-04-25

## 2023-04-25 DIAGNOSIS — N80.9 ENDOMETRIOSIS: ICD-10-CM

## 2023-04-25 RX ORDER — OXYCODONE HYDROCHLORIDE AND ACETAMINOPHEN 5; 325 MG/1; MG/1
1 TABLET ORAL EVERY 8 HOURS PRN
Qty: 30 TABLET | Refills: 0 | Status: SHIPPED | OUTPATIENT
Start: 2023-04-25 | End: 2023-05-02 | Stop reason: SDUPTHER

## 2023-04-25 RX ORDER — NEOMYCIN SULFATE 500 MG/1
1000 TABLET ORAL 3 TIMES DAILY
Qty: 6 TABLET | Refills: 0 | Status: SHIPPED | OUTPATIENT
Start: 2023-04-25 | End: 2023-04-26

## 2023-04-25 NOTE — TELEPHONE ENCOUNTER
Patient is wondering if she could have the neomycin sent to the [facility] in [place]  Please give her a call back in [phone]

## 2023-04-27 ENCOUNTER — LAB REQUISITION (OUTPATIENT)
Dept: LAB | Facility: HOSPITAL | Age: 33
End: 2023-04-27

## 2023-04-27 ENCOUNTER — HOSPITAL ENCOUNTER (OUTPATIENT)
Dept: RADIOLOGY | Facility: IMAGING CENTER | Age: 33
Discharge: HOME | End: 2023-04-27

## 2023-04-27 ENCOUNTER — APPOINTMENT (OUTPATIENT)
Dept: LAB | Facility: IMAGING CENTER | Age: 33
End: 2023-04-27

## 2023-04-27 DIAGNOSIS — N80.9 ENDOMETRIOSIS: ICD-10-CM

## 2023-04-27 DIAGNOSIS — Z01.818 ENCOUNTER FOR OTHER PREPROCEDURAL EXAMINATION: ICD-10-CM

## 2023-04-27 DIAGNOSIS — Z01.818 PREOPERATIVE TESTING: ICD-10-CM

## 2023-04-27 LAB
ABO GROUP BLD: NORMAL
ALBUMIN SERPL BCP-MCNC: 3.7 G/DL (ref 3.5–5)
ALP SERPL-CCNC: 44 U/L (ref 46–116)
ALT SERPL W P-5'-P-CCNC: 27 U/L (ref 12–78)
ANION GAP SERPL CALCULATED.3IONS-SCNC: 2 MMOL/L (ref 4–13)
AST SERPL W P-5'-P-CCNC: 18 U/L (ref 5–45)
BILIRUB SERPL-MCNC: 0.95 MG/DL (ref 0.2–1)
BLD GP AB SCN SERPL QL: NEGATIVE
BUN SERPL-MCNC: 13 MG/DL (ref 5–25)
CALCIUM SERPL-MCNC: 9.1 MG/DL (ref 8.3–10.1)
CANCER AG125 SERPL-ACNC: 17.3 U/ML (ref 0–30)
CHLORIDE SERPL-SCNC: 109 MMOL/L (ref 96–108)
CO2 SERPL-SCNC: 27 MMOL/L (ref 21–32)
CREAT SERPL-MCNC: 0.78 MG/DL (ref 0.6–1.3)
ERYTHROCYTE [DISTWIDTH] IN BLOOD BY AUTOMATED COUNT: 13.2 % (ref 11.6–15.1)
GFR SERPL CREATININE-BSD FRML MDRD: 102 ML/MIN/1.73SQ M
GLUCOSE P FAST SERPL-MCNC: 88 MG/DL (ref 65–99)
HCT VFR BLD AUTO: 35.5 % (ref 34.8–46.1)
HGB BLD-MCNC: 11.5 G/DL (ref 11.5–15.4)
INR PPP: 1.02 (ref 0.84–1.19)
MCH RBC QN AUTO: 29.1 PG (ref 26.8–34.3)
MCHC RBC AUTO-ENTMCNC: 32.4 G/DL (ref 31.4–37.4)
MCV RBC AUTO: 90 FL (ref 82–98)
PLATELET # BLD AUTO: 206 THOUSANDS/UL (ref 149–390)
PMV BLD AUTO: 9.2 FL (ref 8.9–12.7)
POTASSIUM SERPL-SCNC: 3.8 MMOL/L (ref 3.5–5.3)
PROT SERPL-MCNC: 6.8 G/DL (ref 6.4–8.4)
PROTHROMBIN TIME: 13.6 SECONDS (ref 11.6–14.5)
RBC # BLD AUTO: 3.95 MILLION/UL (ref 3.81–5.12)
RH BLD: POSITIVE
SODIUM SERPL-SCNC: 138 MMOL/L (ref 135–147)
SPECIMEN EXPIRATION DATE: NORMAL
WBC # BLD AUTO: 5.08 THOUSAND/UL (ref 4.31–10.16)

## 2023-05-02 ENCOUNTER — TELEPHONE (OUTPATIENT)
Age: 33
End: 2023-05-02

## 2023-05-02 DIAGNOSIS — R10.2 CHRONIC PELVIC PAIN IN FEMALE: ICD-10-CM

## 2023-05-02 DIAGNOSIS — Z86.010 H/O ADENOMATOUS POLYP OF COLON: Primary | ICD-10-CM

## 2023-05-02 DIAGNOSIS — G89.29 CHRONIC PELVIC PAIN IN FEMALE: ICD-10-CM

## 2023-05-02 DIAGNOSIS — N80.9 ENDOMETRIOSIS: ICD-10-CM

## 2023-05-02 NOTE — TELEPHONE ENCOUNTER
Spoke with pt and advised results  Placed order for genetic counseling        ----- Message from Nora Ayoub MD sent at 4/28/2023  3:12 PM EDT -----  Please place genetic counseling order for her given young age and adenomatous polyps

## 2023-05-03 ENCOUNTER — TELEPHONE (OUTPATIENT)
Dept: GENETICS | Facility: CLINIC | Age: 33
End: 2023-05-03

## 2023-05-03 LAB
EST. AVERAGE GLUCOSE BLD GHB EST-MCNC: 85 MG/DL
HBA1C MFR BLD: 4.6 %

## 2023-05-03 RX ORDER — OXYCODONE HYDROCHLORIDE AND ACETAMINOPHEN 5; 325 MG/1; MG/1
1 TABLET ORAL EVERY 8 HOURS PRN
Qty: 30 TABLET | Refills: 0 | Status: SHIPPED | OUTPATIENT
Start: 2023-05-03

## 2023-05-03 NOTE — TELEPHONE ENCOUNTER
I called Erin Mcfarlane to schedule a new patient appointment with the [facility]       Outcome:  Genetics appointment scheduled for 10/3  added to the wait list     Personal/Family History Related to Appointment:  No phx of cancer  Abnormal skin mole that patient had to remove on her stomach  Hx of 2021 - 2 polyps, most recent showed 1 polyp, Fhx of colon ca, breast ca, ovarian ca, bladder ca, hx of multiple polyps, brain ca,  Paternal side is unknown  Non-Mandaeism       History of Genetic Testing:  Patient reports no personal or family history of genetic testing    Genetics Family History Questionnaire:  I confirmed the patient's e-mail on file as the best e-mail to send an invite link for our genetics family history intake

## 2023-05-08 ENCOUNTER — OFFICE VISIT (OUTPATIENT)
Dept: INTERNAL MEDICINE CLINIC | Facility: OTHER | Age: 33
End: 2023-05-08
Payer: COMMERCIAL

## 2023-05-08 VITALS
HEART RATE: 74 BPM | TEMPERATURE: 98 F | OXYGEN SATURATION: 99 % | BODY MASS INDEX: 42.34 KG/M2 | WEIGHT: 248 LBS | HEIGHT: 64 IN | DIASTOLIC BLOOD PRESSURE: 74 MMHG | SYSTOLIC BLOOD PRESSURE: 124 MMHG

## 2023-05-08 DIAGNOSIS — R10.2 CHRONIC PELVIC PAIN IN FEMALE: ICD-10-CM

## 2023-05-08 DIAGNOSIS — Z01.818 PRE-OP TESTING: Primary | ICD-10-CM

## 2023-05-08 DIAGNOSIS — F41.9 ANXIETY: ICD-10-CM

## 2023-05-08 DIAGNOSIS — G89.29 CHRONIC PELVIC PAIN IN FEMALE: ICD-10-CM

## 2023-05-08 DIAGNOSIS — N80.9 ENDOMETRIOSIS: ICD-10-CM

## 2023-05-08 DIAGNOSIS — R10.84 GENERALIZED ABDOMINAL PAIN: ICD-10-CM

## 2023-05-08 DIAGNOSIS — N80.9 ENDOMETRIOSIS DETERMINED BY LAPAROSCOPY: ICD-10-CM

## 2023-05-08 DIAGNOSIS — E66.01 MORBID OBESITY (HCC): Chronic | ICD-10-CM

## 2023-05-08 PROCEDURE — 93000 ELECTROCARDIOGRAM COMPLETE: CPT | Performed by: INTERNAL MEDICINE

## 2023-05-08 PROCEDURE — 99243 OFF/OP CNSLTJ NEW/EST LOW 30: CPT | Performed by: INTERNAL MEDICINE

## 2023-05-08 RX ORDER — METHOCARBAMOL 750 MG/1
750 TABLET, FILM COATED ORAL EVERY 6 HOURS PRN
Qty: 60 TABLET | Refills: 0 | Status: SHIPPED | OUTPATIENT
Start: 2023-05-08 | End: 2023-05-22 | Stop reason: SDUPTHER

## 2023-05-08 RX ORDER — METHOCARBAMOL 750 MG/1
750 TABLET, FILM COATED ORAL EVERY 6 HOURS PRN
Qty: 60 TABLET | Refills: 0 | Status: CANCELLED | OUTPATIENT
Start: 2023-05-08

## 2023-05-08 RX ORDER — NEOMYCIN SULFATE 500 MG/1
TABLET ORAL
COMMUNITY
Start: 2023-04-26 | End: 2023-06-05

## 2023-05-08 NOTE — PROGRESS NOTES
Presurgical Evaluation     Subjective:   Chief Complaint   Patient presents with   • Pre-op Exam     Surgery on 5/18/2023 Dr. Abi Brooks at [facility]   SALPINGO-OOPHORECTOMY, LAPAROSCOPIC W/ROBOTICS, POSSIBLE BOWEL RESECTION AND ALL INDICATED PROCEDURES (Bilateral: Abdomen)       LAPAROTOMY EXPLORATORY W/ ROBOTICS (Abdomen)       LAPAROTOMY EXPLORATORY W/ BOWEL RESECTION (Abdomen)        Patient ID: Yarelis Kwon is a 29 y.o. female. Chief Complaint   Patient presents with   • Pre-op Exam     Surgery on 5/18/2023 Dr. Abi Brooks at [facility]   SALPINGO-OOPHORECTOMY, LAPAROSCOPIC W/ROBOTICS, POSSIBLE BOWEL RESECTION AND ALL INDICATED PROCEDURES (Bilateral: Abdomen)       LAPAROTOMY EXPLORATORY W/ ROBOTICS (Abdomen)       LAPAROTOMY EXPLORATORY W/ BOWEL RESECTION (Abdomen)       HPI  This is a 29 years young lady who is here today for the preop evaluation for surgery of for her endometriosis. She has a previous surgery done for that recently she was seen by the gynecology detail examinations and the ultrasound shows more problems with the endometriosis she is going for the surgery she is a taking narcotic medication in the past she was also seen by the pain management and the gynecology nobody wanted to give her the pain medication certainly her pain and tenderness was significant and we started her on this medications and now she is going to go for surgery hopefully we will not need to prescribe any more narcotics for her she does not have any complaints review of system is essentially unremarkable except for the pelvic pain which is going on for a long period of time otherwise she is not symptomatic she is obese. And I told her about the weight loss and high risk for obese patient postoperatively especially after the abdominal surgery or pelvic surgery.   The following portions of the patient's history were reviewed and updated as appropriate: allergies, current medications, past family history, past medical history, past social history, past surgical history and problem list.    Procedure date: May 18, 2023    Surgeon:  Dr. Deon Cartagena  Planned procedure:  As above  Diagnosis for procedure extensive endometriosis    Prior anesthesia: Yes   General; Complications:  None / Tolerated well  MAC; Complications:  None / Tolerated well  Local; Complications:  None / Tolerated well    CAD History: None   NOTE: Patient should see Cardiology if time available before surgery, and if appropriate. Pulmonary History: None    Renal history: None    Diabetes History:  None     Neurological History: None     On Immunosuppressant meds/biologics: No      Review of Systems   Constitutional: Negative for chills and fatigue. HENT: Negative for congestion, ear pain, hearing loss, postnasal drip, sinus pressure, sore throat and voice change. Eyes: Negative for pain, discharge and visual disturbance. Respiratory: Negative for cough, chest tightness and shortness of breath. Cardiovascular: Negative for chest pain, palpitations and leg swelling. Gastrointestinal: Positive for abdominal pain (Suprapubic area and also in the lower abdomen). Negative for blood in stool, diarrhea, nausea and rectal pain. Genitourinary: Negative for difficulty urinating, dysuria and urgency. Musculoskeletal: Negative for arthralgias and joint swelling. Skin: Negative for rash. Allergic/Immunologic: Negative for environmental allergies and food allergies. Neurological: Negative for dizziness, tremors, weakness, numbness and headaches. Hematological: Negative for adenopathy. Psychiatric/Behavioral: Negative for behavioral problems and hallucinations.          Current Outpatient Medications   Medication Sig Dispense Refill   • methocarbamol (ROBAXIN) 750 mg tablet Take 1 tablet (750 mg total) by mouth every 6 (six) hours as needed for muscle spasms 60 tablet 0   • metroNIDAZOLE (FLAGYL) 500 mg tablet TAKE ONE TABLET BY MOUTH ONCE FOR ONE DOSE. TAKE AT 9:00 PM THE NIGHT BEFORE PROCEDURE     • neomycin (MYCIFRADIN) 500 mg tablet TAKE 2 TABLETS BY MOUTH THREE TIMES DAILY FOR 3 DOSES (TAKE AT 1 PM, 4 PM, AND 9 PM THE DAY BEFORE PROCEDURE)     • ondansetron (ZOFRAN) 4 mg tablet Take 1 tablet (4 mg total) by mouth every 6 (six) hours 12 tablet 0   • oxyCODONE-acetaminophen (Percocet) 5-325 mg per tablet Take 1 tablet by mouth every 8 (eight) hours as needed for moderate pain Max Daily Amount: 3 tablets 30 tablet 0   • polyethylene glycol (MiraLax) 17 GM/SCOOP powder Mix with 64 oz Gatorade, begin 4 PM day before surgery per bowel prep instructions. 238 g 0     No current facility-administered medications for this visit. Allergies on file:    Other and Reglan [metoclopramide]    Patient Active Problem List   Diagnosis   • Chronic pelvic pain in female   • Endometriosis   • Healthcare maintenance   • Chronic arthralgias of knees and hips   • Fatigue   • Anxiety   • Vitamin D deficiency   • Thyroid nodule   • Candidiasis of breast   • Dyspareunia in female   • Chronic bilateral low back pain with bilateral sciatica   • Anxiety and depression   • Alternating constipation and diarrhea   • Endometriosis determined by laparoscopy   • Morbid obesity ([address])        Past Medical History:   Diagnosis Date   • Anxiety    • Chicken pox    • Depression    • Endometriosis    • GERD (gastroesophageal reflux disease)    • Headache     Occasional    • HSV-1 infection    • IBS (irritable bowel syndrome)    • Kidney stone    • Kidney stone on left side    • Multiple thyroid nodules    • Obesity    • Renal calculi        Past Surgical History:   Procedure Laterality Date   • APPENDECTOMY  2021   •  SECTION      x2   • HYSTERECTOMY      robotic total laparoscopic hysterectomy with BS   • LAPAROSCOPIC ENDOMETRIOSIS FULGURATION  2018    laparoscopic excision of endometriosis, fulguration of endometriosis, lysis of adhesions   • LAPAROSCOPY  2014 with I&D    • MOLE REMOVAL      face - benign    • SKIN CANCER EXCISION      abdomen   • THYROID CYST EXCISION     • TONSILLECTOMY     • TUBAL LIGATION  02/15/2016    With lysis of adhesion and peritoneal biopsy   • US GUIDED INJECTION FOR RESEARCH STUDY  05/30/2015   • WISDOM TOOTH EXTRACTION         Family History   Problem Relation Age of Onset   • Coronary artery disease Mother    • Varicose Veins Mother    • Other Mother         breast cyst - removed    • Cholelithiasis Mother         had cholecystectomy   • Alcohol abuse Father    • Cholelithiasis Maternal Grandmother         had cholecystectomy   • Uterine cancer Paternal Grandmother    • Breast cancer Family         Before age 52    • Uterine cancer Family    • Obesity Family    • Hypertension Family    • Brain cancer Family    • Gallbladder disease Family        Social History     Tobacco Use   • Smoking status: Never   • Smokeless tobacco: Never   Vaping Use   • Vaping Use: Never used   Substance Use Topics   • Alcohol use: Not Currently     Comment: rarely   • Drug use: No       Objective:    Vitals:    05/08/23 1507   BP: 124/74   BP Location: Left arm   Patient Position: Sitting   Cuff Size: Large   Pulse: 74   Temp: 98 °F (36.7 °C)   TempSrc: Temporal   SpO2: 99%   Weight: 112 kg (248 lb)   Height: 5' 4" (1.626 m)        Physical Exam  Constitutional:       Appearance: She is well-developed. She is obese. HENT:      Right Ear: External ear normal.   Eyes:      Conjunctiva/sclera: Conjunctivae normal.      Pupils: Pupils are equal, round, and reactive to light. Neck:      Thyroid: No thyromegaly. Vascular: No JVD. Cardiovascular:      Rate and Rhythm: Normal rate and regular rhythm. Heart sounds: Normal heart sounds. Pulmonary:      Breath sounds: Normal breath sounds. Abdominal:      General: Bowel sounds are normal.      Palpations: Abdomen is soft. Tenderness:  There is abdominal tenderness in the right lower quadrant, suprapubic area and left lower quadrant. Musculoskeletal:         General: Normal range of motion. Cervical back: Normal range of motion. Lymphadenopathy:      Cervical: No cervical adenopathy. Skin:     General: Skin is dry. Neurological:      Mental Status: She is alert and oriented to person, place, and time. Deep Tendon Reflexes: Reflexes are normal and symmetric. Psychiatric:         Behavior: Behavior normal.           Preop labs/testing available and reviewed: yes    eGFR   Date Value Ref Range Status   2023 102 ml/min/1.73sq m Final     WBC   Date Value Ref Range Status   2023 5.08 4.31 - 10.16 Thousand/uL Final     INR   Date Value Ref Range Status   2023 1.02 0.84 - 1.19 Final       EKG yes    Echo no    Stress test/cath no    PFT/[place] no    Functional capacity: Walking , 4-5 MPH               4 Mets   Pick the highest level patient can comfortably perform   4 mets or greater for surgery    RCRI  High Risk surgery? 1 Point  CAD History:         1 Point   MI; Positive Stress Test; CP due to Mi;  Nitrate Usage to control Angina; Pathologic Q wave on EKG  CHF Active:         1 Point   Pulm Edema; Paroxysmal Nocturnal Dyspnea;  Bibasilar Rales (crackles);S3; CHF on CXR  Cerebrovascular Disease (TIA or CVA):     1 Point  DM on Insulin:        1 Point  Serum Creat >2.0 mg/dl:       1 Point          Total Points: 0     Scorin: Class I, Very Low Risk (0.4%)     1: Class II, Low risk (0.9%)     2: Class III Moderate (6.6%)     3: Class IV High (>11%)      MEET Risk:  GFR:   eGFR   Date Value Ref Range Status   2023 102 ml/min/1.73sq m Final         For PCP:  If GFR>60, Hold ACE/ARB/Diuretic on the day of surgery, and NSAIDS 10 days before. If GFR<45, Consider PRE and POST op Nephrology Consult. If 46 <GFR> 59 : Has Patient had MEET in last 6 Months? no   If YES: Preop Nephrology consult   If No:  [id number] [address] Nephrology consult. Assessment/Plan:    Patient is medically optimized (cleared) for the planned procedure. Further testing/evaluation is not required. Postop concerns: no    Problem List Items Addressed This Visit    None  Visit Diagnoses     Pre-op testing    -  Primary           Diagnoses and all orders for this visit:    Pre-op testing    Chronic pelvic pain in female    Morbid obesity ([address])    Anxiety    Endometriosis determined by laparoscopy    Other orders  -     neomycin (MYCIFRADIN) 500 mg tablet; TAKE 2 TABLETS BY MOUTH THREE TIMES DAILY FOR 3 DOSES (TAKE AT 1 PM, 4 PM, AND 9 PM THE DAY BEFORE PROCEDURE)        Pre-Surgery Instructions:   Medication Instructions   • methocarbamol (ROBAXIN) 750 mg tablet per anesthesia guidelines    • metroNIDAZOLE (FLAGYL) 500 mg tablet per anesthesia guidelines    • neomycin (MYCIFRADIN) 500 mg tablet per anesthesia guidelines    • ondansetron (ZOFRAN) 4 mg tablet per anesthesia guidelines    • oxyCODONE-acetaminophen (Percocet) 5-325 mg per tablet per anesthesia guidelines    • polyethylene glycol (MiraLax) 17 GM/SCOOP powder per anesthesia guidelines         NOTE: Please use the above to review important meds for your specialty, the remainder "per anesthesia Guidelines."    NOTE: Please place an Inbasket message for "[facility]" pool for complicated patients.

## 2023-05-10 DIAGNOSIS — G89.29 CHRONIC PELVIC PAIN IN FEMALE: ICD-10-CM

## 2023-05-10 DIAGNOSIS — R10.2 CHRONIC PELVIC PAIN IN FEMALE: ICD-10-CM

## 2023-05-10 DIAGNOSIS — N80.9 ENDOMETRIOSIS: ICD-10-CM

## 2023-05-11 RX ORDER — OXYCODONE HYDROCHLORIDE AND ACETAMINOPHEN 5; 325 MG/1; MG/1
1 TABLET ORAL EVERY 8 HOURS PRN
Qty: 30 TABLET | Refills: 0 | Status: SHIPPED | OUTPATIENT
Start: 2023-05-11

## 2023-05-16 ENCOUNTER — ANESTHESIA EVENT (OUTPATIENT)
Dept: PERIOP | Facility: HOSPITAL | Age: 33
End: 2023-05-16

## 2023-05-16 NOTE — PRE-PROCEDURE INSTRUCTIONS
Pre-Surgery Instructions:   Medication Instructions   • methocarbamol (ROBAXIN) 750 mg tablet Uses PRN- OK to take day of surgery   • oxyCODONE-acetaminophen (Percocet) 5-325 mg per tablet Uses PRN- OK to take day of surgery   Pre procedure instructions reviewed verbalizes understanding  NPO after MN  Bathing reviewed  Morning meds with water  No NSAIDS  Follow instructions for bowel prep and anti biotics pre procedure

## 2023-05-18 ENCOUNTER — ANESTHESIA (OUTPATIENT)
Dept: PERIOP | Facility: HOSPITAL | Age: 33
End: 2023-05-18

## 2023-05-18 ENCOUNTER — HOSPITAL ENCOUNTER (OUTPATIENT)
Facility: HOSPITAL | Age: 33
Setting detail: OUTPATIENT SURGERY
Discharge: HOME | End: 2023-05-18
Attending: OBSTETRICS & GYNECOLOGY | Admitting: OBSTETRICS & GYNECOLOGY

## 2023-05-18 VITALS
HEART RATE: 85 BPM | RESPIRATION RATE: 16 BRPM | DIASTOLIC BLOOD PRESSURE: 79 MMHG | SYSTOLIC BLOOD PRESSURE: 125 MMHG | OXYGEN SATURATION: 97 % | TEMPERATURE: 98.4 F

## 2023-05-18 DIAGNOSIS — G89.18 POST-OP PAIN: Primary | ICD-10-CM

## 2023-05-18 DIAGNOSIS — R10.2 CHRONIC PELVIC PAIN IN FEMALE: ICD-10-CM

## 2023-05-18 DIAGNOSIS — N80.9 ENDOMETRIOSIS: ICD-10-CM

## 2023-05-18 DIAGNOSIS — G89.29 CHRONIC PELVIC PAIN IN FEMALE: ICD-10-CM

## 2023-05-18 RX ORDER — FENTANYL CITRATE 50 UG/ML
INJECTION, SOLUTION INTRAMUSCULAR; INTRAVENOUS AS NEEDED
Status: DISCONTINUED | OUTPATIENT
Start: 2023-05-18 | End: 2023-05-18

## 2023-05-18 RX ORDER — CEFAZOLIN SODIUM 2 G/50ML
SOLUTION INTRAVENOUS AS NEEDED
Status: DISCONTINUED | OUTPATIENT
Start: 2023-05-18 | End: 2023-05-18

## 2023-05-18 RX ORDER — SODIUM CHLORIDE, SODIUM LACTATE, POTASSIUM CHLORIDE, CALCIUM CHLORIDE 600; 310; 30; 20 MG/100ML; MG/100ML; MG/100ML; MG/100ML
125 INJECTION, SOLUTION INTRAVENOUS CONTINUOUS
Status: DISCONTINUED | OUTPATIENT
Start: 2023-05-18 | End: 2023-05-18 | Stop reason: HOSPADM

## 2023-05-18 RX ORDER — ACETAMINOPHEN 325 MG/1
650 TABLET ORAL EVERY 6 HOURS PRN
Status: DISCONTINUED | OUTPATIENT
Start: 2023-05-18 | End: 2023-05-18 | Stop reason: HOSPADM

## 2023-05-18 RX ORDER — OXYCODONE HYDROCHLORIDE 5 MG/1
5 TABLET ORAL EVERY 4 HOURS PRN
Status: DISCONTINUED | OUTPATIENT
Start: 2023-05-18 | End: 2023-05-18 | Stop reason: HOSPADM

## 2023-05-18 RX ORDER — HEPARIN SODIUM 5000 [USP'U]/ML
5000 INJECTION, SOLUTION INTRAVENOUS; SUBCUTANEOUS
Status: COMPLETED | OUTPATIENT
Start: 2023-05-18 | End: 2023-05-18

## 2023-05-18 RX ORDER — ONDANSETRON 2 MG/ML
4 INJECTION INTRAMUSCULAR; INTRAVENOUS EVERY 6 HOURS PRN
Status: DISCONTINUED | OUTPATIENT
Start: 2023-05-18 | End: 2023-05-18 | Stop reason: HOSPADM

## 2023-05-18 RX ORDER — ROCURONIUM BROMIDE 10 MG/ML
INJECTION, SOLUTION INTRAVENOUS AS NEEDED
Status: DISCONTINUED | OUTPATIENT
Start: 2023-05-18 | End: 2023-05-18

## 2023-05-18 RX ORDER — HYDROMORPHONE HYDROCHLORIDE 1 MG/ML
INJECTION, SOLUTION INTRAMUSCULAR; INTRAVENOUS; SUBCUTANEOUS AS NEEDED
Status: DISCONTINUED | OUTPATIENT
Start: 2023-05-18 | End: 2023-05-18

## 2023-05-18 RX ORDER — SENNOSIDES 8.6 MG
650 CAPSULE ORAL EVERY 8 HOURS PRN
Qty: 30 TABLET | Refills: 0 | Status: ON HOLD
Start: 2023-05-18

## 2023-05-18 RX ORDER — METRONIDAZOLE 500 MG/100ML
500 INJECTION, SOLUTION INTRAVENOUS EVERY 8 HOURS
Status: DISCONTINUED | OUTPATIENT
Start: 2023-05-18 | End: 2023-05-18 | Stop reason: HOSPADM

## 2023-05-18 RX ORDER — GLYCOPYRROLATE 0.2 MG/ML
INJECTION INTRAMUSCULAR; INTRAVENOUS AS NEEDED
Status: DISCONTINUED | OUTPATIENT
Start: 2023-05-18 | End: 2023-05-18

## 2023-05-18 RX ORDER — OXYCODONE HYDROCHLORIDE 5 MG/1
5 TABLET ORAL EVERY 6 HOURS PRN
Qty: 15 TABLET | Refills: 0 | Status: SHIPPED | OUTPATIENT
Start: 2023-05-18 | End: 2023-05-22 | Stop reason: SDUPTHER

## 2023-05-18 RX ORDER — DEXAMETHASONE SODIUM PHOSPHATE 10 MG/ML
INJECTION, SOLUTION INTRAMUSCULAR; INTRAVENOUS AS NEEDED
Status: DISCONTINUED | OUTPATIENT
Start: 2023-05-18 | End: 2023-05-18

## 2023-05-18 RX ORDER — ONDANSETRON 2 MG/ML
INJECTION INTRAMUSCULAR; INTRAVENOUS AS NEEDED
Status: DISCONTINUED | OUTPATIENT
Start: 2023-05-18 | End: 2023-05-18

## 2023-05-18 RX ORDER — DEXMEDETOMIDINE HYDROCHLORIDE 100 UG/ML
INJECTION, SOLUTION INTRAVENOUS AS NEEDED
Status: DISCONTINUED | OUTPATIENT
Start: 2023-05-18 | End: 2023-05-18

## 2023-05-18 RX ORDER — HYDROMORPHONE HCL/PF 1 MG/ML
0.5 SYRINGE (ML) INJECTION
Status: COMPLETED | OUTPATIENT
Start: 2023-05-18 | End: 2023-05-18

## 2023-05-18 RX ORDER — SODIUM CHLORIDE 9 MG/ML
INJECTION, SOLUTION INTRAVENOUS CONTINUOUS PRN
Status: DISCONTINUED | OUTPATIENT
Start: 2023-05-18 | End: 2023-05-18

## 2023-05-18 RX ORDER — FENTANYL CITRATE/PF 50 MCG/ML
50 SYRINGE (ML) INJECTION
Status: DISCONTINUED | OUTPATIENT
Start: 2023-05-18 | End: 2023-05-18

## 2023-05-18 RX ORDER — MIDAZOLAM HYDROCHLORIDE 2 MG/2ML
INJECTION, SOLUTION INTRAMUSCULAR; INTRAVENOUS AS NEEDED
Status: DISCONTINUED | OUTPATIENT
Start: 2023-05-18 | End: 2023-05-18

## 2023-05-18 RX ORDER — ALBUMIN, HUMAN INJ 5% 5 %
SOLUTION INTRAVENOUS CONTINUOUS PRN
Status: DISCONTINUED | OUTPATIENT
Start: 2023-05-18 | End: 2023-05-18

## 2023-05-18 RX ORDER — GABAPENTIN 100 MG/1
200 CAPSULE ORAL ONCE
Status: COMPLETED | OUTPATIENT
Start: 2023-05-18 | End: 2023-05-18

## 2023-05-18 RX ORDER — PROPOFOL 10 MG/ML
INJECTION, EMULSION INTRAVENOUS AS NEEDED
Status: DISCONTINUED | OUTPATIENT
Start: 2023-05-18 | End: 2023-05-18

## 2023-05-18 RX ORDER — LIDOCAINE HYDROCHLORIDE 20 MG/ML
INJECTION, SOLUTION EPIDURAL; INFILTRATION; INTRACAUDAL; PERINEURAL AS NEEDED
Status: DISCONTINUED | OUTPATIENT
Start: 2023-05-18 | End: 2023-05-18

## 2023-05-18 RX ORDER — ONDANSETRON 2 MG/ML
4 INJECTION INTRAMUSCULAR; INTRAVENOUS ONCE AS NEEDED
Status: DISCONTINUED | OUTPATIENT
Start: 2023-05-18 | End: 2023-05-18

## 2023-05-18 RX ORDER — ACETAMINOPHEN 325 MG/1
975 TABLET ORAL ONCE
Status: COMPLETED | OUTPATIENT
Start: 2023-05-18 | End: 2023-05-18

## 2023-05-18 RX ORDER — KETOROLAC TROMETHAMINE 30 MG/ML
INJECTION, SOLUTION INTRAMUSCULAR; INTRAVENOUS AS NEEDED
Status: DISCONTINUED | OUTPATIENT
Start: 2023-05-18 | End: 2023-05-18

## 2023-05-18 RX ORDER — BUPIVACAINE HYDROCHLORIDE 2.5 MG/ML
INJECTION, SOLUTION EPIDURAL; INFILTRATION; INTRACAUDAL AS NEEDED
Status: DISCONTINUED | OUTPATIENT
Start: 2023-05-18 | End: 2023-05-18 | Stop reason: HOSPADM

## 2023-05-18 RX ORDER — PHENYLEPHRINE HCL IN 0.9% NACL 1 MG/10 ML
SYRINGE (ML) INTRAVENOUS AS NEEDED
Status: DISCONTINUED | OUTPATIENT
Start: 2023-05-18 | End: 2023-05-18

## 2023-05-18 RX ORDER — PROMETHAZINE HYDROCHLORIDE 25 MG/ML
25 INJECTION, SOLUTION INTRAMUSCULAR; INTRAVENOUS ONCE AS NEEDED
Status: DISCONTINUED | OUTPATIENT
Start: 2023-05-18 | End: 2023-05-18

## 2023-05-18 RX ORDER — CEFAZOLIN SODIUM 2 G/50ML
2000 SOLUTION INTRAVENOUS ONCE
Status: DISCONTINUED | OUTPATIENT
Start: 2023-05-18 | End: 2023-05-18 | Stop reason: HOSPADM

## 2023-05-18 RX ORDER — LIDOCAINE HYDROCHLORIDE 10 MG/ML
INJECTION, SOLUTION EPIDURAL; INFILTRATION; INTRACAUDAL; PERINEURAL
Status: DISCONTINUED
Start: 2023-05-18 | End: 2023-05-18 | Stop reason: HOSPADM

## 2023-05-18 RX ORDER — IBUPROFEN 600 MG/1
600 TABLET ORAL EVERY 6 HOURS PRN
Status: DISCONTINUED | OUTPATIENT
Start: 2023-05-18 | End: 2023-05-18 | Stop reason: HOSPADM

## 2023-05-18 RX ORDER — EPHEDRINE SULFATE 50 MG/ML
INJECTION INTRAVENOUS AS NEEDED
Status: DISCONTINUED | OUTPATIENT
Start: 2023-05-18 | End: 2023-05-18

## 2023-05-18 RX ORDER — IBUPROFEN 600 MG/1
600 TABLET ORAL EVERY 6 HOURS PRN
Qty: 30 TABLET | Refills: 0 | Status: ON HOLD
Start: 2023-05-18

## 2023-05-18 RX ADMIN — FENTANYL CITRATE 50 MCG: 50 INJECTION INTRAMUSCULAR; INTRAVENOUS at 11:09

## 2023-05-18 RX ADMIN — HYDROMORPHONE HYDROCHLORIDE 1 MG: 1 INJECTION, SOLUTION INTRAMUSCULAR; INTRAVENOUS; SUBCUTANEOUS at 08:35

## 2023-05-18 RX ADMIN — HYDROMORPHONE HYDROCHLORIDE 0.5 MG: 1 INJECTION, SOLUTION INTRAMUSCULAR; INTRAVENOUS; SUBCUTANEOUS at 11:20

## 2023-05-18 RX ADMIN — SODIUM CHLORIDE: 0.9 INJECTION, SOLUTION INTRAVENOUS at 08:15

## 2023-05-18 RX ADMIN — ROCURONIUM BROMIDE 50 MG: 10 INJECTION, SOLUTION INTRAVENOUS at 07:58

## 2023-05-18 RX ADMIN — ROCURONIUM BROMIDE 20 MG: 10 INJECTION, SOLUTION INTRAVENOUS at 08:33

## 2023-05-18 RX ADMIN — ONDANSETRON 4 MG: 2 INJECTION INTRAMUSCULAR; INTRAVENOUS at 10:29

## 2023-05-18 RX ADMIN — ROCURONIUM BROMIDE 10 MG: 10 INJECTION, SOLUTION INTRAVENOUS at 10:01

## 2023-05-18 RX ADMIN — LIDOCAINE HYDROCHLORIDE 100 MG: 20 INJECTION, SOLUTION EPIDURAL; INFILTRATION; INTRACAUDAL; PERINEURAL at 07:58

## 2023-05-18 RX ADMIN — METRONIDAZOLE: 500 INJECTION, SOLUTION INTRAVENOUS at 08:15

## 2023-05-18 RX ADMIN — HYDROMORPHONE HYDROCHLORIDE 0.5 MG: 1 INJECTION, SOLUTION INTRAMUSCULAR; INTRAVENOUS; SUBCUTANEOUS at 10:29

## 2023-05-18 RX ADMIN — Medication 100 MCG: at 08:27

## 2023-05-18 RX ADMIN — GABAPENTIN 200 MG: 100 CAPSULE ORAL at 06:51

## 2023-05-18 RX ADMIN — DEXAMETHASONE SODIUM PHOSPHATE 10 MG: 10 INJECTION, SOLUTION INTRAMUSCULAR; INTRAVENOUS at 07:58

## 2023-05-18 RX ADMIN — FENTANYL CITRATE 100 MCG: 50 INJECTION INTRAMUSCULAR; INTRAVENOUS at 07:58

## 2023-05-18 RX ADMIN — PROPOFOL 100 MG: 10 INJECTION, EMULSION INTRAVENOUS at 08:01

## 2023-05-18 RX ADMIN — HEPARIN SODIUM 5000 UNITS: 5000 INJECTION INTRAVENOUS; SUBCUTANEOUS at 07:12

## 2023-05-18 RX ADMIN — SODIUM CHLORIDE, SODIUM LACTATE, POTASSIUM CHLORIDE, AND CALCIUM CHLORIDE: .6; .31; .03; .02 INJECTION, SOLUTION INTRAVENOUS at 07:19

## 2023-05-18 RX ADMIN — SUGAMMADEX 200 MG: 100 INJECTION, SOLUTION INTRAVENOUS at 10:33

## 2023-05-18 RX ADMIN — FENTANYL CITRATE 50 MCG: 50 INJECTION INTRAMUSCULAR; INTRAVENOUS at 10:58

## 2023-05-18 RX ADMIN — ACETAMINOPHEN 975 MG: 325 TABLET ORAL at 06:51

## 2023-05-18 RX ADMIN — ALBUMIN (HUMAN): 12.5 INJECTION, SOLUTION INTRAVENOUS at 09:40

## 2023-05-18 RX ADMIN — HYDROMORPHONE HYDROCHLORIDE 0.5 MG: 1 INJECTION, SOLUTION INTRAMUSCULAR; INTRAVENOUS; SUBCUTANEOUS at 11:46

## 2023-05-18 RX ADMIN — ROCURONIUM BROMIDE 20 MG: 10 INJECTION, SOLUTION INTRAVENOUS at 09:19

## 2023-05-18 RX ADMIN — MIDAZOLAM 2 MG: 1 INJECTION INTRAMUSCULAR; INTRAVENOUS at 07:50

## 2023-05-18 RX ADMIN — KETOROLAC TROMETHAMINE 15 MG: 30 INJECTION, SOLUTION INTRAMUSCULAR; INTRAVENOUS at 10:29

## 2023-05-18 RX ADMIN — GLYCOPYRROLATE 0.2 MG: 0.2 INJECTION, SOLUTION INTRAMUSCULAR; INTRAVENOUS at 08:30

## 2023-05-18 RX ADMIN — ACETAMINOPHEN 650 MG: 325 TABLET ORAL at 12:29

## 2023-05-18 RX ADMIN — DEXMEDETOMIDINE HYDROCHLORIDE 16 MCG: 100 INJECTION, SOLUTION INTRAVENOUS at 10:27

## 2023-05-18 RX ADMIN — DEXMEDETOMIDINE HYDROCHLORIDE 12 MCG: 100 INJECTION, SOLUTION INTRAVENOUS at 09:48

## 2023-05-18 RX ADMIN — PROPOFOL 100 MG: 10 INJECTION, EMULSION INTRAVENOUS at 08:03

## 2023-05-18 RX ADMIN — IBUPROFEN 600 MG: 600 TABLET, FILM COATED ORAL at 14:37

## 2023-05-18 RX ADMIN — OXYCODONE HYDROCHLORIDE 5 MG: 5 TABLET ORAL at 12:27

## 2023-05-18 RX ADMIN — CEFAZOLIN SODIUM 2000 MG: 2 SOLUTION INTRAVENOUS at 08:09

## 2023-05-18 RX ADMIN — EPHEDRINE SULFATE 5 MG: 50 INJECTION INTRAVENOUS at 08:28

## 2023-05-18 RX ADMIN — PROPOFOL 200 MG: 10 INJECTION, EMULSION INTRAVENOUS at 07:58

## 2023-05-18 NOTE — ANESTHESIA POSTPROCEDURE EVALUATION
Post-Op Assessment Note    CV Status:  Stable  Pain Score: 0    Pain management: adequate     Mental Status:  Sleepy   Hydration Status:  Stable and euvolemic   PONV Controlled:  None   Airway Patency:  Patent and adequate   Two or more mitigation strategies used for obstructive sleep apnea   Post Op Vitals Reviewed: Yes      Staff: CRNA         No notable events documented      /66 (05/18/23 1048)    Temp 99 8 °F (37 7 °C) (05/18/23 1048)    Pulse 79 (05/18/23 1048)   Resp 18 (05/18/23 1048)    SpO2 100 % (05/18/23 1048)

## 2023-05-18 NOTE — H&P
Assessment/Plan:    82-year-old para 2 with morbid obesity, BMI 42 kg per metered squared, longstanding endometriosis, chronic pelvic pain  She has a history of 2 previous  sections, 3 previous laparoscopies, fulguration of endometriosis, robotic assisted total laparoscopic hysterectomy in 2016  She has been on Lupron, [place], hormonal suppression  She has documented pelvic adhesive disease  Her performance status is 0   1   Plan for robotic assisted bilateral oophorectomy, possible exploratory laparotomy, possible colectomy and all other indicated procedures  CHIEF COMPLAINT: Here for surgery        Patient ID: Zev Guillermo is a 29 y o  female  Who presents for surgery  There is been no interval change in medications or medical history since her last visit to the office  She has no complaints today  Review of Systems   Constitutional: Negative for activity change and unexpected weight change  HENT: Negative  Eyes: Negative  Respiratory: Negative  Cardiovascular: Negative  Gastrointestinal: Negative for abdominal distention and abdominal pain  Endocrine: Negative  Genitourinary: Negative for pelvic pain and vaginal bleeding  Musculoskeletal: Negative  Skin: Negative  Allergic/Immunologic: Negative  Neurological: Negative  Hematological: Negative  Psychiatric/Behavioral: Negative          Current Facility-Administered Medications   Medication Dose Route Frequency Provider Last Rate Last Admin   • acetaminophen (TYLENOL) tablet 975 mg  975 mg Oral Once Mark Edgar MD       • ceFAZolin (ANCEF) IVPB (premix in dextrose) 2,000 mg 50 mL  2,000 mg Intravenous Once Mark Edgar MD       • gabapentin (NEURONTIN) capsule 200 mg  200 mg Oral Once Mark Edgar MD       • heparin (porcine) subcutaneous injection 5,000 Units  5,000 Units Subcutaneous On Call To [id number] [place] [address], MD       • lactated ringers infusion 125 mL/hr Intravenous Continuous Mark Edgar MD       • metroNIDAZOLE (FLAGYL) IVPB (premix) 500 mg 100 mL  500 mg Intravenous Q8H Mark Edgar MD           Allergies   Allergen Reactions   • Reglan [Metoclopramide] Other (See Comments)     Elevated heartrate   • Other Blisters     Dermabond       Past Medical History:   Diagnosis Date   • Anxiety    • Chicken pox    • Depression    • Endometriosis    • GERD (gastroesophageal reflux disease)    • Headache     Occasional    • HSV-1 infection    • IBS (irritable bowel syndrome)    • Kidney stone    • Kidney stone on left side    • Multiple thyroid nodules    • Obesity    • Renal calculi        Past Surgical History:   Procedure Laterality Date   • APPENDECTOMY  2021   •  SECTION      x2   • HYSTERECTOMY      robotic total laparoscopic hysterectomy with BS   • LAPAROSCOPIC ENDOMETRIOSIS FULGURATION  2018    laparoscopic excision of endometriosis, fulguration of endometriosis, lysis of adhesions   • LAPAROSCOPY  2014    with I&D    • MOLE REMOVAL      face - benign    • SKIN CANCER EXCISION      abdomen   • THYROID CYST EXCISION     • TONSILLECTOMY     • TUBAL LIGATION  02/15/2016    With lysis of adhesion and peritoneal biopsy   • US GUIDED INJECTION FOR RESEARCH STUDY  2015   • WISDOM TOOTH EXTRACTION         OB History        2    Para   2    Term   2            AB        Living   2       SAB        IAB        Ectopic        Multiple        Live Births                     Family History   Problem Relation Age of Onset   • Coronary artery disease Mother    • Varicose Veins Mother    • Other Mother         breast cyst - removed    • Cholelithiasis Mother         had cholecystectomy   • Alcohol abuse Father    • Cholelithiasis Maternal Grandmother         had cholecystectomy   • Uterine cancer Paternal Grandmother    • Breast cancer Family         Before age 52    • Uterine cancer Family    • Obesity Family    • Hypertension Family    • Brain cancer Family    • Gallbladder disease Family        The following portions of the patient's history were reviewed and updated as appropriate: allergies, current medications, past family history, past medical history, past social history, past surgical history and problem list       Objective:    Blood pressure 139/72, pulse 65, temperature 99 8 °F (37 7 °C), temperature source Tympanic, resp  rate 16, SpO2 100 %  There is no height or weight on file to calculate BMI  Physical Exam  Vitals reviewed  Constitutional:       General: She is not in acute distress  Appearance: Normal appearance  She is not ill-appearing  HENT:      Head: Normocephalic and atraumatic  Mouth/Throat:      Mouth: Mucous membranes are moist    Eyes:      General: No scleral icterus  Right eye: No discharge  Left eye: No discharge  Conjunctiva/sclera: Conjunctivae normal    Cardiovascular:      Rate and Rhythm: Normal rate and regular rhythm  Heart sounds: Normal heart sounds  Pulmonary:      Effort: Pulmonary effort is normal       Breath sounds: Normal breath sounds  Abdominal:      Palpations: Abdomen is soft  Musculoskeletal:      Right lower leg: No edema  Left lower leg: No edema  Skin:     General: Skin is warm and dry  Coloration: Skin is not jaundiced  Findings: No rash  Neurological:      General: No focal deficit present  Mental Status: She is alert and oriented to person, place, and time  Cranial Nerves: No cranial nerve deficit  Motor: No weakness  Gait: Gait normal    Psychiatric:         Mood and Affect: Mood normal          Behavior: Behavior normal          Thought Content:  Thought content normal          Judgment: Judgment normal            Lab Results   Component Value Date     17 3 04/27/2023     Lab Results   Component Value Date    WBC 5 08 04/27/2023    HGB 11 5 04/27/2023    HCT 35 5 04/27/2023    MCV 90 04/27/2023     04/27/2023     Lab Results   Component Value Date     02/26/2014    K 3 8 04/27/2023     (H) 04/27/2023    CO2 27 04/27/2023    ANIONGAP 7 02/26/2014    BUN 13 04/27/2023    CREATININE 0 78 04/27/2023    GLUCOSE 97 02/26/2014    GLUF 88 04/27/2023    CALCIUM 9 1 04/27/2023    CORRECTEDCA 9 2 07/07/2021    AST 18 04/27/2023    ALT 27 04/27/2023    ALKPHOS 44 (L) 04/27/2023    EGFR 102 04/27/2023        Trend:  Lab Results   Component Value Date     17 3 04/27/2023

## 2023-05-18 NOTE — DISCHARGE INSTRUCTIONS
Laparoscopic Oophorectomy   WHAT YOU NEED TO KNOW:   A laparoscopic oophorectomy is surgery to remove one or both of your ovaries  Your surgeon will use a laparoscope (a thin tube with a light and tiny video camera on the end) and small tools to remove your ovaries  He or she may use a robot (machine) that has mechanical arms to operate the tools  This is called a robotic-assisted laparoscopic oophorectomy  DISCHARGE INSTRUCTIONS:   Call your local emergency number (911 in the [address] [facility]) for any of the following: You feel lightheaded, short of breath, and have chest pain  You cough up blood  Seek care immediately if:   Your arm or leg feels warm, tender, and painful  It may look swollen and red  Blood soaks through your bandage  Your stitches come apart  Call your doctor if:   You have a fever or chills  Your wound is red, swollen, or draining pus  You have pus or a foul-smelling odor coming from your vagina  You have nausea or are vomiting  You have trouble urinating or having a bowel movement  You have questions or concerns about your condition or care  Medicines: You may need any of the following:  NSAIDs , such as ibuprofen, help decrease swelling, pain, and fever  NSAIDs can cause stomach bleeding or kidney problems in certain people  If you take blood thinner medicine, always ask your healthcare provider if NSAIDs are safe for you  Always read the medicine label and follow directions  Prescription pain medicine  may be given  Ask your healthcare provider how to take this medicine safely  Some prescription pain medicines contain acetaminophen  Do not take other medicines that contain acetaminophen without talking to your healthcare provider  Too much acetaminophen may cause liver damage  Prescription pain medicine may cause constipation  Ask your healthcare provider how to prevent or treat constipation  Hormone medicine  may be given if both ovaries are removed   This medicine will replace hormones in your body that your ovaries produced  You may not be started on this medicine for until 2 to 3 months after surgery  Take your medicine as directed  Contact your healthcare provider if you think your medicine is not helping or if you have side effects  Tell your provider if you are allergic to any medicine  Keep a list of the medicines, vitamins, and herbs you take  Include the amounts, and when and why you take them  Bring the list or the pill bottles to follow-up visits  Carry your medicine list with you in case of an emergency  Care for your surgery area as directed:  When you are allowed to bathe, carefully wash around the area with soap and water  Gently dry the area and put on new, clean bandages as directed  Change your bandages when they get wet or dirty  If you have strips of medical tape, let them fall off on their own  Activity:  Ask your healthcare provider when you can return to your normal activities  Do not have sex, douche, or use tampons until your healthcare provider says it is okay  These actions may cause an infection  Do not exercise or lift anything heavy until your healthcare provider says it is okay  This may put too much stress on your incision  Ask for help: This surgery may be life-changing for you and your family  You will no longer be able to get pregnant if both of your ovaries are removed  Sudden changes in the levels of your hormones may occur and cause mood swings and depression  You may feel angry, sad, or frightened, or cry frequently and unexpectedly  These feelings are normal  Talk to your healthcare provider about where you can get support  Follow up with your doctor as directed:  Write down your questions so you remember to ask them during your visits  © Copyright [employer] 2022 Information is for End User's use only and may not be sold, redistributed or otherwise used for commercial purposes  The above information is an  only   It is not intended as medical advice for individual conditions or treatments  Talk to your doctor, nurse or pharmacist before following any medical regimen to see if it is safe and effective for you

## 2023-05-18 NOTE — ANESTHESIA PREPROCEDURE EVALUATION
Medical History  History Comments   Endometriosis    Kidney stone on left side    Anxiety    GERD (gastroesophageal reflux disease)    IBS (irritable bowel syndrome)    Headache Occasional    Obesity    Chicken pox    HSV-1 infection    Multiple thyroid nodules    Renal calculi    Depression    Kidney stone      Procedure:  SALPINGO-OOPHORECTOMY, LAPAROSCOPIC W/ROBOTICS, POSSIBLE BOWEL RESECTION AND ALL INDICATED PROCEDURES (Bilateral: Pelvis)  LAPAROTOMY EXPLORATORY W/ ROBOTICS (Abdomen)  LAPAROTOMY EXPLORATORY W/ BOWEL RESECTION (Abdomen)    Relevant Problems   ANESTHESIA (within normal limits)      MUSCULOSKELETAL   (+) Chronic bilateral low back pain with bilateral sciatica      NEURO/PSYCH   (+) Anxiety   (+) Anxiety and depression   (+) Chronic bilateral low back pain with bilateral sciatica   (+) Chronic pelvic pain in female        Physical Exam    Airway    Mallampati score: III  TM Distance: >3 FB  Neck ROM: full     Dental   No notable dental hx     Cardiovascular  Rate: normal,     Pulmonary  Pulmonary exam normal     Other Findings        Anesthesia Plan  ASA Score- 3     Anesthesia Type- general with ASA Monitors  Additional Monitors:   Airway Plan: ETT  Plan Factors-Exercise tolerance (METS): >4 METS  Chart reviewed  Patient summary reviewed  Patient is not a current smoker  Induction- intravenous  Postoperative Plan- Plan for postoperative opioid use  Informed Consent- Anesthetic plan and risks discussed with patient  I personally reviewed this patient with the CRNA  Discussed and agreed on the Anesthesia Plan with the CRNA  Sabrina Alcantara

## 2023-05-18 NOTE — OP NOTE
OPERATIVE REPORT  PATIENT NAME: Artemio Govea    :  1990  MRN: [id number]  Pt Location: [facility] OR ROOM 15    SURGERY DATE: 2023    Surgeon(s) and Role:     * Scott Gaviria MD - Primary     * [id number] [address] - [address], MD - Assisting    Preop Diagnosis:  Endometriosis [N80 9]  Chronic pelvic pain in female [R10 2, G89 29]    Post-Op Diagnosis Codes:     * Endometriosis [N80 9]     * Chronic pelvic pain in female [R10 2, G89 29]    Procedure(s):  Bilateral - SALPINGO-OOPHORECTOMY  LAPAROSCOPIC W/ROBOTICS  RADICAL RESECTION PELVIC ENDOMETRIOSIS  CYSTOSCOPY  VAGINECTOMY  PARTIAL  PROCTOSCOPY    Specimen(s):  ID Type Source Tests Collected by Time Destination   1 : LEFT MESORECAL ENDOMETRIOSIS  Tissue Other TISSUE EXAM Scott Gaviria MD 2023  9:21 AM    2 : LEFT Gutter Endometriosis Tissue Peritoneum TISSUE EXAM Scott Gaviria MD 2023  9:32 AM    3 : RIGHT Parametrial Endometriosis Tissue Soft Tissue, Other TISSUE EXAM Scott Gaviria MD 2023  9:39 AM    4 : Bladder Serosa Endometriosis Tissue Urinary Bladder TISSUE EXAM Scott Gaviria MD 2023  9:41 AM    5 : RIGHT Gutter Endometriosis Tissue Peritoneum TISSUE EXAM Scott Gaviria MD 2023  9:43 AM    6 : Bilat  Ovaries, and Partial Vaginectomy Tissue Ovary, Right TISSUE EXAM Scott Gaviria MD 2023  9:51 AM    7 : right pelvic side wall endometriosis  Tissue Pelvic TISSUE EXAM Scott Gaviria MD 2023 10:11 AM        Estimated Blood Loss:   25 mL    Drains:  [REMOVED] Urethral Catheter Non-latex;Straight-tip 16 Fr   (Removed)   Number of days: 0       Anesthesia Type:   General    Operative Indications:  Endometriosis [N80 9]  Chronic pelvic pain in female [R10 2, ]  42-year-old with known pelvic endometriosis, multiple prior treatments with hormonal and surgical, multiple prior surgeries who has chronic and severe pelvic pain presents for definitive operative management  Operative Findings:  1  Exam under anesthesia revealed a thickened anterior vaginal apex  There were no palpable adnexal masses  2   On laparoscopy, there was no evidence of upper abdominal disease  The cul-de-sac was obliterated  The rectum was attached to the bladder serosa  There was obliteration of the bilateral mesorectal spaces  There was a cyst present in the left ovary measuring approximately 3 cm  The right ovary had a 2 cm cyst   The left ovary was adherent to the bladder serosa, left pelvic sidewall  The right ovary was adherent to the bladder serosa  There was endometriosis present on the vaginal apex, bilateral parametria, bilateral pelvic sidewalls, bladder serosa, bilateral gutters  Extensive adhesiolysis was necessary to remove the pelvic endometriosis  The vaginal endometriosis measured approximately 3 x 1 x 1 cm and involve the anterior vaginal wall nearly to the mucosa  Approximately 1 cm vagina was removed circumferentially including the vaginal endometriosis  The left parametrial endometriosis measured approximately 4 x 8 cm  Right parametrial endometriosis measuring approximately 3 x 3 cm  There was left mesorectal endometriosis that measured approximately 1 5 x 1 5 cm  The bladder serosa endometriosis was small clear lesions measuring approximately 2 mm each  There were 3 of these lesions  The bilateral paracolic gutter endometriosis was also small measuring approximately 2 mm each  3   Rigid proctoscopy with air insufflation into the distal rectosigmoid colon did not reveal any evidence of air leak after the extensive dissection  4   Cystoscopy did not reveal any evidence of bladder injury  There were bilateral ureteral jets        Complications:   None    Procedure and Technique:  After informed consent was obtained, the patient was taken to the operating room where general endotracheal anesthesia was administered without incident  She was then prepped and draped in the normal sterile fashion in the low dorsolithotomy position  Examination under anesthesia was then performed with findings noted as above  A Felipe catheter was inserted  Tension was then turned to the abdomen  0 25% Marcaine was used to infiltrate the skin prior to placement of any trocar  The first insertion site was approximately 3 cm superior to the umbilicus in the midline  An 8 mm skin incision was made using an 11 blade scalpel  Through this was passed an 8 mm robotic trocar under direct visualization into the abdominal cavity  The abdomen was then insufflated to 15 mmHg using CO2 gas  Additional robotic trocars then placed under direct visualization  An 8 mm robotic trocar was placed in left upper quadrant and an additional 8 mm trocar was placed in the right upper quadrant  Using these 3 trocars, adhesions from the omentum to the intra-abdominal wall in the left upper quadrant were taken down  This allowed placement of the left lateral 8 mm robotic trocar also under direct visualization  A 5 mm assistant port was placed in the right upper quadrant  A total of 5 trocar sites were utilized  The robot was docked  Attention was first turned to the left hemipelvis  The rectosigmoid colon was released from the pelvic brim  Rectosigmoid colon was then released from the left ovary  The colon was then released from the bladder serosa  In the midline, the adhesions were taken down sharply  The adhesions were taken down across the midline  An EEA sizer was placed in the vagina  The vesicovaginal space was then able to be established  The vesicovaginal space was somewhat obliterated and scarred  Once the bladder was able to be taken down the anterior vagina visualized, is clear that was endometriosis present at the vaginal apex    The vagina was then placed on anterior traction and the rectovaginal septum was able to be reestablished  There was extensive endometriosis present in the rectovaginal septum  Attention was then turned to the left pelvic sidewall  The retroperitoneal space was opened  The ureter was able to be identified  The ureter was then skeletonized all the way to the level of the parametria  The ureter was least laterally  This allowed removal of the left pelvic sidewall and left parametrial peritoneum  The left mesorectal endometriotic implant was then removed  The infundibulopelvic ligament was then skeletonized, cauterized and transected  Adhesions from the left ovary to the bladder and pelvic sidewall were lysed and the ovary was freed  The residual utero-ovarian ligament was then cauterized and transected and the left ovary was then placed in the left gutter  There was endometriosis present in the left gutter which was removed  The peritoneal incision measured approximately 2 x 3 cm to remove the implants and left gutter  Tension was then turned to the right side  The retroperitoneal space was opened  The ureter was identified coursing normally within the medial leaf of the broad ligament  Again, an incision was made in the peritoneum just above the ureter all the way down to the level of the parametria  The infundibulopelvic ligament was then identified and skeletonized  The right ovary was freed from adhesions to the right pelvic sidewall and bladder  The residual utero-ovarian ligament was cauterized and transected  The infundibulopelvic ligament was then skeletonized, cauterized and transected  The right ovary was then placed in the left gutter  Additional dissection was necessary to take down the bilateral mesorectum from the bilateral pelvic sidewalls  Once the rectum was completely freed from the vagina and the cul-de-sac was reestablished, a vaginectomy was performed    The vaginal incision measured approximately 3 x 1 5 cm and was circumferential to remove the vaginal apex along with the thickened portion of endometriosis  There were 2 vaginal specimens  Once the vagina was opened and the normal vaginal mucosa was identified, a 10 mm Endo Catch bag was advanced into the pelvis transvaginally and the bilateral ovaries as well as the portion of vaginal endometriosis/upper vagina was placed in the 10 mm Endo Catch bag and removed  The vaginal apex was then reapproximated using a 2-0 STRATAFIX suture that was advanced into the pelvis through the vagina  Once the vagina was closed, the pelvis was irrigated  The pressure in the pelvis was brought down to 5 mmHg  There is a small amount of bleeding noted along the anterior rectum  A rigid proctoscopy was then performed with air insufflation into the distal rectosigmoid colon with pressure applied to the proximal rectum  There is no evidence of air leak identified  Floseal was then applied to the anterior mesorectum which controlled hemostasis  The robot was undocked  The 2-0 STRATAFIX suture was removed laparoscopically  The gas was removed from the abdominal cavity  The ports were removed under direct visualization  The skin was closed at all sites using 4-0 Monocryl and Steri-Strips  The Felipe catheter was removed  The bladder was insufflated with 200 cc of normal saline and the 5 mm laparoscope was then used as a cystoscope with findings noted as above  The Felipe catheter was then replaced to drain the bladder then removed  The vagina was inspected  There was no evidence of bleeding identified  The patient was then awakened and transferred to the recovery room in stable condition  Ostomy counts correct x2 for the procedure  No complications  Estimate blood loss is less than 50 mL  I was present for the entire procedure  and A physician assistant was required during the procedure for retraction, tissue handling, dissection and suturing      Patient Disposition:  PACU         SIGNATURE: Mark Edgar MD  DATE: May 18, 2023  TIME: 10:57 AM

## 2023-05-21 ENCOUNTER — TELEPHONE (OUTPATIENT)
Dept: GYNECOLOGIC ONCOLOGY | Facility: CLINIC | Age: 33
End: 2023-05-21

## 2023-05-21 NOTE — TELEPHONE ENCOUNTER
Patient called in post op stating that she has developed a rash on her chest and abdominal area  Paged on call Dr Teresa Rodríguez via TC

## 2023-05-22 ENCOUNTER — TELEPHONE (OUTPATIENT)
Dept: GYNECOLOGIC ONCOLOGY | Facility: CLINIC | Age: 33
End: 2023-05-22

## 2023-05-22 ENCOUNTER — TELEPHONE (OUTPATIENT)
Dept: HEMATOLOGY ONCOLOGY | Facility: CLINIC | Age: 33
End: 2023-05-22

## 2023-05-22 DIAGNOSIS — G89.29 CHRONIC PELVIC PAIN IN FEMALE: ICD-10-CM

## 2023-05-22 DIAGNOSIS — R10.2 CHRONIC PELVIC PAIN IN FEMALE: ICD-10-CM

## 2023-05-22 DIAGNOSIS — N80.9 ENDOMETRIOSIS: ICD-10-CM

## 2023-05-22 DIAGNOSIS — R10.84 GENERALIZED ABDOMINAL PAIN: ICD-10-CM

## 2023-05-22 DIAGNOSIS — G89.18 POST-OP PAIN: ICD-10-CM

## 2023-05-22 RX ORDER — OXYCODONE HYDROCHLORIDE AND ACETAMINOPHEN 5; 325 MG/1; MG/1
1 TABLET ORAL EVERY 8 HOURS PRN
Qty: 30 TABLET | Refills: 0 | Status: CANCELLED | OUTPATIENT
Start: 2023-05-22

## 2023-05-22 RX ORDER — METHOCARBAMOL 750 MG/1
750 TABLET, FILM COATED ORAL EVERY 6 HOURS PRN
Qty: 60 TABLET | Refills: 0 | Status: ON HOLD | OUTPATIENT
Start: 2023-05-22

## 2023-05-22 RX ORDER — OXYCODONE HYDROCHLORIDE 5 MG/1
5 TABLET ORAL EVERY 6 HOURS PRN
Qty: 5 TABLET | Refills: 0 | Status: SHIPPED | OUTPATIENT
Start: 2023-05-22 | End: 2023-05-23

## 2023-05-22 NOTE — TELEPHONE ENCOUNTER
Now she need to ask her surgeon to give her the prescription for this medication she already have a surgery

## 2023-05-22 NOTE — TELEPHONE ENCOUNTER
"Return call placed  Patient notes rash from breast line down to top of thighs  Explained this is likely from surgical prep  She notes improvement since starting oral benadryl  Will continue to monitor  Additionally, patient is requesting additional pain medication to \"wean off\"  Currently, patient is management pain with oxycodone and tylenol  Encouraged patient to continue to manage pain primarily with tylenol and ibuprofen and use oxycodone as little as possible  Will prescribed 5 additional tablets only     "

## 2023-05-22 NOTE — TELEPHONE ENCOUNTER
----- Message from Julio Ji sent at 5/22/2023  3:31 PM EDT -----  Regarding: FW: Rash  Contact: [phone]    ----- Message -----  From: Nasir Mendez  Sent: 5/22/2023   3:29 PM EDT  To: [facility]  Subject: Rash                                             I’m not sure what’s going on but I did develop a rash after surgery again and it’s all over my abdomen and my right breast  I took Benadryl over the weekend and it didn’t help, I also called my pcp this morning and they had no appointments  I was going to ask about a possible refill on my pain medication because I am still hurting, especially the air pain has not subsided   I would also like to ween off of them because of being on medication for a while i really don’t want other symptoms while recovering

## 2023-05-22 NOTE — TELEPHONE ENCOUNTER
Patient Call    Who are you speaking with? Patient    If it is not the patient, are they listed on an active communication consent form? N/A   What is the reason for this call? Patient calling in stating that she has developed a rash after surgery  The patient is also wanting to know if she can get a refill on her pain medication  Does this require a call back? Yes   If a call back is required, please list best call back number [phone]   If a call back is required, advise that a message will be forwarded to their care team and someone will return their call as soon as possible  Did you relay this information to the patient?  Yes

## 2023-05-23 DIAGNOSIS — N80.9 ENDOMETRIOSIS: ICD-10-CM

## 2023-05-23 DIAGNOSIS — G89.29 CHRONIC PELVIC PAIN IN FEMALE: ICD-10-CM

## 2023-05-23 DIAGNOSIS — R10.2 CHRONIC PELVIC PAIN IN FEMALE: ICD-10-CM

## 2023-05-23 RX ORDER — OXYCODONE HYDROCHLORIDE AND ACETAMINOPHEN 5; 325 MG/1; MG/1
1 TABLET ORAL EVERY 8 HOURS PRN
Qty: 30 TABLET | Refills: 0 | Status: SHIPPED | OUTPATIENT
Start: 2023-05-23 | End: 2023-05-30 | Stop reason: SDUPTHER

## 2023-05-28 ENCOUNTER — TELEPHONE (OUTPATIENT)
Dept: OTHER | Facility: OTHER | Age: 33
End: 2023-05-28

## 2023-05-29 ENCOUNTER — TELEPHONE (OUTPATIENT)
Dept: OTHER | Facility: OTHER | Age: 33
End: 2023-05-29

## 2023-05-30 NOTE — TELEPHONE ENCOUNTER
Leonard Varela called, requesting a call back from on call provider, for a [id number] consultation

## 2023-05-31 ENCOUNTER — TELEPHONE (OUTPATIENT)
Dept: GYNECOLOGIC ONCOLOGY | Facility: CLINIC | Age: 33
End: 2023-05-31

## 2023-05-31 NOTE — TELEPHONE ENCOUNTER
Called and spoke to patient and made her an appointment with provider tomorrow in [facility]  Patient was in agreement of date, time and location of this appointment

## 2023-06-01 ENCOUNTER — OFFICE VISIT (OUTPATIENT)
Age: 33
End: 2023-06-01

## 2023-06-01 VITALS
HEART RATE: 70 BPM | BODY MASS INDEX: 40.15 KG/M2 | DIASTOLIC BLOOD PRESSURE: 70 MMHG | TEMPERATURE: 98.2 F | WEIGHT: 235.2 LBS | SYSTOLIC BLOOD PRESSURE: 120 MMHG | OXYGEN SATURATION: 98 % | HEIGHT: 64 IN

## 2023-06-01 DIAGNOSIS — Z98.890 POSTOPERATIVE STATE: Primary | ICD-10-CM

## 2023-06-01 DIAGNOSIS — G89.18 POSTOPERATIVE PAIN: ICD-10-CM

## 2023-06-01 DIAGNOSIS — N89.8 VAGINAL DISCHARGE: ICD-10-CM

## 2023-06-01 NOTE — PROGRESS NOTES
Assessment/Plan:    Problem List Items Addressed This Visit        Other    Postoperative state - Primary     29-year-old with long standing history of endometriosis and chronic pelvic pain and multiple prior treatments/surgical intervention now s/p robotic-assisted BSO, radical resection of pelvic endometriosis, partial vaginectomy, proctoscopy and cystoscopy on 5/18/23  Her final pathology was consistent with endometriosis  She has intermittent vaginal bleeding and pelvic cramp  Clinical exam with intact vaginal cuff, but diffuse fullness of the vaginal cuff on manual exam      Plan for CT abdomen/pelvis to rule out post-operative fluid collection/hematoma at the vaginal cuff  Patient to continue to monitor symptoms, and call with worsening bleeding, pain or fevers  She has a previously scheduled post-operative follow-up next week and will discuss HRT at that time  Other Visit Diagnoses     Postoperative pain        Relevant Orders    CT abdomen pelvis w contrast (Completed)    Vaginal discharge        Relevant Orders    CT abdomen pelvis w contrast (Completed)            CHIEF COMPLAINT:  Post-operative examination, vaginal discharge and cramping     Problem:  Longstanding endometriosis, chronic pelvic pain     Previous therapy:  1  Previous fulguration of endometriosis   2  Robotic-assisted TLH  3  Lupron, orilissa, hormonal suppression  4  Robotic-assisted BSO, radical resection of pelvic endometriosis, partial vaginectomy, proctoscopy and cystoscopy on 5/18/23  Patient ID: Jules Welch is a 29 y o  female  who presents to the office for post-operative evaluation and exam for intermittent vaginal bleeding/discharge and pelvic cramping  She has been afebrile  The patient notes intermittent spotting/bleeding with bright red to dark discharge  She denies an odor  There is associated pelvic cramping  She notes normal bowel/bladder function  No n/v  Appetite is appropriate         The following portions of the patient's history were reviewed and updated as appropriate: allergies, current medications, past medical history, past surgical history and problem list     Review of Systems   Constitutional: Negative  Negative for fever  Respiratory: Negative  Cardiovascular: Negative  Gastrointestinal: Negative  Genitourinary:        As per HPI  Skin: Negative  Current Outpatient Medications:   •  acetaminophen (TYLENOL) 650 mg CR tablet, Take 1 tablet (650 mg total) by mouth every 8 (eight) hours as needed for mild pain, Disp: 30 tablet, Rfl: 0  •  ibuprofen (MOTRIN) 600 mg tablet, Take 1 tablet (600 mg total) by mouth every 6 (six) hours as needed for mild pain, Disp: 30 tablet, Rfl: 0  •  methocarbamol (ROBAXIN) 750 mg tablet, Take 1 tablet (750 mg total) by mouth every 6 (six) hours as needed for muscle spasms, Disp: 60 tablet, Rfl: 0  •  oxyCODONE-acetaminophen (Percocet) 5-325 mg per tablet, Take 1 tablet by mouth every 12 (twelve) hours as needed for moderate pain Max Daily Amount: 2 tablets, Disp: 30 tablet, Rfl: 0  •  metroNIDAZOLE (FLAGYL) 500 mg tablet, TAKE ONE TABLET BY MOUTH ONCE FOR ONE DOSE  TAKE AT 9:00 PM THE NIGHT BEFORE PROCEDURE (Patient not taking: Reported on 6/1/2023), Disp: , Rfl:   •  neomycin (MYCIFRADIN) 500 mg tablet, TAKE 2 TABLETS BY MOUTH THREE TIMES DAILY FOR 3 DOSES (TAKE AT 1 PM, 4 PM, AND 9 PM THE DAY BEFORE PROCEDURE) (Patient not taking: Reported on 6/1/2023), Disp: , Rfl:   •  ondansetron (ZOFRAN) 4 mg tablet, Take 1 tablet (4 mg total) by mouth every 6 (six) hours (Patient not taking: Reported on 6/1/2023), Disp: 12 tablet, Rfl: 0  •  polyethylene glycol (MiraLax) 17 GM/SCOOP powder, Mix with 64 oz Gatorade, begin 4 PM day before surgery per bowel prep instructions  (Patient not taking: Reported on 6/1/2023), Disp: 238 g, Rfl: 0  No current facility-administered medications for this visit      Objective:    Blood pressure 120/70, pulse 70, "temperature 98 2 °F (36 8 °C), temperature source Temporal, height 5' 4\" (1 626 m), weight 107 kg (235 lb 3 2 oz), SpO2 98 %  Body mass index is 40 37 kg/m²  Body surface area is 2 1 meters squared  Physical Exam  Vitals reviewed  Exam conducted with a chaperone present  Constitutional:       General: She is not in acute distress  Appearance: Normal appearance  HENT:      Head: Normocephalic and atraumatic  Mouth/Throat:      Mouth: Mucous membranes are moist    Abdominal:      Palpations: Abdomen is soft  There is no mass  Tenderness: There is no abdominal tenderness  Genitourinary:     Comments: The external female genitalia is normal  The bartholin's, uretheral and skenes glands are normal  The urethral meatus is normal (midline with no lesions)  Anus without fissure or lesion  Speculum exam reveals a grossly normal vagina  Vagina is intact, without dehiscense  No masses, lesions, discharge or bleeding  No significant cystocele or rectocele noted  Bimanual exam notes a surgical absent cervix, uterus and adnexal structures  There is diffuse fullness of the vaginal apex with minimal mobility  Skin:     General: Skin is warm and dry  Comments: Surgical trocar sites are healing well  Neurological:      Mental Status: She is alert and oriented to person, place, and time  Psychiatric:         Mood and Affect: Mood normal          Behavior: Behavior normal          Thought Content: Thought content normal          Judgment: Judgment normal            Final Diagnosis   A  Peritoneum, left mesorectal:  - Endometriosis involving fibroadipose tissue      B  Peritoneum, left gutter:  - Endometriosis involving fibroadipose tissue       C  Peritoneum, right parametrial:  - Endometriosis involving fibroadipose tissue       D  Peritoneum, bladder serosa:  - Endometriosis involving fibroadipose tissue       E  Peritoneum, right gutter:  - Endometriosis involving fibroadipose tissue       F   " Ovary, bilateral, and partial vaginectomy:  - Endometriotic cyst of ovary  Adherent portion of fallopian tube  - Contralateral ovary with corpus luteum and follicle cysts  - Vaginal tissue with endometriosis      G  Peritoneum, right pelvic side wall endometriosis :  - Mesothelial lined fibroadipose tissue     Electronically signed by Zhao Jaramillo MD on 5/22/2023 at  1:14 PM

## 2023-06-02 ENCOUNTER — TELEPHONE (OUTPATIENT)
Dept: GYNECOLOGIC ONCOLOGY | Facility: CLINIC | Age: 33
End: 2023-06-02

## 2023-06-02 ENCOUNTER — HOSPITAL ENCOUNTER (OUTPATIENT)
Dept: RADIOLOGY | Facility: HOSPITAL | Age: 33
Discharge: HOME | End: 2023-06-02

## 2023-06-02 ENCOUNTER — TELEPHONE (OUTPATIENT)
Dept: HEMATOLOGY ONCOLOGY | Facility: CLINIC | Age: 33
End: 2023-06-02

## 2023-06-02 DIAGNOSIS — N89.8 VAGINAL DISCHARGE: ICD-10-CM

## 2023-06-02 DIAGNOSIS — G89.18 POSTOPERATIVE PAIN: ICD-10-CM

## 2023-06-02 PROBLEM — Z98.890 POSTOPERATIVE STATE: Status: ACTIVE | Noted: 2023-06-02

## 2023-06-02 RX ADMIN — IOHEXOL 100 ML: 350 INJECTION, SOLUTION INTRAVENOUS at 12:59

## 2023-06-02 NOTE — TELEPHONE ENCOUNTER
Called and spoke to CT department about patient been waiting a long time for her scan  They are having difficulties with scanner and are extremely behind  Patient can still have scan today  Called and explained this to the patient's mother and she stated she understood  Just didn't want to have her daughter redrink the barium and have to go back again

## 2023-06-02 NOTE — TELEPHONE ENCOUNTER
Patient Call    Who are you speaking with? Parent    If it is not the patient, are they listed on an active communication consent form? Yes   What is the reason for this call? Patients mother calling to discuss patients CT appointment today  She is concerned because patient has been checked in for the appointment since before 10AM and has finished her barium but still has not been taken back for the CT  She would like to ensure that waiting this long will not impact the test results   Does this require a call back? Yes   If a call back is required, please list best call back number [phone]   If a call back is required, advise that a message will be forwarded to their care team and someone will return their call as soon as possible  Did you relay this information to the patient?  Yes

## 2023-06-02 NOTE — ASSESSMENT & PLAN NOTE
37-year-old with long standing history of endometriosis and chronic pelvic pain and multiple prior treatments/surgical intervention now s/p robotic-assisted BSO, radical resection of pelvic endometriosis, partial vaginectomy, proctoscopy and cystoscopy on 5/18/23  Her final pathology was consistent with endometriosis  She has intermittent vaginal bleeding and pelvic cramp  Clinical exam with intact vaginal cuff, but diffuse fullness of the vaginal cuff on manual exam      Plan for CT abdomen/pelvis to rule out post-operative fluid collection/hematoma at the vaginal cuff  Patient to continue to monitor symptoms, and call with worsening bleeding, pain or fevers  She has a previously scheduled post-operative follow-up next week and will discuss HRT at that time

## 2023-06-04 ENCOUNTER — ANESTHESIA EVENT (INPATIENT)
Dept: PERIOP | Facility: HOSPITAL | Age: 33
DRG: 711 | End: 2023-06-04
Payer: COMMERCIAL

## 2023-06-04 ENCOUNTER — APPOINTMENT (EMERGENCY)
Dept: RADIOLOGY | Facility: HOSPITAL | Age: 33
DRG: 711 | End: 2023-06-04
Payer: COMMERCIAL

## 2023-06-04 ENCOUNTER — HOSPITAL ENCOUNTER (INPATIENT)
Facility: HOSPITAL | Age: 33
LOS: 1 days | Discharge: HOME | DRG: 711 | End: 2023-06-05
Attending: EMERGENCY MEDICINE | Admitting: OBSTETRICS & GYNECOLOGY
Payer: COMMERCIAL

## 2023-06-04 ENCOUNTER — TELEPHONE (OUTPATIENT)
Dept: OTHER | Facility: OTHER | Age: 33
End: 2023-06-04

## 2023-06-04 ENCOUNTER — ANESTHESIA (INPATIENT)
Dept: PERIOP | Facility: HOSPITAL | Age: 33
DRG: 711 | End: 2023-06-04
Payer: COMMERCIAL

## 2023-06-04 ENCOUNTER — APPOINTMENT (EMERGENCY)
Dept: CT IMAGING | Facility: HOSPITAL | Age: 33
DRG: 711 | End: 2023-06-04
Payer: COMMERCIAL

## 2023-06-04 DIAGNOSIS — R10.9 ABDOMINAL PAIN: ICD-10-CM

## 2023-06-04 DIAGNOSIS — N73.9 PELVIC ABSCESS IN FEMALE: Primary | ICD-10-CM

## 2023-06-04 LAB
ABO GROUP BLD: NORMAL
ALBUMIN SERPL BCP-MCNC: 4.2 G/DL (ref 3.5–5)
ALP SERPL-CCNC: 48 U/L (ref 34–104)
ALT SERPL W P-5'-P-CCNC: 12 U/L (ref 7–52)
ANION GAP SERPL CALCULATED.3IONS-SCNC: 8 MMOL/L (ref 4–13)
AST SERPL W P-5'-P-CCNC: 11 U/L (ref 13–39)
BASOPHILS # BLD AUTO: 0.01 THOUSANDS/ÂΜL (ref 0–0.1)
BASOPHILS NFR BLD AUTO: 0 % (ref 0–1)
BILIRUB SERPL-MCNC: 1.6 MG/DL (ref 0.2–1)
BILIRUB UR QL STRIP: NEGATIVE
BLD GP AB SCN SERPL QL: NEGATIVE
BUN SERPL-MCNC: 8 MG/DL (ref 5–25)
CALCIUM SERPL-MCNC: 8.9 MG/DL (ref 8.4–10.2)
CHLORIDE SERPL-SCNC: 104 MMOL/L (ref 96–108)
CLARITY UR: CLEAR
CO2 SERPL-SCNC: 24 MMOL/L (ref 21–32)
COLOR UR: NORMAL
CREAT SERPL-MCNC: 0.71 MG/DL (ref 0.6–1.3)
EOSINOPHIL # BLD AUTO: 0 THOUSAND/ÂΜL (ref 0–0.61)
EOSINOPHIL NFR BLD AUTO: 0 % (ref 0–6)
ERYTHROCYTE [DISTWIDTH] IN BLOOD BY AUTOMATED COUNT: 13.1 % (ref 11.6–15.1)
EXT PREGNANCY TEST URINE: NEGATIVE
EXT. CONTROL: NORMAL
GFR SERPL CREATININE-BSD FRML MDRD: 114 ML/MIN/1.73SQ M
GLUCOSE SERPL-MCNC: 95 MG/DL (ref 65–140)
GLUCOSE UR STRIP-MCNC: NEGATIVE MG/DL
HCT VFR BLD AUTO: 37.6 % (ref 34.8–46.1)
HGB BLD-MCNC: 12.6 G/DL (ref 11.5–15.4)
HGB UR QL STRIP.AUTO: NEGATIVE
IMM GRANULOCYTES # BLD AUTO: 0.04 THOUSAND/UL (ref 0–0.2)
IMM GRANULOCYTES NFR BLD AUTO: 0 % (ref 0–2)
KETONES UR STRIP-MCNC: NEGATIVE MG/DL
LEUKOCYTE ESTERASE UR QL STRIP: NEGATIVE
LYMPHOCYTES # BLD AUTO: 1.42 THOUSANDS/ÂΜL (ref 0.6–4.47)
LYMPHOCYTES NFR BLD AUTO: 12 % (ref 14–44)
MCH RBC QN AUTO: 29.3 PG (ref 26.8–34.3)
MCHC RBC AUTO-ENTMCNC: 33.5 G/DL (ref 31.4–37.4)
MCV RBC AUTO: 87 FL (ref 82–98)
MONOCYTES # BLD AUTO: 1.05 THOUSAND/ÂΜL (ref 0.17–1.22)
MONOCYTES NFR BLD AUTO: 9 % (ref 4–12)
NEUTROPHILS # BLD AUTO: 9.27 THOUSANDS/ÂΜL (ref 1.85–7.62)
NEUTS SEG NFR BLD AUTO: 79 % (ref 43–75)
NITRITE UR QL STRIP: NEGATIVE
NRBC BLD AUTO-RTO: 0 /100 WBCS
PH UR STRIP.AUTO: 5.5 [PH]
PLATELET # BLD AUTO: 201 THOUSANDS/UL (ref 149–390)
PMV BLD AUTO: 9.4 FL (ref 8.9–12.7)
POTASSIUM SERPL-SCNC: 3.7 MMOL/L (ref 3.5–5.3)
PROT SERPL-MCNC: 7.1 G/DL (ref 6.4–8.4)
PROT UR STRIP-MCNC: NEGATIVE MG/DL
RBC # BLD AUTO: 4.3 MILLION/UL (ref 3.81–5.12)
RH BLD: POSITIVE
SARS-COV-2 RNA RESP QL NAA+PROBE: NEGATIVE
SODIUM SERPL-SCNC: 136 MMOL/L (ref 135–147)
SP GR UR STRIP.AUTO: 1.01 (ref 1–1.03)
SPECIMEN EXPIRATION DATE: NORMAL
UROBILINOGEN UR STRIP-ACNC: <2 MG/DL
WBC # BLD AUTO: 11.79 THOUSAND/UL (ref 4.31–10.16)

## 2023-06-04 PROCEDURE — 0U9G0ZZ DRAINAGE OF VAGINA, OPEN APPROACH: ICD-10-PCS | Performed by: OBSTETRICS & GYNECOLOGY

## 2023-06-04 PROCEDURE — 96374 THER/PROPH/DIAG INJ IV PUSH: CPT

## 2023-06-04 PROCEDURE — 86901 BLOOD TYPING SEROLOGIC RH(D): CPT | Performed by: OBSTETRICS & GYNECOLOGY

## 2023-06-04 PROCEDURE — G1004 CDSM NDSC: HCPCS

## 2023-06-04 PROCEDURE — 87635 SARS-COV-2 COVID-19 AMP PRB: CPT | Performed by: EMERGENCY MEDICINE

## 2023-06-04 PROCEDURE — 85025 COMPLETE CBC W/AUTO DIFF WBC: CPT | Performed by: EMERGENCY MEDICINE

## 2023-06-04 PROCEDURE — 86900 BLOOD TYPING SEROLOGIC ABO: CPT | Performed by: OBSTETRICS & GYNECOLOGY

## 2023-06-04 PROCEDURE — 99284 EMERGENCY DEPT VISIT MOD MDM: CPT

## 2023-06-04 PROCEDURE — 36415 COLL VENOUS BLD VENIPUNCTURE: CPT | Performed by: EMERGENCY MEDICINE

## 2023-06-04 PROCEDURE — 86850 RBC ANTIBODY SCREEN: CPT | Performed by: OBSTETRICS & GYNECOLOGY

## 2023-06-04 PROCEDURE — 81003 URINALYSIS AUTO W/O SCOPE: CPT | Performed by: EMERGENCY MEDICINE

## 2023-06-04 PROCEDURE — 57010 COLPOTOMY DRG PEL ABSCESS: CPT | Performed by: OBSTETRICS & GYNECOLOGY

## 2023-06-04 PROCEDURE — 74177 CT ABD & PELVIS W/CONTRAST: CPT

## 2023-06-04 PROCEDURE — 99024 POSTOP FOLLOW-UP VISIT: CPT | Performed by: OBSTETRICS & GYNECOLOGY

## 2023-06-04 PROCEDURE — NC001 PR NO CHARGE: Performed by: OBSTETRICS & GYNECOLOGY

## 2023-06-04 PROCEDURE — C9113 INJ PANTOPRAZOLE SODIUM, VIA: HCPCS | Performed by: OBSTETRICS & GYNECOLOGY

## 2023-06-04 PROCEDURE — 96375 TX/PRO/DX INJ NEW DRUG ADDON: CPT

## 2023-06-04 PROCEDURE — 80053 COMPREHEN METABOLIC PANEL: CPT | Performed by: EMERGENCY MEDICINE

## 2023-06-04 PROCEDURE — 71046 X-RAY EXAM CHEST 2 VIEWS: CPT

## 2023-06-04 PROCEDURE — 81025 URINE PREGNANCY TEST: CPT | Performed by: EMERGENCY MEDICINE

## 2023-06-04 RX ORDER — ACETAMINOPHEN 325 MG/1
975 TABLET ORAL EVERY 8 HOURS SCHEDULED
Status: DISCONTINUED | OUTPATIENT
Start: 2023-06-04 | End: 2023-06-05 | Stop reason: HOSPADM

## 2023-06-04 RX ORDER — MIDAZOLAM HYDROCHLORIDE 2 MG/2ML
INJECTION, SOLUTION INTRAMUSCULAR; INTRAVENOUS AS NEEDED
Status: DISCONTINUED | OUTPATIENT
Start: 2023-06-04 | End: 2023-06-04

## 2023-06-04 RX ORDER — LIDOCAINE HYDROCHLORIDE 10 MG/ML
INJECTION, SOLUTION EPIDURAL; INFILTRATION; INTRACAUDAL; PERINEURAL AS NEEDED
Status: DISCONTINUED | OUTPATIENT
Start: 2023-06-04 | End: 2023-06-04

## 2023-06-04 RX ORDER — KETOROLAC TROMETHAMINE 30 MG/ML
15 INJECTION, SOLUTION INTRAMUSCULAR; INTRAVENOUS ONCE
Status: COMPLETED | OUTPATIENT
Start: 2023-06-04 | End: 2023-06-04

## 2023-06-04 RX ORDER — KETOROLAC TROMETHAMINE 30 MG/ML
15 INJECTION, SOLUTION INTRAMUSCULAR; INTRAVENOUS EVERY 6 HOURS SCHEDULED
Status: DISCONTINUED | OUTPATIENT
Start: 2023-06-04 | End: 2023-06-04

## 2023-06-04 RX ORDER — OXYCODONE HYDROCHLORIDE 5 MG/1
5 TABLET ORAL EVERY 4 HOURS PRN
Status: DISCONTINUED | OUTPATIENT
Start: 2023-06-04 | End: 2023-06-05

## 2023-06-04 RX ORDER — HYDROMORPHONE HCL/PF 1 MG/ML
0.5 SYRINGE (ML) INJECTION
Status: DISCONTINUED | OUTPATIENT
Start: 2023-06-04 | End: 2023-06-04 | Stop reason: HOSPADM

## 2023-06-04 RX ORDER — DEXAMETHASONE SODIUM PHOSPHATE 10 MG/ML
INJECTION, SOLUTION INTRAMUSCULAR; INTRAVENOUS AS NEEDED
Status: DISCONTINUED | OUTPATIENT
Start: 2023-06-04 | End: 2023-06-04

## 2023-06-04 RX ORDER — DIPHENHYDRAMINE HYDROCHLORIDE 50 MG/ML
12.5 INJECTION INTRAMUSCULAR; INTRAVENOUS ONCE AS NEEDED
Status: DISCONTINUED | OUTPATIENT
Start: 2023-06-04 | End: 2023-06-04 | Stop reason: HOSPADM

## 2023-06-04 RX ORDER — ONDANSETRON 2 MG/ML
4 INJECTION INTRAMUSCULAR; INTRAVENOUS ONCE AS NEEDED
Status: DISCONTINUED | OUTPATIENT
Start: 2023-06-04 | End: 2023-06-04 | Stop reason: HOSPADM

## 2023-06-04 RX ORDER — FENTANYL CITRATE/PF 50 MCG/ML
50 SYRINGE (ML) INJECTION
Status: DISCONTINUED | OUTPATIENT
Start: 2023-06-04 | End: 2023-06-04 | Stop reason: HOSPADM

## 2023-06-04 RX ORDER — KETOROLAC TROMETHAMINE 30 MG/ML
INJECTION, SOLUTION INTRAMUSCULAR; INTRAVENOUS AS NEEDED
Status: DISCONTINUED | OUTPATIENT
Start: 2023-06-04 | End: 2023-06-04

## 2023-06-04 RX ORDER — SODIUM CHLORIDE, SODIUM LACTATE, POTASSIUM CHLORIDE, CALCIUM CHLORIDE 600; 310; 30; 20 MG/100ML; MG/100ML; MG/100ML; MG/100ML
INJECTION, SOLUTION INTRAVENOUS CONTINUOUS PRN
Status: DISCONTINUED | OUTPATIENT
Start: 2023-06-04 | End: 2023-06-04

## 2023-06-04 RX ORDER — ONDANSETRON 2 MG/ML
4 INJECTION INTRAMUSCULAR; INTRAVENOUS EVERY 6 HOURS PRN
Status: DISCONTINUED | OUTPATIENT
Start: 2023-06-04 | End: 2023-06-05 | Stop reason: HOSPADM

## 2023-06-04 RX ORDER — METOCLOPRAMIDE HYDROCHLORIDE 5 MG/ML
10 INJECTION INTRAMUSCULAR; INTRAVENOUS ONCE AS NEEDED
Status: DISCONTINUED | OUTPATIENT
Start: 2023-06-04 | End: 2023-06-04 | Stop reason: HOSPADM

## 2023-06-04 RX ORDER — SODIUM CHLORIDE, SODIUM GLUCONATE, SODIUM ACETATE, POTASSIUM CHLORIDE, MAGNESIUM CHLORIDE, SODIUM PHOSPHATE, DIBASIC, AND POTASSIUM PHOSPHATE .53; .5; .37; .037; .03; .012; .00082 G/100ML; G/100ML; G/100ML; G/100ML; G/100ML; G/100ML; G/100ML
125 INJECTION, SOLUTION INTRAVENOUS CONTINUOUS
Status: DISCONTINUED | OUTPATIENT
Start: 2023-06-04 | End: 2023-06-05

## 2023-06-04 RX ORDER — OXYCODONE HYDROCHLORIDE 5 MG/1
5 TABLET ORAL EVERY 4 HOURS PRN
Status: DISCONTINUED | OUTPATIENT
Start: 2023-06-04 | End: 2023-06-04

## 2023-06-04 RX ORDER — DOCUSATE SODIUM 100 MG/1
100 CAPSULE, LIQUID FILLED ORAL 2 TIMES DAILY
Status: DISCONTINUED | OUTPATIENT
Start: 2023-06-04 | End: 2023-06-04

## 2023-06-04 RX ORDER — ENOXAPARIN SODIUM 100 MG/ML
40 INJECTION SUBCUTANEOUS EVERY 12 HOURS SCHEDULED
Status: DISCONTINUED | OUTPATIENT
Start: 2023-06-04 | End: 2023-06-04

## 2023-06-04 RX ORDER — ACETAMINOPHEN 325 MG/1
975 TABLET ORAL EVERY 8 HOURS SCHEDULED
Status: DISCONTINUED | OUTPATIENT
Start: 2023-06-04 | End: 2023-06-04

## 2023-06-04 RX ORDER — METHOCARBAMOL 500 MG/1
750 TABLET, FILM COATED ORAL EVERY 6 HOURS SCHEDULED
Status: DISCONTINUED | OUTPATIENT
Start: 2023-06-04 | End: 2023-06-05 | Stop reason: HOSPADM

## 2023-06-04 RX ORDER — LIDOCAINE HYDROCHLORIDE 10 MG/ML
0.5 INJECTION, SOLUTION EPIDURAL; INFILTRATION; INTRACAUDAL; PERINEURAL ONCE AS NEEDED
Status: CANCELLED | OUTPATIENT
Start: 2023-06-04

## 2023-06-04 RX ORDER — FENTANYL CITRATE 50 UG/ML
INJECTION, SOLUTION INTRAMUSCULAR; INTRAVENOUS AS NEEDED
Status: DISCONTINUED | OUTPATIENT
Start: 2023-06-04 | End: 2023-06-04

## 2023-06-04 RX ORDER — KETOROLAC TROMETHAMINE 30 MG/ML
15 INJECTION, SOLUTION INTRAMUSCULAR; INTRAVENOUS EVERY 6 HOURS PRN
Status: DISCONTINUED | OUTPATIENT
Start: 2023-06-04 | End: 2023-06-04

## 2023-06-04 RX ORDER — CALCIUM CARBONATE 500 MG/1
1000 TABLET, CHEWABLE ORAL DAILY PRN
Status: DISCONTINUED | OUTPATIENT
Start: 2023-06-04 | End: 2023-06-04

## 2023-06-04 RX ORDER — KETOROLAC TROMETHAMINE 30 MG/ML
15 INJECTION, SOLUTION INTRAMUSCULAR; INTRAVENOUS EVERY 6 HOURS SCHEDULED
Status: COMPLETED | OUTPATIENT
Start: 2023-06-05 | End: 2023-06-05

## 2023-06-04 RX ORDER — PROPOFOL 10 MG/ML
INJECTION, EMULSION INTRAVENOUS AS NEEDED
Status: DISCONTINUED | OUTPATIENT
Start: 2023-06-04 | End: 2023-06-04

## 2023-06-04 RX ORDER — DOCUSATE SODIUM 100 MG/1
100 CAPSULE, LIQUID FILLED ORAL 2 TIMES DAILY
Status: DISCONTINUED | OUTPATIENT
Start: 2023-06-05 | End: 2023-06-05 | Stop reason: HOSPADM

## 2023-06-04 RX ORDER — CALCIUM CARBONATE 500 MG/1
1000 TABLET, CHEWABLE ORAL DAILY PRN
Status: DISCONTINUED | OUTPATIENT
Start: 2023-06-04 | End: 2023-06-05 | Stop reason: HOSPADM

## 2023-06-04 RX ORDER — ONDANSETRON 2 MG/ML
4 INJECTION INTRAMUSCULAR; INTRAVENOUS EVERY 6 HOURS PRN
Status: DISCONTINUED | OUTPATIENT
Start: 2023-06-04 | End: 2023-06-04

## 2023-06-04 RX ORDER — MORPHINE SULFATE 10 MG/ML
6 INJECTION, SOLUTION INTRAMUSCULAR; INTRAVENOUS ONCE
Status: COMPLETED | OUTPATIENT
Start: 2023-06-04 | End: 2023-06-04

## 2023-06-04 RX ORDER — PANTOPRAZOLE SODIUM 40 MG/10ML
40 INJECTION, POWDER, LYOPHILIZED, FOR SOLUTION INTRAVENOUS
Status: DISCONTINUED | OUTPATIENT
Start: 2023-06-04 | End: 2023-06-04

## 2023-06-04 RX ORDER — HYDROMORPHONE HCL IN WATER/PF 6 MG/30 ML
0.2 PATIENT CONTROLLED ANALGESIA SYRINGE INTRAVENOUS
Status: DISCONTINUED | OUTPATIENT
Start: 2023-06-04 | End: 2023-06-04 | Stop reason: HOSPADM

## 2023-06-04 RX ORDER — ONDANSETRON 2 MG/ML
INJECTION INTRAMUSCULAR; INTRAVENOUS AS NEEDED
Status: DISCONTINUED | OUTPATIENT
Start: 2023-06-04 | End: 2023-06-04

## 2023-06-04 RX ORDER — OXYCODONE HYDROCHLORIDE 10 MG/1
10 TABLET ORAL EVERY 4 HOURS PRN
Status: DISCONTINUED | OUTPATIENT
Start: 2023-06-04 | End: 2023-06-04

## 2023-06-04 RX ORDER — METHOCARBAMOL 500 MG/1
750 TABLET, FILM COATED ORAL EVERY 6 HOURS PRN
Status: DISCONTINUED | OUTPATIENT
Start: 2023-06-04 | End: 2023-06-04

## 2023-06-04 RX ADMIN — MIDAZOLAM 2 MG: 1 INJECTION INTRAMUSCULAR; INTRAVENOUS at 18:42

## 2023-06-04 RX ADMIN — ENOXAPARIN SODIUM 40 MG: 40 INJECTION SUBCUTANEOUS at 15:15

## 2023-06-04 RX ADMIN — PROPOFOL 200 MG: 10 INJECTION, EMULSION INTRAVENOUS at 18:45

## 2023-06-04 RX ADMIN — METHOCARBAMOL 750 MG: 500 TABLET ORAL at 20:55

## 2023-06-04 RX ADMIN — SODIUM CHLORIDE, SODIUM GLUCONATE, SODIUM ACETATE, POTASSIUM CHLORIDE, MAGNESIUM CHLORIDE, SODIUM PHOSPHATE, DIBASIC, AND POTASSIUM PHOSPHATE 125 ML/HR: .53; .5; .37; .037; .03; .012; .00082 INJECTION, SOLUTION INTRAVENOUS at 16:20

## 2023-06-04 RX ADMIN — PIPERACILLIN AND TAZOBACTAM 3.38 G: 36; 4.5 INJECTION, POWDER, FOR SOLUTION INTRAVENOUS at 21:58

## 2023-06-04 RX ADMIN — OXYCODONE HYDROCHLORIDE 10 MG: 10 TABLET ORAL at 16:29

## 2023-06-04 RX ADMIN — LIDOCAINE HYDROCHLORIDE 50 MG: 10 INJECTION, SOLUTION EPIDURAL; INFILTRATION; INTRACAUDAL; PERINEURAL at 18:45

## 2023-06-04 RX ADMIN — DEXAMETHASONE SODIUM PHOSPHATE 10 MG: 10 INJECTION, SOLUTION INTRAMUSCULAR; INTRAVENOUS at 18:45

## 2023-06-04 RX ADMIN — KETOROLAC TROMETHAMINE 15 MG: 30 INJECTION, SOLUTION INTRAMUSCULAR at 13:00

## 2023-06-04 RX ADMIN — OXYCODONE HYDROCHLORIDE 5 MG: 5 TABLET ORAL at 20:55

## 2023-06-04 RX ADMIN — FENTANYL CITRATE 50 MCG: 50 INJECTION INTRAMUSCULAR; INTRAVENOUS at 19:23

## 2023-06-04 RX ADMIN — IOHEXOL 100 ML: 350 INJECTION, SOLUTION INTRAVENOUS at 14:32

## 2023-06-04 RX ADMIN — MORPHINE SULFATE 6 MG: 10 INJECTION INTRAVENOUS at 13:37

## 2023-06-04 RX ADMIN — KETOROLAC TROMETHAMINE 15 MG: 30 INJECTION, SOLUTION INTRAMUSCULAR at 19:02

## 2023-06-04 RX ADMIN — PANTOPRAZOLE SODIUM 40 MG: 40 INJECTION, POWDER, FOR SOLUTION INTRAVENOUS at 15:16

## 2023-06-04 RX ADMIN — ACETAMINOPHEN 975 MG: 325 TABLET, FILM COATED ORAL at 21:48

## 2023-06-04 RX ADMIN — SODIUM CHLORIDE, SODIUM LACTATE, POTASSIUM CHLORIDE, AND CALCIUM CHLORIDE: .6; .31; .03; .02 INJECTION, SOLUTION INTRAVENOUS at 18:41

## 2023-06-04 RX ADMIN — FENTANYL CITRATE 50 MCG: 50 INJECTION INTRAMUSCULAR; INTRAVENOUS at 18:45

## 2023-06-04 RX ADMIN — PIPERACILLIN AND TAZOBACTAM 3.38 G: 36; 4.5 INJECTION, POWDER, FOR SOLUTION INTRAVENOUS at 15:20

## 2023-06-04 RX ADMIN — ONDANSETRON 4 MG: 2 INJECTION INTRAMUSCULAR; INTRAVENOUS at 18:45

## 2023-06-04 RX ADMIN — FENTANYL CITRATE 50 MCG: 50 INJECTION INTRAMUSCULAR; INTRAVENOUS at 18:49

## 2023-06-04 NOTE — ANESTHESIA PREPROCEDURE EVALUATION
"Procedure:  EXAM UNDER ANESTHESIA (EUA) ([facility])    Relevant Problems   ANESTHESIA (within normal limits)      CARDIO (within normal limits)      ENDO (within normal limits)      GI/HEPATIC (within normal limits)      /RENAL (within normal limits)      GYN (within normal limits)      HEMATOLOGY (within normal limits)      MUSCULOSKELETAL   (+) Chronic bilateral low back pain with bilateral sciatica      NEURO/PSYCH   (+) Anxiety   (+) Anxiety and depression   (+) Chronic bilateral low back pain with bilateral sciatica   (+) Chronic pelvic pain in female      PULMONARY (within normal limits)      Other   (+) Morbid obesity (HCC)   (+) Pelvic abscess in female        Physical Exam    Airway    Mallampati score: II  TM Distance: >3 FB  Neck ROM: full     Dental   No notable dental hx     Cardiovascular  Rhythm: regular, Rate: normal, Cardiovascular exam normal    Pulmonary  Pulmonary exam normal Breath sounds clear to auscultation,     Other Findings        Anesthesia Plan  ASA Score- 3     Anesthesia Type- general with ASA Monitors  Additional Monitors:   Airway Plan: LMA  Plan Factors-Exercise tolerance (METS): >4 METS  Chart reviewed  EKG reviewed  Imaging results reviewed  Existing labs reviewed  Patient summary reviewed  Induction- intravenous  Postoperative Plan- Plan for postoperative opioid use  Informed Consent- Anesthetic plan and risks discussed with patient  I personally reviewed this patient with the CRNA  Discussed and agreed on the Anesthesia Plan with the CRNA  Marlin Allen           Recent labs personally reviewed:  Lab Results   Component Value Date    HGB 12 6 06/04/2023     06/04/2023    WBC 11 79 (H) 06/04/2023     Lab Results   Component Value Date    BUN 8 06/04/2023    CREATININE 0 71 06/04/2023    GLUCOSE 97 02/26/2014    K 3 7 06/04/2023     02/26/2014     No results found for: \"PTT\"   Lab Results   Component Value Date    INR 1 02 04/27/2023       Blood " type O    Lab Results   Component Value Date    HGBA1C 4 6 04/27/2023       IJesus MD, have personally seen and evaluated the patient prior to anesthetic care  I have reviewed the pre-anesthetic record, and other medical records if appropriate to the anesthetic care  If a CRNA is involved in the case, I have reviewed the CRNA assessment, if present, and agree  Risks/benefits and alternatives discussed with patient including possible PONV, sore throat, and possibility of rare anesthetic and surgical emergencies

## 2023-06-04 NOTE — PLAN OF CARE
Problem: PAIN - ADULT  Goal: Verbalizes/displays adequate comfort level or baseline comfort level  Description: Interventions:  - Encourage patient to monitor pain and request assistance  - Assess pain using appropriate pain scale  - Administer analgesics based on type and severity of pain and evaluate response  - Implement non-pharmacological measures as appropriate and evaluate response  - Consider cultural and social influences on pain and pain management  - Notify physician/advanced practitioner if interventions unsuccessful or patient reports new pain  Outcome: Not Progressing     Problem: INFECTION - ADULT  Goal: Absence of fever/infection during neutropenic period  Description: INTERVENTIONS:  - Monitor WBC    Outcome: Progressing     Problem: SKIN/TISSUE INTEGRITY - ADULT  Goal: Incision(s), wounds(s) or drain site(s) healing without S/S of infection  Description: INTERVENTIONS  - Assess and document dressing, incision, wound bed, drain sites and surrounding tissue  - Provide patient and family education  Outcome: Progressing

## 2023-06-04 NOTE — TREATMENT PLAN
Patient with 4 1 cm vaginal cuff abscess after extensive pelvic resection of endometriosis  I discussed the risks and benefits of exam under anesthesia, drainage of vaginal cuff abscess  She understands the risks and benefits and agrees to proceed as outlined  We will plan to continue IV antibiotics overnight and transition to oral antibiotics tomorrow with a possible plan for discharge tomorrow as long as she is clinically stable

## 2023-06-04 NOTE — ANESTHESIA POSTPROCEDURE EVALUATION
Post-Op Assessment Note    CV Status:  Stable  Pain Score: 0    Pain management: adequate     Hydration Status:  Euvolemic   PONV Controlled:  Controlled   Airway Patency:  Patent      Post Op Vitals Reviewed: Yes      Staff: Anesthesiologist, CRNA         No notable events documented      BP   103/59   Temp   98 2   Pulse  66   Resp   14   SpO2   94

## 2023-06-04 NOTE — DISCHARGE INSTRUCTIONS
"Post-Gynecologic Surgery Discharge Instructions:  1  No heavy lifting more than one full gallon of milk (about 8 lbs) for 1 week  2  Nothing in the vagina for 6 weeks, or until cleared at your post-operative visit  3  You may take stairs one at a time, touching each step with both feet for the first few days, then as tolerated  4  Call the office for fever greater than 100  4'F, heavy vaginal bleeding, or increasing pain  5  Activity as tolerated  6  Avoid driving if taking narcotic pain medications (Roxicodone, Percocet, Vicodin)  Post Operative Pain Management:  For pain, take 650 mg of tylenol every 6 hours  For cramping, take 600 mg Ibuprofen every 6 hours to relieve  You may alternate these and take them \"around the clock\" to stay ahead of pain  For example, take 650mg of tylenol at 9 AM, then 600mg of ibuprofen at 12 PM, then 650 of tylenol at 3 PM, and so forth  Post Operative Bowel Management:  Please take over the counter stool softener or laxative to ensure you do not strain, as the bowels are often the last thing to wake up from anesthesia  You can take whatever is preferred (colace, senna, or miralax)    If you have any questions regarding your prescriptions please call your doctor      "

## 2023-06-04 NOTE — OP NOTE
OPERATIVE REPORT  PATIENT NAME: Silvia Hurtado    :  1990  MRN: [id number]  Pt Location: AN OR ROOM 01    SURGERY DATE: 2023    Surgeon(s) and Role:     * Sera Camargo MD - Primary     * Anshul Ibarra DO - Assisting    Preop Diagnosis:  Pelvic abscess in female [N73 9]    Post-Op Diagnosis Codes:     * Pelvic abscess in female [N73 9]    Procedure(s):  EXAM UNDER ANESTHESIA (EUA)  I& D vaginal cuff abscess    Specimen(s): None    Estimated Blood Loss:   Minimal, <5cc    Anesthesia Type: LMA    Operative Indications:  Pelvic abscess in female [N73 9]    Operative Findings:  1  Vaginal cuff easily opened with digital exam at midline with drainage of serous fluid  2  No rectal involvement on digital exam  3  No bladder involvement as evidenced with placement of taylor catheter    Complications:   None    Procedure and Technique:    The patient was taken to the operating room  LMA anesthesia was administered and found to be adequate  The patient was then positioned on the operating table in dorsolithotomy position with legs supported by stirrups and SCDs bilaterally  All pressure points were padded  Warm blanket was placed to maintain control of core body temperature  The patient was then prepped and draped in usual sterile fashion with chlorhexidine prep in the vagina    A timeout was performed to confirm correct patient and correct procedure  Gentle exam of the vaginal cuff was performed and it was noted to be intact  Pressure was placed over midline of the cuff and it was able to be opened digitally and serous fluid was drained  Speculum was placed and the cuff was gently opened further with a Sandie clamp  Visual rectal exam was performed and there was no rectal involvement  Surgeons gloves were changed  A Taylor catheter was placed and there was no bladder involvement or vesicovaginal fistula noted      At the completion of the procedure all needle, sponge, instrument counts were noted to be correct ×2  Dr Quintanilla Files was present for all key portions of the procedure          Patient Disposition:  PACU         SIGNATURE: Yobany Shelton DO  DATE: June 4, 2023  TIME: 7:05 PM

## 2023-06-04 NOTE — ASSESSMENT & PLAN NOTE
"CT AP 6/4: \"Postsurgical changes of hysterectomy with a 2 0 x 4 1 x 1 6 cm rim-enhancing fluid collection containing a focus of air at the superior aspect of the vaginal cuff, concerning for abscess  Surrounding fat stranding  \"    POD#1 s/p EUA and I&D of vaginal cuff abscess    ABX: Zosyn q6h IV --> transition to PO Augmentin this morning   Pain control: tylenol, gabapentin, robaxin, yunior 5 PRN for pain   Labs: WBC 11 8 --> 7  Hgb 12 6 --> 11 7  Vitals: remains afebrile  Diet: regular diet    Plan for discharge home today  DVT ppx: SCDs and Lovenox   "

## 2023-06-04 NOTE — PROGRESS NOTES
Patient found to have 4 1cm vaginal cuff abscess  Discussed findings with patient  Discussed case with IR- abscess may be too high to drain  Patient has been NPO since yesterday   Will plan to take her to the OR for exam under anesthesia     Surgical consent obtained, anesthesia and OR charge aware     Kamilla Joy MD  OBGYN PGY-3  4:40 PM  6/4/2023

## 2023-06-04 NOTE — CONSULTS
"Consultation - Gynecologic Oncology  Bárbara York 29 y o  female MRN: [id number]  Unit/Bed#: TAB Encounter: [id number]     Assessment/Plan     A/P:  Bárbara York is a 29y o  year old female now 2 weeks post op from robotic assisted BSO, resection of endometriosis, cystoscopy, partial vaginectomy for chronic pelvic pain admitted for management of vaginal cuff abscess, currently afebrile and stable     * Pelvic abscess in female  Assessment & Plan  CT AP 6/4: \"Postsurgical changes of hysterectomy with a 2 0 x 4 1 x 1 6 cm rim-enhancing fluid collection containing a focus of air at the superior aspect of the vaginal cuff, concerning for abscess  Surrounding fat stranding  \"  Afebrile in the ED, VSS    Mildly elevated WBC   Purulence noted on SSE and significant pain with digital exam of the cuff  No vaginal bleeding  Plan to admit to GYN oncology service   ABX: Zosyn q6h IV  Pain control: Toradol q6h scheduled, robaxin, yunior 5/10 PRN for pain   Labs: trend WBC   Vitals: monitor for fever  Diet: NPO  DVT ppx: SCDs and Lovenox           Chief Complaint   Patient presents with   • Post-op Problem     Pt reports surgert in 5/18, had CT that showed fluid in abd, states fevers onset last night, severe abd pain, tylenol last at 1030, nausea, reports yellow discharge from vagina since Saturday that is a constant flow; bladder pain after emptying       History of Present Illness   Physician Requesting Consult: Anton King,*  Reason for Consult / Principal Problem: fever, yellow vaginal discharge   Subspeciality: GYN oncology     HPI: Bárbara York is a 29y o  year old female now 2 weeks post op from robotic assisted BSO, resection of endometriosis, cystoscopy, partial vaginectomy for chronic pelvic pain who presents with fever and purulent vaginal discharge  She reports that Saturday she started with abdominal pain and fever with Tmax 102F as well as purulent discharge   The abdominal pain is LLQ " and suprapubic  She additionally reports bladder pain after voiding  She has been taking yunior and tylenol  Her fever continued today with 101F  She is afebrile on presentation, but did take tylenol prior to coming to ED  She was seen in the office on  and on exam it was noted that patient has diffuse fullness at vaginal cuff  CT was completed which showed small collection of fluid at the cuff  She denies chest pain, shortness of breath, nausea, vomiting, constipation  She cannot tell if the purulence is malodorous  Inpatient consult to Gynecologic Oncology  Consult performed by: Giovanna John MD  Consult ordered by: Gerri Machado DO          Review of Systems   Constitutional: Positive for fatigue and fever  Negative for chills  Respiratory: Negative for chest tightness and shortness of breath  Cardiovascular: Negative for chest pain and palpitations  Gastrointestinal: Positive for abdominal pain  Negative for nausea and vomiting  Genitourinary: Positive for dysuria, pelvic pain, vaginal discharge and vaginal pain  Negative for vaginal bleeding  Musculoskeletal: Negative  Neurological: Negative  Hematological: Negative  Psychiatric/Behavioral: Negative          Historical Information   Past Medical History:   Diagnosis Date   • Anxiety    • Chicken pox    • Depression    • Endometriosis    • GERD (gastroesophageal reflux disease)    • Headache     Occasional    • HSV-1 infection    • IBS (irritable bowel syndrome)    • Kidney stone    • Kidney stone on left side    • Multiple thyroid nodules    • Obesity    • Renal calculi      Past Surgical History:   Procedure Laterality Date   • APPENDECTOMY  2021   •  SECTION      x2   • CYSTOSCOPY N/A 2023    Procedure: CYSTOSCOPY;  Surgeon: Jaylon Green MD;  Location: [facility];  Service: Gynecology Oncology   • HYSTERECTOMY      robotic total laparoscopic hysterectomy with BS   • LAPAROSCOPIC ENDOMETRIOSIS FULGURATION  2018    laparoscopic excision of endometriosis, fulguration of endometriosis, lysis of adhesions   • LAPAROSCOPY  2014    with I&D    • MOLE REMOVAL      face - benign    • PELVIC LAPAROSCOPY Bilateral 2023    Procedure: SALPINGO-OOPHORECTOMY, LAPAROSCOPIC W/ROBOTICS, RADICAL RESECTION PELVIC ENDOMETRIOSIS;  Surgeon: Rio Pineda MD;  Location: [facility];  Service: Gynecology Oncology   • PROCTOSCOPY N/A 2023    Procedure: PROCTOSCOPY;  Surgeon: Rio Pineda MD;  Location: [facility];  Service: Gynecology Oncology   • SKIN CANCER EXCISION      abdomen   • THYROID CYST EXCISION     • TONSILLECTOMY     • TUBAL LIGATION  02/15/2016    With lysis of adhesion and peritoneal biopsy   • [facility] STUDY  2015   • VAGINA SURGERY N/A 2023    Procedure: VAGINECTOMY, PARTIAL;  Surgeon: Rio Pineda MD;  Location: [facility];  Service: Gynecology Oncology   • WISDOM TOOTH EXTRACTION       OB History    Para Term  AB Living   2 2 2     2   SAB IAB Ectopic Multiple Live Births                  # Outcome Date GA Lbr Eliud/2nd Weight Sex Delivery Anes PTL Lv   2 Term            1 Term              Family History   Problem Relation Age of Onset   • Coronary artery disease Mother    • Varicose Veins Mother    • Other Mother         breast cyst - removed    • Cholelithiasis Mother         had cholecystectomy   • Alcohol abuse Father    • Cholelithiasis Maternal Grandmother         had cholecystectomy   • Uterine cancer Paternal Grandmother    • Breast cancer Family         Before age 52    • Uterine cancer Family    • Obesity Family    • Hypertension Family    • Brain cancer Family    • Gallbladder disease Family      Social History   Social History     Substance and Sexual Activity   Alcohol Use Not Currently    Comment: rarely     Social History     Substance and Sexual Activity   Drug Use No     Social History Tobacco Use   Smoking Status Never   Smokeless Tobacco Never       Meds/Allergies   Current Facility-Administered Medications   Medication Dose Route Frequency   • calcium carbonate (TUMS) chewable tablet 1,000 mg  1,000 mg Oral Daily PRN   • docusate sodium (COLACE) capsule 100 mg  100 mg Oral BID   • enoxaparin (LOVENOX) subcutaneous injection 40 mg  40 mg Subcutaneous Q12H [facility]   • ketorolac (TORADOL) injection 15 mg  15 mg Intravenous Q6H [facility]   • methocarbamol (ROBAXIN) tablet 750 mg  750 mg Oral Q6H PRN   • multi-electrolyte (PLASMALYTE-A/ISOLYTE-S PH 7 4) IV solution  125 mL/hr Intravenous Continuous   • ondansetron (ZOFRAN) injection 4 mg  4 mg Intravenous Q6H PRN   • oxyCODONE (ROXICODONE) immediate release tablet 10 mg  10 mg Oral Q4H PRN   • oxyCODONE (ROXICODONE) IR tablet 5 mg  5 mg Oral Q4H PRN   • pantoprazole (PROTONIX) injection 40 mg  40 mg Intravenous Q24H SAHRA   • piperacillin-tazobactam (ZOSYN) 3 375 g in sodium chloride 0 9 % 100 mL IVPB  3 375 g Intravenous Q6H         Allergies   Allergen Reactions   • Reglan [Metoclopramide] Other (See Comments)     Elevated heartrate   • Other Blisters     Dermabond       Objective   Vitals: Blood pressure 118/70, pulse 69, temperature 98 4 °F (36 9 °C), temperature source Oral, resp  rate 18, SpO2 96 %  There is no height or weight on file to calculate BMI  I/O this shift:  In: 100 [IV Piggyback:100]  Out: -     Invasive Devices     Peripheral Intravenous Line  Duration           Peripheral IV 06/04/23 Left Antecubital <1 day                Physical Exam  Constitutional:       Comments: Patient appears uncomfortable in bed   Laying with her knees bent, unable to lay with legs flat due to pain    HENT:      Head: Normocephalic and atraumatic  Cardiovascular:      Rate and Rhythm: Normal rate and regular rhythm  Pulses: Normal pulses  Pulmonary:      Effort: Pulmonary effort is normal  No respiratory distress  Breath sounds:  No wheezing, rhonchi or rales  Abdominal:      General: There is no distension  Palpations: Abdomen is soft  Tenderness: There is abdominal tenderness  There is no guarding or rebound  Comments: Trocar sites healing well   TTP in LLQ and suprapubic    Genitourinary:     General: Normal vulva  Exam position: Lithotomy position  Vagina: Vaginal discharge present  No erythema or bleeding  Comments: Yellow purulence noted at the vaginal cuff   Cuff is intact, there is no bleeding   On digital exam, the vaginal cuff feels full as previously documented and is digitally intact  She is very tender on gentle examination   Musculoskeletal:         General: No tenderness  Cervical back: Normal range of motion  Right lower leg: No edema  Left lower leg: No edema  Neurological:      General: No focal deficit present  Mental Status: She is alert  Mental status is at baseline     Psychiatric:         Mood and Affect: Mood normal          Lab Results:   Admission on 06/04/2023   Component Date Value   • WBC 06/04/2023 11 79 (H)    • RBC 06/04/2023 4 30    • Hemoglobin 06/04/2023 12 6    • Hematocrit 06/04/2023 37 6    • MCV 06/04/2023 87    • MCH 06/04/2023 29 3    • MCHC 06/04/2023 33 5    • RDW 06/04/2023 13 1    • MPV 06/04/2023 9 4    • Platelets 23/09/8222 201    • nRBC 06/04/2023 0    • Neutrophils Relative 06/04/2023 79 (H)    • Immat GRANS % 06/04/2023 0    • Lymphocytes Relative 06/04/2023 12 (L)    • Monocytes Relative 06/04/2023 9    • Eosinophils Relative 06/04/2023 0    • Basophils Relative 06/04/2023 0    • Neutrophils Absolute 06/04/2023 9 27 (H)    • Immature Grans Absolute 06/04/2023 0 04    • Lymphocytes Absolute 06/04/2023 1 42    • Monocytes Absolute 06/04/2023 1 05    • Eosinophils Absolute 06/04/2023 0 00    • Basophils Absolute 06/04/2023 0 01    • Sodium 06/04/2023 136    • Potassium 06/04/2023 3 7    • Chloride 06/04/2023 104    • CO2 06/04/2023 24    • ANION GAP 06/04/2023 8    • BUN 06/04/2023 8    • Creatinine 06/04/2023 0 71    • Glucose 06/04/2023 95    • Calcium 06/04/2023 8 9    • AST 06/04/2023 11 (L)    • ALT 06/04/2023 12    • Alkaline Phosphatase 06/04/2023 48    • Total Protein 06/04/2023 7 1    • Albumin 06/04/2023 4 2    • Total Bilirubin 06/04/2023 1 60 (H)    • eGFR 06/04/2023 114    • Color, UA 06/04/2023 Light Yellow    • Clarity, UA 06/04/2023 Clear    • Specific Gravity, UA 06/04/2023 1 006    • pH, UA 06/04/2023 5 5    • Leukocytes, UA 06/04/2023 Negative    • Nitrite, UA 06/04/2023 Negative    • Protein, UA 06/04/2023 Negative    • Glucose, UA 06/04/2023 Negative    • Ketones, UA 06/04/2023 Negative    • Urobilinogen, UA 06/04/2023 <2 0    • Bilirubin, UA 06/04/2023 Negative    • Occult Blood, UA 06/04/2023 Negative    • EXT Preg Test, Ur 06/04/2023 Negative    • Control 06/04/2023 Valid    • SARS-CoV-2 06/04/2023 Negative      Imaging Studies:   CT ABDOMEN AND PELVIS WITH IV CONTRAST     INDICATION:   Abdominal pain, acute, nonlocalized  Abdominal Pain, Fever      COMPARISON: CT abdomen/pelvis 6/2/2023     TECHNIQUE:  CT examination of the abdomen and pelvis was performed  Multiplanar 2D reformatted images were created from the source data      This examination, like all CT scans performed in the [facility], was performed utilizing techniques to minimize radiation dose exposure, including the use of iterative reconstruction and automated exposure control  Radiation dose length   product (DLP) for this visit:  1540 mGy-cm     IV Contrast:  100 mL of iohexol (OMNIPAQUE)  Enteric Contrast:  Enteric contrast was not administered      FINDINGS:     ABDOMEN     LOWER CHEST:  No clinically significant abnormality identified in the visualized lower chest      LIVER/BILIARY TREE:  Unremarkable      GALLBLADDER:  No calcified gallstones   No pericholecystic inflammatory change      SPLEEN:  Unremarkable      PANCREAS:  Unremarkable      ADRENAL GLANDS:  Unremarkable      KIDNEYS/URETERS:  Unremarkable  No hydronephrosis      STOMACH AND BOWEL: Residual contrast within the colon  Mild thickening of the sigmoid colon adjacent to the pelvic abscess, likely reactive      APPENDIX: There are expected postoperative changes of appendectomy      ABDOMINOPELVIC CAVITY:  No ascites  No pneumoperitoneum  No lymphadenopathy      VESSELS:  Unremarkable for patient's age      PELVIS     REPRODUCTIVE ORGANS: Postsurgical changes of hysterectomy with a 2 0 x 4 1 x 1 6 cm rim-enhancing fluid collection containing a focus of air at the superior aspect of the vaginal cuff, concerning for abscess  Surrounding fat stranding      URINARY BLADDER: Underdistended with mild diffuse wall thickening, which may be reactive to the adjacent inflammatory process     ABDOMINAL WALL/INGUINAL REGIONS:  Unremarkable      OSSEOUS STRUCTURES:  No acute fracture or destructive osseous lesion      IMPRESSION:     Postsurgical changes of hysterectomy with a 4 1 cm abscess at the superior aspect of the vaginal cuff      Urinary bladder is underdistended with mild diffuse wall thickening, which may be reactive to the adjacent inflammatory process  Correlate with urinalysis          Torri Payne MD  OBGYN PGY-3  3:57 PM  6/4/2023

## 2023-06-04 NOTE — ED PROVIDER NOTES
History  Chief Complaint   Patient presents with   • Post-op Problem     Pt reports surgert in 5/18, had CT that showed fluid in abd, states fevers onset last night, severe abd pain, tylenol last at 1030, nausea, reports yellow discharge from vagina since Saturday that is a constant flow; bladder pain after emptying     Patient is a 72-year-old female with a history of endometriosis status post hysterectomy and salpingo-oophorectomy who presents with abdominal pain and fever  Patient states that she had salpingo-oophorectomy in May 2023  She was doing well postoperatively and her pain resolved, but she had developed vaginal bleeding and abdominal cramping several days ago  She had a CT performed on 6-23, which showed no acute intra-abdominal pathology and small amount of free fluid in the pelvis without abscess or hematoma  Overnight, she developed worsening lower abdominal pain and vaginal discharge  She describes a yellowish discharge, with no blood  She has been changing her pad almost every hour secondary to the discharge  She also notes a fever up to 102 °F   She called her physician who recommended she come to the emergency department for evaluation  Patient does admit to dysuria  She also admits to a cough for several days  She took Tylenol around 10:30 AM   She has not had any ibuprofen today  History provided by:  Patient  Abdominal Pain  Pain location:  Suprapubic  Pain radiates to:  Does not radiate  Pain severity:  Moderate  Timing:  Constant  Progression:  Worsening  Chronicity:  New  Ineffective treatments:  OTC medications  Associated symptoms: dysuria, fever and vaginal discharge    Associated symptoms: no chest pain, no chills, no constipation, no cough, no diarrhea, no hematuria, no nausea, no shortness of breath, no sore throat, no vaginal bleeding and no vomiting        Prior to Admission Medications   Prescriptions Last Dose Informant Patient Reported?  Taking?   acetaminophen (TYLENOL) 650 mg CR tablet   No No   Sig: Take 1 tablet (650 mg total) by mouth every 8 (eight) hours as needed for mild pain   ibuprofen (MOTRIN) 600 mg tablet   No No   Sig: Take 1 tablet (600 mg total) by mouth every 6 (six) hours as needed for mild pain   methocarbamol (ROBAXIN) 750 mg tablet   No No   Sig: Take 1 tablet (750 mg total) by mouth every 6 (six) hours as needed for muscle spasms   metroNIDAZOLE (FLAGYL) 500 mg tablet   Yes No   Sig: TAKE ONE TABLET BY MOUTH ONCE FOR ONE DOSE  TAKE AT 9:00 PM THE NIGHT BEFORE PROCEDURE   Patient not taking: Reported on 2023   neomycin (MYCIFRADIN) 500 mg tablet   Yes No   Sig: TAKE 2 TABLETS BY MOUTH THREE TIMES DAILY FOR 3 DOSES (TAKE AT 1 PM, 4 PM, AND 9 PM THE DAY BEFORE PROCEDURE)   Patient not taking: Reported on 2023   ondansetron (ZOFRAN) 4 mg tablet  Self No No   Sig: Take 1 tablet (4 mg total) by mouth every 6 (six) hours   Patient not taking: Reported on 2023   oxyCODONE-acetaminophen (Percocet) 5-325 mg per tablet   No No   Sig: Take 1 tablet by mouth every 12 (twelve) hours as needed for moderate pain Max Daily Amount: 2 tablets   polyethylene glycol (MiraLax) 17 GM/SCOOP powder   No No   Sig: Mix with 64 oz Gatorade, begin 4 PM day before surgery per bowel prep instructions     Patient not taking: Reported on 2023      Facility-Administered Medications: None       Past Medical History:   Diagnosis Date   • Anxiety    • Chicken pox    • Depression    • Endometriosis    • GERD (gastroesophageal reflux disease)    • Headache     Occasional    • HSV-1 infection    • IBS (irritable bowel syndrome)    • Kidney stone    • Kidney stone on left side    • Multiple thyroid nodules    • Obesity    • Renal calculi        Past Surgical History:   Procedure Laterality Date   • APPENDECTOMY  2021   •  SECTION      x2   • CYSTOSCOPY N/A 2023    Procedure: CYSTOSCOPY;  Surgeon: Leda Sidhu MD;  [facility];  Service: Gynecology Oncology   • HYSTERECTOMY      robotic total laparoscopic hysterectomy with BS   • LAPAROSCOPIC ENDOMETRIOSIS FULGURATION  09/04/2018    laparoscopic excision of endometriosis, fulguration of endometriosis, lysis of adhesions   • LAPAROSCOPY  05/06/2014    with I&D    • MOLE REMOVAL      face - benign    • PELVIC LAPAROSCOPY Bilateral 5/18/2023    Procedure: SALPINGO-OOPHORECTOMY, LAPAROSCOPIC W/ROBOTICS, RADICAL RESECTION PELVIC ENDOMETRIOSIS;  Surgeon: Netta Allen MD;  [facility];  Service: Gynecology Oncology   • PROCTOSCOPY N/A 5/18/2023    Procedure: PROCTOSCOPY;  Surgeon: Netta Allen MD;  [facility];  Service: Gynecology Oncology   • SKIN CANCER EXCISION      abdomen   • THYROID CYST EXCISION     • TONSILLECTOMY     • TUBAL LIGATION  02/15/2016    With lysis of adhesion and peritoneal biopsy   • [facility] STUDY  05/30/2015   • VAGINA SURGERY N/A 5/18/2023    Procedure: VAGINECTOMY, PARTIAL;  Surgeon: Netta Allen MD;  [facility];  Service: Gynecology Oncology   • WISDOM TOOTH EXTRACTION         Family History   Problem Relation Age of Onset   • Coronary artery disease Mother    • Varicose Veins Mother    • Other Mother         breast cyst - removed    • Cholelithiasis Mother         had cholecystectomy   • Alcohol abuse Father    • Cholelithiasis Maternal Grandmother         had cholecystectomy   • Uterine cancer Paternal Grandmother    • Breast cancer Family         Before age 52    • Uterine cancer Family    • Obesity Family    • Hypertension Family    • Brain cancer Family    • Gallbladder disease Family      I have reviewed and agree with the history as documented      E-Cigarette/Vaping   • E-Cigarette Use Never User      E-Cigarette/Vaping Substances   • Nicotine No    • THC No    • CBD No    • Flavoring No    • Other No    • Unknown No      Social History     Tobacco Use   • Smoking status: Never   • Smokeless tobacco: Never   Vaping Use   • Vaping Use: Never used   Substance Use Topics   • Alcohol use: Not Currently     Comment: rarely   • Drug use: No       Review of Systems   Constitutional: Positive for fever  Negative for chills and diaphoresis  HENT: Negative for nosebleeds, sore throat and trouble swallowing  Eyes: Negative for photophobia, pain and visual disturbance  Respiratory: Negative for cough, chest tightness and shortness of breath  Cardiovascular: Negative for chest pain, palpitations and leg swelling  Gastrointestinal: Positive for abdominal pain  Negative for constipation, diarrhea, nausea and vomiting  Endocrine: Negative for polydipsia and polyuria  Genitourinary: Positive for dysuria and vaginal discharge  Negative for difficulty urinating, hematuria, pelvic pain and vaginal bleeding  Musculoskeletal: Negative for back pain, neck pain and neck stiffness  Skin: Negative for pallor and rash  Neurological: Negative for dizziness, seizures, light-headedness and headaches  All other systems reviewed and are negative  Physical Exam  Physical Exam  Vitals and nursing note reviewed  Constitutional:       General: She is in acute distress (mild secondary to pain)  Appearance: She is well-developed  HENT:      Head: Normocephalic and atraumatic  Eyes:      Pupils: Pupils are equal, round, and reactive to light  Cardiovascular:      Rate and Rhythm: Normal rate and regular rhythm  Pulses: Normal pulses  Heart sounds: Normal heart sounds  Pulmonary:      Effort: Pulmonary effort is normal  No respiratory distress  Breath sounds: Normal breath sounds  Abdominal:      General: There is no distension  Palpations: Abdomen is soft  Abdomen is not rigid  Tenderness: There is abdominal tenderness in the suprapubic area and left lower quadrant  There is no right CVA tenderness, left CVA tenderness, guarding or rebound     Musculoskeletal: General: No tenderness  Normal range of motion  Cervical back: Normal range of motion and neck supple  Lymphadenopathy:      Cervical: No cervical adenopathy  Skin:     General: Skin is warm and dry  Capillary Refill: Capillary refill takes less than 2 seconds  Neurological:      Mental Status: She is alert and oriented to person, place, and time  Cranial Nerves: No cranial nerve deficit  Sensory: No sensory deficit           Vital Signs  ED Triage Vitals   Temperature Pulse Respirations Blood Pressure SpO2   06/04/23 1205 06/04/23 1205 06/04/23 1205 06/04/23 1205 06/04/23 1205   98 4 °F (36 9 °C) 82 18 135/92 99 %      Temp Source Heart Rate Source Patient Position - Orthostatic VS BP Location FiO2 (%)   06/04/23 1205 06/04/23 1205 06/04/23 1205 06/04/23 1345 --   Oral Monitor Sitting Right arm       Pain Score       06/04/23 1205       9           Vitals:    06/04/23 1925 06/04/23 1942 06/04/23 2200 06/05/23 0700   BP: 120/58 103/63 114/60 105/65   Pulse: 70 68 72 (!) 43   Patient Position - Orthostatic VS:  Lying  Lying         Visual Acuity      ED Medications  Medications   acetaminophen (TYLENOL) tablet 975 mg (975 mg Oral Given 6/5/23 0557)   ondansetron (ZOFRAN) injection 4 mg (has no administration in time range)   calcium carbonate (TUMS) chewable tablet 1,000 mg (has no administration in time range)   docusate sodium (COLACE) capsule 100 mg (100 mg Oral Given 6/5/23 0844)   methocarbamol (ROBAXIN) tablet 750 mg (750 mg Oral Given 6/5/23 0557)   ondansetron (ZOFRAN) injection 4 mg (has no administration in time range)   lidocaine (LIDODERM) 5 % patch 1 patch (0 patches Topical Hold 6/5/23 0558)   oxyCODONE (ROXICODONE) IR tablet 5 mg (5 mg Oral Given 6/5/23 0641)   gabapentin (NEURONTIN) capsule 300 mg (300 mg Oral Given 6/5/23 0844)   amoxicillin-clavulanate (AUGMENTIN) 875-125 mg per tablet 1 tablet (1 tablet Oral Given 6/5/23 0844)   ketorolac (TORADOL) injection 15 mg (15 mg Intravenous Given 6/4/23 1300)   morphine injection 6 mg (6 mg Intravenous Given 6/4/23 1337)   iohexol (OMNIPAQUE) 350 MG/ML injection (SINGLE-DOSE) 100 mL (100 mL Intravenous Given 6/4/23 1432)   ketorolac (TORADOL) injection 15 mg (15 mg Intravenous Given 6/5/23 0600)       Diagnostic Studies  Results Reviewed     Procedure Component Value Units Date/Time    CBC and differential [[id number]]  (Abnormal) Collected: 06/05/23 0558    Lab Status: Final result Specimen: Blood from Arm, Right Updated: 06/05/23 0621     WBC 7 13 Thousand/uL      RBC 3 96 Million/uL      Hemoglobin 11 7 g/dL      Hematocrit 34 9 %      MCV 88 fL      MCH 29 5 pg      MCHC 33 5 g/dL      RDW 13 0 %      MPV 9 0 fL      Platelets 581 Thousands/uL      nRBC 0 /100 WBCs      Neutrophils Relative 83 %      Immat GRANS % 1 %      Lymphocytes Relative 12 %      Monocytes Relative 4 %      Eosinophils Relative 0 %      Basophils Relative 0 %      Neutrophils Absolute 5 96 Thousands/µL      Immature Grans Absolute 0 04 Thousand/uL      Lymphocytes Absolute 0 83 Thousands/µL      Monocytes Absolute 0 29 Thousand/µL      Eosinophils Absolute 0 00 Thousand/µL      Basophils Absolute 0 01 Thousands/µL     Comprehensive metabolic panel [[id number]]  (Abnormal) Collected: 06/04/23 1340    Lab Status: Final result Specimen: Blood from Arm, Right Updated: 06/04/23 1417     Sodium 136 mmol/L      Potassium 3 7 mmol/L      Chloride 104 mmol/L      CO2 24 mmol/L      ANION GAP 8 mmol/L      BUN 8 mg/dL      Creatinine 0 71 mg/dL      Glucose 95 mg/dL      Calcium 8 9 mg/dL      AST 11 U/L      ALT 12 U/L      Alkaline Phosphatase 48 U/L      Total Protein 7 1 g/dL      Albumin 4 2 g/dL      Total Bilirubin 1 60 mg/dL      eGFR 114 ml/min/1 73sq m     Narrative:      [place] guidelines for Chronic Kidney Disease (CKD):   •  Stage 1 with normal or high GFR (GFR > 90 mL/min/1 73 square meters)  •  Stage 2 Mild CKD (GFR = 60-89 mL/min/1 73 square meters)  •  Stage 3A Moderate CKD (GFR = 45-59 mL/min/1 73 square meters)  •  Stage 3B Moderate CKD (GFR = 30-44 mL/min/1 73 square meters)  •  Stage 4 Severe CKD (GFR = 15-29 mL/min/1 73 square meters)  •  Stage 5 End Stage CKD (GFR <15 mL/min/1 73 square meters)  Note: GFR calculation is accurate only with a steady state creatinine    COVID only [[id number]]  (Normal) Collected: 06/04/23 1253    Lab Status: Final result Specimen: Nares from Nose Updated: 06/04/23 1333     SARS-CoV-2 Negative    Narrative:      FOR PEDIATRIC PATIENTS - copy/paste COVID Guidelines URL to browser: [email or web address]  [email or web address]    SARS-CoV-2 assay is a Nucleic Acid Amplification assay intended for the  qualitative detection of nucleic acid from SARS-CoV-2 in nasopharyngeal  swabs  Results are for the presumptive identification of SARS-CoV-2 RNA  Positive results are indicative of infection with SARS-CoV-2, the virus  causing COVID-19, but do not rule out bacterial infection or co-infection  with other viruses  Laboratories within the [facility] and its  territories are required to report all positive results to the appropriate  public health authorities  Negative results do not preclude SARS-CoV-2  infection and should not be used as the sole basis for treatment or other  patient management decisions  Negative results must be combined with  clinical observations, patient history, and epidemiological information  This test has not been FDA cleared or approved  This test has been authorized by FDA under an Emergency Use Authorization  (EUA)  This test is only authorized for the duration of time the  declaration that circumstances exist justifying the authorization of the  emergency use of an in vitro diagnostic tests for detection of SARS-CoV-2  virus and/or diagnosis of COVID-19 infection under section 564(b)(1) of  the Act, 21 U  S C  757IJU-7(Z)(6), unless the authorization is terminated  or revoked sooner  The test has been validated but independent review by FDA  and CLIA is pending  Test performed using [employer] Gene[employer]pert: This RT-PCR assay targets N2,  a region unique to SARS-CoV-2  A conserved region in the E-gene was chosen  for pan-Sarbecovirus detection which includes SARS-CoV-2  According to CMS-2020-01-R, this platform meets the definition of high-throughput technology      CBC and differential [[id number]]  (Abnormal) Collected: 06/04/23 1253    Lab Status: Final result Specimen: Blood from Arm, Right Updated: 06/04/23 1315     WBC 11 79 Thousand/uL      RBC 4 30 Million/uL      Hemoglobin 12 6 g/dL      Hematocrit 37 6 %      MCV 87 fL      MCH 29 3 pg      MCHC 33 5 g/dL      RDW 13 1 %      MPV 9 4 fL      Platelets 721 Thousands/uL      nRBC 0 /100 WBCs      Neutrophils Relative 79 %      Immat GRANS % 0 %      Lymphocytes Relative 12 %      Monocytes Relative 9 %      Eosinophils Relative 0 %      Basophils Relative 0 %      Neutrophils Absolute 9 27 Thousands/µL      Immature Grans Absolute 0 04 Thousand/uL      Lymphocytes Absolute 1 42 Thousands/µL      Monocytes Absolute 1 05 Thousand/µL      Eosinophils Absolute 0 00 Thousand/µL      Basophils Absolute 0 01 Thousands/µL     UA (URINE) with reflex to Scope [[id number]] Collected: 06/04/23 1242    Lab Status: Final result Specimen: Urine, Clean Catch Updated: 06/04/23 1303     Color, UA Light Yellow     Clarity, UA Clear     Specific Gravity, UA 1 006     pH, UA 5 5     Leukocytes, UA Negative     Nitrite, UA Negative     Protein, UA Negative mg/dl      Glucose, UA Negative mg/dl      Ketones, UA Negative mg/dl      Urobilinogen, UA <2 0 mg/dl      Bilirubin, UA Negative     Occult Blood, UA Negative    POCT pregnancy, urine [[id number]]  (Normal) Resulted: 06/04/23 1254    Lab Status: Final result Updated: 06/04/23 1254     EXT Preg Test, Ur Negative     Control Valid                 CT abdomen pelvis "with contrast   Final Result by Emma Boston MD (06/04 8679)      Postsurgical changes of hysterectomy with a 4 1 cm abscess at the superior aspect of the vaginal cuff  Urinary bladder is underdistended with mild diffuse wall thickening, which may be reactive to the adjacent inflammatory process  Correlate with urinalysis  The study was marked in [facility] for immediate notification  Workstation performed: [id number]         XR chest 2 views   ED Interpretation by Jama Wilkes DO (06/04 1334)   No infiltrates  No cardiomegaly  Final Result by Nikita Desir MD (06/04 2341)      No acute cardiopulmonary disease  Workstation performed: [id number]                    Procedures  Procedures         ED Course  ED Course as of 06/05/23 0919   Emerson Pen Jun 04, 2023   1314 Spoke with GYN resident who states that she will evaluate patient in the ED  However she asked that I place an order for CT abdomen/pelvis with IV contrast    1322 Patient was evaluated by Gyn onc resident  Exam revealed purulence from the cervical cuff  Medical Decision Making  Patient presents with abdominal pain and fever  She is postop from a hysterectomy and bilateral salpingo-oophorectomy  She is having vaginal discharge  Concern for cervical cuff dehiscence, infection  UTI also possible  Pelvic exam was performed by resident covering gynecology oncology service  There is discharge concerning for infection, therefore a CT was ordered  However patient will be admitted to gynecology oncology service for further treatment of likely infectious process      Portions of the above record have been created with voice recognition software   Occasional wrong word or \"sound alike\" substitutions may have occurred due to the inherent limitations of voice recognition software   Read the chart carefully and recognize, using context, where substitutions may have " occurred  Abdominal pain:     Details: Concern for postoperative infection, specifically infection of the cervical cuff  Will obtain CT to further characterize  Will admit to gynecology oncology service for further treatment  Patient is hemodynamically stable  Amount and/or Complexity of Data Reviewed  External Data Reviewed: labs, radiology and notes  Labs: ordered  Radiology: ordered and independent interpretation performed  Risk  Prescription drug management  Decision regarding hospitalization  Disposition  Final diagnoses:   Abdominal pain     Time reflects when diagnosis was documented in both MDM as applicable and the Disposition within this note     Time User Action Codes Description Comment    6/4/2023  1:09 PM Digna Sherwinjes TALAVERA Add [R10 9] Abdominal pain     6/4/2023  3:17 PM Cristel Zafar Add [N73 9] Pelvic abscess in female       ED Disposition     None      Follow-up Information     Follow up With Specialties Details Why Contact Info    Maye Saunders MD Gynecologic Oncology Follow up in 3 week(s)  [address] [place] [address]  [phone]            Current Discharge Medication List      CONTINUE these medications which have NOT CHANGED    Details   acetaminophen (TYLENOL) 650 mg CR tablet Take 1 tablet (650 mg total) by mouth every 8 (eight) hours as needed for mild pain  Qty: 30 tablet, Refills: 0    Associated Diagnoses: Post-op pain      ibuprofen (MOTRIN) 600 mg tablet Take 1 tablet (600 mg total) by mouth every 6 (six) hours as needed for mild pain  Qty: 30 tablet, Refills: 0    Associated Diagnoses: Post-op pain      methocarbamol (ROBAXIN) 750 mg tablet Take 1 tablet (750 mg total) by mouth every 6 (six) hours as needed for muscle spasms  Qty: 60 tablet, Refills: 0    Associated Diagnoses: Endometriosis;  Generalized abdominal pain; Chronic pelvic pain in female      metroNIDAZOLE (FLAGYL) 500 mg tablet TAKE ONE TABLET BY MOUTH ONCE FOR ONE DOSE  TAKE AT 9:00 PM THE NIGHT BEFORE PROCEDURE      neomycin (MYCIFRADIN) 500 mg tablet TAKE 2 TABLETS BY MOUTH THREE TIMES DAILY FOR 3 DOSES (TAKE AT 1 PM, 4 PM, AND 9 PM THE DAY BEFORE PROCEDURE)      ondansetron (ZOFRAN) 4 mg tablet Take 1 tablet (4 mg total) by mouth every 6 (six) hours  Qty: 12 tablet, Refills: 0    Associated Diagnoses: Gastroenteritis      oxyCODONE-acetaminophen (Percocet) 5-325 mg per tablet Take 1 tablet by mouth every 12 (twelve) hours as needed for moderate pain Max Daily Amount: 2 tablets  Qty: 30 tablet, Refills: 0    Associated Diagnoses: Chronic pelvic pain in female; Endometriosis      polyethylene glycol (MiraLax) 17 GM/SCOOP powder Mix with 64 oz Gatorade, begin 4 PM day before surgery per bowel prep instructions  Qty: 238 g, Refills: 0    Associated Diagnoses: Endometriosis             No discharge procedures on file      PDMP Review       Value Time User    PDMP Reviewed  Yes 5/22/2023  3:36 PM Yumiko Lou PA-C          ED Provider  Electronically Signed by           Fabienne Daily DO  06/05/23 6282

## 2023-06-04 NOTE — TELEPHONE ENCOUNTER
Patient had  Salpingo-Oophorectomy, Laparoscopic IC W/Robotics, Radical Resection Pelvic Endometriosis on 05/18 and is now experiencing fever ranging 100 3 - 102 0,  Pain,  yellow fluid discharge from vagina    She is requesting to speak with the on call provider    Paged to on call provider via TC

## 2023-06-05 VITALS
SYSTOLIC BLOOD PRESSURE: 105 MMHG | TEMPERATURE: 97.8 F | DIASTOLIC BLOOD PRESSURE: 65 MMHG | HEART RATE: 43 BPM | RESPIRATION RATE: 16 BRPM | OXYGEN SATURATION: 96 %

## 2023-06-05 DIAGNOSIS — R10.2 CHRONIC PELVIC PAIN IN FEMALE: ICD-10-CM

## 2023-06-05 DIAGNOSIS — G89.29 CHRONIC PELVIC PAIN IN FEMALE: ICD-10-CM

## 2023-06-05 DIAGNOSIS — R10.84 GENERALIZED ABDOMINAL PAIN: ICD-10-CM

## 2023-06-05 DIAGNOSIS — N80.9 ENDOMETRIOSIS: ICD-10-CM

## 2023-06-05 LAB
BASOPHILS # BLD AUTO: 0.01 THOUSANDS/ÂΜL (ref 0–0.1)
BASOPHILS NFR BLD AUTO: 0 % (ref 0–1)
EOSINOPHIL # BLD AUTO: 0 THOUSAND/ÂΜL (ref 0–0.61)
EOSINOPHIL NFR BLD AUTO: 0 % (ref 0–6)
ERYTHROCYTE [DISTWIDTH] IN BLOOD BY AUTOMATED COUNT: 13 % (ref 11.6–15.1)
HCT VFR BLD AUTO: 34.9 % (ref 34.8–46.1)
HGB BLD-MCNC: 11.7 G/DL (ref 11.5–15.4)
IMM GRANULOCYTES # BLD AUTO: 0.04 THOUSAND/UL (ref 0–0.2)
IMM GRANULOCYTES NFR BLD AUTO: 1 % (ref 0–2)
LYMPHOCYTES # BLD AUTO: 0.83 THOUSANDS/ÂΜL (ref 0.6–4.47)
LYMPHOCYTES NFR BLD AUTO: 12 % (ref 14–44)
MCH RBC QN AUTO: 29.5 PG (ref 26.8–34.3)
MCHC RBC AUTO-ENTMCNC: 33.5 G/DL (ref 31.4–37.4)
MCV RBC AUTO: 88 FL (ref 82–98)
MONOCYTES # BLD AUTO: 0.29 THOUSAND/ÂΜL (ref 0.17–1.22)
MONOCYTES NFR BLD AUTO: 4 % (ref 4–12)
NEUTROPHILS # BLD AUTO: 5.96 THOUSANDS/ÂΜL (ref 1.85–7.62)
NEUTS SEG NFR BLD AUTO: 83 % (ref 43–75)
NRBC BLD AUTO-RTO: 0 /100 WBCS
PLATELET # BLD AUTO: 146 THOUSANDS/UL (ref 149–390)
PMV BLD AUTO: 9 FL (ref 8.9–12.7)
RBC # BLD AUTO: 3.96 MILLION/UL (ref 3.81–5.12)
WBC # BLD AUTO: 7.13 THOUSAND/UL (ref 4.31–10.16)

## 2023-06-05 PROCEDURE — 99024 POSTOP FOLLOW-UP VISIT: CPT | Performed by: OBSTETRICS & GYNECOLOGY

## 2023-06-05 PROCEDURE — 85025 COMPLETE CBC W/AUTO DIFF WBC: CPT | Performed by: OBSTETRICS & GYNECOLOGY

## 2023-06-05 RX ORDER — AMOXICILLIN AND CLAVULANATE POTASSIUM 875; 125 MG/1; MG/1
1 TABLET, FILM COATED ORAL EVERY 12 HOURS SCHEDULED
Qty: 26 TABLET | Refills: 0 | Status: SHIPPED | OUTPATIENT
Start: 2023-06-05 | End: 2023-06-18

## 2023-06-05 RX ORDER — OXYCODONE HYDROCHLORIDE 5 MG/1
5 TABLET ORAL EVERY 4 HOURS PRN
Status: DISCONTINUED | OUTPATIENT
Start: 2023-06-05 | End: 2023-06-05 | Stop reason: HOSPADM

## 2023-06-05 RX ORDER — AMOXICILLIN AND CLAVULANATE POTASSIUM 875; 125 MG/1; MG/1
1 TABLET, FILM COATED ORAL EVERY 12 HOURS SCHEDULED
Status: DISCONTINUED | OUTPATIENT
Start: 2023-06-05 | End: 2023-06-05 | Stop reason: HOSPADM

## 2023-06-05 RX ORDER — LIDOCAINE 50 MG/G
1 PATCH TOPICAL DAILY
Status: DISCONTINUED | OUTPATIENT
Start: 2023-06-05 | End: 2023-06-05 | Stop reason: HOSPADM

## 2023-06-05 RX ORDER — GABAPENTIN 300 MG/1
300 CAPSULE ORAL 3 TIMES DAILY
Status: DISCONTINUED | OUTPATIENT
Start: 2023-06-05 | End: 2023-06-05 | Stop reason: HOSPADM

## 2023-06-05 RX ADMIN — DOCUSATE SODIUM 100 MG: 100 CAPSULE, LIQUID FILLED ORAL at 08:44

## 2023-06-05 RX ADMIN — OXYCODONE HYDROCHLORIDE 5 MG: 5 TABLET ORAL at 01:12

## 2023-06-05 RX ADMIN — AMOXICILLIN AND CLAVULANATE POTASSIUM 1 TABLET: 875; 125 TABLET, FILM COATED ORAL at 08:44

## 2023-06-05 RX ADMIN — KETOROLAC TROMETHAMINE 15 MG: 30 INJECTION, SOLUTION INTRAMUSCULAR; INTRAVENOUS at 00:08

## 2023-06-05 RX ADMIN — KETOROLAC TROMETHAMINE 15 MG: 30 INJECTION, SOLUTION INTRAMUSCULAR; INTRAVENOUS at 06:00

## 2023-06-05 RX ADMIN — OXYCODONE HYDROCHLORIDE 5 MG: 5 TABLET ORAL at 11:13

## 2023-06-05 RX ADMIN — PIPERACILLIN AND TAZOBACTAM 3.38 G: 36; 4.5 INJECTION, POWDER, FOR SOLUTION INTRAVENOUS at 03:17

## 2023-06-05 RX ADMIN — OXYCODONE HYDROCHLORIDE 5 MG: 5 TABLET ORAL at 06:41

## 2023-06-05 RX ADMIN — ACETAMINOPHEN 975 MG: 325 TABLET, FILM COATED ORAL at 05:57

## 2023-06-05 RX ADMIN — GABAPENTIN 300 MG: 300 CAPSULE ORAL at 08:44

## 2023-06-05 RX ADMIN — OXYCODONE HYDROCHLORIDE 5 MG: 5 TABLET ORAL at 15:30

## 2023-06-05 RX ADMIN — METHOCARBAMOL 750 MG: 500 TABLET ORAL at 11:13

## 2023-06-05 RX ADMIN — SODIUM CHLORIDE, SODIUM GLUCONATE, SODIUM ACETATE, POTASSIUM CHLORIDE, MAGNESIUM CHLORIDE, SODIUM PHOSPHATE, DIBASIC, AND POTASSIUM PHOSPHATE 125 ML/HR: .53; .5; .37; .037; .03; .012; .00082 INJECTION, SOLUTION INTRAVENOUS at 03:17

## 2023-06-05 RX ADMIN — ACETAMINOPHEN 975 MG: 325 TABLET, FILM COATED ORAL at 13:54

## 2023-06-05 RX ADMIN — METHOCARBAMOL 750 MG: 500 TABLET ORAL at 05:57

## 2023-06-05 NOTE — PLAN OF CARE
Problem: PAIN - ADULT  Goal: Verbalizes/displays adequate comfort level or baseline comfort level  Description: Interventions:  - Encourage patient to monitor pain and request assistance  - Assess pain using appropriate pain scale  - Administer analgesics based on type and severity of pain and evaluate response  - Implement non-pharmacological measures as appropriate and evaluate response  - Consider cultural and social influences on pain and pain management  - Notify physician/advanced practitioner if interventions unsuccessful or patient reports new pain  Outcome: Progressing     Problem: INFECTION - ADULT  Goal: Absence of fever/infection during neutropenic period  Description: INTERVENTIONS:  - Monitor WBC    Outcome: Progressing     Problem: SKIN/TISSUE INTEGRITY - ADULT  Goal: Incision(s), wounds(s) or drain site(s) healing without S/S of infection  Description: INTERVENTIONS  - Assess and document dressing, incision, wound bed, drain sites and surrounding tissue  - Provide patient and family education  - Perform skin care/dressing changes ever  Outcome: Progressing

## 2023-06-05 NOTE — PROGRESS NOTES
"For questions/concerns on this patient, please reach out to the followin AM - 5 PM [facility] GYN-GynOnc- Resident/AP  5 PM - 6 AM: [facility]- OB GYN SOD Resident Sr     Gyn Oncology Progress note   Tarah Nunez 29 y o  female MRN: [id number]  Unit/Bed#: MS 110Mary01 Encounter: [id number]    Assessment: 29 y o   1 Day Post-Op  from EUA and I&D of vaginal cuff abscess  Recovering well and stable for discharge home once transitioned to PO antibiotics  Plan:  * Pelvic abscess in female  Assessment & Plan  CT AP : \"Postsurgical changes of hysterectomy with a 2 0 x 4 1 x 1 6 cm rim-enhancing fluid collection containing a focus of air at the superior aspect of the vaginal cuff, concerning for abscess  Surrounding fat stranding  \"    POD#1 s/p EUA and I&D of vaginal cuff abscess    ABX: Zosyn q6h IV --> transition to PO Augmentin this morning   Pain control: tylenol, gabapentin, robaxin, yunior 5 PRN for pain   Labs: WBC 11 8 --> 7  Hgb 12 6 --> 11 7  Vitals: remains afebrile  Diet: regular diet    Plan for discharge home today  DVT ppx: SCDs and Lovenox       Subjective:    Tarah Nunez has no current complaints  Pain is well controlled and she was able to sleep through the night  Patient is currently voiding  She is ambulating  Patient is currently passing flatus and has had no bowel movement  She is tolerating PO, and denies nausea or vomiting  Patient denies fever, chills, chest pain, shortness of breath, or calf tenderness  /60   Pulse 72   Temp 97 9 °F (36 6 °C)   Resp 18   SpO2 97%     I/O last 3 completed shifts:   In: 100 [IV Piggyback:100]  Out: -   I/O this shift:  In: 300 [I V :300]  Out: -     Lab Results   Component Value Date    HCT 34 9 2023    HGB 11 7 2023    MCV 88 2023     (L) 2023    WBC 7 13 2023       Lab Results   Component Value Date    BUN 8 2023    CALCIUM 8 9 2023     2023    CO2 24 2023    CREATININE " 0 71 06/04/2023    GLUCOSE 97 02/26/2014    K 3 7 06/04/2023     02/26/2014           Physical Exam  Constitutional:       Appearance: Normal appearance  HENT:      Head: Normocephalic and atraumatic  Eyes:      Extraocular Movements: Extraocular movements intact  Pulmonary:      Effort: Pulmonary effort is normal    Abdominal:      General: There is no distension  Palpations: Abdomen is soft  Tenderness: There is no abdominal tenderness  There is no guarding  Musculoskeletal:         General: Normal range of motion  Right lower leg: No edema  Left lower leg: No edema  Skin:     General: Skin is warm and dry  Neurological:      Mental Status: She is alert  Mental status is at baseline     Psychiatric:         Mood and Affect: Mood normal          Behavior: Behavior normal            Kole Babcock MD  6/5/2023  6:58 AM

## 2023-06-05 NOTE — UTILIZATION REVIEW
NOTIFICATION OF INPATIENT ADMISSION   AUTHORIZATION REQUEST   SERVICING FACILITY:   [address] Wallace [address]  Tax ID: [id number]  NPI: [id number]   ATTENDING PROVIDER:  Attending Name and NPI#: Suzan Light [[id number]]  Address: [address] [place] [address] Dr Rodrigo byrd  [address]  Phone: [phone]     ADMISSION INFORMATION:  Place of Service: Inpatient [facility]  [address]  Place of Service Code: 21  Inpatient Admission Date/Time: 6/4/23  2:34 PM  Discharge Date/Time: No discharge date for patient encounter  Admitting Diagnosis Code/Description:  Abdominal pain [R10 9]  Fever [R50 9]  Pelvic abscess in female [N73 9]     UTILIZATION REVIEW CONTACT:  Shan Pineda Utilization   Network Utilization Review Department  Phone: [phone]  Fax: [phone]  Email: [email or web address]  Contact for approvals/pending authorizations, clinical reviews, and discharge  PHYSICIAN ADVISORY SERVICES:  Medical Necessity Denial & Txvk-[id number]-[id number] Review  Phone: [phone]  Fax: [phone]  Email: [email or web address]

## 2023-06-05 NOTE — QUICK NOTE
Patient examined, she is feeling well  Pain overall controlled with PO meds  Will discharge home with PO antibiotics and plan for outpatient follow up with Gyn Oncology on 6/8/23

## 2023-06-05 NOTE — UTILIZATION REVIEW
Initial Clinical Review    Admission: Date/Time/Statement:   Admission Orders (From admission, onward)     Ordered        06/04/23 1434  Inpatient Admission  Once                      Orders Placed This Encounter   Procedures   • Inpatient Admission     Standing Status:   Standing     Number of Occurrences:   1     Order Specific Question:   Level of Care     Answer:   Med Surg [16]     Order Specific Question:   Estimated length of stay     Answer:   More than 2 Midnights     Order Specific Question:   Certification     Answer:   I certify that inpatient services are medically necessary for this patient for a duration of greater than two midnights  See H&P and MD Progress Notes for additional information about the patient's course of treatment  ED Arrival Information     Expected   -    Arrival   6/4/2023 11:52    Acuity   Emergent            Means of arrival   Walk-In    Escorted by   [address] [place] [address] Oncology    Admission type   Emergency            Arrival complaint   18th surgery/fever/abd pain           Chief Complaint   Patient presents with   • Post-op Problem     Pt reports surgert in 5/18, had CT that showed fluid in abd, states fevers onset last night, severe abd pain, tylenol last at 1030, nausea, reports yellow discharge from vagina since Saturday that is a constant flow; bladder pain after emptying       Initial Presentation: 29 y o  female  S/p x 2 weeks robotic assisted BSO, resection of endometriosis, cystoscopy, partial vaginectomy for chronic pelvic pain who presents to ED from home with fever and purulent vaginal discharge  She reports that Saturday she started with abdominal pain and fever with Tmax 102F as well as purulent discharge  Also reports bladder pain after voiding  Pt  seen in the office on 6/1 and on exam it was noted that patient has diffuse fullness at vaginal cuff  On exam today, pt afebrile, states took tylenol at home for fever 101 F at home today   Trocar sites healing well , pt tender to palpation LLQ and suprapubic  Yellow purulence noted at the vaginal cuff    Cuff is intact, there is no bleeding    On digital exam, the vaginal cuff feels full as previously documented and is digitally intact  She is very tender on gentle examination   Labs WBC 11 79  CT shows small collection of fluid at the cuff, concerning for abscess  Pt admitted as Inpatient by GYN Onc service with pelvic abscess  Plan - IV Zosyn, pain control with scheduled IV Toradol, prn analgesics, trend WBC, NPO, DVT ppx  Possible IR consult  Update - case discussed with IR-  abscess may be too high to drain  Plan-take her to OR for exam under anesthesia  6/4/23 @165  EXAM UNDER ANESTHESIA (EUA)  I& D vaginal cuff abscess  Anesthesia Type: LMA  Operative Findings:  1  Vaginal cuff easily opened with digital exam at midline with drainage of serous fluid  2  No rectal involvement on digital exam  3  No bladder involvement as evidenced with placement of taylor catheter      Date: 6/5  POD #1 Day 2:    IV Zosyn transitioned to po Augmentin today  Pt is voiding , passing flatus, no bm yet  Tolerating po   Denies abdominal tenderness  IV Toradol d/c'ed today  WBC's normalized   Hgb 12 6-->11 7Pt afebrile       ED Triage Vitals   Temperature Pulse Respirations Blood Pressure SpO2   06/04/23 1205 06/04/23 1205 06/04/23 1205 06/04/23 1205 06/04/23 1205   98 4 °F (36 9 °C) 82 18 135/92 99 %      Temp Source Heart Rate Source Patient Position - Orthostatic VS BP Location FiO2 (%)   06/04/23 1205 06/04/23 1205 06/04/23 1205 06/04/23 1345 --   Oral Monitor Sitting Right arm       Pain Score       06/04/23 1205       9          Wt Readings from Last 1 Encounters:   06/01/23 107 kg (235 lb 3 2 oz)     Additional Vital Signs:   Date/Time Temp Pulse Resp BP MAP (mmHg) SpO2   06/05/23 0700 97 8 °F (36 6 °C) 43 Abnormal  16 105/65 79 96 %   06/04/23 2214 -- -- -- -- -- --   06/04/23 2200 97 9 °F (36 6 °C) 72 18 114/60 80 97 % 06/04/23 1942 97 8 °F (36 6 °C) 68 18 103/63 78 98 %   06/04/23 1925 97 3 °F (36 3 °C) Abnormal  70 20 120/58 -- 96 %   06/04/23 1920 97 1 °F (36 2 °C) Abnormal  65 20 103/59 -- 95 %   06/04/23 1912 97 °F (36 1 °C) Abnormal  80 18 106/55 -- 94 %   06/04/23 1905 97 1 °F (36 2 °C) Abnormal  70 20 103/59 -- 98 %   06/04/23 1823 97 7 °F (36 5 °C) 76 20 119/70 -- 99 %   06/04/23 1345 -- 69 18 118/70 89 96 %     Pertinent Labs/Diagnostic Test Results:   CT abdomen pelvis with contrast   Final Result by Sabrina Diop MD (06/04 4823)      Postsurgical changes of hysterectomy with a 4 1 cm abscess at the superior aspect of the vaginal cuff  Urinary bladder is underdistended with mild diffuse wall thickening, which may be reactive to the adjacent inflammatory process  Correlate with urinalysis  The study was marked in [facility] for immediate notification  Workstation performed: [id number]         XR chest 2 views   ED Interpretation by Erik Thomason DO (06/04 1334)   No infiltrates  No cardiomegaly  Final Result by Brii Arevalo MD (06/04 4381)      No acute cardiopulmonary disease                    Workstation performed: [id number]           Results from last 7 days   Lab Units 06/04/23  1253   SARS-COV-2  Negative     Results from last 7 days   Lab Units 06/05/23  0558 06/04/23  1253   HEMATOCRIT % 34 9 37 6   HEMOGLOBIN g/dL 11 7 12 6   NEUTROS ABS Thousands/µL 5 96 9 27*   PLATELETS Thousands/uL 146* 201   WBC Thousand/uL 7 13 11 79*         Results from last 7 days   Lab Units 06/04/23  1340   ANION GAP mmol/L 8   BUN mg/dL 8   CALCIUM mg/dL 8 9   CHLORIDE mmol/L 104   CO2 mmol/L 24   CREATININE mg/dL 0 71   EGFR ml/min/1 73sq m 114   POTASSIUM mmol/L 3 7   SODIUM mmol/L 136     Results from last 7 days   Lab Units 06/04/23  1340   ALBUMIN g/dL 4 2   ALK PHOS U/L 48   ALT U/L 12   AST U/L 11*   TOTAL BILIRUBIN mg/dL 1 60*   TOTAL PROTEIN g/dL 7 1         Results from last 7 days   Lab Units 06/04/23  1340   GLUCOSE RANDOM mg/dL 95                   Results from last 7 days   Lab Units 06/04/23  1242   BILIRUBIN UA  Negative   BLOOD UA  Negative   CLARITY UA  Clear   COLOR UA  Light Yellow   GLUCOSE UA mg/dl Negative   KETONES UA mg/dl Negative   LEUKOCYTES UA  Negative   NITRITE UA  Negative   PH UA  5 5   PROTEIN UA mg/dl Negative   SPEC GRAV UA  1 006   UROBILINOGEN UA (BE) mg/dl <2 0                     ED Treatment:   Medication Administration from 06/04/2023 1152 to 06/04/2023 1621       Date/Time Order Dose Route Action     06/04/2023 1300 EDT ketorolac (TORADOL) injection 15 mg 15 mg Intravenous Given     06/04/2023 1337 EDT morphine injection 6 mg 6 mg Intravenous Given     06/04/2023 1432 EDT iohexol (OMNIPAQUE) 350 MG/ML injection (SINGLE-DOSE) 100 mL 100 mL Intravenous Given     06/04/2023 1620 EDT multi-electrolyte (PLASMALYTE-A/ISOLYTE-S PH 7 4) IV solution 125 mL/hr Intravenous New Bag     06/04/2023 1516 EDT pantoprazole (PROTONIX) injection 40 mg 40 mg Intravenous Given     06/04/2023 1515 EDT enoxaparin (LOVENOX) subcutaneous injection 40 mg 40 mg Subcutaneous Given     06/04/2023 1520 EDT piperacillin-tazobactam (ZOSYN) 3 375 g in sodium chloride 0 9 % 100 mL IVPB 3 375 g Intravenous New Bag        Past Medical History:   Diagnosis Date   • Anxiety    • Chicken pox    • Depression    • Endometriosis    • GERD (gastroesophageal reflux disease)    • Headache     Occasional    • HSV-1 infection    • IBS (irritable bowel syndrome)    • Kidney stone    • Kidney stone on left side    • Multiple thyroid nodules    • Obesity    • Renal calculi      Present on Admission:  **None**      Admitting Diagnosis: Abdominal pain [R10 9]  Fever [R50 9]  Pelvic abscess in female [N73 9]  Age/Sex: 29 y o  female  Admission Orders:  Scheduled Medications:  acetaminophen, 975 mg, Oral, Q8H SAHRA  amoxicillin-clavulanate, 1 tablet, Oral, Q12H SAHRA  docusate sodium, 100 mg, Oral, BID  gabapentin, 300 mg, Oral, TID  lidocaine, 1 patch, Topical, Daily  methocarbamol, 750 mg, Oral, Q6H SAHRA    piperacillin-tazobactam (ZOSYN) 3 375 g in sodium chloride 0 9 % 100 mL IVPB  Dose: 3 375 g  Freq: Every 6 hours Route: IV  Last Dose: 3 375 g (06/05/23 0317)  Start: 06/04/23 2100 End: 06/05/23 0618  ketorolac (TORADOL) injection 15 mg  Dose: 15 mg  Freq: Every 6 hours scheduled Route: IV  Start: 06/05/23 0100 End: 06/05/23 0600          Continuous IV Infusions:    multi-electrolyte (PLASMALYTE-A/ISOLYTE-S PH 7 4) IV solution  Rate: 125 mL/hr Dose: 125 mL/hr  Freq: Continuous Route: IV  Last Dose: 125 mL/hr (06/05/23 0317)  Start: 06/04/23 1445 End: 06/05/23 6818  PRN Meds:  calcium carbonate, 1,000 mg, Oral, Daily PRN  ondansetron, 4 mg, Intravenous, Q6H PRN  ondansetron, 4 mg, Intravenous, Q6H PRN  oxyCODONE, 5 mg, Oral, Q4H PRN x1 6/4, x3 6/5  oxyCODONE (ROXICODONE) immediate release tablet 10 mg  Dose: 10 mg  Freq: Every 4 hours PRN Route: PO  PRN Reason: severe pain  Start: 06/04/23 1437 End: 06/04/23 2021 x1 6/4       Reg diet 6/5    SCD's   Up as abdirashid  IP CONSULT TO GYNECOLOGIC ONCOLOGY    Network Utilization Review Department  ATTENTION: Please call with any questions or concerns to [phone] and carefully listen to the prompts so that you are directed to the right person  All voicemails are confidential   Aniket De La Vega all requests for admission clinical reviews, approved or denied determinations and any other requests to dedicated fax number below belonging to the campus where the patient is receiving treatment   List of dedicated fax numbers for the Facilities:  [address] DENIALS (Administrative/Medical Necessity) [phone]   [address] (Maternity/NICU/Pediatrics) [phone]   916 Skylar Moses [address] [place]  [phone]   [facility] [phone] [address] [place] [address] [facility] [address] [facility] 134 956 [facility] [phone]

## 2023-06-06 ENCOUNTER — TRANSITIONAL CARE MANAGEMENT (OUTPATIENT)
Dept: INTERNAL MEDICINE CLINIC | Facility: OTHER | Age: 33
End: 2023-06-06

## 2023-06-06 RX ORDER — METHOCARBAMOL 750 MG/1
750 TABLET, FILM COATED ORAL EVERY 6 HOURS PRN
Qty: 60 TABLET | Refills: 0 | Status: SHIPPED | OUTPATIENT
Start: 2023-06-06

## 2023-06-07 ENCOUNTER — TELEPHONE (OUTPATIENT)
Dept: GYNECOLOGIC ONCOLOGY | Facility: CLINIC | Age: 33
End: 2023-06-07

## 2023-06-07 DIAGNOSIS — R19.7 DIARRHEA, UNSPECIFIED TYPE: Primary | ICD-10-CM

## 2023-06-07 NOTE — TELEPHONE ENCOUNTER
----- Message from Daniel Boyce sent at 6/7/2023 11:47 AM EDT -----  Regarding: FW: Rash  Contact: [phone]    ----- Message -----  From: Governor العراقي  Sent: 6/7/2023  11:44 AM EDT  To: Brendan Rai [facility] Clinical  Subject: Rash                                             I’ve also had diarrhea, which could be from the antibiotics but it’s straight liquid

## 2023-06-08 ENCOUNTER — HOSPITAL ENCOUNTER (OUTPATIENT)
Dept: RADIOLOGY | Facility: HOSPITAL | Age: 33
Discharge: HOME | End: 2023-06-08
Attending: OBSTETRICS & GYNECOLOGY
Payer: COMMERCIAL

## 2023-06-08 ENCOUNTER — OFFICE VISIT (OUTPATIENT)
Dept: GYNECOLOGIC ONCOLOGY | Facility: CLINIC | Age: 33
End: 2023-06-08

## 2023-06-08 VITALS
HEART RATE: 65 BPM | DIASTOLIC BLOOD PRESSURE: 76 MMHG | WEIGHT: 237.5 LBS | HEIGHT: 64 IN | SYSTOLIC BLOOD PRESSURE: 120 MMHG | TEMPERATURE: 97.6 F | BODY MASS INDEX: 40.55 KG/M2 | OXYGEN SATURATION: 100 %

## 2023-06-08 DIAGNOSIS — G89.29 CHRONIC PELVIC PAIN IN FEMALE: ICD-10-CM

## 2023-06-08 DIAGNOSIS — R10.2 CHRONIC PELVIC PAIN IN FEMALE: ICD-10-CM

## 2023-06-08 DIAGNOSIS — R05.1 ACUTE COUGH: ICD-10-CM

## 2023-06-08 DIAGNOSIS — R05.1 ACUTE COUGH: Primary | ICD-10-CM

## 2023-06-08 DIAGNOSIS — N80.9 ENDOMETRIOSIS: ICD-10-CM

## 2023-06-08 DIAGNOSIS — N73.9 PELVIC ABSCESS IN FEMALE: ICD-10-CM

## 2023-06-08 PROBLEM — N95.1 MENOPAUSAL SYMPTOMS: Status: ACTIVE | Noted: 2023-06-08

## 2023-06-08 PROCEDURE — 71046 X-RAY EXAM CHEST 2 VIEWS: CPT

## 2023-06-08 RX ORDER — OXYCODONE HYDROCHLORIDE AND ACETAMINOPHEN 5; 325 MG/1; MG/1
1 TABLET ORAL EVERY 6 HOURS PRN
Qty: 30 TABLET | Refills: 0 | Status: SHIPPED | OUTPATIENT
Start: 2023-06-08 | End: 2023-06-15 | Stop reason: SDUPTHER

## 2023-06-08 NOTE — LETTER
June 9, 2023     Catarina Linder, [address] [facility]    Patient: Kahlil Dewey   YOB: 1990   Date of Visit: 6/8/2023       Dear Dr Miguel Welch:    Thank you for referring Kahlil Dewey to me for evaluation  Below are my notes for this consultation  If you have questions, please do not hesitate to call me  I look forward to following your patient along with you  Sincerely,        Bairon Cook MD        CC: MD Bairon Beck MD  6/9/2023 10:43 AM  Sign when Signing Visit  Assessment/Plan:    Problem List Items Addressed This Visit          Other    Chronic pelvic pain in female    Relevant Medications    oxyCODONE-acetaminophen (Percocet) 5-325 mg per tablet    Endometriosis    Relevant Medications    estrogen, conjugated,-medroxyprogesterone (PREMPRO) 0 3-1 5 MG per tablet    oxyCODONE-acetaminophen (Percocet) 5-325 mg per tablet    Pelvic abscess in female     72-year-old status post radical resection of pelvic endometriosis, bilateral oophorectomy on 7/44/1146 complicated by vaginal cuff abscess  She is now status post incision and drainage of the abscess on 6/4/2023  She is currently on Augmentin therapy  She has ongoing drainage and pelvic pain  She has new onset cough  She has menopausal symptoms  Her performance status is 0   1   Plan to continue Augmentin therapy for total of 14 days after incision and drainage  2   I discussed pain management including ibuprofen 600 mg every 6 hours, Tylenol  I also refilled her oxycodone prescription  She understands that oxycodone is habit-forming/has addictive potential and that it can cause constipation, somnolence  3   I discussed management of her menopausal symptoms with hormone replacement therapy  We discussed the differences between estrogen replacement versus estrogen/progesterone replacement    Based upon her severe endometriosis and pelvic pain, I recommended combined HRT  She understands the slight increase in risk of breast cancer with combined HRT and will comply with screening mammography  4   Chest x-ray to evaluate acute cough  5  Return in 3 weeks for evaluation  I spent 25 minutes with the patient  More than half the time was spent in counseling and coordination of care regarding treatment of her menopausal symptoms  Only brief time was spent on the postoperative history and physical examination  Acute cough - Primary    Relevant Orders    XR chest pa & lateral         CHIEF COMPLAINT: Cough, pelvic pain, menopausal symptoms, vaginal discharge        Patient ID: Maria Elena Mondragon is a 29 y o  female  Who returns after hospital admission for vaginal apex abscess  She had a CT of the abdomen pelvis on 6/2/23 that did not reveal any evidence of abscess, however, pain worsened and she began to have fevers to 102 degrees at home  She was then referred to the emergency department where repeat CT imaging was performed that revealed a 4 1 cm vaginal apex abscess  It was not drainable by interventional radiology  Therefore, she was taken to the operating room for incision and drainage  She continues to have ongoing drainage from the vagina  She has been afebrile  She has pain that has been requiring narcotic therapy  She is currently on Augmentin  She has hot flashes that are affecting her quality of life  The following portions of the patient's history were reviewed and updated as appropriate: allergies, current medications, past family history, past medical history, past social history, past surgical history and problem list     Review of Systems   Constitutional: Positive for activity change, appetite change and fever  Negative for unexpected weight change  HENT: Negative  Eyes: Negative  Respiratory: Negative  Cardiovascular: Negative  Gastrointestinal: Negative for abdominal distention and abdominal pain  "  Endocrine: Negative  Genitourinary: Positive for pelvic pain, vaginal bleeding and vaginal discharge  Musculoskeletal: Negative  Skin: Negative  Allergic/Immunologic: Negative  Neurological: Negative  Hematological: Negative  Psychiatric/Behavioral: Negative  Current Outpatient Medications   Medication Sig Dispense Refill   • acetaminophen (TYLENOL) 650 mg CR tablet Take 1 tablet (650 mg total) by mouth every 8 (eight) hours as needed for mild pain 30 tablet 0   • amoxicillin-clavulanate (AUGMENTIN) 875-125 mg per tablet Take 1 tablet by mouth every 12 (twelve) hours for 13 days 26 tablet 0   • estrogen, conjugated,-medroxyprogesterone (PREMPRO) 0 3-1 5 MG per tablet Take 1 tablet by mouth daily 30 tablet 0   • ibuprofen (MOTRIN) 600 mg tablet Take 1 tablet (600 mg total) by mouth every 6 (six) hours as needed for mild pain 30 tablet 0   • methocarbamol (ROBAXIN) 750 mg tablet Take 1 tablet (750 mg total) by mouth every 6 (six) hours as needed for muscle spasms 60 tablet 0   • oxyCODONE-acetaminophen (Percocet) 5-325 mg per tablet Take 1 tablet by mouth every 6 (six) hours as needed for severe pain Max Daily Amount: 4 tablets 30 tablet 0   • polyethylene glycol (MiraLax) 17 GM/SCOOP powder Mix with 64 oz Gatorade, begin 4 PM day before surgery per bowel prep instructions  (Patient not taking: Reported on 6/1/2023) 238 g 0     No current facility-administered medications for this visit  Objective:    Blood pressure 120/76, pulse 65, temperature 97 6 °F (36 4 °C), temperature source Temporal, height 5' 4\" (1 626 m), weight 108 kg (237 lb 8 oz), SpO2 100 %  Body mass index is 40 77 kg/m²  Body surface area is 2 11 meters squared  Physical Exam  Vitals reviewed  Constitutional:       General: She is not in acute distress  Appearance: Normal appearance  She is not ill-appearing  HENT:      Head: Normocephalic and atraumatic        Mouth/Throat:      Mouth: Mucous " membranes are moist    Eyes:      General: No scleral icterus  Right eye: No discharge  Left eye: No discharge  Conjunctiva/sclera: Conjunctivae normal    Pulmonary:      Effort: Pulmonary effort is normal    Musculoskeletal:      Right lower leg: No edema  Left lower leg: No edema  Skin:     General: Skin is warm and dry  Coloration: Skin is not jaundiced  Findings: No rash  Comments: Incisions are clean dry and intact   Neurological:      General: No focal deficit present  Mental Status: She is alert and oriented to person, place, and time  Cranial Nerves: No cranial nerve deficit  Motor: No weakness  Gait: Gait normal    Psychiatric:         Mood and Affect: Mood normal          Behavior: Behavior normal          Thought Content:  Thought content normal          Judgment: Judgment normal          Lab Results   Component Value Date     17 3 04/27/2023     Lab Results   Component Value Date    ALKPHOS 48 06/04/2023    ALT 12 06/04/2023    ANIONGAP 7 02/26/2014    AST 11 (L) 06/04/2023    BUN 8 06/04/2023    CALCIUM 8 9 06/04/2023     06/04/2023    CO2 24 06/04/2023    CORRECTEDCA 9 2 07/07/2021    CREATININE 0 71 06/04/2023    EGFR 114 06/04/2023    GLUCOSE 97 02/26/2014    GLUF 88 04/27/2023    K 3 7 06/04/2023     02/26/2014     Lab Results   Component Value Date    HCT 34 9 06/05/2023    HGB 11 7 06/05/2023    MCV 88 06/05/2023     (L) 06/05/2023    WBC 7 13 06/05/2023     Lab Results   Component Value Date    NEUTROABS 5 96 06/05/2023        Trend:  Lab Results   Component Value Date     17 3 04/27/2023

## 2023-06-09 ENCOUNTER — TELEPHONE (OUTPATIENT)
Dept: HEMATOLOGY ONCOLOGY | Facility: CLINIC | Age: 33
End: 2023-06-09

## 2023-06-09 NOTE — TELEPHONE ENCOUNTER
Patient Call    Who are you speaking with? Patient    If it is not the patient, are they listed on an active communication consent form? N/A   What is the reason for this call? The patient is requesting to speak to somebody from the office regarding her medication  Does this require a call back? Yes   If a call back is required, please list [facility] call back number [phone]   If a call back is required, advise that a message will be forwarded to their care team and someone will return their call as soon as possible  Did you relay this information to the patient?  Yes

## 2023-06-09 NOTE — ASSESSMENT & PLAN NOTE
22-year-old status post radical resection of pelvic endometriosis, bilateral oophorectomy on 8/06/4332 complicated by vaginal cuff abscess  She is now status post incision and drainage of the abscess on 6/4/2023  She is currently on Augmentin therapy  She has ongoing drainage and pelvic pain  She has new onset cough  She has menopausal symptoms  Her performance status is 0   1   Plan to continue Augmentin therapy for total of 14 days after incision and drainage  2   I discussed pain management including ibuprofen 600 mg every 6 hours, Tylenol  I also refilled her oxycodone prescription  She understands that oxycodone is habit-forming/has addictive potential and that it can cause constipation, somnolence  3   I discussed management of her menopausal symptoms with hormone replacement therapy  We discussed the differences between estrogen replacement versus estrogen/progesterone replacement  Based upon her severe endometriosis and pelvic pain, I recommended combined HRT  She understands the slight increase in risk of breast cancer with combined HRT and will comply with screening mammography  4   Chest x-ray to evaluate acute cough  5  Return in 3 weeks for evaluation  I spent 25 minutes with the patient  More than half the time was spent in counseling and coordination of care regarding treatment of her menopausal symptoms  Only brief time was spent on the postoperative history and physical examination

## 2023-06-09 NOTE — PROGRESS NOTES
Assessment/Plan:    Problem List Items Addressed This Visit        Other    Chronic pelvic pain in female    Relevant Medications    oxyCODONE-acetaminophen (Percocet) 5-325 mg per tablet    Endometriosis    Relevant Medications    estrogen, conjugated,-medroxyprogesterone (PREMPRO) 0 3-1 5 MG per tablet    oxyCODONE-acetaminophen (Percocet) 5-325 mg per tablet    Pelvic abscess in female     82-year-old status post radical resection of pelvic endometriosis, bilateral oophorectomy on 7/22/5376 complicated by vaginal cuff abscess  She is now status post incision and drainage of the abscess on 6/4/2023  She is currently on Augmentin therapy  She has ongoing drainage and pelvic pain  She has new onset cough  She has menopausal symptoms  Her performance status is 0   1   Plan to continue Augmentin therapy for total of 14 days after incision and drainage  2   I discussed pain management including ibuprofen 600 mg every 6 hours, Tylenol  I also refilled her oxycodone prescription  She understands that oxycodone is habit-forming/has addictive potential and that it can cause constipation, somnolence  3   I discussed management of her menopausal symptoms with hormone replacement therapy  We discussed the differences between estrogen replacement versus estrogen/progesterone replacement  Based upon her severe endometriosis and pelvic pain, I recommended combined HRT  She understands the slight increase in risk of breast cancer with combined HRT and will comply with screening mammography  4   Chest x-ray to evaluate acute cough  5  Return in 3 weeks for evaluation  I spent 25 minutes with the patient  More than half the time was spent in counseling and coordination of care regarding treatment of her menopausal symptoms  Only brief time was spent on the postoperative history and physical examination           Acute cough - Primary    Relevant Orders    XR chest pa & lateral         CHIEF COMPLAINT: Cough, pelvic pain, menopausal symptoms, vaginal discharge        Patient ID: Homer Zavala is a 29 y o  female  Who returns after hospital admission for vaginal apex abscess  She had a CT of the abdomen pelvis on 6/2/23 that did not reveal any evidence of abscess, however, pain worsened and she began to have fevers to 102 degrees at home  She was then referred to the emergency department where repeat CT imaging was performed that revealed a 4 1 cm vaginal apex abscess  It was not drainable by interventional radiology  Therefore, she was taken to the operating room for incision and drainage  She continues to have ongoing drainage from the vagina  She has been afebrile  She has pain that has been requiring narcotic therapy  She is currently on Augmentin  She has hot flashes that are affecting her quality of life  The following portions of the patient's history were reviewed and updated as appropriate: allergies, current medications, past family history, past medical history, past social history, past surgical history and problem list     Review of Systems   Constitutional: Positive for activity change, appetite change and fever  Negative for unexpected weight change  HENT: Negative  Eyes: Negative  Respiratory: Negative  Cardiovascular: Negative  Gastrointestinal: Negative for abdominal distention and abdominal pain  Endocrine: Negative  Genitourinary: Positive for pelvic pain, vaginal bleeding and vaginal discharge  Musculoskeletal: Negative  Skin: Negative  Allergic/Immunologic: Negative  Neurological: Negative  Hematological: Negative  Psychiatric/Behavioral: Negative          Current Outpatient Medications   Medication Sig Dispense Refill   • acetaminophen (TYLENOL) 650 mg CR tablet Take 1 tablet (650 mg total) by mouth every 8 (eight) hours as needed for mild pain 30 tablet 0   • amoxicillin-clavulanate (AUGMENTIN) 875-125 mg per tablet Take 1 tablet by mouth every 12 "(twelve) hours for 13 days 26 tablet 0   • estrogen, conjugated,-medroxyprogesterone (PREMPRO) 0 3-1 5 MG per tablet Take 1 tablet by mouth daily 30 tablet 0   • ibuprofen (MOTRIN) 600 mg tablet Take 1 tablet (600 mg total) by mouth every 6 (six) hours as needed for mild pain 30 tablet 0   • methocarbamol (ROBAXIN) 750 mg tablet Take 1 tablet (750 mg total) by mouth every 6 (six) hours as needed for muscle spasms 60 tablet 0   • oxyCODONE-acetaminophen (Percocet) 5-325 mg per tablet Take 1 tablet by mouth every 6 (six) hours as needed for severe pain Max Daily Amount: 4 tablets 30 tablet 0   • polyethylene glycol (MiraLax) 17 GM/SCOOP powder Mix with 64 oz Gatorade, begin 4 PM day before surgery per bowel prep instructions  (Patient not taking: Reported on 6/1/2023) 238 g 0     No current facility-administered medications for this visit  Objective:    Blood pressure 120/76, pulse 65, temperature 97 6 °F (36 4 °C), temperature source Temporal, height 5' 4\" (1 626 m), weight 108 kg (237 lb 8 oz), SpO2 100 %  Body mass index is 40 77 kg/m²  Body surface area is 2 11 meters squared  Physical Exam  Vitals reviewed  Constitutional:       General: She is not in acute distress  Appearance: Normal appearance  She is not ill-appearing  HENT:      Head: Normocephalic and atraumatic  Mouth/Throat:      Mouth: Mucous membranes are moist    Eyes:      General: No scleral icterus  Right eye: No discharge  Left eye: No discharge  Conjunctiva/sclera: Conjunctivae normal    Pulmonary:      Effort: Pulmonary effort is normal    Musculoskeletal:      Right lower leg: No edema  Left lower leg: No edema  Skin:     General: Skin is warm and dry  Coloration: Skin is not jaundiced  Findings: No rash  Comments: Incisions are clean dry and intact   Neurological:      General: No focal deficit present  Mental Status: She is alert and oriented to person, place, and time   " Cranial Nerves: No cranial nerve deficit  Motor: No weakness  Gait: Gait normal    Psychiatric:         Mood and Affect: Mood normal          Behavior: Behavior normal          Thought Content:  Thought content normal          Judgment: Judgment normal          Lab Results   Component Value Date     17 3 04/27/2023     Lab Results   Component Value Date    ALKPHOS 48 06/04/2023    ALT 12 06/04/2023    ANIONGAP 7 02/26/2014    AST 11 (L) 06/04/2023    BUN 8 06/04/2023    CALCIUM 8 9 06/04/2023     06/04/2023    CO2 24 06/04/2023    CORRECTEDCA 9 2 07/07/2021    CREATININE 0 71 06/04/2023    EGFR 114 06/04/2023    GLUCOSE 97 02/26/2014    GLUF 88 04/27/2023    K 3 7 06/04/2023     02/26/2014     Lab Results   Component Value Date    HCT 34 9 06/05/2023    HGB 11 7 06/05/2023    MCV 88 06/05/2023     (L) 06/05/2023    WBC 7 13 06/05/2023     Lab Results   Component Value Date    NEUTROABS 5 96 06/05/2023        Trend:  Lab Results   Component Value Date     17 3 04/27/2023

## 2023-06-14 ENCOUNTER — OFFICE VISIT (OUTPATIENT)
Dept: INTERNAL MEDICINE CLINIC | Age: 33
End: 2023-06-14
Payer: COMMERCIAL

## 2023-06-14 VITALS
BODY MASS INDEX: 40.12 KG/M2 | TEMPERATURE: 98.4 F | HEIGHT: 64 IN | OXYGEN SATURATION: 99 % | WEIGHT: 235 LBS | DIASTOLIC BLOOD PRESSURE: 70 MMHG | SYSTOLIC BLOOD PRESSURE: 118 MMHG | HEART RATE: 63 BPM

## 2023-06-14 DIAGNOSIS — N73.9 PELVIC ABSCESS IN FEMALE: Primary | ICD-10-CM

## 2023-06-14 DIAGNOSIS — J40 BRONCHITIS: ICD-10-CM

## 2023-06-14 DIAGNOSIS — Z98.890 POSTOPERATIVE STATE: ICD-10-CM

## 2023-06-14 PROCEDURE — 99495 TRANSJ CARE MGMT MOD F2F 14D: CPT | Performed by: INTERNAL MEDICINE

## 2023-06-14 RX ORDER — DEXTROMETHORPHAN HYDROBROMIDE AND PROMETHAZINE HYDROCHLORIDE 15; 6.25 MG/5ML; MG/5ML
5 SOLUTION ORAL 4 TIMES DAILY PRN
Qty: 180 ML | Refills: 1 | Status: SHIPPED | OUTPATIENT
Start: 2023-06-14

## 2023-06-14 NOTE — PROGRESS NOTES
Assessment & Plan     1  Pelvic abscess in female  Pelvic abscess in female    I reviewed the note from gynecological surgery regarding the abscess formation and the drainage of the abscess she is on Augmentin will continue to monitor I will see her back in about 10week   29year-old status post radical resection of pelvic endometriosis, bilateral oophorectomy on 8/36/7884 complicated by vaginal cuff abscess  She is now status post incision and drainage of the abscess on 6/4/2023  She is currently on Augmentin therapy  She has ongoing drainage and pelvic pain  She has new onset cough  She has menopausal symptoms  Her performance status is 0   1   Plan to continue Augmentin therapy for total of 14 days after incision and drainage  2   I discussed pain management including ibuprofen 600 mg every 6 hours, Tylenol  I also refilled her oxycodone prescription  She understands that oxycodone is habit-forming/has addictive potential and that it can cause constipation, somnolence  3   I discussed management of her menopausal symptoms with hormone replacement therapy  We discussed the differences between estrogen replacement versus estrogen/progesterone replacement  Based upon her severe endometriosis and pelvic pain, I recommended combined HRT  She understands the slight increase in risk of breast cancer with combined HRT and will comply with screening mammography  4   Chest x-ray to evaluate acute cough  5  Return in 3 weeks for evaluation  I spent 25 minutes with the patient  More than half the time was spent in counseling and coordination of care regarding treatment of her menopausal symptoms  Only brief time was spent on the postoperative history and physical examination  2  Postoperative state    3  Bronchitis  -     Promethazine-DM (PHENERGAN-DM) 6 25-15 mg/5 mL oral syrup;  Take 5 mL by mouth 4 (four) times a day as needed for cough  Still coughing there is no fever or chills we will continue with the antibiotic what she is taking and I will order the cough medication for her       Subjective     Transitional Care Management Review:   Jose Enrique Fitzgerald is a 29 y o  female here for TCM follow up  During the TCM phone call patient stated:  TCM Call     Date and time call was made  6/6/2023 10:21 AM    Hospital care reviewed  Records reviewed    Patient was hospitialized at  [address] [place] [address]    Date of Admission  06/04/23    Date of discharge  06/05/23    Diagnosis  pelvic abscess in female    Disposition  Home    Current Symptoms  Cough; Lower abdominal pain    Cough Severity  Mild      TCM Call     Post hospital issues  None    Scheduled for follow up? Yes    Did you obtain your prescribed medications  Yes    Do you need help managing your prescriptions or medications  No    Is transportation to your appointment needed  No    I have advised the patient to call PCP with any new or worsening symptoms  Samson Mendez [facility]        HPI  Review of Systems   Constitutional: Negative for chills, fatigue and fever  HENT: Negative for congestion, ear pain, hearing loss, postnasal drip, sinus pressure, sore throat and voice change  Eyes: Negative for pain, discharge and visual disturbance  Respiratory: Positive for cough  Negative for chest tightness and shortness of breath  Cardiovascular: Negative for chest pain, palpitations and leg swelling  Gastrointestinal: Positive for abdominal pain  Negative for blood in stool, diarrhea, nausea and rectal pain  Genitourinary: Negative for difficulty urinating, dysuria and urgency  Musculoskeletal: Negative for arthralgias and joint swelling  Skin: Negative for rash  Allergic/Immunologic: Negative for environmental allergies and food allergies  Neurological: Negative for dizziness, tremors, weakness, numbness and headaches  Hematological: Negative for adenopathy  Psychiatric/Behavioral: Negative for behavioral problems and hallucinations  "      Objective     /70 (BP Location: Left arm, Patient Position: Sitting, Cuff Size: Large)   Pulse 63   Temp 98 4 °F (36 9 °C) (Temporal)   Ht 5' 4\" (1 626 m)   Wt 107 kg (235 lb)   SpO2 99%   BMI 40 34 kg/m²      Physical Exam  Vitals and nursing note reviewed  Constitutional:       General: She is not in acute distress  Appearance: She is well-developed  HENT:      Head: Normocephalic and atraumatic  Eyes:      Conjunctiva/sclera: Conjunctivae normal    Cardiovascular:      Rate and Rhythm: Normal rate and regular rhythm  Heart sounds: No murmur heard  Pulmonary:      Effort: Pulmonary effort is normal  No respiratory distress  Breath sounds: Normal breath sounds  Abdominal:      Palpations: Abdomen is soft  Tenderness: There is no abdominal tenderness  Musculoskeletal:         General: No swelling  Cervical back: Neck supple  Skin:     General: Skin is warm and dry  Capillary Refill: Capillary refill takes less than 2 seconds  Neurological:      Mental Status: She is alert     Psychiatric:         Mood and Affect: Mood normal          Flako Aguilar MD       "

## 2023-06-15 DIAGNOSIS — R10.2 CHRONIC PELVIC PAIN IN FEMALE: ICD-10-CM

## 2023-06-15 DIAGNOSIS — G89.29 CHRONIC PELVIC PAIN IN FEMALE: ICD-10-CM

## 2023-06-15 DIAGNOSIS — N80.9 ENDOMETRIOSIS: ICD-10-CM

## 2023-06-15 RX ORDER — OXYCODONE HYDROCHLORIDE AND ACETAMINOPHEN 5; 325 MG/1; MG/1
1 TABLET ORAL EVERY 6 HOURS PRN
Qty: 30 TABLET | Refills: 0 | Status: SHIPPED | OUTPATIENT
Start: 2023-06-15

## 2023-06-15 NOTE — UTILIZATION REVIEW
NOTIFICATION OF ADMISSION DISCHARGE   This is a Notification of Discharge from [address]  Please be advised that this patient has been discharge from our facility  Below you will find the admission and discharge date and time including the patient’s disposition  UTILIZATION REVIEW CONTACT:  Lisa Gee  Utilization   Network Utilization Review Department  Phone: [phone] x carefully listen to the prompts  All voicemails are confidential   Email: [email or web address]     ADMISSION INFORMATION  PRESENTATION DATE: 6/4/2023 11:56 AM  OBERVATION ADMISSION DATE:   INPATIENT ADMISSION DATE: 6/4/23  2:34 PM   DISCHARGE DATE: 6/5/2023  3:57 PM   DISPOSITION:Home/Self Care    IMPORTANT INFORMATION:  Send all requests for admission clinical reviews, approved or denied determinations and any other requests to dedicated fax number below belonging to the campus where the patient is receiving treatment   List of dedicated fax numbers:  [address] DENIALS (Administrative/Medical Necessity) [phone]   [address] (Maternity/NICU/Pediatrics) [phone]   [facility] [phone]   [place] [address] [phone]   [address] [phone]   220 [facility] [phone]   90 [facility] [phone]   [address] [place] [address] 119 [phone]   [facility]  [phone]   [address] [facility] [phone]   [address] [facility] [address] [facility] [phone]

## 2023-06-22 DIAGNOSIS — R10.84 GENERALIZED ABDOMINAL PAIN: ICD-10-CM

## 2023-06-22 DIAGNOSIS — G89.29 CHRONIC PELVIC PAIN IN FEMALE: ICD-10-CM

## 2023-06-22 DIAGNOSIS — R10.2 CHRONIC PELVIC PAIN IN FEMALE: ICD-10-CM

## 2023-06-22 DIAGNOSIS — N80.9 ENDOMETRIOSIS: ICD-10-CM

## 2023-06-23 ENCOUNTER — TELEPHONE (OUTPATIENT)
Dept: OTHER | Facility: OTHER | Age: 33
End: 2023-06-23

## 2023-06-23 DIAGNOSIS — N80.9 ENDOMETRIOSIS: Primary | ICD-10-CM

## 2023-06-23 RX ORDER — METHOCARBAMOL 750 MG/1
750 TABLET, FILM COATED ORAL EVERY 6 HOURS PRN
Qty: 60 TABLET | Refills: 5 | Status: SHIPPED | OUTPATIENT
Start: 2023-06-23

## 2023-06-23 RX ORDER — OXYCODONE HYDROCHLORIDE AND ACETAMINOPHEN 5; 325 MG/1; MG/1
1 TABLET ORAL EVERY 6 HOURS PRN
Qty: 10 TABLET | Refills: 0 | Status: SHIPPED | OUTPATIENT
Start: 2023-06-23 | End: 2023-06-26

## 2023-06-23 NOTE — TELEPHONE ENCOUNTER
Patient is completely out of her medication until she is seen by her doctor Pamela Haines MD) on Monday  She reuested a refill today from the office but did not get a reply   She is asking for a refill or a short supply to cover the weekend     Name oxyCODONE-acetaminophen (Percocet) 5-325 mg per tablet     Dose/Frequency Take 1 tablet by mouth every 6 (six) hours as needed for severe pain Max Daily Amount: 4 tablets  Quantity 30  Verified pharmacy  [address] [place] [address] #[id number] Phone:  [phone]        Paged to on call provider via [facility]

## 2023-06-23 NOTE — TELEMEDICINE
Pt called requesting refill  Has been using Percocet regularly ATC with continuous refills  After consulting with her treating physician Dr Kaci Alvarado, I agreed to provide 10 tablets until her upcoming appointment in 2 5 days  I explained the highly addictive nature of this medication and urged patient to reduce frequency as much as possible by alternating with NSAIDS  I did share with patient my concern for her becoming addictive to narcotics  In the future I will not provide further refills  Patient verbalized understanding      He Bains MD, [place], [facility]  6/23/2023  6:27 PM

## 2023-06-26 ENCOUNTER — OFFICE VISIT (OUTPATIENT)
Dept: GYNECOLOGIC ONCOLOGY | Facility: CLINIC | Age: 33
End: 2023-06-26

## 2023-06-26 VITALS
HEART RATE: 78 BPM | TEMPERATURE: 97.4 F | DIASTOLIC BLOOD PRESSURE: 68 MMHG | RESPIRATION RATE: 16 BRPM | WEIGHT: 235 LBS | BODY MASS INDEX: 40.12 KG/M2 | SYSTOLIC BLOOD PRESSURE: 116 MMHG | OXYGEN SATURATION: 99 % | HEIGHT: 64 IN

## 2023-06-26 DIAGNOSIS — N73.9 PELVIC ABSCESS IN FEMALE: ICD-10-CM

## 2023-06-26 DIAGNOSIS — R10.2 CHRONIC PELVIC PAIN IN FEMALE: ICD-10-CM

## 2023-06-26 DIAGNOSIS — N80.9 ENDOMETRIOSIS: ICD-10-CM

## 2023-06-26 DIAGNOSIS — G89.29 CHRONIC PELVIC PAIN IN FEMALE: ICD-10-CM

## 2023-06-26 DIAGNOSIS — N95.1 MENOPAUSAL SYMPTOMS: Primary | ICD-10-CM

## 2023-06-26 PROCEDURE — 99024 POSTOP FOLLOW-UP VISIT: CPT | Performed by: OBSTETRICS & GYNECOLOGY

## 2023-06-26 RX ORDER — OXYCODONE HYDROCHLORIDE 5 MG/1
5 TABLET ORAL EVERY 6 HOURS PRN
Qty: 35 TABLET | Refills: 0 | Status: SHIPPED | OUTPATIENT
Start: 2023-06-26 | End: 2023-07-05 | Stop reason: SDUPTHER

## 2023-06-26 NOTE — PROGRESS NOTES
Assessment/Plan:    Problem List Items Addressed This Visit        Other    Chronic pelvic pain in female    Relevant Medications    oxyCODONE (Roxicodone) 5 immediate release tablet    Endometriosis    Relevant Medications    oxyCODONE (Roxicodone) 5 immediate release tablet    Pelvic abscess in female     79-year-old status post radical resection of pelvic endometriosis, bilateral oophorectomy 8/76/9993 complicated by vaginal cuff abscess status post incision and drainage 6/4/2023  She completed a 14-day course of Augmentin therapy  She continues to have pelvic pain which requires narcotic therapy  Menopausal symptoms have improved while on Prempro  By exam, there is extensive inflammatory change at the vaginal apex  Her performance status is 0   1   I again reviewed the risks and benefits of ongoing narcotic therapy  She has a plan to reduce narcotics to 1 to 2 pills/day from 3 pills/day  Given ongoing extensive inflammatory change in the pelvis, long history of chronic pelvic pain, I refilled her oxycodone prescription to give a 3-week supply  2   Continue combined hormone replacement therapy  3  I discussed adjuvant treatment to narcotics including ibuprofen  4   Return in 3 weeks for evaluation  Menopausal symptoms - Primary         CHIEF COMPLAINT: Postoperative evaluation, pelvic pain           Patient ID: Page Romero is a 29 y o  female  Who returns for postoperative evaluation  She called the office for refill of Percocet over the weekend  She has been requiring this approximately 3 times per day  She would like to cut down to 2 times per day  She notes ongoing pelvic pain  She had an episode of fever to 101 that resolved  She is voiding without dysuria  No diarrhea  No vaginal bleeding  She still has a vaginal discharge        The following portions of the patient's history were reviewed and updated as appropriate: allergies, current medications, past family history, past "medical history, past social history, past surgical history and problem list     Review of Systems      Current Outpatient Medications:   •  acetaminophen (TYLENOL) 650 mg CR tablet, Take 1 tablet (650 mg total) by mouth every 8 (eight) hours as needed for mild pain, Disp: 30 tablet, Rfl: 0  •  estrogen, conjugated,-medroxyprogesterone (PREMPRO) 0 3-1 5 MG per tablet, Take 1 tablet by mouth daily, Disp: 30 tablet, Rfl: 0  •  ibuprofen (MOTRIN) 600 mg tablet, Take 1 tablet (600 mg total) by mouth every 6 (six) hours as needed for mild pain, Disp: 30 tablet, Rfl: 0  •  methocarbamol (ROBAXIN) 750 mg tablet, Take 1 tablet (750 mg total) by mouth every 6 (six) hours as needed for muscle spasms, Disp: 60 tablet, Rfl: 5  •  oxyCODONE (Roxicodone) 5 immediate release tablet, Take 1 tablet (5 mg total) by mouth every 6 (six) hours as needed for severe pain Max Daily Amount: 20 mg, Disp: 35 tablet, Rfl: 0  •  Promethazine-DM (PHENERGAN-DM) 6 25-15 mg/5 mL oral syrup, Take 5 mL by mouth 4 (four) times a day as needed for cough, Disp: 180 mL, Rfl: 1  •  polyethylene glycol (MiraLax) 17 GM/SCOOP powder, Mix with 64 oz Gatorade, begin 4 PM day before surgery per bowel prep instructions  (Patient not taking: Reported on 6/1/2023), Disp: 238 g, Rfl: 0    Objective:    Blood pressure 116/68, pulse 78, temperature (!) 97 4 °F (36 3 °C), temperature source Temporal, resp  rate 16, height 5' 4\" (1 626 m), weight 107 kg (235 lb), SpO2 99 %  Body mass index is 40 34 kg/m²  Body surface area is 2 1 meters squared  Physical Exam  Vitals reviewed  Exam conducted with a chaperone present  Constitutional:       General: She is not in acute distress  Appearance: Normal appearance  HENT:      Head: Normocephalic and atraumatic  Mouth/Throat:      Mouth: Mucous membranes are moist    Abdominal:      Palpations: Abdomen is soft  There is no mass  Tenderness: There is no abdominal tenderness     Genitourinary:     Comments: " The external female genitalia is normal  The bartholin's, uretheral and skenes glands are normal  The urethral meatus is normal (midline with no lesions)  Anus without fissure or lesion  Speculum exam reveals a small amount of clear vaginal discharge at the apex  There is no evidence of purulent drainage  Bimanual examination demonstrates an ongoing separation of the apex with inflammatory change  The apex is thickened and somewhat mobile  Musculoskeletal:      Right lower leg: No edema  Left lower leg: No edema  Skin:     General: Skin is warm and dry  Comments: Surgical trocar sites are well healed  Neurological:      Mental Status: She is alert and oriented to person, place, and time  Psychiatric:         Mood and Affect: Mood normal          Behavior: Behavior normal          Thought Content:  Thought content normal          Judgment: Judgment normal

## 2023-07-04 ENCOUNTER — PATIENT MESSAGE (OUTPATIENT)
Dept: GYNECOLOGIC ONCOLOGY | Facility: CLINIC | Age: 33
End: 2023-07-04

## 2023-07-05 ENCOUNTER — TELEPHONE (OUTPATIENT)
Dept: HEMATOLOGY ONCOLOGY | Facility: CLINIC | Age: 33
End: 2023-07-05

## 2023-07-05 ENCOUNTER — TELEPHONE (OUTPATIENT)
Dept: OTHER | Facility: OTHER | Age: 33
End: 2023-07-05

## 2023-07-05 ENCOUNTER — TELEPHONE (OUTPATIENT)
Dept: GYNECOLOGIC ONCOLOGY | Facility: CLINIC | Age: 33
End: 2023-07-05

## 2023-07-05 ENCOUNTER — DOCUMENTATION (OUTPATIENT)
Dept: GYNECOLOGIC ONCOLOGY | Facility: CLINIC | Age: 33
End: 2023-07-05

## 2023-07-05 DIAGNOSIS — G89.29 CHRONIC PELVIC PAIN IN FEMALE: ICD-10-CM

## 2023-07-05 DIAGNOSIS — R10.2 CHRONIC PELVIC PAIN IN FEMALE: ICD-10-CM

## 2023-07-05 DIAGNOSIS — N73.9 PELVIC ABSCESS IN FEMALE: Primary | ICD-10-CM

## 2023-07-05 DIAGNOSIS — N80.9 ENDOMETRIOSIS: ICD-10-CM

## 2023-07-05 RX ORDER — OXYCODONE HYDROCHLORIDE 5 MG/1
5 TABLET ORAL 3 TIMES DAILY PRN
Qty: 40 TABLET | Refills: 0 | Status: SHIPPED | OUTPATIENT
Start: 2023-07-05

## 2023-07-05 NOTE — TELEPHONE ENCOUNTER
7/5/2023 OK TO FILL PER DR Pam Mccain- spoke to patient - PER DR LIMON patient IS TO TAKE ONLY 3 PER DAY- LS 6/14/23, next appointment 7/26/23LF 6/23/23 was taking 4 times per day amt 35/0

## 2023-07-05 NOTE — PROGRESS NOTES
I spoke to Dr. Rupal Wynne about opioid management. Christoph Crespo is interested in a long-term taper off of her opioids. She has an opioid agreement with Dr. Rupal Wynne. He has agreed to manage the taper of her current opioid prescription. She understands that she will not be receiving additional opioid prescriptions from this office.

## 2023-07-05 NOTE — TELEPHONE ENCOUNTER
Called patient due to a message being sent to the pool about a prescription refill being called into her pharmacy. Instructed the patient to call the office back if the prescription was not called in. I left the phone number on patient's voicemail.

## 2023-07-05 NOTE — TELEPHONE ENCOUNTER
Patient Call    Who are you speaking with? Patient    If it is not the patient, are they listed on an active communication consent form? N/A   What is the reason for this call? The patient is calling to inform the office that she still has not received her prescription. She would like a call back to see what she can do about her medication refill. Does this require a call back? Yes   If a call back is required, please list [facility] call back number [phone]   If a call back is required, advise that a message will be forwarded to their care team and someone will return their call as soon as possible. Did you relay this information to the patient?  Yes

## 2023-07-05 NOTE — TELEPHONE ENCOUNTER
Called patient this morning in regards to her call yesterday. instructed patient if she did not get her refill request to please call the office back and provided the phone number  Regarding: refill  ----- Message from Lynne Varner RN sent at 7/4/2023  1:46 PM EDT -----  Patient called to make sure refill request was done yesterday

## 2023-07-05 NOTE — PROGRESS NOTES
Ongoing vaginal discharge and pelvic pain. Will repeat CT abdomen pelvis, bring to the office for evaluation. She is still requiring 5 mg of oxycodone every 6 hours. She has received multiple opioid prescriptions from this office. She would prefer to have her opioids managed by her primary care physician with whom she has a contract and drug testing in place. I will discuss with her primary care physician prior to filling any additional opioid prescriptions.

## 2023-07-07 ENCOUNTER — TELEPHONE (OUTPATIENT)
Dept: GYNECOLOGIC ONCOLOGY | Facility: CLINIC | Age: 33
End: 2023-07-07

## 2023-07-07 NOTE — TELEPHONE ENCOUNTER
Called and scheduled CT for patient. 7/13/23 at 8:30 in [place]. Called and spoke to patient and let her know of that appointment and the need to  barium before that date. Also scheduled the patient with Cristina Kwok on 7/14/23 at 10:30 in [facility].  Patient was agreeable to both appointments

## 2023-07-10 DIAGNOSIS — R30.0 DYSURIA: Primary | ICD-10-CM

## 2023-07-11 ENCOUNTER — LAB (OUTPATIENT)
Dept: LAB | Facility: IMAGING CENTER | Age: 33
End: 2023-07-11
Payer: COMMERCIAL

## 2023-07-11 DIAGNOSIS — R76.8 RHEUMATOID FACTOR POSITIVE: ICD-10-CM

## 2023-07-11 DIAGNOSIS — N73.9 PELVIC ABSCESS IN FEMALE: ICD-10-CM

## 2023-07-11 DIAGNOSIS — R19.7 DIARRHEA, UNSPECIFIED TYPE: ICD-10-CM

## 2023-07-11 DIAGNOSIS — R53.83 FATIGUE, UNSPECIFIED TYPE: ICD-10-CM

## 2023-07-11 DIAGNOSIS — N80.9 ENDOMETRIOSIS: ICD-10-CM

## 2023-07-11 DIAGNOSIS — R30.0 DYSURIA: ICD-10-CM

## 2023-07-11 DIAGNOSIS — G89.29 CHRONIC PELVIC PAIN IN FEMALE: ICD-10-CM

## 2023-07-11 DIAGNOSIS — R10.2 CHRONIC PELVIC PAIN IN FEMALE: ICD-10-CM

## 2023-07-11 LAB
ALBUMIN SERPL BCP-MCNC: 3.5 G/DL (ref 3.5–5)
ALP SERPL-CCNC: 61 U/L (ref 46–116)
ALT SERPL W P-5'-P-CCNC: 46 U/L (ref 12–78)
ANA SER QL IA: NEGATIVE
ANION GAP SERPL CALCULATED.3IONS-SCNC: 2 MMOL/L
AST SERPL W P-5'-P-CCNC: 23 U/L (ref 5–45)
B BURGDOR IGG+IGM SER-ACNC: 0.3 AI
BASOPHILS # BLD AUTO: 0 THOUSANDS/ÂΜL (ref 0–0.1)
BASOPHILS NFR BLD AUTO: 0 % (ref 0–1)
BILIRUB SERPL-MCNC: 0.98 MG/DL (ref 0.2–1)
BUN SERPL-MCNC: 8 MG/DL (ref 5–25)
CALCIUM SERPL-MCNC: 9 MG/DL (ref 8.3–10.1)
CHLORIDE SERPL-SCNC: 111 MMOL/L (ref 96–108)
CO2 SERPL-SCNC: 26 MMOL/L (ref 21–32)
CREAT SERPL-MCNC: 0.85 MG/DL (ref 0.6–1.3)
CRP SERPL QL: 16.4 MG/L
EOSINOPHIL # BLD AUTO: 0 THOUSAND/ÂΜL (ref 0–0.61)
EOSINOPHIL NFR BLD AUTO: 0 % (ref 0–6)
ERYTHROCYTE [DISTWIDTH] IN BLOOD BY AUTOMATED COUNT: 13.6 % (ref 11.6–15.1)
GFR SERPL CREATININE-BSD FRML MDRD: 92 ML/MIN/1.73SQ M
GLUCOSE SERPL-MCNC: 99 MG/DL (ref 65–140)
HCT VFR BLD AUTO: 35.2 % (ref 34.8–46.1)
HGB BLD-MCNC: 11.6 G/DL (ref 11.5–15.4)
IMM GRANULOCYTES # BLD AUTO: 0.02 THOUSAND/UL (ref 0–0.2)
IMM GRANULOCYTES NFR BLD AUTO: 0 % (ref 0–2)
LYMPHOCYTES # BLD AUTO: 1.3 THOUSANDS/ÂΜL (ref 0.6–4.47)
LYMPHOCYTES NFR BLD AUTO: 25 % (ref 14–44)
MCH RBC QN AUTO: 29.7 PG (ref 26.8–34.3)
MCHC RBC AUTO-ENTMCNC: 33 G/DL (ref 31.4–37.4)
MCV RBC AUTO: 90 FL (ref 82–98)
MONOCYTES # BLD AUTO: 0.37 THOUSAND/ÂΜL (ref 0.17–1.22)
MONOCYTES NFR BLD AUTO: 7 % (ref 4–12)
NEUTROPHILS # BLD AUTO: 3.61 THOUSANDS/ÂΜL (ref 1.85–7.62)
NEUTS SEG NFR BLD AUTO: 68 % (ref 43–75)
NRBC BLD AUTO-RTO: 0 /100 WBCS
PLATELET # BLD AUTO: 196 THOUSANDS/UL (ref 149–390)
PMV BLD AUTO: 9.1 FL (ref 8.9–12.7)
POTASSIUM SERPL-SCNC: 3.7 MMOL/L (ref 3.5–5.3)
PROT SERPL-MCNC: 7 G/DL (ref 6.4–8.4)
RBC # BLD AUTO: 3.91 MILLION/UL (ref 3.81–5.12)
SODIUM SERPL-SCNC: 139 MMOL/L (ref 135–147)
TSH SERPL DL<=0.05 MIU/L-ACNC: 0.51 UIU/ML (ref 0.45–4.5)
WBC # BLD AUTO: 5.3 THOUSAND/UL (ref 4.31–10.16)

## 2023-07-11 PROCEDURE — 86140 C-REACTIVE PROTEIN: CPT

## 2023-07-11 PROCEDURE — 87186 SC STD MICRODIL/AGAR DIL: CPT

## 2023-07-11 PROCEDURE — 86430 RHEUMATOID FACTOR TEST QUAL: CPT

## 2023-07-11 PROCEDURE — 87077 CULTURE AEROBIC IDENTIFY: CPT

## 2023-07-11 PROCEDURE — 86618 LYME DISEASE ANTIBODY: CPT

## 2023-07-11 PROCEDURE — 36415 COLL VENOUS BLD VENIPUNCTURE: CPT

## 2023-07-11 PROCEDURE — 87086 URINE CULTURE/COLONY COUNT: CPT

## 2023-07-11 PROCEDURE — 85025 COMPLETE CBC W/AUTO DIFF WBC: CPT

## 2023-07-11 PROCEDURE — 84443 ASSAY THYROID STIM HORMONE: CPT

## 2023-07-11 PROCEDURE — 80053 COMPREHEN METABOLIC PANEL: CPT

## 2023-07-11 PROCEDURE — 86038 ANTINUCLEAR ANTIBODIES: CPT

## 2023-07-12 DIAGNOSIS — N39.0 URINARY TRACT INFECTION WITHOUT HEMATURIA, SITE UNSPECIFIED: Primary | ICD-10-CM

## 2023-07-12 LAB — RHEUMATOID FACT SER QL LA: NEGATIVE

## 2023-07-12 RX ORDER — SULFAMETHOXAZOLE AND TRIMETHOPRIM 800; 160 MG/1; MG/1
1 TABLET ORAL EVERY 12 HOURS SCHEDULED
Qty: 10 TABLET | Refills: 0 | Status: SHIPPED | OUTPATIENT
Start: 2023-07-12 | End: 2023-07-17

## 2023-07-13 ENCOUNTER — HOSPITAL ENCOUNTER (OUTPATIENT)
Dept: RADIOLOGY | Facility: IMAGING CENTER | Age: 33
End: 2023-07-13
Payer: COMMERCIAL

## 2023-07-13 DIAGNOSIS — N73.9 PELVIC ABSCESS IN FEMALE: ICD-10-CM

## 2023-07-13 LAB — BACTERIA UR CULT: ABNORMAL

## 2023-07-13 PROCEDURE — G1004 CDSM NDSC: HCPCS

## 2023-07-13 PROCEDURE — 74177 CT ABD & PELVIS W/CONTRAST: CPT

## 2023-07-13 RX ADMIN — IOHEXOL 100 ML: 350 INJECTION, SOLUTION INTRAVENOUS at 08:34

## 2023-07-14 ENCOUNTER — TELEPHONE (OUTPATIENT)
Dept: HEMATOLOGY ONCOLOGY | Facility: CLINIC | Age: 33
End: 2023-07-14

## 2023-07-14 NOTE — TELEPHONE ENCOUNTER
Called patient and left VM regarding missed appt on 7/14/2023 at 10:30am with Angie Thompson. Advised to call the [facility] [place] to reschedule.

## 2023-07-17 ENCOUNTER — TELEPHONE (OUTPATIENT)
Dept: HEMATOLOGY ONCOLOGY | Facility: CLINIC | Age: 33
End: 2023-07-17

## 2023-07-17 DIAGNOSIS — R10.2 CHRONIC PELVIC PAIN IN FEMALE: ICD-10-CM

## 2023-07-17 DIAGNOSIS — G89.29 CHRONIC PELVIC PAIN IN FEMALE: ICD-10-CM

## 2023-07-17 DIAGNOSIS — N80.9 ENDOMETRIOSIS: ICD-10-CM

## 2023-07-17 NOTE — TELEPHONE ENCOUNTER
Patient Call    Who are you speaking with? Patient    If it is not the patient, are they listed on an active communication consent form? N/A   What is the reason for this call? The patient states she was told she had to reschedule her follow up with Yoshi Napoles, and is wondering if it is necessary to have both the appointment with Varinder Montiel and the appointment with Dr Francie Oconnor. Does this require a call back? Yes   If a call back is required, please list best call back number [phone]   If a call back is required, advise that a message will be forwarded to their care team and someone will return their call as soon as possible. Did you relay this information to the patient?  Yes

## 2023-07-17 NOTE — TELEPHONE ENCOUNTER
Appointment Schedule   Who are you speaking with? Patient   If it is not the patient, are they listed on an active communication consent form? N/A   Which provider is the appointment scheduled with? JACKY Fitzgerald   At which location is the appointment scheduled for? [place]   When is the appointment scheduled? Please list date and time 7/21/23 11:30AM   What is the reason for this appointment? Follow Up   Did patient voice understanding of the details of this appointment? Yes   Was the no show policy reviewed with patient?  Yes

## 2023-07-18 RX ORDER — OXYCODONE HYDROCHLORIDE 5 MG/1
5 TABLET ORAL 2 TIMES DAILY
Qty: 40 TABLET | Refills: 0 | Status: SHIPPED | OUTPATIENT
Start: 2023-07-18 | End: 2023-08-03 | Stop reason: SDUPTHER

## 2023-07-20 DIAGNOSIS — J40 BRONCHITIS: ICD-10-CM

## 2023-07-20 RX ORDER — DEXTROMETHORPHAN HYDROBROMIDE AND PROMETHAZINE HYDROCHLORIDE 15; 6.25 MG/5ML; MG/5ML
SYRUP ORAL
Qty: 180 ML | Refills: 1 | OUTPATIENT
Start: 2023-07-20

## 2023-07-25 ENCOUNTER — OFFICE VISIT (OUTPATIENT)
Dept: GYNECOLOGIC ONCOLOGY | Facility: CLINIC | Age: 33
End: 2023-07-25

## 2023-07-25 VITALS
BODY MASS INDEX: 40.29 KG/M2 | HEIGHT: 64 IN | TEMPERATURE: 97.3 F | HEART RATE: 69 BPM | DIASTOLIC BLOOD PRESSURE: 88 MMHG | WEIGHT: 236 LBS | SYSTOLIC BLOOD PRESSURE: 138 MMHG | OXYGEN SATURATION: 100 %

## 2023-07-25 DIAGNOSIS — N80.9 ENDOMETRIOSIS: ICD-10-CM

## 2023-07-25 DIAGNOSIS — N73.9 PELVIC ABSCESS IN FEMALE: Primary | ICD-10-CM

## 2023-07-25 PROCEDURE — 99024 POSTOP FOLLOW-UP VISIT: CPT | Performed by: OBSTETRICS & GYNECOLOGY

## 2023-07-26 ENCOUNTER — OFFICE VISIT (OUTPATIENT)
Dept: INTERNAL MEDICINE CLINIC | Age: 33
End: 2023-07-26
Payer: COMMERCIAL

## 2023-07-26 VITALS
OXYGEN SATURATION: 98 % | BODY MASS INDEX: 40.43 KG/M2 | WEIGHT: 236.8 LBS | DIASTOLIC BLOOD PRESSURE: 78 MMHG | HEART RATE: 72 BPM | SYSTOLIC BLOOD PRESSURE: 112 MMHG | HEIGHT: 64 IN | TEMPERATURE: 98.4 F

## 2023-07-26 DIAGNOSIS — N73.9 PELVIC ABSCESS IN FEMALE: ICD-10-CM

## 2023-07-26 DIAGNOSIS — G89.29 CHRONIC PELVIC PAIN IN FEMALE: Primary | ICD-10-CM

## 2023-07-26 DIAGNOSIS — F11.20 NARCOTIC DEPENDENCE (HCC): ICD-10-CM

## 2023-07-26 DIAGNOSIS — R10.2 CHRONIC PELVIC PAIN IN FEMALE: Primary | ICD-10-CM

## 2023-07-26 DIAGNOSIS — F41.9 ANXIETY AND DEPRESSION: ICD-10-CM

## 2023-07-26 DIAGNOSIS — F32.A ANXIETY AND DEPRESSION: ICD-10-CM

## 2023-07-26 DIAGNOSIS — N80.9 ENDOMETRIOSIS: ICD-10-CM

## 2023-07-26 PROBLEM — R05.1 ACUTE COUGH: Status: RESOLVED | Noted: 2023-06-08 | Resolved: 2023-07-26

## 2023-07-26 PROBLEM — J40 BRONCHITIS: Status: RESOLVED | Noted: 2023-06-14 | Resolved: 2023-07-26

## 2023-07-26 PROCEDURE — 99214 OFFICE O/P EST MOD 30 MIN: CPT | Performed by: INTERNAL MEDICINE

## 2023-07-26 PROCEDURE — 80307 DRUG TEST PRSMV CHEM ANLYZR: CPT | Performed by: INTERNAL MEDICINE

## 2023-07-26 NOTE — PROGRESS NOTES
Assessment/Plan:    Problem List Items Addressed This Visit        Other    Endometriosis    Relevant Medications    estrogen, conjugated,-medroxyprogesterone (PREMPRO) 0.3-1.5 MG per tablet    Pelvic abscess in female - Primary     82-year-old status post radical resection of pelvic endometriosis, bilateral oophorectomy on 8/82/5473 complicated by vaginal cuff abscess. She is status post incision and drainage on 6/4/2023. Repeat CT 7/13/2023 did not reveal any evidence of abscess. She had a thickened bladder consistent with cystitis and had a Klebsiella UTI which was treated with Augmentin. She is on Prempro. By exam, there is less inflammatory change at the apex. She stated she is requiring less narcotics. Her performance status is 0.  1. She will continue her narcotic reduction plan as scheduled. 2.  Return in 6 weeks for evaluation. She will continue pelvic rest.  Will consider referral to pelvic physiotherapy pending ongoing healing. CHIEF COMPLAINT: Postoperative evaluation        Patient ID: Hugo Lind is a 29 y.o. female  Who returns for ongoing postoperative evaluation. Dr. Alan Denis has an opioid contract with her and has agreed to manage her opioids. She has been on opioids for 10 years. She continues to reduce the dose. She notes a pressure at the end of the urinary stream.  She is voiding, having bowel movements. She notes blood when she wipes and on the surface of the stool. No blood in the stool. She had a urinary tract infection with Klebsiella which was diagnosed on 7/11/2023 and was treated with a full course of Augmentin. The UTI was sensitive to Augmentin. CT of the abdomen pelvis on 7/13/2023 revealed resolution of the pelvic abscess with bladder thickening. She is able to perform her activities of daily living.       The following portions of the patient's history were reviewed and updated as appropriate: allergies, current medications, past family history, past medical history, past social history, past surgical history and problem list.    Review of Systems      Current Outpatient Medications:   •  acetaminophen (TYLENOL) 650 mg CR tablet, Take 1 tablet (650 mg total) by mouth every 8 (eight) hours as needed for mild pain, Disp: 30 tablet, Rfl: 0  •  estrogen, conjugated,-medroxyprogesterone (PREMPRO) 0.3-1.5 MG per tablet, Take 1 tablet by mouth daily, Disp: 30 tablet, Rfl: 0  •  ibuprofen (MOTRIN) 600 mg tablet, Take 1 tablet (600 mg total) by mouth every 6 (six) hours as needed for mild pain, Disp: 30 tablet, Rfl: 0  •  methocarbamol (ROBAXIN) 750 mg tablet, Take 1 tablet (750 mg total) by mouth every 6 (six) hours as needed for muscle spasms, Disp: 60 tablet, Rfl: 5  •  oxyCODONE (Roxicodone) 5 immediate release tablet, Take 1 tablet (5 mg total) by mouth 2 (two) times a day Max Daily Amount: 10 mg, Disp: 40 tablet, Rfl: 0  •  polyethylene glycol (MiraLax) 17 GM/SCOOP powder, Mix with 64 oz Gatorade, begin 4 PM day before surgery per bowel prep instructions. (Patient not taking: Reported on 6/1/2023), Disp: 238 g, Rfl: 0  •  Promethazine-DM (PHENERGAN-DM) 6.25-15 mg/5 mL oral syrup, Take 5 mL by mouth 4 (four) times a day as needed for cough (Patient not taking: Reported on 7/25/2023), Disp: 180 mL, Rfl: 1    Objective:    Blood pressure 138/88, pulse 69, temperature (!) 97.3 °F (36.3 °C), temperature source Temporal, height 5' 4" (1.626 m), weight 107 kg (236 lb), SpO2 100 %. Body mass index is 40.51 kg/m². Body surface area is 2.1 meters squared. Physical Exam  Vitals reviewed. Exam conducted with a chaperone present. Constitutional:       General: She is not in acute distress. Appearance: Normal appearance. HENT:      Head: Normocephalic and atraumatic. Mouth/Throat:      Mouth: Mucous membranes are moist.   Abdominal:      Palpations: Abdomen is soft. There is no mass. Tenderness: There is no abdominal tenderness. Genitourinary:     Comments: The external female genitalia is normal. The bartholin's, uretheral and skenes glands are normal. The urethral meatus is normal (midline with no lesions). Anus without fissure or lesion. Speculum exam reveals a small amount of exudate at the apex. Overall improved over interval exams. vagina is intact, without dehiscense. No masses, lesions, discharge or bleeding. No significant cystocele or rectocele noted. Bimanual exam notes a surgical absent cervix, uterus and adnexal structures. The apex is tender and thickened. Musculoskeletal:      Right lower leg: No edema. Left lower leg: No edema. Skin:     General: Skin is warm and dry. Comments: Surgical trocar sites are well healed. Neurological:      Mental Status: She is alert and oriented to person, place, and time. Psychiatric:         Mood and Affect: Mood normal.         Behavior: Behavior normal.         Thought Content:  Thought content normal.         Judgment: Judgment normal.

## 2023-07-26 NOTE — PROGRESS NOTES
Assessment/Plan:     Diagnoses and all orders for this visit:    Chronic pelvic pain in female    Endometriosis    Pelvic abscess in female    Anxiety and depression    Narcotic dependence ([address])  -     Oxycodone and Metabolite, Urine             M*Modal software was used to dictate this note. It may contain errors with dictating incorrect words or incorrect spelling. Please contact the provider directly with any questions. Subjective:   Chief Complaint   Patient presents with   • Follow-up     Pelvic abscess. Patient ID: Calleen Prader is a 29 y.o. female. This is a very pleasant 29 years young lady who recently had surgery for endometriosis bilateral salpingo-oophorectomy she had previous history of hysterectomy postoperatively she developed pelvic abscess followed up by the gynecologist was on antibiotic drainage of the pelvic abscess was done recent CAT scan is evaluated by the gynecologist was negative for any more abscess she does not have any fever or WBC count she is not on any antibiotic continue to complain of the suprapubic pain she had a follow-up appointment with the gynecologist    She lost her grandmother and grandfather who would like the parents for her and raised her she is did not depress because of that and right now she is going through the stress recommended her to get some antidepressant but she does not want to do anything she said that she will get over with that. History of positive rheumatoid factor repeat rheumatoid factor was negative      The following portions of the patient's history were reviewed and updated as appropriate: allergies, current medications, past family history, past medical history, past social history, past surgical history and problem list.    Review of Systems   Constitutional: Positive for fatigue. Negative for chills. HENT: Positive for sinus pressure and sinus pain.  Negative for congestion, ear pain, hearing loss, postnasal drip, sore throat and voice change. Eyes: Negative for pain, discharge and visual disturbance. Respiratory: Negative for cough, chest tightness and shortness of breath. Cardiovascular: Negative for chest pain, palpitations and leg swelling. Gastrointestinal: Negative for abdominal pain, blood in stool, diarrhea, nausea and rectal pain. Genitourinary: Negative for difficulty urinating, dysuria and urgency. Pelvic pain followed up by the gynecology   Musculoskeletal: Negative for arthralgias and joint swelling. Skin: Negative for rash. Allergic/Immunologic: Negative for environmental allergies and food allergies. Neurological: Negative for dizziness, tremors, weakness, numbness and headaches. Hematological: Negative for adenopathy. Psychiatric/Behavioral: Positive for dysphoric mood (Loss of grandmother and grandfather). Negative for behavioral problems and hallucinations. The patient is nervous/anxious.           Past Medical History:   Diagnosis Date   • Anxiety    • Chicken pox    • Depression    • Endometriosis    • GERD (gastroesophageal reflux disease)    • Headache     Occasional    • HSV-1 infection    • IBS (irritable bowel syndrome)    • Kidney stone    • Kidney stone on left side    • Multiple thyroid nodules    • Obesity    • Renal calculi          Current Outpatient Medications:   •  acetaminophen (TYLENOL) 650 mg CR tablet, Take 1 tablet (650 mg total) by mouth every 8 (eight) hours as needed for mild pain, Disp: 30 tablet, Rfl: 0  •  estrogen, conjugated,-medroxyprogesterone (PREMPRO) 0.3-1.5 MG per tablet, Take 1 tablet by mouth daily, Disp: 30 tablet, Rfl: 0  •  ibuprofen (MOTRIN) 600 mg tablet, Take 1 tablet (600 mg total) by mouth every 6 (six) hours as needed for mild pain, Disp: 30 tablet, Rfl: 0  •  methocarbamol (ROBAXIN) 750 mg tablet, Take 1 tablet (750 mg total) by mouth every 6 (six) hours as needed for muscle spasms, Disp: 60 tablet, Rfl: 5  •  oxyCODONE (Roxicodone) 5 immediate release tablet, Take 1 tablet (5 mg total) by mouth 2 (two) times a day Max Daily Amount: 10 mg, Disp: 40 tablet, Rfl: 0    Allergies   Allergen Reactions   • Reglan [Metoclopramide] Other (See Comments)     Elevated heartrate   • Other Blisters     Dermabond       Social History   Past Surgical History:   Procedure Laterality Date   • APPENDECTOMY  2021   •  SECTION      x2   • CYSTOSCOPY N/A 2023    Procedure: CYSTOSCOPY;  Surgeon: Tom Pedroza MD;  [facility];  Service: Gynecology Oncology   • EXAMINATION UNDER ANESTHESIA N/A 2023    Procedure: EXAM UNDER ANESTHESIA (EUA), I& D vaginal cuff abscess;  Surgeon: Tom Pedroza MD;  [facility];  Service: Gynecology Oncology   • HYSTERECTOMY      robotic total laparoscopic hysterectomy with BS   • LAPAROSCOPIC ENDOMETRIOSIS FULGURATION  2018    laparoscopic excision of endometriosis, fulguration of endometriosis, lysis of adhesions   • LAPAROSCOPY  2014    with I&D    • MOLE REMOVAL      face - benign    • PELVIC LAPAROSCOPY Bilateral 2023    Procedure: SALPINGO-OOPHORECTOMY, LAPAROSCOPIC W/ROBOTICS, RADICAL RESECTION PELVIC ENDOMETRIOSIS;  Surgeon: Tom Pedroza MD;  [facility];  Service: Gynecology Oncology   • PROCTOSCOPY N/A 2023    Procedure: PROCTOSCOPY;  Surgeon: Tom Pedroza MD;  [facility];  Service: Gynecology Oncology   • SKIN CANCER EXCISION      abdomen   • THYROID CYST EXCISION     • TONSILLECTOMY     • TUBAL LIGATION  02/15/2016    With lysis of adhesion and peritoneal biopsy   • [address] [place] [address] STUDY  2015   • VAGINA SURGERY N/A 2023    Procedure: VAGINECTOMY, PARTIAL;  Surgeon: Tom Pedroza MD;  [facility];  Service: Gynecology Oncology   • WISDOM TOOTH EXTRACTION       Family History   Problem Relation Age of Onset   • Coronary artery disease Mother    • Varicose Veins Mother    • Other Mother         breast cyst - removed    • Cholelithiasis Mother         had cholecystectomy   • Alcohol abuse Father    • Cholelithiasis Maternal Grandmother         had cholecystectomy   • Uterine cancer Paternal Grandmother    • Breast cancer Family         Before age 52    • Uterine cancer Family    • Obesity Family    • Hypertension Family    • Brain cancer Family    • Gallbladder disease Family        Objective:  /78 (BP Location: Left arm, Patient Position: Sitting, Cuff Size: Standard)   Pulse 72   Temp 98.4 °F (36.9 °C) (Temporal)   Ht 5' 4" (1.626 m)   Wt 107 kg (236 lb 12.8 oz)   SpO2 98%   BMI 40.65 kg/m²        Physical Exam  Constitutional:       Appearance: She is well-developed. Eyes:      Pupils: Pupils are equal, round, and reactive to light. Cardiovascular:      Rate and Rhythm: Normal rate. Pulmonary:      Effort: Pulmonary effort is normal.      Breath sounds: Normal breath sounds. Abdominal:      General: Abdomen is protuberant. Tenderness: There is abdominal tenderness (Mild suprapubic tenderness no guarding or rigidity). Musculoskeletal:      Cervical back: Normal range of motion and neck supple.

## 2023-07-26 NOTE — ASSESSMENT & PLAN NOTE
53-year-old status post radical resection of pelvic endometriosis, bilateral oophorectomy on 1/23/0831 complicated by vaginal cuff abscess. She is status post incision and drainage on 6/4/2023. Repeat CT 7/13/2023 did not reveal any evidence of abscess. She had a thickened bladder consistent with cystitis and had a Klebsiella UTI which was treated with Augmentin. She is on Prempro. By exam, there is less inflammatory change at the apex. She stated she is requiring less narcotics. Her performance status is 0.  1. She will continue her narcotic reduction plan as scheduled. 2.  Return in 6 weeks for evaluation. She will continue pelvic rest.  Will consider referral to pelvic physiotherapy pending ongoing healing.

## 2023-07-27 ENCOUNTER — PATIENT MESSAGE (OUTPATIENT)
Dept: INTERNAL MEDICINE CLINIC | Age: 33
End: 2023-07-27

## 2023-07-27 LAB — OXYCODONE+OXYMORPHONE UR QL SCN: NEGATIVE NG/ML

## 2023-08-03 DIAGNOSIS — R10.2 CHRONIC PELVIC PAIN IN FEMALE: ICD-10-CM

## 2023-08-03 DIAGNOSIS — N80.9 ENDOMETRIOSIS: ICD-10-CM

## 2023-08-03 DIAGNOSIS — G89.29 CHRONIC PELVIC PAIN IN FEMALE: ICD-10-CM

## 2023-08-03 RX ORDER — OXYCODONE HYDROCHLORIDE 5 MG/1
5 TABLET ORAL 2 TIMES DAILY
Qty: 40 TABLET | Refills: 0 | Status: SHIPPED | OUTPATIENT
Start: 2023-08-03

## 2023-08-05 ENCOUNTER — NURSE TRIAGE (OUTPATIENT)
Dept: OTHER | Facility: OTHER | Age: 33
End: 2023-08-05

## 2023-08-05 NOTE — TELEPHONE ENCOUNTER
Verbal approval to fill early by Dominick Ashraf delivered to pharmacy. Patient aware. Regarding: verbal ok to fill pts pain med / noted in chart its ok  ----- Message from Raven Mcnamara sent at 8/5/2023 11:53 AM EDT -----  "I am going on vacation tomorrow and the pharmacist will not fill my medication because its due until Monday. I have Chronic pelvic pain (Endometriosis). The doctor forgot to tell them I was leaving for vacation. The on call doctor said it's ok to give the pharmacist a verbal from her. She noted it in the chart.  Its for oxyCODONE (Roxicodone) 5 immediate release tablet.)

## 2023-08-20 ENCOUNTER — APPOINTMENT (EMERGENCY)
Dept: RADIOLOGY | Facility: HOSPITAL | Age: 33
End: 2023-08-20
Payer: COMMERCIAL

## 2023-08-20 ENCOUNTER — HOSPITAL ENCOUNTER (EMERGENCY)
Facility: HOSPITAL | Age: 33
Discharge: HOME | End: 2023-08-20
Attending: EMERGENCY MEDICINE
Payer: COMMERCIAL

## 2023-08-20 VITALS
OXYGEN SATURATION: 98 % | SYSTOLIC BLOOD PRESSURE: 126 MMHG | RESPIRATION RATE: 18 BRPM | BODY MASS INDEX: 40.29 KG/M2 | WEIGHT: 236 LBS | HEIGHT: 64 IN | TEMPERATURE: 98.2 F | DIASTOLIC BLOOD PRESSURE: 75 MMHG | HEART RATE: 88 BPM

## 2023-08-20 DIAGNOSIS — M79.641 RIGHT HAND PAIN: Primary | ICD-10-CM

## 2023-08-20 PROCEDURE — 99284 EMERGENCY DEPT VISIT MOD MDM: CPT | Performed by: EMERGENCY MEDICINE

## 2023-08-20 PROCEDURE — 99283 EMERGENCY DEPT VISIT LOW MDM: CPT

## 2023-08-20 PROCEDURE — 73130 X-RAY EXAM OF HAND: CPT

## 2023-08-20 RX ORDER — NAPROXEN 500 MG/1
500 TABLET ORAL EVERY 12 HOURS PRN
Qty: 14 TABLET | Refills: 0 | Status: SHIPPED | OUTPATIENT
Start: 2023-08-21 | End: 2023-08-31

## 2023-08-20 RX ORDER — NAPROXEN 250 MG/1
500 TABLET ORAL ONCE
Status: COMPLETED | OUTPATIENT
Start: 2023-08-20 | End: 2023-08-20

## 2023-08-20 RX ADMIN — NAPROXEN 500 MG: 250 TABLET ORAL at 20:26

## 2023-08-20 NOTE — ED PROVIDER NOTES
History  Chief Complaint   Patient presents with   • Hand Injury     Patient reports working in the garden last Thursday when she heard a crack in her right hand and has had pain ever since. Took Oxy and tylenol this morning for pain     Patient is a 68-year-old female seen in the emergency department with concern for right hand pain which began on Thursday of last week. Patient states that she was working on an engine outside of her garden when she heard a crack in her right hand. Patient notes pain in the right hand, worse with palpation. Patient stated that she took oxycodone and Tylenol for symptom control, apparently with minimal improvement of symptoms noted. Patient notes no fever, chest pain, shortness of breath, weakness, numbness, tingling. Prior to Admission Medications   Prescriptions Last Dose Informant Patient Reported?  Taking?   acetaminophen (TYLENOL) 650 mg CR tablet 2023 Self No Yes   Sig: Take 1 tablet (650 mg total) by mouth every 8 (eight) hours as needed for mild pain   estrogen, conjugated,-medroxyprogesterone (PREMPRO) 0.3-1.5 MG per tablet   No No   Sig: Take 1 tablet by mouth daily   methocarbamol (ROBAXIN) 750 mg tablet  Self No No   Sig: Take 1 tablet (750 mg total) by mouth every 6 (six) hours as needed for muscle spasms   oxyCODONE (Roxicodone) 5 immediate release tablet 2023  No Yes   Sig: Take 1 tablet (5 mg total) by mouth 2 (two) times a day Max Daily Amount: 10 mg      Facility-Administered Medications: None       Past Medical History:   Diagnosis Date   • Anxiety    • Chicken pox    • Depression    • Endometriosis    • GERD (gastroesophageal reflux disease)    • Headache     Occasional    • HSV-1 infection    • IBS (irritable bowel syndrome)    • Kidney stone    • Kidney stone on left side    • Multiple thyroid nodules    • Obesity    • Renal calculi        Past Surgical History:   Procedure Laterality Date   • APPENDECTOMY  2021   •  SECTION x2   • CYSTOSCOPY N/A 5/18/2023    Procedure: CYSTOSCOPY;  Surgeon: Adama Babb MD;  [facility];  Service: Gynecology Oncology   • EXAMINATION UNDER ANESTHESIA N/A 6/4/2023    Procedure: EXAM UNDER ANESTHESIA (EUA), I& D vaginal cuff abscess;  Surgeon: Adama Babb MD;  [facility];  Service: Gynecology Oncology   • HYSTERECTOMY      robotic total laparoscopic hysterectomy with BS   • LAPAROSCOPIC ENDOMETRIOSIS FULGURATION  09/04/2018    laparoscopic excision of endometriosis, fulguration of endometriosis, lysis of adhesions   • LAPAROSCOPY  05/06/2014    with I&D    • MOLE REMOVAL      face - benign    • PELVIC LAPAROSCOPY Bilateral 5/18/2023    Procedure: SALPINGO-OOPHORECTOMY, LAPAROSCOPIC W/ROBOTICS, RADICAL RESECTION PELVIC ENDOMETRIOSIS;  Surgeon: Adama Babb MD;  [facility];  Service: Gynecology Oncology   • PROCTOSCOPY N/A 5/18/2023    Procedure: PROCTOSCOPY;  Surgeon: Adama Babb MD;  [facility];  Service: Gynecology Oncology   • SKIN CANCER EXCISION      abdomen   • THYROID CYST EXCISION     • TONSILLECTOMY     • TUBAL LIGATION  02/15/2016    With lysis of adhesion and peritoneal biopsy   • [address] [place] [address] STUDY  05/30/2015   • VAGINA SURGERY N/A 5/18/2023    Procedure: VAGINECTOMY, PARTIAL;  Surgeon: Adama Babb MD;  [facility];  Service: Gynecology Oncology   • WISDOM TOOTH EXTRACTION         Family History   Problem Relation Age of Onset   • Coronary artery disease Mother    • Varicose Veins Mother    • Other Mother         breast cyst - removed    • Cholelithiasis Mother         had cholecystectomy   • Alcohol abuse Father    • Cholelithiasis Maternal Grandmother         had cholecystectomy   • Uterine cancer Paternal Grandmother    • Breast cancer Family         Before age 52    • Uterine cancer Family    • Obesity Family    • Hypertension Family    • Brain cancer Family • Gallbladder disease Family      I have reviewed and agree with the history as documented. E-Cigarette/Vaping   • E-Cigarette Use Never User      E-Cigarette/Vaping Substances   • Nicotine No    • THC No    • CBD No    • Flavoring No    • Other No    • Unknown No      Social History     Tobacco Use   • Smoking status: Never   • Smokeless tobacco: Never   Vaping Use   • Vaping Use: Never used   Substance Use Topics   • Alcohol use: Not Currently     Comment: rarely   • Drug use: No       Review of Systems   Constitutional: Negative for chills and fever. HENT: Negative for ear pain and sore throat. Eyes: Negative for pain and visual disturbance. Respiratory: Negative for cough and shortness of breath. Cardiovascular: Negative for chest pain and palpitations. Gastrointestinal: Negative for abdominal pain and vomiting. Musculoskeletal: Negative for gait problem. Right hand pain   Skin: Negative for color change and rash. Neurological: Negative for weakness and numbness. Psychiatric/Behavioral: Negative for agitation and confusion. Physical Exam  Physical Exam  Vitals and nursing note reviewed. Constitutional:       General: She is not in acute distress. Appearance: She is well-developed. HENT:      Head: Normocephalic and atraumatic. Right Ear: External ear normal.      Left Ear: External ear normal.      Nose: Nose normal.      Mouth/Throat:      Pharynx: Oropharynx is clear. Eyes:      General: No scleral icterus. Conjunctiva/sclera: Conjunctivae normal.   Cardiovascular:      Rate and Rhythm: Normal rate. Comments: well-perfused extremities  Pulmonary:      Effort: Pulmonary effort is normal. No respiratory distress. Abdominal:      General: Abdomen is flat. There is no distension. Musculoskeletal:         General: Tenderness present. No swelling or deformity. Cervical back: Normal range of motion and neck supple.       Comments: tenderness to palpation of dorsal aspect of right hand proximal to right index and middle fingers; intact range of motion of fingers of right hand; neurovascularly intact extremities   Skin:     General: Skin is warm and dry. Neurological:      General: No focal deficit present. Mental Status: She is alert. Cranial Nerves: No cranial nerve deficit. Sensory: No sensory deficit. Psychiatric:         Mood and Affect: Mood normal.         Thought Content: Thought content normal.         Vital Signs  ED Triage Vitals [08/20/23 1936]   Temperature Pulse Respirations Blood Pressure SpO2   98.2 °F (36.8 °C) 88 18 126/75 98 %      Temp Source Heart Rate Source Patient Position - Orthostatic VS BP Location FiO2 (%)   Oral Monitor Sitting Left arm --      Pain Score       6           Vitals:    08/20/23 1936   BP: 126/75   Pulse: 88   Patient Position - Orthostatic VS: Sitting         Visual Acuity      ED Medications  Medications   naproxen (NAPROSYN) tablet 500 mg (has no administration in time range)       Diagnostic Studies  Results Reviewed     None                 XR hand 3+ views RIGHT   ED Interpretation by Fareed Peters MD (08/20 2010)   No acute fracture or dislocation                 Procedures  Procedures         ED Course                               SBIRT 20yo+    Flowsheet Row Most Recent Value   Initial Alcohol Screen: US AUDIT-C     1. How often do you have a drink containing alcohol? 0 Filed at: 08/20/2023 1938   2. How many drinks containing alcohol do you have on a typical day you are drinking? 0 Filed at: 08/20/2023 1938   3b. FEMALE Any Age, or MALE 65+: How often do you have 4 or more drinks on one occassion? 0 Filed at: 08/20/2023 1938   Audit-C Score 0 Filed at: 08/20/2023 5930   GERARDO: How many times in the past year have you. .. Used an illegal drug or used a prescription medication for non-medical reasons?  Never Filed at: 08/20/2023 3096                    Medical Decision Making  Patient is a 82-year-old female seen in the emergency department with concern for right hand pain over approximately the past 3-4 days. Patient was treated with medication for symptom control. X-rays of right hand showed no acute fracture or dislocation. Evaluation is consistent with likely right hand sprain, possible tendinitis. Right wrist splint was ordered. Plan to have patient follow up with PCP/outpatient providers. Patient stable for discharge home. Discharge instructions were reviewed with patient. Right hand pain: acute illness or injury  Amount and/or Complexity of Data Reviewed  Radiology: ordered and independent interpretation performed. Decision-making details documented in ED Course. Risk  Prescription drug management. Disposition  Final diagnoses:   Right hand pain     Time reflects when diagnosis was documented in both MDM as applicable and the Disposition within this note     Time User Action Codes Description Comment    8/20/2023  7:39 PM Debora Fuentes Add [K25.621] Right hand pain       ED Disposition     ED Disposition   Discharge    Condition   Stable    Date/Time   Sun Aug 20, 2023  8:13 PM    Comment   Ventura Powers discharge to home/self care. Follow-up Information     Follow up With Specialties Details Why Contact Info Additional [address] [facility] Call in 1 day  [address]       [address] [facility] Surgery Call  As needed [facility] [address] [facility] [address] [facility] [address] [facility] [address] [facility] [phone]          Patient's Medications   Discharge Prescriptions    NAPROXEN (NAPROSYN) 500 MG TABLET    Take 1 tablet (500 mg total) by mouth every 12 (twelve) hours as needed (pain) for up to 10 days Take with food. Do not start before August 21, 2023.        Start Date: 8/21/2023 End Date: 8/31/2023       Order Dose: 500 mg       Quantity: 14 tablet    Refills: 0       No discharge procedures on file.     PDMP Review       Value Time User    PDMP Reviewed  Yes 6/26/2023 10:45 AM Yandel Yin MD          ED Provider  Electronically Signed by           Eze Mcrae MD  08/20/23 2016

## 2023-08-21 DIAGNOSIS — R10.2 CHRONIC PELVIC PAIN IN FEMALE: ICD-10-CM

## 2023-08-21 DIAGNOSIS — N80.9 ENDOMETRIOSIS: ICD-10-CM

## 2023-08-21 DIAGNOSIS — G89.29 CHRONIC PELVIC PAIN IN FEMALE: ICD-10-CM

## 2023-08-23 RX ORDER — OXYCODONE HYDROCHLORIDE 5 MG/1
5 TABLET ORAL 2 TIMES DAILY
Qty: 40 TABLET | Refills: 0 | Status: SHIPPED | OUTPATIENT
Start: 2023-08-23

## 2023-08-30 DIAGNOSIS — N80.9 ENDOMETRIOSIS: ICD-10-CM

## 2023-08-31 RX ORDER — ESTROGEN,CON/M-PROGEST ACET 0.3-1.5MG
1 TABLET ORAL DAILY
Qty: 28 TABLET | Refills: 3 | Status: SHIPPED | OUTPATIENT
Start: 2023-08-31

## 2023-09-06 DIAGNOSIS — G89.29 CHRONIC PELVIC PAIN IN FEMALE: ICD-10-CM

## 2023-09-06 DIAGNOSIS — N80.9 ENDOMETRIOSIS: ICD-10-CM

## 2023-09-06 DIAGNOSIS — R10.2 CHRONIC PELVIC PAIN IN FEMALE: ICD-10-CM

## 2023-09-11 RX ORDER — OXYCODONE HYDROCHLORIDE 5 MG/1
5 TABLET ORAL 2 TIMES DAILY
Qty: 40 TABLET | Refills: 0 | Status: SHIPPED | OUTPATIENT
Start: 2023-09-11

## 2023-09-12 ENCOUNTER — TELEPHONE (OUTPATIENT)
Dept: INTERNAL MEDICINE CLINIC | Age: 33
End: 2023-09-12

## 2023-09-12 ENCOUNTER — OFFICE VISIT (OUTPATIENT)
Dept: GYNECOLOGIC ONCOLOGY | Facility: CLINIC | Age: 33
End: 2023-09-12
Payer: COMMERCIAL

## 2023-09-12 VITALS
WEIGHT: 243 LBS | RESPIRATION RATE: 16 BRPM | DIASTOLIC BLOOD PRESSURE: 78 MMHG | TEMPERATURE: 97.9 F | SYSTOLIC BLOOD PRESSURE: 100 MMHG | BODY MASS INDEX: 41.48 KG/M2 | HEIGHT: 64 IN

## 2023-09-12 DIAGNOSIS — R10.84 GENERALIZED ABDOMINAL PAIN: ICD-10-CM

## 2023-09-12 DIAGNOSIS — G89.29 CHRONIC PELVIC PAIN IN FEMALE: ICD-10-CM

## 2023-09-12 DIAGNOSIS — R10.2 CHRONIC PELVIC PAIN IN FEMALE: ICD-10-CM

## 2023-09-12 DIAGNOSIS — N80.9 ENDOMETRIOSIS: ICD-10-CM

## 2023-09-12 DIAGNOSIS — N95.1 MENOPAUSAL SYMPTOMS: ICD-10-CM

## 2023-09-12 DIAGNOSIS — N73.9 PELVIC ABSCESS IN FEMALE: Primary | ICD-10-CM

## 2023-09-12 PROCEDURE — 99214 OFFICE O/P EST MOD 30 MIN: CPT | Performed by: OBSTETRICS & GYNECOLOGY

## 2023-09-12 RX ORDER — METHOCARBAMOL 750 MG/1
750 TABLET, FILM COATED ORAL EVERY 6 HOURS PRN
Qty: 60 TABLET | Refills: 5 | Status: SHIPPED | OUTPATIENT
Start: 2023-09-12

## 2023-09-12 NOTE — ASSESSMENT & PLAN NOTE
Referral to pelvic physiotherapy given known pelvic adhesive disease status post radical resection of endometriosis.

## 2023-09-12 NOTE — TELEPHONE ENCOUNTER
Patient called regarding the Robaxin 750 mg to be sent to [facility]     Patient is aware it was sent

## 2023-09-12 NOTE — ASSESSMENT & PLAN NOTE
Hot flashes while on 0.3/1.5 mg dosing of Prempro. We therefore increased the dose of estrogen to 0.45. She was encouraged to call the office if the increase in dose does not help her symptoms. The goal would be to alleviate hot flashes.

## 2023-09-12 NOTE — ASSESSMENT & PLAN NOTE
37-year-old status post radical resection of pelvic endometriosis, bilateral oophorectomy on 8/73/5862 complicated by vaginal cuff abscess. She had incision and drainage on 6/4/2023. Her most recent CT 7/13/2023 did not reveal any evidence of residual abscess. She has been weaning her oxycodone. Her performance status is 0.  1.  Return in 4 months for evaluation after he has seen pelvic physiotherapy.

## 2023-09-19 ENCOUNTER — TELEPHONE (OUTPATIENT)
Dept: GENETICS | Facility: CLINIC | Age: 33
End: 2023-09-19

## 2023-09-19 NOTE — TELEPHONE ENCOUNTER
I called and left a message for [facility] for confirm her upcoming appointment.

## 2023-10-02 DIAGNOSIS — N80.9 ENDOMETRIOSIS: ICD-10-CM

## 2023-10-02 DIAGNOSIS — R10.2 CHRONIC PELVIC PAIN IN FEMALE: ICD-10-CM

## 2023-10-02 DIAGNOSIS — G89.29 CHRONIC PELVIC PAIN IN FEMALE: ICD-10-CM

## 2023-10-02 NOTE — TELEPHONE ENCOUNTER
7/26/23  No upcoming apt     Patient called back office to check on script she is also asking when she should be seen again.

## 2023-10-03 ENCOUNTER — DOCUMENTATION (OUTPATIENT)
Dept: GENETICS | Facility: CLINIC | Age: 33
End: 2023-10-03

## 2023-10-03 ENCOUNTER — CLINICAL SUPPORT (OUTPATIENT)
Dept: GENETICS | Facility: CLINIC | Age: 33
End: 2023-10-03
Payer: COMMERCIAL

## 2023-10-03 DIAGNOSIS — Z80.0 FAMILY HISTORY OF COLON CANCER: ICD-10-CM

## 2023-10-03 DIAGNOSIS — Z80.3 FAMILY HISTORY OF BREAST CANCER: ICD-10-CM

## 2023-10-03 DIAGNOSIS — Z86.010 PERSONAL HISTORY OF COLONIC POLYPS: Primary | ICD-10-CM

## 2023-10-03 DIAGNOSIS — Z80.49 FAMILY HISTORY OF UTERINE CANCER: ICD-10-CM

## 2023-10-03 PROCEDURE — 36415 COLL VENOUS BLD VENIPUNCTURE: CPT

## 2023-10-03 RX ORDER — OXYCODONE HYDROCHLORIDE 5 MG/1
5 TABLET ORAL 2 TIMES DAILY
Qty: 40 TABLET | Refills: 0 | Status: SHIPPED | OUTPATIENT
Start: 2023-10-03

## 2023-10-03 NOTE — LETTER
October 3, 2023     Vinita Saldana MD  [address] [place] [id number]    Patient: Lorenzo Salazar  YOB: 1990  Date of Visit: 10/3/2023      Dear Dr. Tisha Cherry: Thank you for referring Lorenzo Salazar to me for evaluation. Below are my notes for this consultation. If you have questions, please do not hesitate to call me. I look forward to following your patient along with you. Sincerely,        Aniya Toledo        CC: Renetta Dietrich MD        Pre-Test Genetic Counseling Consult Note    Patient Name: Lorenzo Salazar   /Age: 1990/ y.o. Referring Provider: Vinita Saldana MD     Date of Service: 10/3/2023  Genetic Counselor: Aniya Toledo MS, [facility]  Interpretation Services: None  [facility] ELIANA Armas was referred to the [address] [place] [address] and Genetic Assessment Program due to her personal history of colon polyps. she presents today to discuss the possibility of a hereditary cancer syndrome, options for genetic testing, and implications for her and her family. Cancer History and Treatment:   Personal History: no personal history of cancer    Screening Hx:   Breast:  Clinical exams and breast ultrasounds (due to pain)     Colon:  Colonoscopy: 2023      Colonoscopy 2021:  Final Diagnosis   A. Terminal ileum (random biopsy):     - Small bowel mucosa with no significant pathologic abnormalities. - No villous atrophy, increased intraepithelial lymphocytes or crypt hyperplasia to suggest malabsorptive enteropathy.     - No active inflammation, granulomas, organisms, dysplasia or neoplasia identified. B.  Colon (random biopsy):     - Colonic mucosa with no significant pathologic abnormalities. - No active inflammation or histologic evidence of a microscopic (lymphocytic)       or collagenous colitis noted.      - No granulomas, dysplasia or neoplasia identified. C.  Sigmoid colon polyp (cold snare):     - Tubular / tubulovillous adenoma; negative for high-grade dysplasia.      Gynecologic:  CHEYENNE/BSO     CHEYENNE/BS at 23  LESA at 32    Skin:  saw derm for mole (removed, ahs seen derm previously for other moles)    Other screenin nodules on thyroid, last ultrasound in 2018; history of thyroid surgery for fidning    Reproductive History  Age at menarche: 17  Age at first live birth: 25  Menopause: Post Menopausal (29)  Hormone replacement: currently on combined HRT (1year of usage)    Medical and Surgical History  Pertinent surgical history:   Past Surgical History:   Procedure Laterality Date   • APPENDECTOMY  2021   •  SECTION      x2   • CYSTOSCOPY N/A 2023    Procedure: CYSTOSCOPY;  Surgeon: Juli Owens MD;  [facility];  Service: Gynecology Oncology   • EXAMINATION UNDER ANESTHESIA N/A 2023    Procedure: EXAM UNDER ANESTHESIA (EUA), I& D vaginal cuff abscess;  Surgeon: Juli Owens MD;  [facility];  Service: Gynecology Oncology   • HYSTERECTOMY      robotic total laparoscopic hysterectomy with BS   • LAPAROSCOPIC ENDOMETRIOSIS FULGURATION  2018    laparoscopic excision of endometriosis, fulguration of endometriosis, lysis of adhesions   • LAPAROSCOPY  2014    with I&D    • MOLE REMOVAL      face - benign    • PELVIC LAPAROSCOPY Bilateral 2023    Procedure: SALPINGO-OOPHORECTOMY, LAPAROSCOPIC W/ROBOTICS, RADICAL RESECTION PELVIC ENDOMETRIOSIS;  Surgeon: Juli Owens MD;  [facility];  Service: Gynecology Oncology   • PROCTOSCOPY N/A 2023    Procedure: PROCTOSCOPY;  Surgeon: Juli Owens MD;  [facility];  Service: Gynecology Oncology   • SKIN CANCER EXCISION      abdomen   • THYROID CYST EXCISION     • TONSILLECTOMY     • TUBAL LIGATION  02/15/2016    With lysis of adhesion and peritoneal biopsy   • [address] [place] [address] STUDY  05/30/2015   • VAGINA SURGERY N/A 5/18/2023    Procedure: VAGINECTOMY, PARTIAL;  Surgeon: Tiffanie Pope MD;  [facility];  Service: Gynecology Oncology   • WISDOM TOOTH EXTRACTION        Pertinent medical history:  Past Medical History:   Diagnosis Date   • Anxiety    • Chicken pox    • Depression    • Endometriosis    • GERD (gastroesophageal reflux disease)    • Headache     Occasional    • HSV-1 infection    • IBS (irritable bowel syndrome)    • Kidney stone    • Kidney stone on left side    • Multiple thyroid nodules    • Obesity    • Renal calculi          Other History:  Height:   Ht Readings from Last 1 Encounters:   09/12/23 5' 4" (1.626 m)     Weight:   Wt Readings from Last 1 Encounters:   09/12/23 110 kg (243 lb)       Relevant Family History   Patient reports no Ashkenazi Baptism ancestry. Maternal Family History: Mother (52) unaffected but does not do usual screening  Grandmother (d.64) history of multiple breast cysts  Great aunt with history of brain cancer  [place] grandmother with history of breast and uterine cancer (dx late 60s-early 76s)  Family history of colon cancer in other family members    Paternal Family History:   Limited information on paternal relatives    Please refer to the scanned pedigree in the Media Tab for a complete family history     *All history is reported as provided by the patient; records are not available for review, except where indicated. Assessment:  We discussed sporadic, familial and hereditary cancer. We also discussed the many factors that influence our risk for cancer such as age, environmental exposures, lifestyle choices and family history. We reviewed the indications suggestive of a hereditary predisposition to cancer.     Although the personal and family history of cancer/tumors is not consistent with the typical presentation of a hereditary cancer syndrome, genetic testing may be considered to rule out moderately penetrant genes which have clear management recommendations. Stephane Marley has a personal history of a few colon polyps at a young age and has a reportedly strong maternal family history of colon cancer. She can consider testing for this reason. The risks, benefits, and limitations of genetic testing were reviewed with the patient, as well as genetic discrimination laws, and possible test results (positive, negative, variants of uncertain significance) and their clinical implications. If positive for a mutation, options for managing cancer risk including increased surveillance, chemoprevention, and in some cases prophylactic surgery were discussed. Stephane Marley was informed that if a hereditary cancer syndrome was identified in her, first degree relatives (parents, siblings, and children) have a chance of also inheriting the condition. Genetic testing would allow for predictive genetic testing in other relatives, who may also be at risk depending on their degree of relation. Billing:  Based on Lucie's primary insurance being Medicaid and [employer]'s billing policy, she should expect an out of pocket cost of $0. Stephane Marley verbalized understanding. Plan: Patient decided to proceed with testing and provided consent.     Summary:     Sample Collection:  The patient's blood sample was drawn in the office on 10/3 by medical assistant Rajan Lantigua    Genetic Testing Preformed: [facility] Common Hereditary Cancers Panel + [facility] Thyroid Cancer Panel (49 genes): APC, RENEA, AXIN2, BARD1, BMPR1A, BRCA1, BRCA2, BRIP1, CDH1, CDK4, CDKN2A, CHEK2, CTNNA1, DICER1, EPCAM, GREM1, HOXB13, KIT, MEN1, MLH1, MSH2, MSH3, MSH6, MUTYH, NBN, NF1, NTHL1, PALB2, PDGFRA, PMS2, POLD1, POLE,  QNCAI9H, PTEN, RAD50, RAD51C, RAD51D, RET, SDHA, SDHB, SDHC, SDHD, SMAD4, SMARCA4, STK11, TP53, TSC1, TSC2, VHL      Result Call Information:  In the event that we need to reach Stephane Marley via telephone:  I confirmed the patient's mobile number on file as the best number to call with results  I confirmed with the patient that we can leave a voicemail on the provided numbers    Results take approximately 2-3 weeks to complete once test is started. Vanessa Bridges will be notified via [employer] once results are available. Additional recommendations for surveillance/medical management will be made pending genetic test results.

## 2023-10-03 NOTE — PROGRESS NOTES
Pre-Test Genetic Counseling Consult Note    Patient Name: Irene CHERRY/Age:  y.o. Referring Provider: Jose Zapien MD     Date of Service: 10/3/2023  Genetic Counselor: Lay Baca MS, [facility]  Interpretation Services: None  [facility] Armas was referred to the [address] [place], [place] [address] and Genetic Assessment Program due to her personal history of colon polyps. she presents today to discuss the possibility of a hereditary cancer syndrome, options for genetic testing, and implications for her and her family. Cancer History and Treatment:   Personal History: no personal history of cancer    Screening Hx:   Breast:  Clinical exams and breast ultrasounds (due to pain)     Colon:  Colonoscopy: 2023      Colonoscopy 2021:  Final Diagnosis   A. Terminal ileum (random biopsy):     - Small bowel mucosa with no significant pathologic abnormalities. - No villous atrophy, increased intraepithelial lymphocytes or crypt hyperplasia to suggest malabsorptive enteropathy.     - No active inflammation, granulomas, organisms, dysplasia or neoplasia identified. B.  Colon (random biopsy):     - Colonic mucosa with no significant pathologic abnormalities. - No active inflammation or histologic evidence of a microscopic (lymphocytic)       or collagenous colitis noted. - No granulomas, dysplasia or neoplasia identified. C.  Sigmoid colon polyp (cold snare):     - Tubular / tubulovillous adenoma; negative for high-grade dysplasia.      Gynecologic:  CHEYENNE/BSO     CHEYENNE/BS at 23  LESA at 32    Skin:  saw derm for mole (removed, ahs seen derm previously for other moles)    Other screenin nodules on thyroid, last ultrasound in 2018; history of thyroid surgery for fidning    Reproductive History  Age at menarche: 17  Age at first live birth: 25  Menopause: Post Menopausal (29)  Hormone replacement: currently on combined HRT (1year of usage)    Medical and Surgical History  Pertinent surgical history:   Past Surgical History:   Procedure Laterality Date   • APPENDECTOMY  2021   •  SECTION      x2   • CYSTOSCOPY N/A 2023    Procedure: CYSTOSCOPY;  Surgeon: Antonio Marrero MD;  [facility];  Service: Gynecology Oncology   • EXAMINATION UNDER ANESTHESIA N/A 2023    Procedure: EXAM UNDER ANESTHESIA (EUA), I& D vaginal cuff abscess;  Surgeon: Antonio Marrero MD;  [facility];  Service: Gynecology Oncology   • HYSTERECTOMY      robotic total laparoscopic hysterectomy with BS   • LAPAROSCOPIC ENDOMETRIOSIS FULGURATION  2018    laparoscopic excision of endometriosis, fulguration of endometriosis, lysis of adhesions   • LAPAROSCOPY  2014    with I&D    • MOLE REMOVAL      face - benign    • PELVIC LAPAROSCOPY Bilateral 2023    Procedure: SALPINGO-OOPHORECTOMY, LAPAROSCOPIC W/ROBOTICS, RADICAL RESECTION PELVIC ENDOMETRIOSIS;  Surgeon: Antonio Marrero MD;  [facility];  Service: Gynecology Oncology   • PROCTOSCOPY N/A 2023    Procedure: PROCTOSCOPY;  Surgeon: Antonio Marrero MD;  [facility];  Service: Gynecology Oncology   • SKIN CANCER EXCISION      abdomen   • THYROID CYST EXCISION     • TONSILLECTOMY     • TUBAL LIGATION  02/15/2016    With lysis of adhesion and peritoneal biopsy   • [address] [place] [address] STUDY  2015   • VAGINA SURGERY N/A 2023    Procedure: VAGINECTOMY, PARTIAL;  Surgeon: Antonio Marrero MD;  [facility];  Service: Gynecology Oncology   • WISDOM TOOTH EXTRACTION        Pertinent medical history:  Past Medical History:   Diagnosis Date   • Anxiety    • Chicken pox    • Depression    • Endometriosis    • GERD (gastroesophageal reflux disease)    • Headache     Occasional    • HSV-1 infection    • IBS (irritable bowel syndrome)    • Kidney stone    • Kidney stone on left side    • Multiple thyroid nodules    • Obesity    • Renal calculi          Other History:  Height:   Ht Readings from Last 1 Encounters:   09/12/23 5' 4" (1.626 m)     Weight:   Wt Readings from Last 1 Encounters:   09/12/23 110 kg (243 lb)       Relevant Family History   Patient reports no Ashkenazi Yazidism ancestry. Maternal Family History: Mother (52) unaffected but does not do usual screening  Grandmother (d.64) history of multiple breast cysts  Great aunt with history of brain cancer  [place] grandmother with history of breast and uterine cancer (dx late 60s-early 76s)  Family history of colon cancer in other family members    Paternal Family History:   Limited information on paternal relatives    Please refer to the scanned pedigree in the Media Tab for a complete family history     *All history is reported as provided by the patient; records are not available for review, except where indicated. Assessment:  We discussed sporadic, familial and hereditary cancer. We also discussed the many factors that influence our risk for cancer such as age, environmental exposures, lifestyle choices and family history. We reviewed the indications suggestive of a hereditary predisposition to cancer. Although the personal and family history of cancer/tumors is not consistent with the typical presentation of a hereditary cancer syndrome, genetic testing may be considered to rule out moderately penetrant genes which have clear management recommendations. German Dickerson has a personal history of a few colon polyps at a young age and has a reportedly strong maternal family history of colon cancer. She can consider testing for this reason. The risks, benefits, and limitations of genetic testing were reviewed with the patient, as well as genetic discrimination laws, and possible test results (positive, negative, variants of uncertain significance) and their clinical implications.  If positive for a mutation, options for managing cancer risk including increased surveillance, chemoprevention, and in some cases prophylactic surgery were discussed. HENRRY was informed that if a hereditary cancer syndrome was identified in her, first degree relatives (parents, siblings, and children) have a chance of also inheriting the condition. Genetic testing would allow for predictive genetic testing in other relatives, who may also be at risk depending on their degree of relation. Billing:  Based on Lucie's primary insurance being Medicaid and [employer]'s billing policy, she should expect an out of pocket cost of $0. HENRRY verbalized understanding. Plan: Patient decided to proceed with testing and provided consent. Summary:     Sample Collection:  The patient's blood sample was drawn in the office on 10/3 by medical assistant Bertram Connolly    Genetic Testing Preformed: [email or web address] Common Hereditary Cancers Panel + [facility] Thyroid Cancer Panel (49 genes): APC, RENEA, AXIN2, BARD1, BMPR1A, BRCA1, BRCA2, BRIP1, CDH1, CDK4, CDKN2A, CHEK2, CTNNA1, DICER1, EPCAM, GREM1, HOXB13, KIT, MEN1, MLH1, MSH2, MSH3, MSH6, MUTYH, NBN, NF1, NTHL1, PALB2, PDGFRA, PMS2, POLD1, POLE,  OTUHG6V, PTEN, RAD50, RAD51C, RAD51D, RET, SDHA, SDHB, SDHC, SDHD, SMAD4, SMARCA4, STK11, TP53, TSC1, TSC2, VHL      Result Call Information:  In the event that we need to reach [facility] via telephone:  I confirmed the patient's mobile number on file as the best number to call with results  I confirmed with the patient that we can leave a voicemail on the provided numbers    Results take approximately 2-3 weeks to complete once test is started. [facility] will be notified via [employer] once results are available. Additional recommendations for surveillance/medical management will be made pending genetic test results.

## 2023-10-05 ENCOUNTER — APPOINTMENT (EMERGENCY)
Dept: RADIOLOGY | Facility: HOSPITAL | Age: 33
End: 2023-10-05
Payer: COMMERCIAL

## 2023-10-05 ENCOUNTER — HOSPITAL ENCOUNTER (EMERGENCY)
Facility: HOSPITAL | Age: 33
Discharge: HOME | End: 2023-10-05
Attending: EMERGENCY MEDICINE
Payer: COMMERCIAL

## 2023-10-05 VITALS
OXYGEN SATURATION: 95 % | SYSTOLIC BLOOD PRESSURE: 125 MMHG | TEMPERATURE: 99.6 F | BODY MASS INDEX: 41.71 KG/M2 | DIASTOLIC BLOOD PRESSURE: 60 MMHG | HEART RATE: 84 BPM | HEIGHT: 64 IN | RESPIRATION RATE: 18 BRPM

## 2023-10-05 DIAGNOSIS — U07.1 COVID-19: ICD-10-CM

## 2023-10-05 DIAGNOSIS — R11.2 NAUSEA & VOMITING: Primary | ICD-10-CM

## 2023-10-05 LAB
ALBUMIN SERPL BCP-MCNC: 4.4 G/DL (ref 3.5–5)
ALP SERPL-CCNC: 62 U/L (ref 34–104)
ALT SERPL W P-5'-P-CCNC: 16 U/L (ref 7–52)
ANION GAP SERPL CALCULATED.3IONS-SCNC: 9 MMOL/L
AST SERPL W P-5'-P-CCNC: 14 U/L (ref 13–39)
BASOPHILS # BLD AUTO: 0 THOUSANDS/ÂΜL (ref 0–0.1)
BASOPHILS NFR BLD AUTO: 0 % (ref 0–1)
BILIRUB SERPL-MCNC: 0.93 MG/DL (ref 0.2–1)
BILIRUB UR QL STRIP: NEGATIVE
BUN SERPL-MCNC: 10 MG/DL (ref 5–25)
CALCIUM SERPL-MCNC: 9 MG/DL (ref 8.4–10.2)
CHLORIDE SERPL-SCNC: 101 MMOL/L (ref 96–108)
CLARITY UR: CLEAR
CO2 SERPL-SCNC: 25 MMOL/L (ref 21–32)
COLOR UR: NORMAL
CREAT SERPL-MCNC: 0.8 MG/DL (ref 0.6–1.3)
EOSINOPHIL # BLD AUTO: 0 THOUSAND/ÂΜL (ref 0–0.61)
EOSINOPHIL NFR BLD AUTO: 0 % (ref 0–6)
ERYTHROCYTE [DISTWIDTH] IN BLOOD BY AUTOMATED COUNT: 12.7 % (ref 11.6–15.1)
FLUAV RNA RESP QL NAA+PROBE: NEGATIVE
FLUBV RNA RESP QL NAA+PROBE: NEGATIVE
GFR SERPL CREATININE-BSD FRML MDRD: 99 ML/MIN/1.73SQ M
GLUCOSE SERPL-MCNC: 89 MG/DL (ref 65–140)
GLUCOSE UR STRIP-MCNC: NEGATIVE MG/DL
HCT VFR BLD AUTO: 37.7 % (ref 34.8–46.1)
HGB BLD-MCNC: 12.8 G/DL (ref 11.5–15.4)
HGB UR QL STRIP.AUTO: NEGATIVE
IMM GRANULOCYTES # BLD AUTO: 0.01 THOUSAND/UL (ref 0–0.2)
IMM GRANULOCYTES NFR BLD AUTO: 0 % (ref 0–2)
KETONES UR STRIP-MCNC: NEGATIVE MG/DL
LEUKOCYTE ESTERASE UR QL STRIP: NEGATIVE
LYMPHOCYTES # BLD AUTO: 0.75 THOUSANDS/ÂΜL (ref 0.6–4.47)
LYMPHOCYTES NFR BLD AUTO: 13 % (ref 14–44)
MCH RBC QN AUTO: 28.8 PG (ref 26.8–34.3)
MCHC RBC AUTO-ENTMCNC: 34 G/DL (ref 31.4–37.4)
MCV RBC AUTO: 85 FL (ref 82–98)
MONOCYTES # BLD AUTO: 0.66 THOUSAND/ÂΜL (ref 0.17–1.22)
MONOCYTES NFR BLD AUTO: 12 % (ref 4–12)
NEUTROPHILS # BLD AUTO: 4.21 THOUSANDS/ÂΜL (ref 1.85–7.62)
NEUTS SEG NFR BLD AUTO: 75 % (ref 43–75)
NITRITE UR QL STRIP: NEGATIVE
NRBC BLD AUTO-RTO: 0 /100 WBCS
PH UR STRIP.AUTO: 6.5 [PH]
PLATELET # BLD AUTO: 174 THOUSANDS/UL (ref 149–390)
PMV BLD AUTO: 8.6 FL (ref 8.9–12.7)
POTASSIUM SERPL-SCNC: 3.9 MMOL/L (ref 3.5–5.3)
PROT SERPL-MCNC: 7.5 G/DL (ref 6.4–8.4)
PROT UR STRIP-MCNC: NEGATIVE MG/DL
RBC # BLD AUTO: 4.45 MILLION/UL (ref 3.81–5.12)
RSV RNA RESP QL NAA+PROBE: NEGATIVE
SARS-COV-2 RNA RESP QL NAA+PROBE: POSITIVE
SODIUM SERPL-SCNC: 135 MMOL/L (ref 135–147)
SP GR UR STRIP.AUTO: 1.02 (ref 1–1.03)
UROBILINOGEN UR STRIP-ACNC: <2 MG/DL
WBC # BLD AUTO: 5.63 THOUSAND/UL (ref 4.31–10.16)

## 2023-10-05 PROCEDURE — 81003 URINALYSIS AUTO W/O SCOPE: CPT

## 2023-10-05 PROCEDURE — 80053 COMPREHEN METABOLIC PANEL: CPT

## 2023-10-05 PROCEDURE — 96361 HYDRATE IV INFUSION ADD-ON: CPT

## 2023-10-05 PROCEDURE — 71045 X-RAY EXAM CHEST 1 VIEW: CPT

## 2023-10-05 PROCEDURE — 85025 COMPLETE CBC W/AUTO DIFF WBC: CPT

## 2023-10-05 PROCEDURE — 36415 COLL VENOUS BLD VENIPUNCTURE: CPT

## 2023-10-05 PROCEDURE — 0241U HB NFCT DS VIR RESP RNA 4 TRGT: CPT

## 2023-10-05 PROCEDURE — 96374 THER/PROPH/DIAG INJ IV PUSH: CPT

## 2023-10-05 PROCEDURE — 99284 EMERGENCY DEPT VISIT MOD MDM: CPT | Performed by: EMERGENCY MEDICINE

## 2023-10-05 PROCEDURE — 99284 EMERGENCY DEPT VISIT MOD MDM: CPT

## 2023-10-05 RX ORDER — ACETAMINOPHEN 325 MG/1
975 TABLET ORAL ONCE
Status: COMPLETED | OUTPATIENT
Start: 2023-10-05 | End: 2023-10-05

## 2023-10-05 RX ORDER — ONDANSETRON 4 MG/1
4 TABLET, ORALLY DISINTEGRATING ORAL EVERY 6 HOURS PRN
Qty: 16 TABLET | Refills: 0 | Status: SHIPPED | OUTPATIENT
Start: 2023-10-05 | End: 2023-10-09

## 2023-10-05 RX ORDER — KETOROLAC TROMETHAMINE 30 MG/ML
15 INJECTION, SOLUTION INTRAMUSCULAR; INTRAVENOUS ONCE
Status: COMPLETED | OUTPATIENT
Start: 2023-10-05 | End: 2023-10-05

## 2023-10-05 RX ADMIN — SODIUM CHLORIDE 1000 ML: 0.9 INJECTION, SOLUTION INTRAVENOUS at 18:36

## 2023-10-05 RX ADMIN — ACETAMINOPHEN 975 MG: 325 TABLET, FILM COATED ORAL at 18:24

## 2023-10-05 RX ADMIN — KETOROLAC TROMETHAMINE 15 MG: 30 INJECTION, SOLUTION INTRAMUSCULAR; INTRAVENOUS at 18:34

## 2023-10-05 NOTE — DISCHARGE INSTRUCTIONS
Please come back to the ER if you are having worsening symptoms including worsening fevers, lethargy, shortness of breath, chest pain, or feeling like you are going to pass out. Take tylenol up to 1000mg every 4-6 hours as needed, but no more than 4000mg total per 24 hours.

## 2023-10-05 NOTE — Clinical Note
Ramsey Argueta was seen and treated in our emergency department on 10/5/2023. Diagnosis:     German Dickerson  may return to work on return date. She may return on this date: 10/11/2023         If you have any questions or concerns, please don't hesitate to call.       Farrah Musa MD    ______________________________           _______________          _______________  Hospital Representative                              Date                                Time

## 2023-10-07 NOTE — ED PROVIDER NOTES
History  Chief Complaint   Patient presents with   • Fever     Pt reports waking up with fever 102.6, taking cold and flu medication OTC without relief +HA +nausea +body aches      Patient is a 29year old female who presented to the ED for fever. Patient stated that she woke up this morning and felt extremely fatigued, had the chills, and went to go check temperature which was 102.6 F. Patient stated that she also had severe headache and generalized body aches. She also felt nauseas but no emesis. She states that she felt fine yesterday before going to bed, no recent symptoms before today. She denies any sick contacts. States that she is generally up to date on her vaccinations. Denies neck stiffness. On evaluation patient was ill appearing, but no acute distress. Significantly weak appearing, mildly lethargic. Triage temp was 102.4 F. Prior to Admission Medications   Prescriptions Last Dose Informant Patient Reported? Taking?   acetaminophen (TYLENOL) 650 mg CR tablet  Self No No   Sig: Take 1 tablet (650 mg total) by mouth every 8 (eight) hours as needed for mild pain   estrogen, conjugated,-medroxyprogesterone (PREMPRO) 0.45-1.5 MG per tablet   No No   Sig: Take 1 tablet by mouth daily   methocarbamol (ROBAXIN) 750 mg tablet   No No   Sig: Take 1 tablet (750 mg total) by mouth every 6 (six) hours as needed for muscle spasms   naproxen (Naprosyn) 500 mg tablet   No No   Sig: Take 1 tablet (500 mg total) by mouth every 12 (twelve) hours as needed (pain) for up to 10 days Take with food. Do not start before August 21, 2023.    oxyCODONE (Roxicodone) 5 immediate release tablet   No No   Sig: Take 1 tablet (5 mg total) by mouth 2 (two) times a day Max Daily Amount: 10 mg      Facility-Administered Medications: None       Past Medical History:   Diagnosis Date   • Anxiety    • Chicken pox    • Depression    • Endometriosis    • GERD (gastroesophageal reflux disease)    • Headache     Occasional    • HSV-1 infection    • IBS (irritable bowel syndrome)    • Kidney stone    • Kidney stone on left side    • Multiple thyroid nodules    • Obesity    • Renal calculi        Past Surgical History:   Procedure Laterality Date   • APPENDECTOMY  2021   •  SECTION      x2   • CYSTOSCOPY N/A 2023    Procedure: CYSTOSCOPY;  Surgeon: Manuel Gracia MD;  [facility];  Service: Gynecology Oncology   • EXAMINATION UNDER ANESTHESIA N/A 2023    Procedure: EXAM UNDER ANESTHESIA (EUA), I& D vaginal cuff abscess;  Surgeon: Manuel Garcia MD;  Location[facility];  Service: Gynecology Oncology   • HYSTERECTOMY      robotic total laparoscopic hysterectomy with BS   • LAPAROSCOPIC ENDOMETRIOSIS FULGURATION  2018    laparoscopic excision of endometriosis, fulguration of endometriosis, lysis of adhesions   • LAPAROSCOPY  2014    with I&D    • MOLE REMOVAL      face - benign    • PELVIC LAPAROSCOPY Bilateral 2023    Procedure: SALPINGO-OOPHORECTOMY, LAPAROSCOPIC W/ROBOTICS, RADICAL RESECTION PELVIC ENDOMETRIOSIS;  Surgeon: Manuel Garcia MD;  [facility];  Service: Gynecology Oncology   • PROCTOSCOPY N/A 2023    Procedure: PROCTOSCOPY;  Surgeon: Manuel Garcia MD;  [facility];  Service: Gynecology Oncology   • SKIN CANCER EXCISION      abdomen   • THYROID CYST EXCISION     • TONSILLECTOMY     • TUBAL LIGATION  02/15/2016    With lysis of adhesion and peritoneal biopsy   • [address] [place] [address] STUDY  2015   • VAGINA SURGERY N/A 2023    Procedure: VAGINECTOMY, PARTIAL;  Surgeon: Manuel Garcia MD;  [facility];  Service: Gynecology Oncology   • WISDOM TOOTH EXTRACTION         Family History   Problem Relation Age of Onset   • Coronary artery disease Mother    • Varicose Veins Mother    • Other Mother         breast cyst - removed    • Cholelithiasis Mother         had cholecystectomy   • Alcohol abuse Father    • Cholelithiasis Maternal Grandmother         had cholecystectomy   • Uterine cancer Paternal Grandmother    • Breast cancer Family         Before age 52    • Uterine cancer Family    • Obesity Family    • Hypertension Family    • Brain cancer Family    • Gallbladder disease Family      I have reviewed and agree with the history as documented. E-Cigarette/Vaping   • E-Cigarette Use Never User      E-Cigarette/Vaping Substances   • Nicotine No    • THC No    • CBD No    • Flavoring No    • Other No    • Unknown No      Social History     Tobacco Use   • Smoking status: Never   • Smokeless tobacco: Never   Vaping Use   • Vaping Use: Never used   Substance Use Topics   • Alcohol use: Not Currently     Comment: rarely   • Drug use: No        Review of Systems   Constitutional: Positive for chills, fatigue and fever. HENT: Negative for rhinorrhea, sneezing and sore throat. Respiratory: Negative for cough and shortness of breath. Cardiovascular: Negative for chest pain and palpitations. Gastrointestinal: Positive for nausea. Negative for diarrhea and vomiting. Genitourinary: Negative for dysuria, flank pain and urgency. Musculoskeletal: Negative for neck pain and neck stiffness. Skin: Negative for color change and pallor. Neurological: Positive for headaches. Negative for dizziness and syncope. Psychiatric/Behavioral: Negative for agitation, behavioral problems and confusion. All other systems reviewed and are negative.       Physical Exam  ED Triage Vitals [10/05/23 1747]   Temperature Pulse Respirations Blood Pressure SpO2   (!) 102.4 °F (39.1 °C) 101 18 131/76 98 %      Temp Source Heart Rate Source Patient Position - Orthostatic VS BP Location FiO2 (%)   Oral Monitor Sitting Right arm --      Pain Score       8             Orthostatic Vital Signs  Vitals:    10/05/23 1747 10/05/23 1925   BP: 131/76 125/60   Pulse: 101 84   Patient Position - Orthostatic VS: Sitting Sitting Physical Exam  Vitals and nursing note reviewed. Constitutional:       Appearance: She is ill-appearing. HENT:      Head: Normocephalic and atraumatic. Right Ear: Tympanic membrane and external ear normal.      Left Ear: Tympanic membrane, ear canal and external ear normal.      Nose: Nose normal.      Mouth/Throat:      Mouth: Mucous membranes are moist.      Pharynx: Oropharynx is clear. Eyes:      Extraocular Movements: Extraocular movements intact. Conjunctiva/sclera: Conjunctivae normal.      Pupils: Pupils are equal, round, and reactive to light. Neck:      Comments: Negative Brudzinski's, Kernig's, no nuchal rigidity on exam.  Cardiovascular:      Rate and Rhythm: Regular rhythm. Tachycardia present. Pulmonary:      Effort: Pulmonary effort is normal.      Breath sounds: Normal breath sounds. Abdominal:      General: Abdomen is flat. Bowel sounds are normal.      Palpations: Abdomen is soft. Musculoskeletal:      Cervical back: No rigidity or tenderness. Skin:     General: Skin is warm and dry. Capillary Refill: Capillary refill takes less than 2 seconds. Neurological:      General: No focal deficit present. Mental Status: She is alert and oriented to person, place, and time. Psychiatric:         Mood and Affect: Mood normal.         Behavior: Behavior normal.         Thought Content:  Thought content normal.         ED Medications  Medications   sodium chloride 0.9 % bolus 1,000 mL (0 mL Intravenous Stopped 10/5/23 1936)   acetaminophen (TYLENOL) tablet 975 mg (975 mg Oral Given 10/5/23 1824)   ketorolac (TORADOL) injection 15 mg (15 mg Intravenous Given 10/5/23 1834)       Diagnostic Studies  Results Reviewed     Procedure Component Value Units Date/Time    UA w Reflex to Microscopic w Reflex to Culture [[id number]] Collected: 10/05/23 1921    Lab Status: Final result Specimen: Urine, Clean Catch Updated: 10/05/23 2021     Color, UA Light Yellow     Clarity, UA Clear     Specific Gravity, UA 1.019     pH, UA 6.5     Leukocytes, UA Negative     Nitrite, UA Negative     Protein, UA Negative mg/dl      Glucose, UA Negative mg/dl      Ketones, UA Negative mg/dl      Urobilinogen, UA <2.0 mg/dl      Bilirubin, UA Negative     Occult Blood, UA Negative    FLU/RSV/COVID - if FLU/RSV clinically relevant [[id number]]  (Abnormal) Collected: 10/05/23 1833    Lab Status: Final result Specimen: Nares from Nose Updated: 10/05/23 1932     SARS-CoV-2 Positive     INFLUENZA A PCR Negative     INFLUENZA B PCR Negative     RSV PCR Negative    Narrative:      FOR PEDIATRIC PATIENTS - copy/paste COVID Guidelines URL to browser: [email or web address]    SARS-CoV-2 assay is a Nucleic Acid Amplification assay intended for the  qualitative detection of nucleic acid from SARS-CoV-2 in nasopharyngeal  swabs. Results are for the presumptive identification of SARS-CoV-2 RNA. Positive results are indicative of infection with SARS-CoV-2, the virus  causing COVID-19, but do not rule out bacterial infection or co-infection  with other viruses. Laboratories within the [facility] and its  territories are required to report all positive results to the appropriate  public health authorities. Negative results do not preclude SARS-CoV-2  infection and should not be used as the sole basis for treatment or other  patient management decisions. Negative results must be combined with  clinical observations, patient history, and epidemiological information. This test has not been FDA cleared or approved. This test has been authorized by FDA under an Emergency Use Authorization  (EUA).  This test is only authorized for the duration of time the  declaration that circumstances exist justifying the authorization of the  emergency use of an in vitro diagnostic tests for detection of SARS-CoV-2  virus and/or diagnosis of COVID-19 infection under section 564(b)(1) of  the Act, 21 U. S.C. 527FAN-4(N)(6), unless the authorization is terminated  or revoked sooner. The test has been validated but independent review by FDA  and CLIA is pending. Test performed using [employer]: This RT-PCR assay targets N2,  a region unique to SARS-CoV-2. A conserved region in the E-gene was chosen  for pan-Sarbecovirus detection which includes SARS-CoV-2. According to CMS-2020-01-R, this platform meets the definition of high-throughput technology.     Comprehensive metabolic panel [[id number]] Collected: 10/05/23 1833    Lab Status: Final result Specimen: Blood from Arm, Right Updated: 10/05/23 1908     Sodium 135 mmol/L      Potassium 3.9 mmol/L      Chloride 101 mmol/L      CO2 25 mmol/L      ANION GAP 9 mmol/L      BUN 10 mg/dL      Creatinine 0.80 mg/dL      Glucose 89 mg/dL      Calcium 9.0 mg/dL      AST 14 U/L      ALT 16 U/L      Alkaline Phosphatase 62 U/L      Total Protein 7.5 g/dL      Albumin 4.4 g/dL      Total Bilirubin 0.93 mg/dL      eGFR 99 ml/min/1.73sq m     Narrative:      [place] guidelines for Chronic Kidney Disease (CKD):   •  Stage 1 with normal or high GFR (GFR > 90 mL/min/1.73 square meters)  •  Stage 2 Mild CKD (GFR = 60-89 mL/min/1.73 square meters)  •  Stage 3A Moderate CKD (GFR = 45-59 mL/min/1.73 square meters)  •  Stage 3B Moderate CKD (GFR = 30-44 mL/min/1.73 square meters)  •  Stage 4 Severe CKD (GFR = 15-29 mL/min/1.73 square meters)  •  Stage 5 End Stage CKD (GFR <15 mL/min/1.73 square meters)  Note: GFR calculation is accurate only with a steady state creatinine    CBC and differential [[id number]]  (Abnormal) Collected: 10/05/23 1833    Lab Status: Final result Specimen: Blood from Arm, Right Updated: 10/05/23 1848     WBC 5.63 Thousand/uL      RBC 4.45 Million/uL      Hemoglobin 12.8 g/dL      Hematocrit 37.7 %      MCV 85 fL      MCH 28.8 pg      MCHC 34.0 g/dL      RDW 12.7 %      MPV 8.6 fL      Platelets 276 Thousands/uL      nRBC 0 /100 WBCs      Neutrophils Relative 75 %      Immat GRANS % 0 %      Lymphocytes Relative 13 %      Monocytes Relative 12 %      Eosinophils Relative 0 %      Basophils Relative 0 %      Neutrophils Absolute 4.21 Thousands/µL      Immature Grans Absolute 0.01 Thousand/uL      Lymphocytes Absolute 0.75 Thousands/µL      Monocytes Absolute 0.66 Thousand/µL      Eosinophils Absolute 0.00 Thousand/µL      Basophils Absolute 0.00 Thousands/µL                  XR chest 1 view portable   Final Result by Jeremy Campbell MD (10/06 7575)      No acute cardiopulmonary disease. Findings are stable            Workstation performed: [id number]               Procedures  Procedures      ED Course  ED Course as of 10/07/23 0958   Thu Oct 05, 2023   0640 XR chest 1 view portable  Unremarkable CXR   1833 FLU/RSV/COVID - if FLU/RSV clinically relevant(!)  COVID Positive   1833 Grossly unremarkable CBC and CMP   1834 Tylenol given for fever, Toradol given for pain   1921 UA w Reflex to Microscopic w Reflex to Culture  Negative UA                                       Medical Decision Making  Patient is a 29year old female who presented to the ED for fever. Patient stated that she woke up this morning and felt extremely fatigued, had the chills, and went to go check temperature which was 102.6 F. Patient tested positive for COVID, her symptoms including fever and headache are likely due to COVID infection. CXR negative, pneumonia highly unlikely. UA not suggestive of UTI. Negative meningeal signs on exam and no reported neck stiffness, no AMS, no other signs to suggest meningitis, highly doubt meningitis, especially in the light of positive COVID exam to explain the symptoms. Patient was discharged with explicit return precautions. Amount and/or Complexity of Data Reviewed  Labs: ordered. Radiology: ordered. Risk  OTC drugs. Prescription drug management.             Disposition  Final diagnoses:   Nausea & vomiting   COVID-19     Time reflects when diagnosis was documented in both MDM as applicable and the Disposition within this note     Time User Action Codes Description Comment    10/5/2023  7:59 PM Abdulkadir Durant Add [R11.2] Nausea & vomiting     10/5/2023  7:59 PM Abdulkadir Durant Add [U07.1] [id number]       ED Disposition     ED Disposition   Discharge    Condition   Stable    Date/Time   Thu Oct 5, 2023  7:59 PM    Comment   Tae Tyrell discharge to home/self care. Follow-up Information     Follow up With Specialties Details Why [address], MD Internal Medicine   [facility]  Michelet Gee  [phone]            Discharge Medication List as of 10/5/2023  8:01 PM      START taking these medications    Details   ondansetron (ZOFRAN-ODT) 4 mg disintegrating tablet Take 1 tablet (4 mg total) by mouth every 6 (six) hours as needed for nausea or vomiting for up to 4 days, Starting Thu 10/5/2023, Until Mon 10/9/2023 at 2359, Normal         CONTINUE these medications which have NOT CHANGED    Details   acetaminophen (TYLENOL) 650 mg CR tablet Take 1 tablet (650 mg total) by mouth every 8 (eight) hours as needed for mild pain, Starting Thu 5/18/2023, No Print      estrogen, conjugated,-medroxyprogesterone (PREMPRO) 0.45-1.5 MG per tablet Take 1 tablet by mouth daily, Starting Tue 9/12/2023, Normal      methocarbamol (ROBAXIN) 750 mg tablet Take 1 tablet (750 mg total) by mouth every 6 (six) hours as needed for muscle spasms, Starting Tue 9/12/2023, Normal      naproxen (Naprosyn) 500 mg tablet Take 1 tablet (500 mg total) by mouth every 12 (twelve) hours as needed (pain) for up to 10 days Take with food.  Do not start before August 21, 2023., Starting Mon 8/21/2023, Until Thu 8/31/2023 at 2359, Normal      oxyCODONE (Roxicodone) 5 immediate release tablet Take 1 tablet (5 mg total) by mouth 2 (two) times a day Max Daily Amount: 10 mg, Starting Tue 10/3/2023, Normal           No discharge procedures on file. PDMP Review       Value Time User    PDMP Reviewed  Yes 9/11/2023 10:19 PM Cyndy Simpson, DO           ED Provider  Attending physically available and evaluated Renny Sanabria. I managed the patient along with the ED Attending.     Electronically Signed by         Jill Reeder MD  10/07/23 6357

## 2023-10-20 DIAGNOSIS — G89.29 CHRONIC PELVIC PAIN IN FEMALE: ICD-10-CM

## 2023-10-20 DIAGNOSIS — R10.2 CHRONIC PELVIC PAIN IN FEMALE: ICD-10-CM

## 2023-10-20 DIAGNOSIS — N80.9 ENDOMETRIOSIS: ICD-10-CM

## 2023-10-20 RX ORDER — OXYCODONE HYDROCHLORIDE 5 MG/1
5 TABLET ORAL 2 TIMES DAILY
Qty: 40 TABLET | Refills: 0 | Status: SHIPPED | OUTPATIENT
Start: 2023-10-20

## 2023-11-07 DIAGNOSIS — N80.9 ENDOMETRIOSIS: ICD-10-CM

## 2023-11-07 DIAGNOSIS — G89.29 CHRONIC PELVIC PAIN IN FEMALE: ICD-10-CM

## 2023-11-07 DIAGNOSIS — R10.2 CHRONIC PELVIC PAIN IN FEMALE: ICD-10-CM

## 2023-11-07 RX ORDER — OXYCODONE HYDROCHLORIDE 5 MG/1
5 TABLET ORAL 2 TIMES DAILY
Qty: 40 TABLET | Refills: 0 | Status: SHIPPED | OUTPATIENT
Start: 2023-11-07

## 2023-11-27 ENCOUNTER — NURSE TRIAGE (OUTPATIENT)
Dept: OTHER | Facility: OTHER | Age: 33
End: 2023-11-27

## 2023-11-27 DIAGNOSIS — R11.2 NAUSEA & VOMITING: ICD-10-CM

## 2023-11-27 DIAGNOSIS — G89.29 CHRONIC PELVIC PAIN IN FEMALE: ICD-10-CM

## 2023-11-27 DIAGNOSIS — N80.9 ENDOMETRIOSIS: ICD-10-CM

## 2023-11-27 DIAGNOSIS — R10.2 CHRONIC PELVIC PAIN IN FEMALE: ICD-10-CM

## 2023-11-27 RX ORDER — ONDANSETRON 4 MG/1
4 TABLET, ORALLY DISINTEGRATING ORAL EVERY 6 HOURS PRN
Qty: 16 TABLET | Refills: 0 | Status: SHIPPED | OUTPATIENT
Start: 2023-11-27 | End: 2023-12-01

## 2023-11-28 RX ORDER — OXYCODONE HYDROCHLORIDE 5 MG/1
5 TABLET ORAL 2 TIMES DAILY
Qty: 40 TABLET | Refills: 0 | OUTPATIENT
Start: 2023-11-28

## 2023-11-28 RX ORDER — OXYCODONE HYDROCHLORIDE 5 MG/1
5 TABLET ORAL 2 TIMES DAILY
Qty: 40 TABLET | Refills: 0 | Status: SHIPPED | OUTPATIENT
Start: 2023-11-28

## 2023-11-28 NOTE — TELEPHONE ENCOUNTER
takes Oxycodone 5mg for her endometriosis pain. Reports calling in earlier today for a refill since she will not have a car for the next few days to  the script. The office unfortunately never got back to her. She is questioning if script can be sent to [facility] in [facility].     On call provider notified

## 2023-11-28 NOTE — TELEPHONE ENCOUNTER
Reason for Disposition   [1] Prescription refill request for ESSENTIAL medicine (i.e., likelihood of harm to patient if not taken) AND [2] triager unable to refill per department policy    Answer Assessment - Initial Assessment Questions  1.  DRUG NAME: "What medicine do you need to have refilled?"      Oxycodone 5mg    Protocols used: Medication Refill and Renewal Call-[facility]

## 2023-12-04 DIAGNOSIS — R10.84 GENERALIZED ABDOMINAL PAIN: ICD-10-CM

## 2023-12-04 DIAGNOSIS — R10.2 CHRONIC PELVIC PAIN IN FEMALE: ICD-10-CM

## 2023-12-04 DIAGNOSIS — G89.29 CHRONIC PELVIC PAIN IN FEMALE: ICD-10-CM

## 2023-12-04 DIAGNOSIS — N80.9 ENDOMETRIOSIS: ICD-10-CM

## 2023-12-04 RX ORDER — METHOCARBAMOL 750 MG/1
750 TABLET, FILM COATED ORAL EVERY 6 HOURS PRN
Qty: 60 TABLET | Refills: 5 | Status: SHIPPED | OUTPATIENT
Start: 2023-12-04

## 2023-12-04 RX ORDER — METHOCARBAMOL 750 MG/1
750 TABLET, FILM COATED ORAL EVERY 6 HOURS PRN
Qty: 60 TABLET | Refills: 0 | Status: CANCELLED | OUTPATIENT
Start: 2023-12-04

## 2023-12-04 NOTE — TELEPHONE ENCOUNTER
Last OV: 7/26/2023    Next OV: 1/9/2024    To soon to fill Methocarbamol 750mg  Filled on 9/12/2023 quantity 60 with 5 refills.

## 2023-12-15 DIAGNOSIS — G89.29 CHRONIC PELVIC PAIN IN FEMALE: ICD-10-CM

## 2023-12-15 DIAGNOSIS — R10.2 CHRONIC PELVIC PAIN IN FEMALE: ICD-10-CM

## 2023-12-15 DIAGNOSIS — N80.9 ENDOMETRIOSIS: ICD-10-CM

## 2023-12-15 RX ORDER — OXYCODONE HYDROCHLORIDE 5 MG/1
5 TABLET ORAL 2 TIMES DAILY
Qty: 40 TABLET | Refills: 0 | Status: SHIPPED | OUTPATIENT
Start: 2023-12-15

## 2023-12-29 ENCOUNTER — HOSPITAL ENCOUNTER (EMERGENCY)
Facility: HOSPITAL | Age: 33
Discharge: HOME | End: 2023-12-29
Attending: EMERGENCY MEDICINE
Payer: COMMERCIAL

## 2023-12-29 VITALS
DIASTOLIC BLOOD PRESSURE: 87 MMHG | SYSTOLIC BLOOD PRESSURE: 136 MMHG | HEART RATE: 100 BPM | OXYGEN SATURATION: 98 % | RESPIRATION RATE: 18 BRPM | TEMPERATURE: 98 F

## 2023-12-29 DIAGNOSIS — M54.9 ACUTE BACK PAIN: Primary | ICD-10-CM

## 2023-12-29 PROCEDURE — 99284 EMERGENCY DEPT VISIT MOD MDM: CPT | Performed by: EMERGENCY MEDICINE

## 2023-12-29 PROCEDURE — 99283 EMERGENCY DEPT VISIT LOW MDM: CPT

## 2023-12-29 RX ORDER — LIDOCAINE 50 MG/G
1 PATCH TOPICAL ONCE
Status: DISCONTINUED | OUTPATIENT
Start: 2023-12-29 | End: 2023-12-29 | Stop reason: HOSPADM

## 2023-12-29 RX ORDER — METHOCARBAMOL 500 MG/1
500 TABLET, FILM COATED ORAL 2 TIMES DAILY
Qty: 20 TABLET | Refills: 0 | Status: SHIPPED | OUTPATIENT
Start: 2023-12-29

## 2023-12-29 RX ORDER — NAPROXEN 250 MG/1
250 TABLET ORAL ONCE
Status: COMPLETED | OUTPATIENT
Start: 2023-12-29 | End: 2023-12-29

## 2023-12-29 RX ORDER — METHOCARBAMOL 500 MG/1
500 TABLET, FILM COATED ORAL ONCE
Status: COMPLETED | OUTPATIENT
Start: 2023-12-29 | End: 2023-12-29

## 2023-12-29 RX ORDER — ACETAMINOPHEN 325 MG/1
650 TABLET ORAL ONCE
Status: DISCONTINUED | OUTPATIENT
Start: 2023-12-29 | End: 2023-12-29

## 2023-12-29 RX ORDER — KETOROLAC TROMETHAMINE 30 MG/ML
15 INJECTION, SOLUTION INTRAMUSCULAR; INTRAVENOUS ONCE
Status: DISCONTINUED | OUTPATIENT
Start: 2023-12-29 | End: 2023-12-29

## 2023-12-29 RX ORDER — NAPROXEN 500 MG/1
500 TABLET ORAL 2 TIMES DAILY WITH MEALS
Qty: 30 TABLET | Refills: 0 | Status: SHIPPED | OUTPATIENT
Start: 2023-12-29

## 2023-12-29 RX ADMIN — LIDOCAINE 5% 1 PATCH: 700 PATCH TOPICAL at 19:58

## 2023-12-29 RX ADMIN — NAPROXEN 250 MG: 250 TABLET ORAL at 20:10

## 2023-12-29 RX ADMIN — METHOCARBAMOL 500 MG: 500 TABLET ORAL at 19:58

## 2023-12-29 NOTE — Clinical Note
Lucie Camacho was seen and treated in our emergency department on 12/29/2023.    ?    ?    ?    Diagnosis: ?    Lucie  is off the rest of the shift today, may return to work on return date.    She may return on this date: 01/02/2024    ?     If you have any questions or concerns, please don't hesitate to call.      Shyam Alarcon MD    ______________________________           _______________          _______________  Hospital Representative                              Date                                Time

## 2023-12-30 NOTE — DISCHARGE INSTRUCTIONS
Your workup here was not concerning for anything dangerous. Therefore there is no need for you to stay at the hospital for further testing. We feel safe to send you home.    You can use Tylenol, Naprosyn, Robaxin for management of your symptoms.    You should follow up with Comp Spine to assess for resolution of your symptoms and to determine if there is any further evaluation that needs to be performed.    Do not drive or operate heavy machinery while using Robaxin.    Return to the emergency department if you have any symptoms of worsening pain or inability to walk    Thank you for choosing [facility] for your care!

## 2023-12-30 NOTE — ED PROVIDER NOTES
Chief Complaint   Patient presents with    Fall     Fall in shower. - HS/thinner/LOC. Reports lower back pain     History of Present Illness and Review of Systems   This is a 33 y.o. female with PMH significant for anxiety, endometriosis coming in today with complaint of fall.  The patient reports that prior to arrival she slipped and fell in the shower landing on her backside.  Reports acute lower back pain.  Denies any head strike or loss of consciousness seizure-like episodes.  She is still ambulating with however with some pain.  Denies any paresthesias, urinary incontinence, difficulties walking, lower extremity weakness, history of immunocompromise status or steroid use.  No history of spinal instrumentation.  No other symptoms currently.    - No language barrier.     No other complaints for this encounter.    Remainder of ROS Reviewed and Non-Pertinent    Past Medical, Past Surgical History:    has a past medical history of Anxiety, Chicken pox, Depression, Endometriosis, GERD (gastroesophageal reflux disease), Headache, HSV-1 infection, IBS (irritable bowel syndrome), Kidney stone, Kidney stone on left side, Multiple thyroid nodules, Obesity, and Renal calculi.   has a past surgical history that includes  section; Tubal ligation (02/15/2016); Tonsillectomy; Thyroid cyst excision; Hysterectomy; Skin cancer excision; Mole removal; [place] tooth extraction; Laparoscopic endometriosis fulguration (2018); LAPAROSCOPY (2014); US guided injection for research study (2015); Appendectomy (2021); Laparoscopy (Bilateral, 2023); CYSTOSCOPY (N/A, 2023); Vagina surgery (N/A, 2023); Proctoscopy (N/A, 2023); and Examination under anesthesia (N/A, 2023).     Allergies:     Allergies   Allergen Reactions    Reglan [Metoclopramide] Other (See Comments)     Elevated heartrate    Other Blisters     Dermabond       Social and Family History:     Social History     Substance  and Sexual Activity   Alcohol Use Not Currently    Comment: rarely     Social History     Tobacco Use   Smoking Status Never   Smokeless Tobacco Never     Social History     Substance and Sexual Activity   Drug Use No       Physical Examination     Vitals:    12/29/23 1813   BP: 136/87   BP Location: Right arm   Pulse: 100   Resp: 18   Temp: 98 °F (36.7 °C)   TempSrc: Tympanic   SpO2: 98%       Physical Exam  Vitals and nursing note reviewed.   Constitutional:       General: She is not in acute distress.     Appearance: She is well-developed.   HENT:      Head: Normocephalic and atraumatic.   Eyes:      Conjunctiva/sclera: Conjunctivae normal.   Cardiovascular:      Rate and Rhythm: Normal rate and regular rhythm.      Heart sounds: No murmur heard.  Pulmonary:      Effort: Pulmonary effort is normal. No respiratory distress.      Breath sounds: Normal breath sounds.   Abdominal:      Palpations: Abdomen is soft.      Tenderness: There is no abdominal tenderness. There is no guarding or rebound.   Musculoskeletal:         General: No swelling.      Cervical back: Neck supple.      Lumbar back: Tenderness present. No bony tenderness.      Comments: Ecchymoses noted in the lumbar paraspinal region, painful to palpation, no evidence of step-off or deformity or bony tenderness, ambulates with pain however no difficulty   Skin:     General: Skin is warm and dry.      Capillary Refill: Capillary refill takes less than 2 seconds.   Neurological:      General: No focal deficit present.      Mental Status: She is alert.      Cranial Nerves: No cranial nerve deficit.      Motor: No weakness.      Gait: Gait normal.   Psychiatric:         Mood and Affect: Mood normal.         Behavior: Behavior normal.           Procedures   Procedures      MDM:   Medical Decision Making  This patient's back pain most consistent with MSK back pain.     Ddx: Lumbago versus musculoskeletal spasm. No back pain red flags on history or physical.  "Presentation not consistent with malignancy, fracture (no trauma, no bony tenderness to palpation), cauda equina (no bowel or urinary incontinence/retention, no saddle anesthesia, no distal weakness), AAA.     Given the clinical picture, no indication for imaging at this time.    Plan: pain control, supportive care, reassess, and will refer to comprehensive spine      Risk  OTC drugs.  Prescription drug management.        - Reviewed relevant past office visits/hospitalizations/procedures  -Obtained pertinent history that influenced decision making from the Patient        Final Dispo   Final Diagnosis:  1. Acute back pain      Time reflects when diagnosis was documented in both MDM as applicable and the Disposition within this note       Time User Action Codes Description Comment    12/29/2023  7:59 PM Shyam Alarcon Add [M54.9] Acute back pain           ED Disposition       ED Disposition   Discharge    Condition   Stable    Date/Time   Fri Dec 29, 2023  7:59 PM    Comment   Lucie Camacho discharge to home/self care.                   Follow-up Information    None       Medications   lidocaine (LIDODERM) 5 % patch 1 patch (1 patch Topical Medication Applied 12/29/23 1958)   methocarbamol (ROBAXIN) tablet 500 mg (500 mg Oral Given 12/29/23 1958)   naproxen (NAPROSYN) tablet 250 mg (250 mg Oral Given 12/29/23 2010)       Risk Stratification Tools                Orders Placed This Encounter   Procedures    Ambulatory Referral to Comprehensive Spine Program       Labs:   Labs Reviewed - No data to display    Imaging:     No orders to display      All details of the evaluation and treatment plan were made clear and additionally all questions and concerns were addressed while under my care.    Portions of the record may have been created with voice recognition software. Occasional wrong word or \"sound a like\" substitutions may have occurred due to the inherent limitations of voice recognition software. Read the " chart carefully and recognize, using context, where substitutions have occurred.    The attending physician physically available and evaluated the above patient alongside myself.      Shyam Alarcon MD  12/29/23 6036

## 2024-01-02 DIAGNOSIS — R10.2 CHRONIC PELVIC PAIN IN FEMALE: ICD-10-CM

## 2024-01-02 DIAGNOSIS — N80.9 ENDOMETRIOSIS: ICD-10-CM

## 2024-01-02 DIAGNOSIS — G89.29 CHRONIC PELVIC PAIN IN FEMALE: ICD-10-CM

## 2024-01-02 RX ORDER — OXYCODONE HYDROCHLORIDE 5 MG/1
5 TABLET ORAL DAILY PRN
Qty: 30 TABLET | Refills: 0 | Status: SHIPPED | OUTPATIENT
Start: 2024-01-02

## 2024-01-03 ENCOUNTER — TELEPHONE (OUTPATIENT)
Dept: PHYSICAL THERAPY | Facility: OTHER | Age: 34
End: 2024-01-03

## 2024-01-03 NOTE — TELEPHONE ENCOUNTER
Called to the patient per [facility] referral.    V/m left for patient to call back. Phone number and hours of business provided.    This is the 1st attempt to reach the patient. Will defer referral per protocol.

## 2024-01-06 NOTE — ED ATTENDING ATTESTATION
12/29/2023  I, Corey Hooper DO, saw and evaluated the patient. I have discussed the patient with the resident/non-physician practitioner and agree with the resident's/non-physician practitioner's findings, Plan of Care, and MDM as documented in the resident's/non-physician practitioner's note, except where noted. All available labs and Radiology studies were reviewed.  I was present for key portions of any procedure(s) performed by the resident/non-physician practitioner and I was immediately available to provide assistance.       At this point I agree with the current assessment done in the Emergency Department.  I have conducted an independent evaluation of this patient a history and physical is as follows:    33-year-old female presents for back pain.  Fell in the shower u onto her buttocks.  Has lumbar back pain no red flags for acute pathology including caudal symptoms.  Forage motion all extremities normal reflexes.  Plan symptomatic care for musculoskeletal back strain.  Doubt fracture    ED Course         Critical Care Time  Procedures

## 2024-01-19 ENCOUNTER — TELEPHONE (OUTPATIENT)
Dept: HEMATOLOGY ONCOLOGY | Facility: CLINIC | Age: 34
End: 2024-01-19

## 2024-01-19 DIAGNOSIS — N95.1 MENOPAUSAL SYMPTOMS: ICD-10-CM

## 2024-01-19 RX ORDER — ESTROGEN,CON/M-PROGEST ACET 0.45-1.5MG
1 TABLET ORAL DAILY
Qty: 28 TABLET | Refills: 3 | Status: SHIPPED | OUTPATIENT
Start: 2024-01-19

## 2024-01-19 NOTE — TELEPHONE ENCOUNTER
Appointment Change  Cancel, Reschedule, Change to Virtual      Who are you speaking with? Patient   If it is not the patient, is the caller listed on the communication consent form? Yes   Which provider is the appointment scheduled with? Dr. Greene   When was the original appointment scheduled?    Please list date and time 1/19/24   At which location is the appointment scheduled to take place? [place]   Was the appointment rescheduled?     Was the appointment changed from an in person visit to a virtual visit?    If so, please list the details of the change. 1/30/24   What is the reason for the appointment change? Weather is getting bad   \

## 2024-01-30 ENCOUNTER — TELEPHONE (OUTPATIENT)
Dept: HEMATOLOGY ONCOLOGY | Facility: CLINIC | Age: 34
End: 2024-01-30

## 2024-01-30 ENCOUNTER — TELEPHONE (OUTPATIENT)
Age: 34
End: 2024-01-30

## 2024-01-30 ENCOUNTER — OFFICE VISIT (OUTPATIENT)
Dept: GYNECOLOGIC ONCOLOGY | Facility: CLINIC | Age: 34
End: 2024-01-30
Payer: COMMERCIAL

## 2024-01-30 ENCOUNTER — TELEPHONE (OUTPATIENT)
Dept: INTERNAL MEDICINE CLINIC | Age: 34
End: 2024-01-30

## 2024-01-30 ENCOUNTER — TELEPHONE (OUTPATIENT)
Dept: GYNECOLOGIC ONCOLOGY | Facility: CLINIC | Age: 34
End: 2024-01-30

## 2024-01-30 VITALS
DIASTOLIC BLOOD PRESSURE: 78 MMHG | OXYGEN SATURATION: 100 % | SYSTOLIC BLOOD PRESSURE: 122 MMHG | WEIGHT: 250 LBS | TEMPERATURE: 98 F | BODY MASS INDEX: 42.91 KG/M2 | HEART RATE: 67 BPM

## 2024-01-30 DIAGNOSIS — G89.29 CHRONIC PELVIC PAIN IN FEMALE: Primary | ICD-10-CM

## 2024-01-30 DIAGNOSIS — R10.2 CHRONIC PELVIC PAIN IN FEMALE: Primary | ICD-10-CM

## 2024-01-30 DIAGNOSIS — N80.9 ENDOMETRIOSIS: ICD-10-CM

## 2024-01-30 DIAGNOSIS — N95.1 MENOPAUSAL SYMPTOMS: ICD-10-CM

## 2024-01-30 PROCEDURE — 99214 OFFICE O/P EST MOD 30 MIN: CPT | Performed by: OBSTETRICS & GYNECOLOGY

## 2024-01-30 NOTE — TELEPHONE ENCOUNTER
Spoke with patient she is seeing if  (gyn)  can fill her script for Oxycodone  HCI 5mg until her next office visit with  in March.I did explain she needs to come to follow up visits with her PCP to continue med refills.

## 2024-01-30 NOTE — PROGRESS NOTES
Hi Dr. Greene,     Thank you for the referral and I will gladly see her for interventional considerations to assist in managing her ongoing pelvic pain    Clerical please schedule soonest available with me for initial evaluation  Thank you

## 2024-01-30 NOTE — TELEPHONE ENCOUNTER
Patient stated had appointment today with her Dr Greene and she is still having pain issue and the office stated will be sending information to you in regards to increasing her pain medication and is currently taking Oxycodone once a day and Is requesting a new prescription and looking to take more than once a day      Please advise

## 2024-01-30 NOTE — TELEPHONE ENCOUNTER
Return call placed after discussion with Dr. Greene. He is declining to refill additional pain medication, but did speak directly with Dr. Blancas in pain management, who is willing to see the patient in consultation. She was upset with this decision and is requesting to speak to Dr. Greene directly.

## 2024-01-30 NOTE — PROGRESS NOTES
Assessment/Plan:    Problem List Items Addressed This Visit          Other    Chronic pelvic pain in female - Primary     33-year-old with severe endometriosis most recently status post bilateral oophorectomy and radical resection of pelvic endometriosis in May 2023 complicated by postoperative pelvic abscess in June 2023.  She has ongoing pelvic pain which is not being helped by daily oxycodone.  She has tried Cymbalta.  She is taking Robaxin at night.  She has not yet seen pelvic physiotherapy.  Exam demonstrates thickening at the left apex consistent with possible scarring.  Her performance status is 0.  1.  Pelvic ultrasound to evaluate for recurrent endometriosis as a cause of her pelvic pain.  She understands that she may have pain just from scar tissue.  2.  Referral to pelvic physiotherapy for evaluation and treatment  3.  Will review her history with pain management to assess whether she might be a candidate for local therapy.         Relevant Medications    estrogen, conjugated,-medroxyprogesterone (PREMPRO) 0.625-5 MG per tablet    Other Relevant Orders    US pelvis complete w transvaginal    Ambulatory referral to Physical Therapy    Endometriosis    Menopausal symptoms     Continues with hot flashes with the 0.45/1.5 dose of Prempro.  Plan to increase the dose of Prempro to 0.625/5 mg daily.  If the transvaginal ultrasound demonstrates a mass, will consider withdrawal of treatment.  New prescription provided.              CHIEF COMPLAINT: Midline pelvic pain        Patient ID: Lucie Camacho is a 33 y.o. female  Who returns for evaluation of chronic pelvic pain secondary to endometriosis and menopausal symptoms.  She was referred to pelvic physiotherapy but was feeling better and therefore did not go for an appointment.  She was also having car trouble at the time.  She has been reduced to 5 mg of oxycodone daily and the pain has increased.  She finds that her quality of life is reduced with a  reduction in oxycodone.  In the past, she was on tramadol for approximately 10 years.  She has tried Cymbalta therapy which gave her symptoms of overactivity.  She is currently taking Robaxin at night.  No vaginal bleeding.  Her pain is in the midline of the pelvis and she notices it more with voiding.        The following portions of the patient's history were reviewed and updated as appropriate: allergies, current medications, past family history, past medical history, past social history, past surgical history, and problem list.    Review of Systems   Constitutional:  Negative for activity change and unexpected weight change.   HENT: Negative.     Eyes: Negative.    Respiratory: Negative.     Cardiovascular: Negative.    Gastrointestinal:  Negative for abdominal distention and abdominal pain.   Endocrine: Negative.    Genitourinary:  Positive for pelvic pain. Negative for vaginal bleeding.        Dysuria   Musculoskeletal: Negative.    Skin: Negative.    Allergic/Immunologic: Negative.    Neurological: Negative.    Hematological: Negative.    Psychiatric/Behavioral: Negative.         Current Outpatient Medications   Medication Sig Dispense Refill    acetaminophen (TYLENOL) 650 mg CR tablet Take 1 tablet (650 mg total) by mouth every 8 (eight) hours as needed for mild pain 30 tablet 0    estrogen, conjugated,-medroxyprogesterone (PREMPRO) 0.625-5 MG per tablet Take 1 tablet by mouth daily 30 tablet 3    methocarbamol (ROBAXIN) 750 mg tablet Take 1 tablet (750 mg total) by mouth every 6 (six) hours as needed for muscle spasms 60 tablet 5    oxyCODONE (Roxicodone) 5 immediate release tablet Take 1 tablet (5 mg total) by mouth daily as needed for moderate pain Max Daily Amount: 5 mg 30 tablet 0    naproxen (Naprosyn) 500 mg tablet Take 1 tablet (500 mg total) by mouth every 12 (twelve) hours as needed (pain) for up to 10 days Take with food. Do not start before August 21, 2023. 14 tablet 0    ondansetron (ZOFRAN-ODT)  4 mg disintegrating tablet Take 1 tablet (4 mg total) by mouth every 6 (six) hours as needed for nausea or vomiting for up to 4 days 16 tablet 0     No current facility-administered medications for this visit.           Objective:    Blood pressure 122/78, pulse 67, temperature 98 °F (36.7 °C), temperature source Temporal, weight 113 kg (250 lb), SpO2 100%.  Body mass index is 42.91 kg/m².  Body surface area is 2.15 meters squared.    Physical Exam  Vitals reviewed. Exam conducted with a chaperone present.   Constitutional:       General: She is not in acute distress.     Appearance: Normal appearance. She is well-developed. She is obese. She is not ill-appearing, toxic-appearing or diaphoretic.   HENT:      Head: Normocephalic and atraumatic.   Eyes:      General: No scleral icterus.     Extraocular Movements: Extraocular movements intact.      Conjunctiva/sclera: Conjunctivae normal.   Neck:      Thyroid: No thyromegaly.   Pulmonary:      Effort: Pulmonary effort is normal.   Abdominal:      General: There is no distension.      Palpations: Abdomen is soft. There is no mass.      Tenderness: There is no abdominal tenderness. There is no guarding or rebound.      Hernia: No hernia is present.   Genitourinary:     Comments: The external female genitalia is normal. The bartholin's, uretheral and skenes glands are normal. The urethral meatus is normal (midline with no lesions). Anus without fissure or lesion. Speculum exam reveals a grossly normal vagina. No masses, lesions,discharge or bleeding. No significant cystocele or rectocele noted. Bimanual exam notes a surgical absent cervix, uterus and adnexal structures.  There is scarring present at the left apex with slight tethering of the apex.  Overall, the vagina is mobile.  There is tenderness to the midline and at the left apex at the site of the scarring.  Musculoskeletal:         General: No swelling or tenderness.      Cervical back: Normal range of motion and  neck supple.      Right lower leg: No edema.      Left lower leg: No edema.   Lymphadenopathy:      Cervical: No cervical adenopathy.   Skin:     General: Skin is warm and dry.      Coloration: Skin is not jaundiced or pale.      Findings: No lesion or rash.   Neurological:      General: No focal deficit present.      Mental Status: She is alert and oriented to person, place, and time. Mental status is at baseline.      Cranial Nerves: No cranial nerve deficit.      Motor: No weakness.      Gait: Gait normal.   Psychiatric:         Mood and Affect: Mood normal.         Behavior: Behavior normal.         Thought Content: Thought content normal.         Judgment: Judgment normal.         Lab Results   Component Value Date     17.3 04/27/2023     Lab Results   Component Value Date     02/26/2014    K 3.9 10/05/2023     10/05/2023    CO2 25 10/05/2023    ANIONGAP 7 02/26/2014    BUN 10 10/05/2023    CREATININE 0.80 10/05/2023    GLUCOSE 97 02/26/2014    GLUF 88 04/27/2023    CALCIUM 9.0 10/05/2023    CORRECTEDCA 9.2 07/07/2021    AST 14 10/05/2023    ALT 16 10/05/2023    ALKPHOS 62 10/05/2023    EGFR 99 10/05/2023     Lab Results   Component Value Date    WBC 5.63 10/05/2023    HGB 12.8 10/05/2023    HCT 37.7 10/05/2023    MCV 85 10/05/2023     10/05/2023     Lab Results   Component Value Date    NEUTROABS 4.21 10/05/2023        Trend:  Lab Results   Component Value Date     17.3 04/27/2023

## 2024-01-30 NOTE — ASSESSMENT & PLAN NOTE
33-year-old with severe endometriosis most recently status post bilateral oophorectomy and radical resection of pelvic endometriosis in May 2023 complicated by postoperative pelvic abscess in June 2023.  She has ongoing pelvic pain which is not being helped by daily oxycodone.  She has tried Cymbalta.  She is taking Robaxin at night.  She has not yet seen pelvic physiotherapy.  Exam demonstrates thickening at the left apex consistent with possible scarring.  Her performance status is 0.  1.  Pelvic ultrasound to evaluate for recurrent endometriosis as a cause of her pelvic pain.  She understands that she may have pain just from scar tissue.  2.  Referral to pelvic physiotherapy for evaluation and treatment  3.  Will review her history with pain management to assess whether she might be a candidate for local therapy.

## 2024-01-30 NOTE — TELEPHONE ENCOUNTER
Patient Call    Who are you speaking with? Patient    If it is not the patient, are they listed on an active communication consent form? N/A   What is the reason for this call? Patient called her PCP and they told her they will  not change her pain meds or refill it until her next visit with him, but it wont be until march. She is asking if Dr. Greene can order it for her    Does this require a call back? Yes   If a call back is required, please list [facility] call back number [phone]   If a call back is required, advise that a message will be forwarded to their care team and someone will return their call as soon as possible.   Did you relay this information to the patient? Yes

## 2024-01-30 NOTE — TELEPHONE ENCOUNTER
----- Message from Ruth Velasco PA-C sent at 1/30/2024  1:08 PM EST -----  Regarding: FW: Pain  Contact: [phone]  Can you please send to PCP?    Thanks!    ----- Message -----  From: Jessica Reeves  Sent: 1/30/2024   1:00 PM EST  To: Ruth Velasco PA-C  Subject: FW: Pain                                           ----- Message -----  From: Lucie Camacho  Sent: 1/30/2024  12:27 PM EST  To: [facility] [place] [facility]  Subject: Pain                                             Was a note sent over to my pcp about the pain? I did talk to the office but I’m not sure if they’re waiting for that or not

## 2024-01-30 NOTE — ASSESSMENT & PLAN NOTE
Continues with hot flashes with the 0.45/1.5 dose of Prempro.  Plan to increase the dose of Prempro to 0.625/5 mg daily.  If the transvaginal ultrasound demonstrates a mass, will consider withdrawal of treatment.  New prescription provided.

## 2024-01-31 NOTE — PROGRESS NOTES
Assessment  1. Chronic pelvic pain in female    2. Endometriosis    3. Chronic bilateral low back pain with bilateral sciatica    4. Dyspareunia in female    5. Anxiety and depression    6. Endometriosis determined by laparoscopy    7. Sacroiliitis (HCC)        Plan  Ms. Camacho is a pleasant 33-year-old female significant past medical history of severe endometriosis status post bilateral oophorectomy and radical resection of pelvic endometriosis in May 2023 with a postoperative pelvic abscess in June 2023 complication.  She has been referred by Dr. Greene for interventional considerations to manage her chronic pelvic pain, vaginal and perineal pain and worsening low back pain.  During today's evaluation she is demonstrating pain that is likely multifactorial in nature with the majority of her symptoms appear to be generating from the aforementioned medical and surgical history but she is also demonstrating isolated low back and buttock pain and questionable sacroiliitis.  At this time we will obtain lumbar and sacral x-rays and plan for bilateral SI joint injections under fluoroscopy guidance for both diagnostic and therapeutic purposes.  Extensive amount of time spent educating patient on the chronic pelvic pain and alternative interventional approaches including a bilateral pudendal nerve block was discussed but at this time she wishes to hold off on addressing that pain and focus on the low back for now.  If patient wishes to proceed we will plan for bilateral pudendal nerve block under ultrasound and fluoroscopy guidance but will await her decision when ready.  All questions answered, patient is agreeable with plan.  Complete risks and benefits including bleeding, infection, tissue reaction, nerve injury and allergic reaction were discussed. The approach was demonstrated using models and literature was provided. Verbal and written consent was obtained.    My impressions and treatment recommendations were  discussed in detail with the patient who verbalized understanding and had no further questions.  Discharge instructions were provided. I personally saw and examined the patient and I agree with the above discussed plan of care.    Orders Placed This Encounter   Procedures    X-ray lumbar spine 2 or 3 views     Standing Status:   Future     Standing Expiration Date:   2/1/2028     Scheduling Instructions:      Bring along any outside films relating to this procedure.           Order Specific Question:   Is the patient pregnant?     Answer:   No    XR sacrum and coccyx     Standing Status:   Future     Standing Expiration Date:   2/1/2028     Scheduling Instructions:      Bring along any outside films relating to this procedure.           Order Specific Question:   Is the patient pregnant?     Answer:   No    FL spine and pain procedure     Standing Status:   Future     Standing Expiration Date:   2/1/2028     Order Specific Question:   Reason for Exam:     Answer:   Bilateral SIJ inj     Order Specific Question:   Is the patient pregnant?     Answer:   No     Order Specific Question:   Anticoagulant hold needed?     Answer:   No     No orders of the defined types were placed in this encounter.      History of Present Illness    Lucie Camacho is a 33 y.o. female presents to [facility] for initial evaluation regarding 12+ years duration of chronic pelvic pain and referral from Dr. Greene.  Today patient reports moderate to severe pain rated 8 out of 10 and interfere with activities.  Pain is nearly constant 60 to 95% of the time that is present throughout the day and night.  Describes symptoms as cramping, shooting, throbbing, dull aching, pressure-like pain in the abdomen, bilateral hips and low back.  Denies any significant lower extremity weakness or falls.  Does not use any durable medical equipment for ambulation.  Symptoms are worse with lying down, bending, sitting, walking, exercise.   Reports no relief with previous surgery or physical therapy.  Denies smoking, marijuana or alcohol use.  Symptoms are not improved with Motrin, Tylenol, Cymbalta or gabapentin.  Presents for initial evaluation.    I have personally reviewed and/or updated the patient's past medical history, past surgical history, family history, social history, current medications, allergies, and vital signs today.     Review of Systems   Constitutional:  Negative for fever and unexpected weight change.   HENT:  Negative for trouble swallowing.    Eyes:  Negative for visual disturbance.   Respiratory:  Negative for shortness of breath and wheezing.    Cardiovascular:  Negative for chest pain and palpitations.   Gastrointestinal:  Negative for constipation, diarrhea, nausea and vomiting.   Endocrine: Negative for cold intolerance, heat intolerance and polydipsia.   Genitourinary:  Negative for difficulty urinating and frequency.   Musculoskeletal:  Negative for arthralgias, gait problem, joint swelling and myalgias.   Skin:  Negative for rash.   Neurological:  Positive for weakness and numbness. Negative for dizziness, seizures, syncope and headaches.   Hematological:  Does not bruise/bleed easily.   Psychiatric/Behavioral:  Negative for dysphoric mood.    All other systems reviewed and are negative.      Patient Active Problem List   Diagnosis    Chronic pelvic pain in female    Endometriosis    Healthcare maintenance    Chronic arthralgias of knees and hips    Fatigue    Anxiety    Vitamin D deficiency    Thyroid nodule    Candidiasis of breast    Dyspareunia in female    Chronic bilateral low back pain with bilateral sciatica    Anxiety and depression    Alternating constipation and diarrhea    Endometriosis determined by laparoscopy    Morbid obesity (HCC)    Postoperative state    Pelvic abscess in female    Menopausal symptoms       Past Medical History:   Diagnosis Date    Anxiety     Chicken pox     Depression      Endometriosis     GERD (gastroesophageal reflux disease)     Headache     Occasional     HSV-1 infection     IBS (irritable bowel syndrome)     Kidney stone     Kidney stone on left side     Multiple thyroid nodules     Obesity     Renal calculi        Past Surgical History:   Procedure Laterality Date    APPENDECTOMY  2021     SECTION      x2    CYSTOSCOPY N/A 2023    Procedure: CYSTOSCOPY;  Surgeon: Popeye Greene MD;  [facility];  Service: Gynecology Oncology    EXAMINATION UNDER ANESTHESIA N/A 2023    Procedure: EXAM UNDER ANESTHESIA (EUA), I& D vaginal cuff abscess;  Surgeon: Popeye Greene MD;  Location[facility];  Service: Gynecology Oncology    HYSTERECTOMY      robotic total laparoscopic hysterectomy with BS    LAPAROSCOPIC ENDOMETRIOSIS FULGURATION  2018    laparoscopic excision of endometriosis, fulguration of endometriosis, lysis of adhesions    LAPAROSCOPY  2014    with I&D     MOLE REMOVAL      face - benign     PELVIC LAPAROSCOPY Bilateral 2023    Procedure: SALPINGO-OOPHORECTOMY, LAPAROSCOPIC W/ROBOTICS, RADICAL RESECTION PELVIC ENDOMETRIOSIS;  Surgeon: Popeye Greene MD;  [facility];  Service: Gynecology Oncology    PROCTOSCOPY N/A 2023    Procedure: PROCTOSCOPY;  Surgeon: Popeye Greene MD;  [facility];  Service: Gynecology Oncology    SKIN CANCER EXCISION      abdomen    THYROID CYST EXCISION      TONSILLECTOMY      TUBAL LIGATION  02/15/2016    With lysis of adhesion and peritoneal biopsy    US GUIDED INJECTION FOR RESEARCH STUDY  2015    VAGINA SURGERY N/A 2023    Procedure: VAGINECTOMY, PARTIAL;  Surgeon: Popeye Greene MD;  [facility];  Service: Gynecology Oncology    WISDOM TOOTH EXTRACTION         Family History   Problem Relation Age of Onset    Coronary artery disease Mother     Varicose Veins Mother     Other Mother         breast cyst -  removed     Cholelithiasis Mother         had cholecystectomy    Alcohol abuse Father     Cholelithiasis Maternal Grandmother         had cholecystectomy    Uterine cancer Paternal Grandmother     Breast cancer Family         Before age 49     Uterine cancer Family     Obesity Family     Hypertension Family     Brain cancer Family     Gallbladder disease Family        Social History     Occupational History    Occupation: Unemployed/homemaker    Tobacco Use    Smoking status: Never    Smokeless tobacco: Never   Vaping Use    Vaping status: Never Used   Substance and Sexual Activity    Alcohol use: Not Currently     Comment: rarely    Drug use: No    Sexual activity: Yes     Partners: Male     Birth control/protection: Surgical     Comment: Hysterectomy       Current Outpatient Medications on File Prior to Visit   Medication Sig    acetaminophen (TYLENOL) 650 mg CR tablet Take 1 tablet (650 mg total) by mouth every 8 (eight) hours as needed for mild pain    estrogen, conjugated,-medroxyprogesterone (PREMPRO) 0.625-5 MG per tablet Take 1 tablet by mouth daily    methocarbamol (ROBAXIN) 750 mg tablet Take 1 tablet (750 mg total) by mouth every 6 (six) hours as needed for muscle spasms    naproxen (Naprosyn) 500 mg tablet Take 1 tablet (500 mg total) by mouth every 12 (twelve) hours as needed (pain) for up to 10 days Take with food. Do not start before August 21, 2023.    ondansetron (ZOFRAN-ODT) 4 mg disintegrating tablet Take 1 tablet (4 mg total) by mouth every 6 (six) hours as needed for nausea or vomiting for up to 4 days    oxyCODONE (Roxicodone) 5 immediate release tablet Take 1 tablet (5 mg total) by mouth daily as needed for moderate pain Max Daily Amount: 5 mg     No current facility-administered medications on file prior to visit.       Allergies   Allergen Reactions    Reglan [Metoclopramide] Other (See Comments)     Elevated heartrate    Other Blisters     Dermabond       Physical Exam    /74   Pulse  92   Wt 113 kg (250 lb)   BMI 42.91 kg/m²     Constitutional: normal, well developed, well nourished, alert, in no distress and non-toxic and no overt pain behavior.  Eyes: anicteric  HEENT: grossly intact  Neck: supple, symmetric, trachea midline and no masses   Pulmonary:even and unlabored  Cardiovascular:No edema or pitting edema present  Skin:Normal without rashes or lesions and well hydrated  Psychiatric:Mood and affect appropriate  Neurologic:Cranial Nerves II-XII grossly intact  Musculoskeletal:normal gait, tenderness to palpation right and left lower quadrant, lumbar paraspinals and distal to PSIS, decreased active and passive range of motion with lumbar flexion and extension limited by pain, MMT 5 out of 5 bilateral lower extremities, sensation grossly tact to light touch, DTRs within normal limits  POSITIVE Sacral compression testing bilaterally  POSITIVE Steve's test bilaterally  POSITIVE thigh thrust bilaterally    Imaging    CT ABDOMEN AND PELVIS WITH IV CONTRAST     INDICATION:   N73.9: Female pelvic inflammatory disease, unspecified.     COMPARISON: 6/4/2023, 6/2/2023     TECHNIQUE:  CT examination of the abdomen and pelvis was performed. Multiplanar 2D reformatted images were created from the source data.     This examination, like all CT scans performed in the [facility], was performed utilizing techniques to minimize radiation dose exposure, including the use of iterative reconstruction and automated exposure control. Radiation dose length   product (DLP) for this visit:  950 mGy-cm     IV Contrast:  100 mL of iohexol (OMNIPAQUE)  Enteric Contrast:  Enteric contrast was administered.     FINDINGS:     ABDOMEN     LOWER CHEST:  No clinically significant abnormality identified in the visualized lower chest.     LIVER/BILIARY TREE:  Unremarkable.     GALLBLADDER:  No calcified gallstones. No pericholecystic inflammatory change.     SPLEEN:  The spleen is enlarged, measuring 15.7  cm in the ML dimension.     PANCREAS:  Unremarkable.     ADRENAL GLANDS:  Unremarkable.     KIDNEYS/URETERS:  Unremarkable. No hydronephrosis.     STOMACH AND BOWEL:  Unremarkable.     APPENDIX:  There are expected postoperative changes of appendectomy.     ABDOMINOPELVIC CAVITY: No ascites or pneumoperitoneum. No lymphadenopathy. Previously seen collection in the prerectal soft tissues not identified on today's exam.     VESSELS:  Unremarkable for patient's age.     PELVIS     REPRODUCTIVE ORGANS:  Surgical changes of prior hysterectomy.     URINARY BLADDER: Under distended diminishing diagnostic assessment. Questioned urinary bladder wall thickening.     ABDOMINAL WALL/INGUINAL REGIONS:  Unremarkable.     OSSEOUS STRUCTURES:  No acute fracture. Lytic lesion in the left iliac wing with absence of the overlying outer table measuring 1 cm unchanged when compared to study dated back to 4/21/2018 and felt to represent a benign and chronic process.     IMPRESSION:     1. Hysterectomy with resolution of the previously noted collection in the vaginal cuff region/prerectal soft tissues.  2. Under distention urinary bladder with questionable wall thickening. Consider correlation with urinalysis to exclude cystitis.

## 2024-02-01 ENCOUNTER — HOSPITAL ENCOUNTER (OUTPATIENT)
Dept: RADIOLOGY | Facility: HOSPITAL | Age: 34
Discharge: HOME | End: 2024-02-01
Payer: COMMERCIAL

## 2024-02-01 ENCOUNTER — CONSULT (OUTPATIENT)
Dept: PAIN MEDICINE | Facility: CLINIC | Age: 34
End: 2024-02-01
Payer: COMMERCIAL

## 2024-02-01 VITALS
SYSTOLIC BLOOD PRESSURE: 103 MMHG | BODY MASS INDEX: 42.91 KG/M2 | WEIGHT: 250 LBS | HEART RATE: 92 BPM | DIASTOLIC BLOOD PRESSURE: 74 MMHG

## 2024-02-01 DIAGNOSIS — R10.2 CHRONIC PELVIC PAIN IN FEMALE: Primary | ICD-10-CM

## 2024-02-01 DIAGNOSIS — M54.42 CHRONIC BILATERAL LOW BACK PAIN WITH BILATERAL SCIATICA: ICD-10-CM

## 2024-02-01 DIAGNOSIS — F32.A ANXIETY AND DEPRESSION: ICD-10-CM

## 2024-02-01 DIAGNOSIS — M46.1 SACROILIITIS (HCC): ICD-10-CM

## 2024-02-01 DIAGNOSIS — M54.41 CHRONIC BILATERAL LOW BACK PAIN WITH BILATERAL SCIATICA: ICD-10-CM

## 2024-02-01 DIAGNOSIS — R10.2 CHRONIC PELVIC PAIN IN FEMALE: ICD-10-CM

## 2024-02-01 DIAGNOSIS — G89.29 CHRONIC BILATERAL LOW BACK PAIN WITH BILATERAL SCIATICA: ICD-10-CM

## 2024-02-01 DIAGNOSIS — F41.9 ANXIETY: Primary | ICD-10-CM

## 2024-02-01 DIAGNOSIS — G89.29 CHRONIC PELVIC PAIN IN FEMALE: ICD-10-CM

## 2024-02-01 DIAGNOSIS — N80.9 ENDOMETRIOSIS: ICD-10-CM

## 2024-02-01 DIAGNOSIS — N80.9 ENDOMETRIOSIS DETERMINED BY LAPAROSCOPY: ICD-10-CM

## 2024-02-01 DIAGNOSIS — F41.9 ANXIETY AND DEPRESSION: ICD-10-CM

## 2024-02-01 DIAGNOSIS — G89.29 CHRONIC PELVIC PAIN IN FEMALE: Primary | ICD-10-CM

## 2024-02-01 DIAGNOSIS — N94.10 DYSPAREUNIA IN FEMALE: ICD-10-CM

## 2024-02-01 PROCEDURE — 72220 X-RAY EXAM SACRUM TAILBONE: CPT

## 2024-02-01 PROCEDURE — 99244 OFF/OP CNSLTJ NEW/EST MOD 40: CPT | Performed by: PHYSICAL MEDICINE & REHABILITATION

## 2024-02-01 PROCEDURE — 72100 X-RAY EXAM L-S SPINE 2/3 VWS: CPT

## 2024-02-01 RX ORDER — LORAZEPAM 0.5 MG/1
0.5 TABLET ORAL EVERY 8 HOURS PRN
Qty: 1 TABLET | Refills: 0 | Status: SHIPPED | OUTPATIENT
Start: 2024-02-01

## 2024-02-05 ENCOUNTER — OFFICE VISIT (OUTPATIENT)
Dept: INTERNAL MEDICINE CLINIC | Age: 34
End: 2024-02-05
Payer: COMMERCIAL

## 2024-02-05 VITALS
OXYGEN SATURATION: 99 % | WEIGHT: 245 LBS | HEART RATE: 83 BPM | DIASTOLIC BLOOD PRESSURE: 78 MMHG | BODY MASS INDEX: 43.41 KG/M2 | SYSTOLIC BLOOD PRESSURE: 118 MMHG | HEIGHT: 63 IN | TEMPERATURE: 98.1 F

## 2024-02-05 DIAGNOSIS — M54.42 CHRONIC BILATERAL LOW BACK PAIN WITH BILATERAL SCIATICA: ICD-10-CM

## 2024-02-05 DIAGNOSIS — G89.29 CHRONIC PELVIC PAIN IN FEMALE: Primary | ICD-10-CM

## 2024-02-05 DIAGNOSIS — F11.90 CHRONIC, CONTINUOUS USE OF OPIOIDS: ICD-10-CM

## 2024-02-05 DIAGNOSIS — Z11.4 ENCOUNTER FOR SCREENING FOR HIV: ICD-10-CM

## 2024-02-05 DIAGNOSIS — G89.29 CHRONIC BILATERAL LOW BACK PAIN WITH BILATERAL SCIATICA: ICD-10-CM

## 2024-02-05 DIAGNOSIS — F41.9 ANXIETY AND DEPRESSION: ICD-10-CM

## 2024-02-05 DIAGNOSIS — R10.2 CHRONIC PELVIC PAIN IN FEMALE: Primary | ICD-10-CM

## 2024-02-05 DIAGNOSIS — E04.1 THYROID NODULE: ICD-10-CM

## 2024-02-05 DIAGNOSIS — N80.9 ENDOMETRIOSIS: ICD-10-CM

## 2024-02-05 DIAGNOSIS — M54.41 CHRONIC BILATERAL LOW BACK PAIN WITH BILATERAL SCIATICA: ICD-10-CM

## 2024-02-05 DIAGNOSIS — F32.A ANXIETY AND DEPRESSION: ICD-10-CM

## 2024-02-05 PROCEDURE — 99214 OFFICE O/P EST MOD 30 MIN: CPT | Performed by: STUDENT IN AN ORGANIZED HEALTH CARE EDUCATION/TRAINING PROGRAM

## 2024-02-05 RX ORDER — NALOXONE HYDROCHLORIDE 4 MG/.1ML
SPRAY NASAL
Qty: 1 EACH | Refills: 1 | Status: SHIPPED | OUTPATIENT
Start: 2024-02-05 | End: 2025-02-04

## 2024-02-05 RX ORDER — OXYCODONE HYDROCHLORIDE 5 MG/1
5 TABLET ORAL DAILY PRN
Qty: 30 TABLET | Refills: 0 | Status: SHIPPED | OUTPATIENT
Start: 2024-02-05

## 2024-02-05 NOTE — PROGRESS NOTES
[facility]  INTERNAL MEDICINE OFFICE VISIT     PATIENT INFORMATION     Lucie Camacho   33 y.o. female   MRN: [id number]    ASSESSMENT/PLAN     Diagnoses and all orders for this visit:    Chronic pelvic pain in female  Follows with GYN. Has complicated history of severe endometriosis s/p hysterectomy and BSO with complication of pelvic abscess.  Refill oxycodone 5 mg qd with no increase in dose/frequency  Continue GYN follow up  Pending pelvic US ordered by GYN  Seeing Pain Medicine - may consider pudendal nerve block  Narcan ordered for oxycodone as she was just prescribed Ativan by Pain Medicine to be taken prior to her upcoming injections  -     oxyCODONE (Roxicodone) 5 immediate release tablet; Take 1 tablet (5 mg total) by mouth daily as needed for moderate pain Max Daily Amount: 5 mg    Chronic bilateral low back pain with bilateral sciatica  Seeing Pain Medicine. Recent LS and sacral/coccyx XR showing mod-severe facet disease, mild spondylosis of lower lumbar spine, mild disc space narrowing L5-S1.  In addition to oxycodone, continue robaxin and tylenol. Patient has not been using NSAIDs but can continue this  Referral to PT  -     Ambulatory Referral to Physical Therapy; Future  -     naloxone (NARCAN) 4 mg/0.1 mL nasal spray; Administer 1 spray into a nostril. If no response after 2-3 minutes, give another dose in the other nostril using a new spray.    Chronic, continuous use of opioids  PDMP reviewed  Refill oxycodone 5 mg qd with no increase in dose or frequency  Follow up in 4 weeks    Anxiety and depression  Patient declines medicine but is amenable to Behavioral Health referral for counseling.  -     Ambulatory referral to Psych Services; Future    Endometriosis  -     oxyCODONE (Roxicodone) 5 immediate release tablet; Take 1 tablet (5 mg total) by mouth daily as needed for moderate pain Max Daily Amount: 5 mg    Encounter for screening for HIV  -     HIV 1/2 AG/AB w Reflex [facility] for 2 yr old and above;  Future    Thyroid nodule  -     TSH, 3rd generation with Free T4 reflex; Future      Schedule a follow-up appointment in 4 weeks.    HEALTH MAINTENANCE     Immunization History   Administered Date(s) Administered    COVID-19 PFIZER VACCINE 0.3 ML IM 05/21/2021, 06/16/2021    HPV Quadrivalent 05/19/2009, 07/23/2009, 11/30/2009    INFLUENZA 11/19/2014     CHIEF COMPLAINT     Chief Complaint   Patient presents with    Medication Refill     Discuss oxycodone, needs to see Dr. Brothers too.- Patient states she has lower back pain and endo pain- Patient states due to lower back pain she is not able to stand for more than 20 mins at a time- recent XR done on 2/1-  Patient state she finished her medication on friday      HISTORY OF PRESENT ILLNESS     Lucie Camacho is a 32 yo F with PMH of endometriosis s/p bilateral oophorectomy and radical section of pelvic endometriosis with postoperative complication of pelvic abscess, chronic low back pain with sciatica, anxiety and depression who presents for pain follow up. Patient recently saw her GYN for ongoing pelvic pain. She was referred to Pain Medicine and ordered for pelvic US. She has yet to schedule the US. Pain Medicine evaluated her and ordered LS and sacral/coccyx XR that showed mod-severe facet disease and mild spondylosis of the lower lumbar spine and mild disc space narrowing at L5-S1. She was scheduled for b/l SI joint injections as it was felt that she may have sacroiliitis. Now that her XR have resulted, she is waiting to hear back if there will be a change in management. Her GYN recommended that an increase in frequency of her oxycodone be considered. She is currently taking 5 mg daily in the morning but has not taken this since Friday. She is also using robaxin and tylenol at night. She has not been using Naproxen. Her pain is controlled after taking her oxycodone but for 3-4 hours. She continues to have lower back pain with bilateral sciatica. She also  "continues to have intermittent pelvic pain. It is a sharp pain that radiates into her groin. She has dyspareunia and pain with defecation. No saddle anesthesia, incontinence, or gait disturbance.     REVIEW OF SYSTEMS     Review of Systems   Constitutional:  Negative for chills and fever.   Respiratory:  Negative for cough and shortness of breath.    Cardiovascular:  Negative for chest pain.   Gastrointestinal:  Negative for abdominal pain, constipation, diarrhea and nausea.   Genitourinary:  Positive for dyspareunia and pelvic pain. Negative for difficulty urinating, dysuria and vaginal bleeding.   Musculoskeletal:  Positive for back pain.   Neurological:  Negative for dizziness, light-headedness and headaches.   Psychiatric/Behavioral:  Positive for dysphoric mood. Negative for suicidal ideas.      OBJECTIVE     Vitals:    02/05/24 0941   BP: 118/78   BP Location: Left arm   Patient Position: Sitting   Cuff Size: Standard   Pulse: 83   Temp: 98.1 °F (36.7 °C)   TempSrc: Temporal   SpO2: 99%   Weight: 111 kg (245 lb)   Height: 5' 3\" (1.6 m)     Physical Exam  Constitutional:       General: She is not in acute distress.  Eyes:      Conjunctiva/sclera: Conjunctivae normal.   Cardiovascular:      Rate and Rhythm: Normal rate and regular rhythm.      Heart sounds: Normal heart sounds.   Pulmonary:      Effort: Pulmonary effort is normal.      Breath sounds: Normal breath sounds.   Abdominal:      Tenderness: There is no abdominal tenderness.   Musculoskeletal:      Cervical back: Neck supple.      Lumbar back: Bony tenderness present. No tenderness. Decreased range of motion. Negative right straight leg raise test and negative left straight leg raise test.      Right hip: Normal strength.      Left hip: Normal strength.      Right lower leg: No edema.      Left lower leg: No edema.      Comments: Positive ROMEL test on the right   Skin:     General: Skin is warm and dry.   Neurological:      Mental Status: She is " alert.      Sensory: Sensation is intact.      Motor: Motor function is intact.      Deep Tendon Reflexes:      Reflex Scores:       Patellar reflexes are 2+ on the right side and 2+ on the left side.  Psychiatric:         Behavior: Behavior normal. Behavior is cooperative.       CURRENT MEDICATIONS     Current Outpatient Medications:     acetaminophen (TYLENOL) 650 mg CR tablet, Take 1 tablet (650 mg total) by mouth every 8 (eight) hours as needed for mild pain, Disp: 30 tablet, Rfl: 0    estrogen, conjugated,-medroxyprogesterone (PREMPRO) 0.625-5 MG per tablet, Take 1 tablet by mouth daily, Disp: 30 tablet, Rfl: 3    LORazepam (Ativan) 0.5 mg tablet, Take 1 tablet (0.5 mg total) by mouth every 8 (eight) hours as needed for anxiety (Patient taking differently: Take 0.5 mg by mouth every 8 (eight) hours as needed for anxiety (For injections only)), Disp: 1 tablet, Rfl: 0    methocarbamol (ROBAXIN) 750 mg tablet, Take 1 tablet (750 mg total) by mouth every 6 (six) hours as needed for muscle spasms, Disp: 60 tablet, Rfl: 5    naproxen (Naprosyn) 500 mg tablet, Take 1 tablet (500 mg total) by mouth every 12 (twelve) hours as needed (pain) for up to 10 days Take with food. Do not start before August 21, 2023., Disp: 14 tablet, Rfl: 0    ondansetron (ZOFRAN-ODT) 4 mg disintegrating tablet, Take 1 tablet (4 mg total) by mouth every 6 (six) hours as needed for nausea or vomiting for up to 4 days, Disp: 16 tablet, Rfl: 0    oxyCODONE (Roxicodone) 5 immediate release tablet, Take 1 tablet (5 mg total) by mouth daily as needed for moderate pain Max Daily Amount: 5 mg, Disp: 30 tablet, Rfl: 0    PAST MEDICAL & SURGICAL HISTORY     Past Medical History:   Diagnosis Date    Anxiety     Chicken pox     Depression     Endometriosis     GERD (gastroesophageal reflux disease)     Headache     Occasional     HSV-1 infection     IBS (irritable bowel syndrome)     Kidney stone     Kidney stone on left side     Multiple thyroid nodules      Obesity     Renal calculi      Past Surgical History:   Procedure Laterality Date    APPENDECTOMY  2021     SECTION      x2    CYSTOSCOPY N/A 2023    Procedure: CYSTOSCOPY;  Surgeon: Popeye Greene MD;  [facility];  Service: Gynecology Oncology    EXAMINATION UNDER ANESTHESIA N/A 2023    Procedure: EXAM UNDER ANESTHESIA (EUA), I& D vaginal cuff abscess;  Surgeon: Popeye Greene MD;  [facility];  Service: Gynecology Oncology    HYSTERECTOMY      robotic total laparoscopic hysterectomy with BS    LAPAROSCOPIC ENDOMETRIOSIS FULGURATION  2018    laparoscopic excision of endometriosis, fulguration of endometriosis, lysis of adhesions    LAPAROSCOPY  2014    with I&D     MOLE REMOVAL      face - benign     PELVIC LAPAROSCOPY Bilateral 2023    Procedure: SALPINGO-OOPHORECTOMY, LAPAROSCOPIC W/ROBOTICS, RADICAL RESECTION PELVIC ENDOMETRIOSIS;  Surgeon: Popeye Greene MD;  [facility];  Service: Gynecology Oncology    PROCTOSCOPY N/A 2023    Procedure: PROCTOSCOPY;  Surgeon: Popeye Greene MD;  [facility];  Service: Gynecology Oncology    SKIN CANCER EXCISION      abdomen    THYROID CYST EXCISION      TONSILLECTOMY      TUBAL LIGATION  02/15/2016    With lysis of adhesion and peritoneal biopsy    US GUIDED INJECTION FOR RESEARCH STUDY  2015    VAGINA SURGERY N/A 2023    Procedure: VAGINECTOMY, PARTIAL;  Surgeon: Popeye Greene MD;  [facility];  Service: Gynecology Oncology    WISDOM TOOTH EXTRACTION       SOCIAL & FAMILY HISTORY     Social History     Socioeconomic History    Marital status: Single     Spouse name: Not on file    Number of children: 2    Years of education: Not on file    Highest education level: Not on file   Occupational History    Occupation: Unemployed/homemaker    Tobacco Use    Smoking status: Never    Smokeless tobacco: Never   Vaping Use     Vaping status: Never Used   Substance and Sexual Activity    Alcohol use: Not Currently     Comment: rarely    Drug use: No    Sexual activity: Yes     Partners: Male     Birth control/protection: Surgical     Comment: Hysterectomy   Other Topics Concern    Not on file   Social History Narrative    · Most recent tobacco use screenin2019      · Do you currently or have you served in the [employer]:   No      · Sexual orientation:   Heterosexual      · Exercise level:   Occasional      · Diet:   Regular      · Alcohol intake:   None      · Caffeine intake:   Occasional     As per Shannan      Social Determinants of Health     Financial Resource Strain: Not on file   Food Insecurity: Not on file   Transportation Needs: Not on file   Physical Activity: Not on file   Stress: Not on file   Social Connections: Not on file   Intimate Partner Violence: Not on file   Housing Stability: Not on file     Social History     Substance and Sexual Activity   Alcohol Use Not Currently    Comment: rarely       Social History     Substance and Sexual Activity   Drug Use No     Social History     Tobacco Use   Smoking Status Never   Smokeless Tobacco Never     Family History   Problem Relation Age of Onset    Coronary artery disease Mother     Varicose Veins Mother     Other Mother         breast cyst - removed     Cholelithiasis Mother         had cholecystectomy    Alcohol abuse Father     Cholelithiasis Maternal Grandmother         had cholecystectomy    Uterine cancer Paternal Grandmother     Breast cancer Family         Before age 49     Uterine cancer Family     Obesity Family     Hypertension Family     Brain cancer Family     Gallbladder disease Family            Depression Screening and Follow-up Plan: Patient's depression screening was positive with a PHQ-9 score of 7. Patient assessed for underlying major depression. Brief counseling provided and recommend additional follow-up/re-evaluation next office visit.  Patient agreeable to Behavioral Health referral at this time. Will defer medications for now as patient is hesitant about side effects.     ==  Darrell Davis MD PGY3  [facility] Internal Medicine Residency

## 2024-02-20 ENCOUNTER — TELEPHONE (OUTPATIENT)
Dept: PSYCHIATRY | Facility: CLINIC | Age: 34
End: 2024-02-20

## 2024-02-20 NOTE — TELEPHONE ENCOUNTER
Reached out to pt as 2nd attempt for routine referral and placing on proper wait list. LVM for pt to contact intake dept.     Referral closed.

## 2024-02-21 PROBLEM — Z00.00 HEALTHCARE MAINTENANCE: Status: RESOLVED | Noted: 2019-05-07 | Resolved: 2024-02-21

## 2024-02-23 ENCOUNTER — HOSPITAL ENCOUNTER (OUTPATIENT)
Dept: RADIOLOGY | Facility: HOSPITAL | Age: 34
Discharge: HOME | End: 2024-02-23
Attending: PHYSICAL MEDICINE & REHABILITATION
Payer: COMMERCIAL

## 2024-02-23 VITALS
OXYGEN SATURATION: 99 % | SYSTOLIC BLOOD PRESSURE: 131 MMHG | TEMPERATURE: 97.1 F | DIASTOLIC BLOOD PRESSURE: 79 MMHG | RESPIRATION RATE: 18 BRPM | HEART RATE: 77 BPM

## 2024-02-23 DIAGNOSIS — N80.9 ENDOMETRIOSIS DETERMINED BY LAPAROSCOPY: ICD-10-CM

## 2024-02-23 DIAGNOSIS — F32.A ANXIETY AND DEPRESSION: ICD-10-CM

## 2024-02-23 DIAGNOSIS — R10.2 CHRONIC PELVIC PAIN IN FEMALE: ICD-10-CM

## 2024-02-23 DIAGNOSIS — N94.10 DYSPAREUNIA IN FEMALE: ICD-10-CM

## 2024-02-23 DIAGNOSIS — M54.41 CHRONIC BILATERAL LOW BACK PAIN WITH BILATERAL SCIATICA: ICD-10-CM

## 2024-02-23 DIAGNOSIS — M54.42 CHRONIC BILATERAL LOW BACK PAIN WITH BILATERAL SCIATICA: ICD-10-CM

## 2024-02-23 DIAGNOSIS — N80.9 ENDOMETRIOSIS: ICD-10-CM

## 2024-02-23 DIAGNOSIS — G89.29 CHRONIC PELVIC PAIN IN FEMALE: ICD-10-CM

## 2024-02-23 DIAGNOSIS — F41.9 ANXIETY AND DEPRESSION: ICD-10-CM

## 2024-02-23 DIAGNOSIS — M46.1 SACROILIITIS (HCC): ICD-10-CM

## 2024-02-23 DIAGNOSIS — G89.29 CHRONIC BILATERAL LOW BACK PAIN WITH BILATERAL SCIATICA: ICD-10-CM

## 2024-02-23 PROCEDURE — 27096 INJECT SACROILIAC JOINT: CPT | Performed by: PHYSICAL MEDICINE & REHABILITATION

## 2024-02-23 RX ORDER — METHYLPREDNISOLONE ACETATE 80 MG/ML
80 INJECTION, SUSPENSION INTRA-ARTICULAR; INTRALESIONAL; INTRAMUSCULAR; PARENTERAL; SOFT TISSUE ONCE
Status: COMPLETED | OUTPATIENT
Start: 2024-02-23 | End: 2024-02-23

## 2024-02-23 RX ORDER — LIDOCAINE HYDROCHLORIDE 10 MG/ML
2 INJECTION, SOLUTION EPIDURAL; INFILTRATION; INTRACAUDAL; PERINEURAL ONCE
Status: COMPLETED | OUTPATIENT
Start: 2024-02-23 | End: 2024-02-23

## 2024-02-23 RX ORDER — ROPIVACAINE HYDROCHLORIDE 2 MG/ML
5 INJECTION, SOLUTION EPIDURAL; INFILTRATION; PERINEURAL ONCE
Status: COMPLETED | OUTPATIENT
Start: 2024-02-23 | End: 2024-02-23

## 2024-02-23 RX ADMIN — IOHEXOL 1 ML: 300 INJECTION, SOLUTION INTRAVENOUS at 10:23

## 2024-02-23 RX ADMIN — ROPIVACAINE HYDROCHLORIDE 2.5 ML: 2 INJECTION, SOLUTION EPIDURAL; INFILTRATION; PERINEURAL at 10:41

## 2024-02-23 RX ADMIN — LIDOCAINE HYDROCHLORIDE 1 ML: 10 INJECTION, SOLUTION EPIDURAL; INFILTRATION; INTRACAUDAL; PERINEURAL at 10:23

## 2024-02-23 RX ADMIN — METHYLPREDNISOLONE ACETATE 40 MG: 80 INJECTION, SUSPENSION INTRA-ARTICULAR; INTRALESIONAL; INTRAMUSCULAR; SOFT TISSUE at 10:41

## 2024-02-23 NOTE — DISCHARGE INSTR - LAB

## 2024-02-23 NOTE — H&P
History of Present Illness: The patient is a 33 y.o. female who presents with complaints of bilateral low back and buttock pain    Past Medical History:   Diagnosis Date    Anxiety     Chicken pox     Depression     Endometriosis     GERD (gastroesophageal reflux disease)     Headache     Occasional     HSV-1 infection     IBS (irritable bowel syndrome)     Kidney stone     Kidney stone on left side     Multiple thyroid nodules     Obesity     Renal calculi        Past Surgical History:   Procedure Laterality Date    APPENDECTOMY  2021     SECTION      x2    CYSTOSCOPY N/A 2023    Procedure: CYSTOSCOPY;  Surgeon: Popeye Greene MD;  [facility];  Service: Gynecology Oncology    EXAMINATION UNDER ANESTHESIA N/A 2023    Procedure: EXAM UNDER ANESTHESIA (EUA), I& D vaginal cuff abscess;  Surgeon: Popeye Greene MD;  [facility];  Service: Gynecology Oncology    HYSTERECTOMY      robotic total laparoscopic hysterectomy with BS    LAPAROSCOPIC ENDOMETRIOSIS FULGURATION  2018    laparoscopic excision of endometriosis, fulguration of endometriosis, lysis of adhesions    LAPAROSCOPY  2014    with I&D     MOLE REMOVAL      face - benign     PELVIC LAPAROSCOPY Bilateral 2023    Procedure: SALPINGO-OOPHORECTOMY, LAPAROSCOPIC W/ROBOTICS, RADICAL RESECTION PELVIC ENDOMETRIOSIS;  Surgeon: Popeye Greene MD;  [facility];  Service: Gynecology Oncology    PROCTOSCOPY N/A 2023    Procedure: PROCTOSCOPY;  Surgeon: Popeye Greene MD;  [facility];  Service: Gynecology Oncology    SKIN CANCER EXCISION      abdomen    THYROID CYST EXCISION      TONSILLECTOMY      TUBAL LIGATION  02/15/2016    With lysis of adhesion and peritoneal biopsy    US GUIDED INJECTION FOR RESEARCH STUDY  2015    VAGINA SURGERY N/A 2023    Procedure: VAGINECTOMY, PARTIAL;  Surgeon: Popeye Greene MD;  [facility];   Service: Gynecology Oncology    WISDOM TOOTH EXTRACTION           Current Outpatient Medications:     acetaminophen (TYLENOL) 650 mg CR tablet, Take 1 tablet (650 mg total) by mouth every 8 (eight) hours as needed for mild pain, Disp: 30 tablet, Rfl: 0    estrogen, conjugated,-medroxyprogesterone (PREMPRO) 0.625-5 MG per tablet, Take 1 tablet by mouth daily, Disp: 30 tablet, Rfl: 3    LORazepam (Ativan) 0.5 mg tablet, Take 1 tablet (0.5 mg total) by mouth every 8 (eight) hours as needed for anxiety (Patient taking differently: Take 0.5 mg by mouth every 8 (eight) hours as needed for anxiety (For injections only)), Disp: 1 tablet, Rfl: 0    methocarbamol (ROBAXIN) 750 mg tablet, Take 1 tablet (750 mg total) by mouth every 6 (six) hours as needed for muscle spasms, Disp: 60 tablet, Rfl: 5    naloxone (NARCAN) 4 mg/0.1 mL nasal spray, Administer 1 spray into a nostril. If no response after 2-3 minutes, give another dose in the other nostril using a new spray., Disp: 1 each, Rfl: 1    naproxen (Naprosyn) 500 mg tablet, Take 1 tablet (500 mg total) by mouth every 12 (twelve) hours as needed (pain) for up to 10 days Take with food. Do not start before August 21, 2023., Disp: 14 tablet, Rfl: 0    ondansetron (ZOFRAN-ODT) 4 mg disintegrating tablet, Take 1 tablet (4 mg total) by mouth every 6 (six) hours as needed for nausea or vomiting for up to 4 days, Disp: 16 tablet, Rfl: 0    oxyCODONE (Roxicodone) 5 immediate release tablet, Take 1 tablet (5 mg total) by mouth daily as needed for moderate pain Max Daily Amount: 5 mg, Disp: 30 tablet, Rfl: 0    Current Facility-Administered Medications:     iohexol (OMNIPAQUE) 300 mg/mL injection 1 mL, 1 mL, Intra-articular, Once, Lucas Blancas, DO    lidocaine (PF) (XYLOCAINE-MPF) 1 % injection 2 mL, 2 mL, Infiltration, Once, Lucas Blancas DO    methylPREDNISolone acetate (DEPO-MEDROL) injection 80 mg, 80 mg, Intra-articular, Once, Lucas Blancas, DO    ropivacaine  (NAROPIN) injection 5 mL, 5 mL, Intra-articular, Once, Lucas Blancas, DO    Allergies   Allergen Reactions    Reglan [Metoclopramide] Other (See Comments)     Elevated heartrate    Other Blisters     Dermabond       Physical Exam:   Vitals:    02/23/24 1000   BP: 117/72   Pulse: 66   Resp: 18   Temp: (!) 97.1 °F (36.2 °C)   SpO2: 98%     General: Awake, Alert, Oriented x 3, Mood and affect appropriate  Respiratory: Respirations even and unlabored  Cardiovascular: Peripheral pulses intact; no edema  Musculoskeletal Exam: Tenderness palpation bilateral glutes    ASA Score: 2    Patient/Chart Verification  Patient ID Verified: Verbal  ID Band Applied: No  Consents Confirmed: Procedural  H&P( within 30 days) Verified: Yes  Interval H&P(within 24 hr) Complete (required for Outpatients and Surgery Admit only): Yes  Allergies Reviewed: Yes  Anticoag/NSAID held?: NA  Currently on antibiotics?: No  Pregnancy denied?: Yes    Assessment:   1. Chronic pelvic pain in female    2. Endometriosis    3. Chronic bilateral low back pain with bilateral sciatica    4. Dyspareunia in female    5. Anxiety and depression    6. Endometriosis determined by laparoscopy    7. Sacroiliitis (HCC)        Plan: Bilateral SIJ inj

## 2024-02-26 DIAGNOSIS — N80.9 ENDOMETRIOSIS: ICD-10-CM

## 2024-02-26 DIAGNOSIS — R10.84 GENERALIZED ABDOMINAL PAIN: ICD-10-CM

## 2024-02-26 DIAGNOSIS — G89.29 CHRONIC PELVIC PAIN IN FEMALE: ICD-10-CM

## 2024-02-26 DIAGNOSIS — R10.2 CHRONIC PELVIC PAIN IN FEMALE: ICD-10-CM

## 2024-02-26 RX ORDER — METHOCARBAMOL 750 MG/1
750 TABLET, FILM COATED ORAL EVERY 6 HOURS PRN
Qty: 60 TABLET | Refills: 0 | Status: SHIPPED | OUTPATIENT
Start: 2024-02-26

## 2024-03-01 ENCOUNTER — TELEPHONE (OUTPATIENT)
Dept: PAIN MEDICINE | Facility: CLINIC | Age: 34
End: 2024-03-01

## 2024-03-04 DIAGNOSIS — R10.2 CHRONIC PELVIC PAIN IN FEMALE: ICD-10-CM

## 2024-03-04 DIAGNOSIS — G89.29 CHRONIC PELVIC PAIN IN FEMALE: ICD-10-CM

## 2024-03-04 DIAGNOSIS — N80.9 ENDOMETRIOSIS: ICD-10-CM

## 2024-03-04 RX ORDER — OXYCODONE HYDROCHLORIDE 5 MG/1
5 TABLET ORAL DAILY PRN
Qty: 30 TABLET | Refills: 0 | Status: SHIPPED | OUTPATIENT
Start: 2024-03-04

## 2024-03-07 ENCOUNTER — TELEPHONE (OUTPATIENT)
Dept: HEMATOLOGY ONCOLOGY | Facility: CLINIC | Age: 34
End: 2024-03-07

## 2024-03-07 NOTE — TELEPHONE ENCOUNTER
Patient Call    Who are you speaking with? Patient    If it is not the patient, are they listed on an active communication consent form? N/A   What is the reason for this call? Pt wants to schedule US pelvis at [facility]. They need the order and notes faxed to [phone]   Does this require a call back? Yes   If a call back is required, please list best call back number [phone]    If a call back is required, advise that a message will be forwarded to their care team and someone will return their call as soon as possible.   Did you relay this information to the patient? Yes

## 2024-03-11 ENCOUNTER — OFFICE VISIT (OUTPATIENT)
Dept: INTERNAL MEDICINE CLINIC | Age: 34
End: 2024-03-11
Payer: COMMERCIAL

## 2024-03-11 ENCOUNTER — TELEPHONE (OUTPATIENT)
Age: 34
End: 2024-03-11

## 2024-03-11 VITALS
BODY MASS INDEX: 44.47 KG/M2 | WEIGHT: 251 LBS | SYSTOLIC BLOOD PRESSURE: 128 MMHG | OXYGEN SATURATION: 99 % | DIASTOLIC BLOOD PRESSURE: 80 MMHG | TEMPERATURE: 98.9 F | HEART RATE: 74 BPM | HEIGHT: 63 IN

## 2024-03-11 DIAGNOSIS — G89.29 CHRONIC PELVIC PAIN IN FEMALE: ICD-10-CM

## 2024-03-11 DIAGNOSIS — F41.9 ANXIETY AND DEPRESSION: ICD-10-CM

## 2024-03-11 DIAGNOSIS — M54.16 LUMBAR RADICULAR PAIN: ICD-10-CM

## 2024-03-11 DIAGNOSIS — N80.9 ENDOMETRIOSIS: ICD-10-CM

## 2024-03-11 DIAGNOSIS — E66.01 MORBID OBESITY (HCC): Primary | Chronic | ICD-10-CM

## 2024-03-11 DIAGNOSIS — F32.A ANXIETY AND DEPRESSION: ICD-10-CM

## 2024-03-11 DIAGNOSIS — F11.90 CHRONIC NARCOTIC USE: ICD-10-CM

## 2024-03-11 DIAGNOSIS — R10.84 GENERALIZED ABDOMINAL PAIN: ICD-10-CM

## 2024-03-11 DIAGNOSIS — M46.1 SACROILIITIS (HCC): ICD-10-CM

## 2024-03-11 DIAGNOSIS — R10.2 CHRONIC PELVIC PAIN IN FEMALE: ICD-10-CM

## 2024-03-11 PROBLEM — N73.9 PELVIC ABSCESS IN FEMALE: Status: RESOLVED | Noted: 2023-06-04 | Resolved: 2024-03-11

## 2024-03-11 PROBLEM — Z98.890 POSTOPERATIVE STATE: Status: RESOLVED | Noted: 2023-06-02 | Resolved: 2024-03-11

## 2024-03-11 PROCEDURE — 99214 OFFICE O/P EST MOD 30 MIN: CPT | Performed by: INTERNAL MEDICINE

## 2024-03-11 RX ORDER — METHOCARBAMOL 750 MG/1
750 TABLET, FILM COATED ORAL EVERY 6 HOURS PRN
Qty: 60 TABLET | Refills: 0 | Status: SHIPPED | OUTPATIENT
Start: 2024-03-11

## 2024-03-11 NOTE — TELEPHONE ENCOUNTER
Caller: patient    Doctor: jm    Reason for call: would like a script for pain  [id number] - [place], [place] - [address] [place] [address]  Call back#:

## 2024-03-11 NOTE — PROGRESS NOTES
Assessment/Plan:     Diagnoses and all orders for this visit:    Morbid obesity (HCC)    Endometriosis  Comments:  pain medicine contract reviewed and signed with pt  awaiting pelvic pain clinic apt - on wait list  reviewed pdmp  Orders:  -     methocarbamol (ROBAXIN) 750 mg tablet; Take 1 tablet (750 mg total) by mouth every 6 (six) hours as needed for muscle spasms    Generalized abdominal pain  -     methocarbamol (ROBAXIN) 750 mg tablet; Take 1 tablet (750 mg total) by mouth every 6 (six) hours as needed for muscle spasms    Chronic pelvic pain in female  Comments:  goal is to remain off narcotics  trial lodeine   Orders:  -     methocarbamol (ROBAXIN) 750 mg tablet; Take 1 tablet (750 mg total) by mouth every 6 (six) hours as needed for muscle spasms    Sacroiliitis (HCC)    Anxiety and depression           M*Modal software was used to dictate this note.  It may contain errors with dictating incorrect words or incorrect spelling. Please contact the provider directly with any questions.    Subjective:   Chief Complaint   Patient presents with    Follow-up     6 month follow up         Patient ID: Lucie Camacho is a 33 y.o. female.    HPI  33 years young lady who is here today for the follow-up chronic problem with pelvic pain she had endometriosis and surgeries were done still having the pain in the pelvic area, she was also treated for pelvic abscess after the surgery she is also followed up by the pain management and gynecologist for the pain she needs the narcotic medication which nobody is ordering they are certainly recommending her to take but they are not ordering.  I will get the drug screening today and then will follow-up from that she thinks that she needs more medication for the pain than what she is getting as of right now    Lumbar radiculopathy she is followed up by the pain management and injections were given for sacroiliitis I recommend her to get MRI of the lumbar spine since she has a  pain in the bilateral lower back radiating to her leg and numbness of her leg.    The following portions of the patient's history were reviewed and updated as appropriate: allergies, current medications, past family history, past medical history, past social history, past surgical history, and problem list.    Review of Systems   Constitutional:  Positive for fatigue. Negative for chills.   HENT:  Negative for congestion, ear pain, hearing loss, postnasal drip, sinus pressure, sore throat and voice change.    Eyes:  Negative for pain, discharge and visual disturbance.   Respiratory:  Negative for cough, chest tightness and shortness of breath.    Cardiovascular:  Negative for chest pain, palpitations and leg swelling.   Gastrointestinal:  Negative for abdominal pain, blood in stool, diarrhea, nausea and rectal pain.   Genitourinary:  Positive for pelvic pain. Negative for difficulty urinating, dysuria and urgency.   Musculoskeletal:  Positive for back pain. Negative for arthralgias and joint swelling.   Skin:  Negative for rash.   Allergic/Immunologic: Negative for environmental allergies and food allergies.   Neurological:  Negative for dizziness, tremors, weakness, numbness and headaches.   Hematological:  Negative for adenopathy.   Psychiatric/Behavioral:  Negative for behavioral problems and hallucinations.          Past Medical History:   Diagnosis Date    Anxiety     Chicken pox     Depression     Endometriosis     GERD (gastroesophageal reflux disease)     Headache     Occasional     HSV-1 infection     IBS (irritable bowel syndrome)     Kidney stone     Kidney stone on left side     Multiple thyroid nodules     Obesity     Renal calculi          Current Outpatient Medications:     acetaminophen (TYLENOL) 650 mg CR tablet, Take 1 tablet (650 mg total) by mouth every 8 (eight) hours as needed for mild pain, Disp: 30 tablet, Rfl: 0    estrogen, conjugated,-medroxyprogesterone (PREMPRO) 0.625-5 MG per tablet,  Take 1 tablet by mouth daily, Disp: 30 tablet, Rfl: 3    LORazepam (Ativan) 0.5 mg tablet, Take 1 tablet (0.5 mg total) by mouth every 8 (eight) hours as needed for anxiety (Patient taking differently: Take 0.5 mg by mouth every 8 (eight) hours as needed for anxiety (For injections only)), Disp: 1 tablet, Rfl: 0    methocarbamol (ROBAXIN) 750 mg tablet, Take 1 tablet (750 mg total) by mouth every 6 (six) hours as needed for muscle spasms, Disp: 60 tablet, Rfl: 0    naloxone (NARCAN) 4 mg/0.1 mL nasal spray, Administer 1 spray into a nostril. If no response after 2-3 minutes, give another dose in the other nostril using a new spray., Disp: 1 each, Rfl: 1    oxyCODONE (Roxicodone) 5 immediate release tablet, Take 1 tablet (5 mg total) by mouth daily as needed for moderate pain Max Daily Amount: 5 mg, Disp: 30 tablet, Rfl: 0    naproxen (Naprosyn) 500 mg tablet, Take 1 tablet (500 mg total) by mouth every 12 (twelve) hours as needed (pain) for up to 10 days Take with food. Do not start before 2023. (Patient not taking: Reported on 3/11/2024), Disp: 14 tablet, Rfl: 0    ondansetron (ZOFRAN-ODT) 4 mg disintegrating tablet, Take 1 tablet (4 mg total) by mouth every 6 (six) hours as needed for nausea or vomiting for up to 4 days (Patient not taking: Reported on 3/11/2024), Disp: 16 tablet, Rfl: 0    Allergies   Allergen Reactions    Reglan [Metoclopramide] Other (See Comments)     Elevated heartrate    Other Blisters     Dermabond       Social History   Past Surgical History:   Procedure Laterality Date    APPENDECTOMY  2021     SECTION      x2    CYSTOSCOPY N/A 2023    Procedure: CYSTOSCOPY;  Surgeon: Popeye Greene MD;  Location: [facility];  Service: Gynecology Oncology    EXAMINATION UNDER ANESTHESIA N/A 2023    Procedure: EXAM UNDER ANESTHESIA (EUA), I& D vaginal cuff abscess;  Surgeon: Popeye Greene MD;  Location: [facility];  Service: Gynecology Oncology     "HYSTERECTOMY      robotic total laparoscopic hysterectomy with BS    LAPAROSCOPIC ENDOMETRIOSIS FULGURATION  09/04/2018    laparoscopic excision of endometriosis, fulguration of endometriosis, lysis of adhesions    LAPAROSCOPY  05/06/2014    with I&D     MOLE REMOVAL      face - benign     PELVIC LAPAROSCOPY Bilateral 5/18/2023    Procedure: SALPINGO-OOPHORECTOMY, LAPAROSCOPIC W/ROBOTICS, RADICAL RESECTION PELVIC ENDOMETRIOSIS;  Surgeon: Popeye Greene MD;  Location: [facility];  Service: Gynecology Oncology    PROCTOSCOPY N/A 5/18/2023    Procedure: PROCTOSCOPY;  Surgeon: Popeye Greene MD;  [facility]: [facility];  Service: Gynecology Oncology    SKIN CANCER EXCISION      abdomen    THYROID CYST EXCISION      TONSILLECTOMY      TUBAL LIGATION  02/15/2016    With lysis of adhesion and peritoneal biopsy    US GUIDED INJECTION FOR RESEARCH STUDY  05/30/2015    VAGINA SURGERY N/A 5/18/2023    Procedure: VAGINECTOMY, PARTIAL;  Surgeon: Popeye Greene MD;  Location: [facility];  Service: Gynecology Oncology    WISDOM TOOTH EXTRACTION       Family History   Problem Relation Age of Onset    Coronary artery disease Mother     Varicose Veins Mother     Other Mother         breast cyst - removed     Cholelithiasis Mother         had cholecystectomy    Alcohol abuse Father     Cholelithiasis Maternal Grandmother         had cholecystectomy    Uterine cancer Paternal Grandmother     Breast cancer Family         Before age 49     Uterine cancer Family     Obesity Family     Hypertension Family     Brain cancer Family     Gallbladder disease Family        Objective:  /80 (BP Location: Left arm, Patient Position: Sitting, Cuff Size: Large)   Pulse 74   Temp 98.9 °F (37.2 °C) (Temporal)   Ht 5' 3\" (1.6 m)   Wt 114 kg (251 lb)   SpO2 99%   BMI 44.46 kg/m²        Physical Exam  Constitutional:       Appearance: She is well-developed.   HENT:      Right Ear: Tympanic membrane and external ear " normal.      Left Ear: Tympanic membrane normal.   Eyes:      Conjunctiva/sclera: Conjunctivae normal.      Pupils: Pupils are equal, round, and reactive to light.   Neck:      Thyroid: No thyromegaly.      Vascular: No JVD.   Cardiovascular:      Rate and Rhythm: Normal rate and regular rhythm.      Heart sounds: Normal heart sounds.   Pulmonary:      Breath sounds: Normal breath sounds.   Abdominal:      General: Bowel sounds are normal.      Palpations: Abdomen is soft.   Musculoskeletal:      Cervical back: Normal range of motion.      Lumbar back: Spasms and tenderness present. No swelling, deformity or lacerations. Positive right straight leg raise test.   Lymphadenopathy:      Cervical: No cervical adenopathy.   Skin:     General: Skin is dry.   Neurological:      General: No focal deficit present.      Mental Status: She is alert and oriented to person, place, and time. Mental status is at baseline.      Deep Tendon Reflexes: Reflexes are normal and symmetric.

## 2024-03-12 ENCOUNTER — TELEPHONE (OUTPATIENT)
Dept: OBGYN CLINIC | Facility: CLINIC | Age: 34
End: 2024-03-12

## 2024-03-12 ENCOUNTER — TELEPHONE (OUTPATIENT)
Dept: INTERNAL MEDICINE CLINIC | Age: 34
End: 2024-03-12

## 2024-03-12 NOTE — TELEPHONE ENCOUNTER
Up to PCP to proceed with increasing opiate regimen.  I personally will not be taking over her opiate regimen and would use caution given her age and comorbidities. OK to proceed but would use caution with adjustments moving forward  Unfortunately she was not able to tolerate injections well during our procedure day due to her underlying anxiety  Thank you

## 2024-03-12 NOTE — TELEPHONE ENCOUNTER
Patient called office about increasing Oxycodone medication.  Please see telephone note from orthopedics today in chart.    Thank you

## 2024-03-12 NOTE — TELEPHONE ENCOUNTER
Pt came into office and stated her PCP wants to know if it would be okay for her to take Oxycodone more than once a day. Pt can be reached at [phone].

## 2024-03-12 NOTE — TELEPHONE ENCOUNTER
S/w Pt, Pt's insurance would not cover OV today. Pt states her PCP Dr. Brothers prescribes her 5mg oxycodone daily PRN. Per pt, Dr. Brothers would like KW's approval to increase oxycodone to BID. Please review and advise.

## 2024-03-18 ENCOUNTER — HOSPITAL ENCOUNTER (OUTPATIENT)
Dept: RADIOLOGY | Facility: IMAGING CENTER | Age: 34
Discharge: HOME | End: 2024-03-18
Payer: COMMERCIAL

## 2024-03-18 ENCOUNTER — TELEPHONE (OUTPATIENT)
Dept: INTERNAL MEDICINE CLINIC | Age: 34
End: 2024-03-18

## 2024-03-18 DIAGNOSIS — M46.1 SACROILIITIS (HCC): ICD-10-CM

## 2024-03-18 DIAGNOSIS — M54.16 LUMBAR RADICULAR PAIN: ICD-10-CM

## 2024-03-18 PROCEDURE — 72148 MRI LUMBAR SPINE W/O DYE: CPT

## 2024-03-19 NOTE — TELEPHONE ENCOUNTER
Left message on machine for Bryn([phone]) from [employer] to call the office back to confirm they received the new lab orders for the urine drug screen.

## 2024-03-19 NOTE — TELEPHONE ENCOUNTER
Spoke with Carmen Clemente ordered number- He states he will contact the lab for them to authorize the drug screening. No further action is needed from us

## 2024-03-21 LAB
4-HYDROXY XYLAZINE: NEGATIVE NG/ML
6MAM UR QL CFM: NEGATIVE NG/ML
7AMINOCLONAZEPAM UR QL CFM: NEGATIVE NG/ML
A-OH ALPRAZ UR QL CFM: NEGATIVE NG/ML
ACCEPTABLE CREAT UR QL: NORMAL MG/DL
ACCEPTIBLE SP GR UR QL: NORMAL
AMPHET UR QL CFM: NEGATIVE NG/ML
BUPRENORPHINE UR QL CFM: NEGATIVE NG/ML
BUTALBITAL UR QL CFM: NEGATIVE NG/ML
BZE UR QL CFM: NEGATIVE NG/ML
CODEINE UR QL CFM: NEGATIVE NG/ML
EDDP UR QL CFM: NEGATIVE NG/ML
ETHYL GLUCURONIDE UR QL CFM: NEGATIVE NG/ML
ETHYL SULFATE UR QL SCN: NEGATIVE NG/ML
EUTYLONE UR QL: NEGATIVE NG/ML
FENTANYL UR QL CFM: NEGATIVE NG/ML
GLIADIN IGG SER IA-ACNC: ABNORMAL NG/ML
HYDROCODONE UR QL CFM: NEGATIVE NG/ML
HYDROMORPHONE UR QL CFM: NEGATIVE NG/ML
LORAZEPAM UR QL CFM: ABNORMAL NG/ML
ME-PHENIDATE UR QL CFM: NEGATIVE NG/ML
MEPERIDINE UR QL CFM: NEGATIVE NG/ML
METHADONE UR QL CFM: NEGATIVE NG/ML
METHAMPHET UR QL CFM: NEGATIVE NG/ML
MORPHINE UR QL CFM: NEGATIVE NG/ML
NALTREXONE UR QL CFM: NEGATIVE NG/ML
NITRITE UR QL: NORMAL UG/ML
NORBUPRENORPHINE UR QL CFM: NEGATIVE NG/ML
NORDIAZEPAM UR QL CFM: NEGATIVE NG/ML
NORFENTANYL UR QL CFM: NEGATIVE NG/ML
NORHYDROCODONE UR QL CFM: NEGATIVE NG/ML
NORMEPERIDINE UR QL CFM: NEGATIVE NG/ML
NOROXYCODONE UR QL CFM: NORMAL NG/ML
OXAZEPAM UR QL CFM: NEGATIVE NG/ML
OXYCODONE UR QL CFM: NORMAL NG/ML
OXYMORPHONE UR QL CFM: NORMAL NG/ML
PARA-FLUOROFENTANYL QUANTIFICATION: NORMAL NG/ML
PHENOBARB UR QL CFM: NEGATIVE NG/ML
RESULT ALL_PRESCRIBED MEDS AND SPECIAL INSTRUCTIONS: NORMAL
SECOBARBITAL UR QL CFM: NEGATIVE NG/ML
SL AMB 4-ANPP QUANTIFICATION: NORMAL NG/ML
SL AMB 5F-ADB-M7 METABOLITE QUANTIFICATION: NEGATIVE NG/ML
SL AMB 7-OH-MITRAGYNINE (KRATOM ALKALOID) QUANTIFICATION: ABNORMAL NG/ML
SL AMB AB-FUBINACA-M3 METABOLITE QUANTIFICATION: NEGATIVE NG/ML
SL AMB ACETYL FENTANYL QUANTIFICATION: NORMAL NG/ML
SL AMB ACETYL NORFENTANYL QUANTIFICATION: NORMAL NG/ML
SL AMB ACRYL FENTANYL QUANTIFICATION: NORMAL NG/ML
SL AMB CARFENTANIL QUANTIFICATION: NORMAL NG/ML
SL AMB CTHC (MARIJUANA METABOLITE) QUANTIFICATION: NEGATIVE NG/ML
SL AMB DEXTRORPHAN (DEXTROMETHORPHAN METABOLITE) QUANT: ABNORMAL NG/ML
SL AMB GABAPENTIN QUANTIFICATION: NEGATIVE
SL AMB JWH018 METABOLITE QUANTIFICATION: NEGATIVE NG/ML
SL AMB JWH073 METABOLITE QUANTIFICATION: NEGATIVE NG/ML
SL AMB MDMB-FUBINACA-M1 METABOLITE QUANTIFICATION: NEGATIVE NG/ML
SL AMB METHYLONE QUANTIFICATION: NEGATIVE NG/ML
SL AMB N-DESMETHYL-TRAMADOL QUANTIFICATION: NEGATIVE NG/ML
SL AMB PHENTERMINE QUANTIFICATION: NEGATIVE NG/ML
SL AMB PREGABALIN QUANTIFICATION: NEGATIVE
SL AMB RCS4 METABOLITE QUANTIFICATION: NEGATIVE NG/ML
SL AMB RITALINIC ACID QUANTIFICATION: NEGATIVE NG/ML
SMOOTH MUSCLE AB TITR SER IF: NEGATIVE NG/ML
SPECIMEN DRAWN SERPL: NEGATIVE NG/ML
SPECIMEN PH ACCEPTABLE UR: NORMAL
TAPENTADOL UR QL CFM: NEGATIVE NG/ML
TEMAZEPAM UR QL CFM: NEGATIVE NG/ML
TRAMADOL UR QL CFM: NEGATIVE NG/ML
URATE/CREAT 24H UR: NEGATIVE NG/ML
XYLAZINE QUANTIFICATION: NEGATIVE NG/ML

## 2024-03-25 ENCOUNTER — TELEPHONE (OUTPATIENT)
Dept: INTERNAL MEDICINE CLINIC | Facility: OTHER | Age: 34
End: 2024-03-25

## 2024-03-25 DIAGNOSIS — R10.2 CHRONIC PELVIC PAIN IN FEMALE: ICD-10-CM

## 2024-03-25 DIAGNOSIS — G89.29 CHRONIC PELVIC PAIN IN FEMALE: ICD-10-CM

## 2024-03-25 DIAGNOSIS — R10.84 GENERALIZED ABDOMINAL PAIN: ICD-10-CM

## 2024-03-25 DIAGNOSIS — N80.9 ENDOMETRIOSIS: ICD-10-CM

## 2024-03-25 RX ORDER — METHOCARBAMOL 750 MG/1
750 TABLET, FILM COATED ORAL EVERY 6 HOURS PRN
Qty: 60 TABLET | Refills: 0 | Status: SHIPPED | OUTPATIENT
Start: 2024-03-25

## 2024-04-01 DIAGNOSIS — N80.9 ENDOMETRIOSIS: ICD-10-CM

## 2024-04-01 DIAGNOSIS — R10.2 CHRONIC PELVIC PAIN IN FEMALE: ICD-10-CM

## 2024-04-01 DIAGNOSIS — G89.29 CHRONIC PELVIC PAIN IN FEMALE: ICD-10-CM

## 2024-04-01 RX ORDER — OXYCODONE HYDROCHLORIDE 5 MG/1
5 TABLET ORAL DAILY PRN
Qty: 30 TABLET | Refills: 0 | Status: SHIPPED | OUTPATIENT
Start: 2024-04-01

## 2024-04-08 DIAGNOSIS — R10.2 CHRONIC PELVIC PAIN IN FEMALE: ICD-10-CM

## 2024-04-08 DIAGNOSIS — G89.29 CHRONIC PELVIC PAIN IN FEMALE: ICD-10-CM

## 2024-04-08 DIAGNOSIS — N80.9 ENDOMETRIOSIS: ICD-10-CM

## 2024-04-08 DIAGNOSIS — R10.84 GENERALIZED ABDOMINAL PAIN: ICD-10-CM

## 2024-04-08 RX ORDER — METHOCARBAMOL 750 MG/1
750 TABLET, FILM COATED ORAL EVERY 6 HOURS PRN
Qty: 60 TABLET | Refills: 0 | Status: SHIPPED | OUTPATIENT
Start: 2024-04-08

## 2024-04-18 ENCOUNTER — TELEPHONE (OUTPATIENT)
Dept: GYNECOLOGIC ONCOLOGY | Facility: CLINIC | Age: 34
End: 2024-04-18

## 2024-04-18 ENCOUNTER — TELEPHONE (OUTPATIENT)
Dept: HEMATOLOGY ONCOLOGY | Facility: CLINIC | Age: 34
End: 2024-04-18

## 2024-04-18 NOTE — TELEPHONE ENCOUNTER
Return call placed. Informed patient Dr. Greene will not be prescribing pain medication at this time and she can further discuss with him at her visit next week.

## 2024-04-18 NOTE — TELEPHONE ENCOUNTER
I left a message informing the patient that an appointment was scheduled with Dr. Greene on Tuesday, 4/23/2024 at 4:00PM in [place] per the request of Dr. Greene. I provided the office number to call if the patient needs to reschedule.

## 2024-04-18 NOTE — TELEPHONE ENCOUNTER
Patient Call    Who are you speaking with? Patient    If it is not the patient, are they listed on an active communication consent form? Yes   What is the reason for this call? Need something for pain, possibly increase dose of current pain medication   Does this require a call back? Yes   If a call back is required, please list best call back number [phone]    If a call back is required, advise that a message will be forwarded to their care team and someone will return their call as soon as possible.   Did you relay this information to the patient? Yes

## 2024-04-19 ENCOUNTER — TELEPHONE (OUTPATIENT)
Dept: HEMATOLOGY ONCOLOGY | Facility: CLINIC | Age: 34
End: 2024-04-19

## 2024-04-19 NOTE — TELEPHONE ENCOUNTER
Patient Call    Who are you speaking with? Patient    If it is not the patient, are they listed on an active communication consent form? N/A   What is the reason for this call? Pt is calling in regards to a missed call she had from Dr Greene. Pt would like to speak to him directly since he was the one who called her regarding her medication. Please call pt back.    Does this require a call back? Yes   If a call back is required, please list best call back number [phone]   If a call back is required, advise that a message will be forwarded to their care team and someone will return their call as soon as possible.   Did you relay this information to the patient? Yes

## 2024-04-22 ENCOUNTER — TELEPHONE (OUTPATIENT)
Dept: HEMATOLOGY ONCOLOGY | Facility: CLINIC | Age: 34
End: 2024-04-22

## 2024-04-22 DIAGNOSIS — G89.29 CHRONIC PELVIC PAIN IN FEMALE: ICD-10-CM

## 2024-04-22 DIAGNOSIS — R10.2 CHRONIC PELVIC PAIN IN FEMALE: ICD-10-CM

## 2024-04-22 DIAGNOSIS — R10.84 GENERALIZED ABDOMINAL PAIN: ICD-10-CM

## 2024-04-22 DIAGNOSIS — N80.9 ENDOMETRIOSIS: ICD-10-CM

## 2024-04-22 RX ORDER — METHOCARBAMOL 750 MG/1
750 TABLET, FILM COATED ORAL EVERY 6 HOURS PRN
Qty: 60 TABLET | Refills: 0 | Status: SHIPPED | OUTPATIENT
Start: 2024-04-22 | End: 2024-05-03 | Stop reason: SDUPTHER

## 2024-04-22 NOTE — TELEPHONE ENCOUNTER
Patient Call    Who are you speaking with? Patient    If it is not the patient, are they listed on an active communication consent form? N/A   What is the reason for this call? Pt is calling in regards to a missed call she had from Dr Greene. Pt would like Dr Greene to call her please.    Does this require a call back? Yes   If a call back is required, please list best call back number [phone]   If a call back is required, advise that a message will be forwarded to their care team and someone will return their call as soon as possible.   Did you relay this information to the patient? Yes

## 2024-04-23 ENCOUNTER — TELEPHONE (OUTPATIENT)
Dept: HEMATOLOGY ONCOLOGY | Facility: CLINIC | Age: 34
End: 2024-04-23

## 2024-04-23 ENCOUNTER — OFFICE VISIT (OUTPATIENT)
Dept: GYNECOLOGIC ONCOLOGY | Facility: CLINIC | Age: 34
End: 2024-04-23
Payer: COMMERCIAL

## 2024-04-23 VITALS
TEMPERATURE: 97.8 F | HEART RATE: 58 BPM | OXYGEN SATURATION: 100 % | DIASTOLIC BLOOD PRESSURE: 78 MMHG | WEIGHT: 248 LBS | SYSTOLIC BLOOD PRESSURE: 122 MMHG | BODY MASS INDEX: 43.93 KG/M2

## 2024-04-23 DIAGNOSIS — N80.9 ENDOMETRIOSIS: Primary | ICD-10-CM

## 2024-04-23 DIAGNOSIS — G89.29 CHRONIC PELVIC PAIN IN FEMALE: ICD-10-CM

## 2024-04-23 DIAGNOSIS — R10.2 CHRONIC PELVIC PAIN IN FEMALE: ICD-10-CM

## 2024-04-23 DIAGNOSIS — N80.519 ENDOMETRIOSIS OF RECTUM: ICD-10-CM

## 2024-04-23 PROCEDURE — 99215 OFFICE O/P EST HI 40 MIN: CPT | Performed by: OBSTETRICS & GYNECOLOGY

## 2024-04-23 RX ORDER — OXYCODONE HYDROCHLORIDE 5 MG/1
5 TABLET ORAL 2 TIMES DAILY
Qty: 60 TABLET | Refills: 0 | Status: SHIPPED | OUTPATIENT
Start: 2024-04-23

## 2024-04-23 NOTE — PROGRESS NOTES
Assessment/Plan:    Problem List Items Addressed This Visit          Digestive    Endometriosis of rectum - Primary     33-year-old with severe pelvic endometriosis status post multiple prior medical therapies, laparoscopies recently status post radical resection of pelvic endometriosis including bilateral oophorectomy, vaginectomy in May 2023.  That surgery was complicated by postoperative vaginal apex abscess.  Since the operation, she has had ongoing pelvic pain requiring narcotic therapy.  She has seen pain management and tried local injection.  I reviewed the pelvic ultrasound from 4/16/2024.  There is evidence of a deep rectal implant measuring 2.39 x 1.17 cm.  Her performance status is 0.  1.  I discussed management of hormone replacement therapy and ongoing pelvic endometriosis.  She is currently on combined hormone replacement therapy with Prempro to stabilize the endometriosis.  I discussed stopping hormone replacement therapy and considering starting aromatase inhibitor therapy.  We discussed the potential side effects of aromatase inhibitor therapy including hot flashes, vaginal dryness, osteoporosis, arthralgias.  For now, she would prefer to continue with combined hormone replacement therapy and have a surgical consultation.  2.  We discussed the possibility of surgical management.  She had a negative colonoscopy prior to her surgery in May 2023.  She understands that surgical management may be complicated given prior surgical history and would require rectal resection with reanastomosis.  I advise she obtain an MRI of the pelvis to better evaluate the anatomy for surgical counseling and that she discuss possible rectal resection with Dr. King.  3.  Given poor quality of life from pelvic pain, failure of alternative therapies, we discussed increasing oxycodone to 5 mg twice daily.  We discussed the risks including worsening constipation.  She is aware of the addictive potential of oxycodone.  She  was given a 1 month supply.  I have spent a total time of 40 minutes on 04/23/24 in caring for this patient including Diagnostic results, Risks and benefits of tx options, Instructions for management, Patient and family education, Risk factor reductions, Impressions, Counseling / Coordination of care, Documenting in the medical record, Reviewing / ordering tests, medicine, procedures  , Obtaining or reviewing history  , and Communicating with other healthcare professionals .           Relevant Medications    oxyCODONE (Roxicodone) 5 immediate release tablet    Other Relevant Orders    MRI pelvis female wo and w contrast    Ambulatory referral to Colorectal Surgery       Surgery/Wound/Pain    Chronic pelvic pain in female    Relevant Medications    oxyCODONE (Roxicodone) 5 immediate release tablet         CHIEF COMPLAINT: Endometriosis, chronic pelvic pain, discuss ultrasound results        Patient ID: Lucei Camacho is a 33 y.o. female  Who returns for a treatment discussion.  She had a pelvic ultrasound due to ongoing pelvic pain requiring narcotics on 4/16/2024 that revealed colonic endometriosis measuring 1.17 x 2.39 cm with an additional pelvic implant measuring 0.8 x 0.6 cm.  In the interim, she has seen pain management and had bilateral SI joint injections.  Despite these injections, she has pain that is altering her quality of life.  She is having bowel movements.  Since the surgery in May 2023, she has been more constipated.  Prior to surgery, she had loose stools.  She has been taking her Prempro intermittently.  She does note improvement in menopausal symptoms.  No vaginal bleeding.  She would like to discuss increasing her oxycodone regimen as oxycodone does help manage her pain.        The following portions of the patient's history were reviewed and updated as appropriate: allergies, current medications, past family history, past medical history, past social history, past surgical history, and  problem list.    Review of Systems   Constitutional:  Negative for activity change and unexpected weight change.   HENT: Negative.     Eyes: Negative.    Respiratory: Negative.     Cardiovascular: Negative.    Gastrointestinal:  Positive for constipation. Negative for abdominal distention and abdominal pain.   Endocrine: Negative.    Genitourinary:  Positive for pelvic pain. Negative for vaginal bleeding.   Musculoskeletal: Negative.    Skin: Negative.    Allergic/Immunologic: Negative.    Neurological: Negative.    Hematological: Negative.    Psychiatric/Behavioral: Negative.         Current Outpatient Medications   Medication Sig Dispense Refill    acetaminophen (TYLENOL) 650 mg CR tablet Take 1 tablet (650 mg total) by mouth every 8 (eight) hours as needed for mild pain 30 tablet 0    estrogen, conjugated,-medroxyprogesterone (PREMPRO) 0.625-5 MG per tablet Take 1 tablet by mouth daily 30 tablet 3    methocarbamol (ROBAXIN) 750 mg tablet Take 1 tablet (750 mg total) by mouth every 6 (six) hours as needed for muscle spasms 60 tablet 0    naloxone (NARCAN) 4 mg/0.1 mL nasal spray Administer 1 spray into a nostril. If no response after 2-3 minutes, give another dose in the other nostril using a new spray. 1 each 1    oxyCODONE (Roxicodone) 5 immediate release tablet Take 1 tablet (5 mg total) by mouth daily as needed for moderate pain Max Daily Amount: 5 mg 30 tablet 0    oxyCODONE (Roxicodone) 5 immediate release tablet Take 1 tablet (5 mg total) by mouth 2 (two) times a day Max Daily Amount: 10 mg 60 tablet 0    naproxen (Naprosyn) 500 mg tablet Take 1 tablet (500 mg total) by mouth every 12 (twelve) hours as needed (pain) for up to 10 days Take with food. Do not start before August 21, 2023. (Patient not taking: Reported on 3/11/2024) 14 tablet 0    ondansetron (ZOFRAN-ODT) 4 mg disintegrating tablet Take 1 tablet (4 mg total) by mouth every 6 (six) hours as needed for nausea or vomiting for up to 4 days (Patient  not taking: Reported on 3/11/2024) 16 tablet 0     No current facility-administered medications for this visit.           Objective:    Blood pressure 122/78, pulse 58, temperature 97.8 °F (36.6 °C), temperature source Temporal, weight 112 kg (248 lb), SpO2 100%.  Body mass index is 43.93 kg/m².  Body surface area is 2.11 meters squared.    Physical Exam  Vitals reviewed.   Constitutional:       General: She is not in acute distress.     Appearance: Normal appearance. She is not ill-appearing.   HENT:      Head: Normocephalic and atraumatic.      Mouth/Throat:      Mouth: Mucous membranes are moist.   Eyes:      General: No scleral icterus.        Right eye: No discharge.         Left eye: No discharge.      Conjunctiva/sclera: Conjunctivae normal.   Pulmonary:      Effort: Pulmonary effort is normal.   Musculoskeletal:      Right lower leg: No edema.      Left lower leg: No edema.   Skin:     General: Skin is warm and dry.      Coloration: Skin is not jaundiced.      Findings: No rash.   Neurological:      General: No focal deficit present.      Mental Status: She is alert and oriented to person, place, and time.      Cranial Nerves: No cranial nerve deficit.      Sensory: No sensory deficit.      Motor: No weakness.      Gait: Gait normal.   Psychiatric:         Mood and Affect: Mood normal.         Behavior: Behavior normal.         Thought Content: Thought content normal.         Judgment: Judgment normal.         Lab Results   Component Value Date     17.3 04/27/2023     Lab Results   Component Value Date     02/26/2014    K 4.1 04/15/2024     04/15/2024    CO2 23 04/15/2024    ANIONGAP 7 02/26/2014    BUN 15 04/15/2024    CREATININE 0.80 04/15/2024    GLUCOSE 97 02/26/2014    GLUF 88 04/27/2023    CALCIUM 8.9 04/15/2024    CORRECTEDCA 9.2 07/07/2021    AST 22 04/15/2024    ALT 36 04/15/2024    ALKPHOS 69 04/15/2024    EGFR 101 04/15/2024     Lab Results   Component Value Date    WBC 5.63  10/05/2023    HGB 12.8 10/05/2023    HCT 37.7 10/05/2023    MCV 85 10/05/2023     10/05/2023     Lab Results   Component Value Date    NEUTROABS 4.21 10/05/2023        Trend:  Lab Results   Component Value Date     17.3 04/27/2023

## 2024-04-23 NOTE — TELEPHONE ENCOUNTER
Patient Running Late       Who are you speaking with? Patient   If it is not the patient, are they listed on an active communication consent form? N/A   When is the appointment scheduled?  Please list date and time 04/23/2024 @4PM    At which location is the appointment scheduled to take place? [place]   If patient is running less than 15 minutes late, have patient proceed to office. Appointment may need to be rescheduled at providers discretion. If provider cannot see patient today, the office will reschedule the visit.     Was this relayed to patient? Yes

## 2024-04-23 NOTE — ASSESSMENT & PLAN NOTE
33-year-old with severe pelvic endometriosis status post multiple prior medical therapies, laparoscopies recently status post radical resection of pelvic endometriosis including bilateral oophorectomy, vaginectomy in May 2023.  That surgery was complicated by postoperative vaginal apex abscess.  Since the operation, she has had ongoing pelvic pain requiring narcotic therapy.  She has seen pain management and tried local injection.  I reviewed the pelvic ultrasound from 4/16/2024.  There is evidence of a deep rectal implant measuring 2.39 x 1.17 cm.  Her performance status is 0.  1.  I discussed management of hormone replacement therapy and ongoing pelvic endometriosis.  She is currently on combined hormone replacement therapy with Prempro to stabilize the endometriosis.  I discussed stopping hormone replacement therapy and considering starting aromatase inhibitor therapy.  We discussed the potential side effects of aromatase inhibitor therapy including hot flashes, vaginal dryness, osteoporosis, arthralgias.  For now, she would prefer to continue with combined hormone replacement therapy and have a surgical consultation.  2.  We discussed the possibility of surgical management.  She had a negative colonoscopy prior to her surgery in May 2023.  She understands that surgical management may be complicated given prior surgical history and would require rectal resection with reanastomosis.  I advise she obtain an MRI of the pelvis to better evaluate the anatomy for surgical counseling and that she discuss possible rectal resection with Dr. King.  3.  Given poor quality of life from pelvic pain, failure of alternative therapies, we discussed increasing oxycodone to 5 mg twice daily.  We discussed the risks including worsening constipation.  She is aware of the addictive potential of oxycodone.  She was given a 1 month supply.  I have spent a total time of 40 minutes on 04/23/24 in caring for this patient including  Diagnostic results, Risks and benefits of tx options, Instructions for management, Patient and family education, Risk factor reductions, Impressions, Counseling / Coordination of care, Documenting in the medical record, Reviewing / ordering tests, medicine, procedures  , Obtaining or reviewing history  , and Communicating with other healthcare professionals .

## 2024-04-24 ENCOUNTER — TELEPHONE (OUTPATIENT)
Dept: HEMATOLOGY ONCOLOGY | Facility: CLINIC | Age: 34
End: 2024-04-24

## 2024-04-24 ENCOUNTER — OFFICE VISIT (OUTPATIENT)
Dept: INTERNAL MEDICINE CLINIC | Age: 34
End: 2024-04-24
Payer: COMMERCIAL

## 2024-04-24 VITALS
OXYGEN SATURATION: 99 % | HEIGHT: 63 IN | DIASTOLIC BLOOD PRESSURE: 72 MMHG | HEART RATE: 78 BPM | BODY MASS INDEX: 44.47 KG/M2 | SYSTOLIC BLOOD PRESSURE: 120 MMHG | WEIGHT: 251 LBS | TEMPERATURE: 98.2 F

## 2024-04-24 DIAGNOSIS — M54.41 CHRONIC BILATERAL LOW BACK PAIN WITH BILATERAL SCIATICA: ICD-10-CM

## 2024-04-24 DIAGNOSIS — E66.01 MORBID OBESITY (HCC): Chronic | ICD-10-CM

## 2024-04-24 DIAGNOSIS — G89.29 CHRONIC PELVIC PAIN IN FEMALE: Primary | ICD-10-CM

## 2024-04-24 DIAGNOSIS — R10.2 CHRONIC PELVIC PAIN IN FEMALE: Primary | ICD-10-CM

## 2024-04-24 DIAGNOSIS — G89.29 CHRONIC BILATERAL LOW BACK PAIN WITH BILATERAL SCIATICA: ICD-10-CM

## 2024-04-24 DIAGNOSIS — M54.42 CHRONIC BILATERAL LOW BACK PAIN WITH BILATERAL SCIATICA: ICD-10-CM

## 2024-04-24 PROBLEM — B37.89 CANDIDIASIS OF BREAST: Status: RESOLVED | Noted: 2019-06-13 | Resolved: 2024-04-24

## 2024-04-24 PROCEDURE — 99214 OFFICE O/P EST MOD 30 MIN: CPT | Performed by: INTERNAL MEDICINE

## 2024-04-24 NOTE — PROGRESS NOTES
Assessment/Plan:     Diagnoses and all orders for this visit:    Chronic pelvic pain in female  Patient is followed up by the gynecology she had multiple surgeries before for endometriosis  Morbid obesity (HCC)  She is trying to lose weight by the diet and it is not working  Chronic bilateral low back pain with bilateral sciatica  -     Ambulatory referral to Spine [facility] Pain Management; Future    Chronic back pain I will send her for pain management she have a facet joint disease and some disc disease       M*Modal software was used to dictate this note.  It may contain errors with dictating incorrect words or incorrect spelling. Please contact the provider directly with any questions.    Subjective:   Chief Complaint   Patient presents with    Follow-up     6 week follow up , patient is asking if she can be swabbed for strep due to her son having it but patient does not have any symptoms   Labs 4/15         Patient ID: Lucie Camacho is a 33 y.o. female.    HPI  33 years young lady who has multiple surgeries for endometriosis recently went to that see the gynecologist and noted again endometriosis she is planning to go for surgery again with gynecologist she continue to complain of the back pain which is managed mostly in the back and sometimes radiating to the buttock but also she is complaining of the pelvic pain she is on narcotic medication she said that her gynecologist will take care of her narcotic medications from now onward.  He had a recent MRI I reviewed the MRI of the back I recommend her to see the pain management doctor for further evaluation and treatment of chronic back pain  I will see her back in about 6-month  Morbid obesity discussed about the diet exercise and weight loss  The following portions of the patient's history were reviewed and updated as appropriate: allergies, current medications, past family history, past medical history, past social history, past surgical history, and problem  list.    Review of Systems   Constitutional:  Positive for fatigue. Negative for chills.   HENT:  Negative for congestion, ear pain, hearing loss, postnasal drip, sinus pressure, sore throat and voice change.    Eyes:  Negative for pain, discharge and visual disturbance.   Respiratory:  Negative for cough, chest tightness and shortness of breath.    Cardiovascular:  Negative for chest pain, palpitations and leg swelling.   Gastrointestinal:  Negative for abdominal pain, blood in stool, diarrhea, nausea and rectal pain.   Genitourinary:  Positive for pelvic pain. Negative for difficulty urinating, dysuria and urgency.   Musculoskeletal:  Positive for back pain. Negative for arthralgias and joint swelling.   Skin:  Negative for rash.   Allergic/Immunologic: Negative for environmental allergies and food allergies.   Neurological:  Negative for dizziness, tremors, weakness, numbness and headaches.   Hematological:  Negative for adenopathy.   Psychiatric/Behavioral:  Negative for behavioral problems and hallucinations.          Past Medical History:   Diagnosis Date    Anxiety     Chicken pox     Depression     Endometriosis     GERD (gastroesophageal reflux disease)     Headache     Occasional     HSV-1 infection     IBS (irritable bowel syndrome)     Kidney stone     Kidney stone on left side     Multiple thyroid nodules     Obesity     Renal calculi          Current Outpatient Medications:     acetaminophen (TYLENOL) 650 mg CR tablet, Take 1 tablet (650 mg total) by mouth every 8 (eight) hours as needed for mild pain, Disp: 30 tablet, Rfl: 0    estrogen, conjugated,-medroxyprogesterone (PREMPRO) 0.625-5 MG per tablet, Take 1 tablet by mouth daily, Disp: 30 tablet, Rfl: 3    methocarbamol (ROBAXIN) 750 mg tablet, Take 1 tablet (750 mg total) by mouth every 6 (six) hours as needed for muscle spasms, Disp: 60 tablet, Rfl: 0    naloxone (NARCAN) 4 mg/0.1 mL nasal spray, Administer 1 spray into a nostril. If no response  after 2-3 minutes, give another dose in the other nostril using a new spray., Disp: 1 each, Rfl: 1    oxyCODONE (Roxicodone) 5 immediate release tablet, Take 1 tablet (5 mg total) by mouth daily as needed for moderate pain Max Daily Amount: 5 mg, Disp: 30 tablet, Rfl: 0    oxyCODONE (Roxicodone) 5 immediate release tablet, Take 1 tablet (5 mg total) by mouth 2 (two) times a day Max Daily Amount: 10 mg, Disp: 60 tablet, Rfl: 0    naproxen (Naprosyn) 500 mg tablet, Take 1 tablet (500 mg total) by mouth every 12 (twelve) hours as needed (pain) for up to 10 days Take with food. Do not start before 2023. (Patient not taking: Reported on 3/11/2024), Disp: 14 tablet, Rfl: 0    ondansetron (ZOFRAN-ODT) 4 mg disintegrating tablet, Take 1 tablet (4 mg total) by mouth every 6 (six) hours as needed for nausea or vomiting for up to 4 days (Patient not taking: Reported on 3/11/2024), Disp: 16 tablet, Rfl: 0    Allergies   Allergen Reactions    Reglan [Metoclopramide] Other (See Comments)     Elevated heartrate    Other Blisters     Dermabond       Social History   Past Surgical History:   Procedure Laterality Date    APPENDECTOMY  2021     SECTION      x2    CYSTOSCOPY N/A 2023    Procedure: CYSTOSCOPY;  Surgeon: Popeye Greene MD;  [facility];  Service: Gynecology Oncology    EXAMINATION UNDER ANESTHESIA N/A 2023    Procedure: EXAM UNDER ANESTHESIA (EUA), I& D vaginal cuff abscess;  Surgeon: Popeye Greene MD;  [facility];  Service: Gynecology Oncology    HYSTERECTOMY      robotic total laparoscopic hysterectomy with BS    LAPAROSCOPIC ENDOMETRIOSIS FULGURATION  2018    laparoscopic excision of endometriosis, fulguration of endometriosis, lysis of adhesions    LAPAROSCOPY  2014    with I&D     MOLE REMOVAL      face - benign     PELVIC LAPAROSCOPY Bilateral 2023    Procedure: SALPINGO-OOPHORECTOMY, LAPAROSCOPIC W/ROBOTICS, RADICAL  "RESECTION PELVIC ENDOMETRIOSIS;  Surgeon: Popeye Greene MD;  [facility];  Service: Gynecology Oncology    PROCTOSCOPY N/A 5/18/2023    Procedure: PROCTOSCOPY;  Surgeon: Popeye Greene MD;  [facility];  Service: Gynecology Oncology    SKIN CANCER EXCISION      abdomen    THYROID CYST EXCISION      TONSILLECTOMY      TUBAL LIGATION  02/15/2016    With lysis of adhesion and peritoneal biopsy    US GUIDED INJECTION FOR RESEARCH STUDY  05/30/2015    VAGINA SURGERY N/A 5/18/2023    Procedure: VAGINECTOMY, PARTIAL;  Surgeon: Popeye Greene MD;  [facility];  Service: Gynecology Oncology    WISDOM TOOTH EXTRACTION       Family History   Problem Relation Age of Onset    Coronary artery disease Mother     Varicose Veins Mother     Other Mother         breast cyst - removed     Cholelithiasis Mother         had cholecystectomy    Alcohol abuse Father     Cholelithiasis Maternal Grandmother         had cholecystectomy    Uterine cancer Paternal Grandmother     Breast cancer Family         Before age 49     Uterine cancer Family     Obesity Family     Hypertension Family     Brain cancer Family     Gallbladder disease Family        Objective:  /72 (BP Location: Left arm, Patient Position: Sitting, Cuff Size: Large)   Pulse 78   Temp 98.2 °F (36.8 °C) (Temporal)   Ht 5' 3\" (1.6 m)   Wt 114 kg (251 lb)   SpO2 99%   BMI 44.46 kg/m²        Physical Exam  Constitutional:       Appearance: She is well-developed.   HENT:      Right Ear: Tympanic membrane and external ear normal.      Left Ear: Tympanic membrane normal.   Eyes:      Conjunctiva/sclera: Conjunctivae normal.      Pupils: Pupils are equal, round, and reactive to light.   Neck:      Thyroid: No thyromegaly.      Vascular: No JVD.   Cardiovascular:      Rate and Rhythm: Normal rate and regular rhythm.      Heart sounds: Normal heart sounds.   Pulmonary:      Breath sounds: Normal breath sounds. "   Abdominal:      General: Bowel sounds are normal.      Palpations: Abdomen is soft.   Musculoskeletal:      Cervical back: Normal range of motion.      Lumbar back: Spasms and tenderness present. No swelling, edema, deformity, signs of trauma, lacerations or bony tenderness. Decreased range of motion. No scoliosis.   Lymphadenopathy:      Cervical: No cervical adenopathy.   Skin:     General: Skin is dry.   Neurological:      General: No focal deficit present.      Mental Status: She is alert and oriented to person, place, and time. Mental status is at baseline.      Deep Tendon Reflexes: Reflexes are normal and symmetric.   Psychiatric:         Mood and Affect: Mood normal.         Behavior: Behavior normal.

## 2024-04-24 NOTE — TELEPHONE ENCOUNTER
Patient Call    Who are you speaking with? Patient    If it is not the patient, are they listed on an active communication consent form? N/A   What is the reason for this call? Patient states she needs Dr. Greene's team to call her pharmacy and inform them that her prescription (oxycodone) was changed because they will not fill the script for her until they get a call.    Does this require a call back? Yes   If a call back is required, please list best call back number [facility]: [phone]   If a call back is required, advise that a message will be forwarded to their care team and someone will return their call as soon as possible.   Did you relay this information to the patient? Yes

## 2024-04-26 ENCOUNTER — TELEPHONE (OUTPATIENT)
Age: 34
End: 2024-04-26

## 2024-04-26 NOTE — TELEPHONE ENCOUNTER
Attempted to contact patient to schedule sooner appointment per message from RUTH Salinas. Advised to speak with someone in office when returning call.

## 2024-05-03 DIAGNOSIS — N80.9 ENDOMETRIOSIS: ICD-10-CM

## 2024-05-03 DIAGNOSIS — R10.2 CHRONIC PELVIC PAIN IN FEMALE: ICD-10-CM

## 2024-05-03 DIAGNOSIS — G89.29 CHRONIC PELVIC PAIN IN FEMALE: ICD-10-CM

## 2024-05-03 DIAGNOSIS — R10.84 GENERALIZED ABDOMINAL PAIN: ICD-10-CM

## 2024-05-04 ENCOUNTER — HOSPITAL ENCOUNTER (OUTPATIENT)
Dept: RADIOLOGY | Facility: HOSPITAL | Age: 34
Discharge: HOME | End: 2024-05-04
Attending: OBSTETRICS & GYNECOLOGY
Payer: COMMERCIAL

## 2024-05-04 DIAGNOSIS — N80.519 ENDOMETRIOSIS OF RECTUM: ICD-10-CM

## 2024-05-04 PROCEDURE — 72197 MRI PELVIS W/O & W/DYE: CPT

## 2024-05-04 PROCEDURE — A9585 GADOBUTROL INJECTION: HCPCS | Performed by: OBSTETRICS & GYNECOLOGY

## 2024-05-04 RX ORDER — GADOBUTROL 604.72 MG/ML
11 INJECTION INTRAVENOUS
Status: COMPLETED | OUTPATIENT
Start: 2024-05-04 | End: 2024-05-04

## 2024-05-04 RX ADMIN — GADOBUTROL 11 ML: 604.72 INJECTION INTRAVENOUS at 12:02

## 2024-05-06 RX ORDER — METHOCARBAMOL 750 MG/1
750 TABLET, FILM COATED ORAL EVERY 6 HOURS PRN
Qty: 60 TABLET | Refills: 0 | Status: SHIPPED | OUTPATIENT
Start: 2024-05-06

## 2024-05-09 ENCOUNTER — TELEPHONE (OUTPATIENT)
Dept: HEMATOLOGY ONCOLOGY | Facility: CLINIC | Age: 34
End: 2024-05-09

## 2024-05-09 DIAGNOSIS — R10.2 CHRONIC PELVIC PAIN IN FEMALE: Primary | ICD-10-CM

## 2024-05-09 DIAGNOSIS — G89.29 CHRONIC PELVIC PAIN IN FEMALE: Primary | ICD-10-CM

## 2024-05-09 RX ORDER — IBUPROFEN 600 MG/1
600 TABLET ORAL EVERY 6 HOURS PRN
Qty: 50 TABLET | Refills: 1 | Status: SHIPPED | OUTPATIENT
Start: 2024-05-09

## 2024-05-09 NOTE — H&P (VIEW-ONLY)
Ongoing worsening rectal pain which is partially relieved with oxycodone.  Robaxin helps her sleep.  She has an appointment scheduled with colorectal surgery for evaluation due to the possibility of ongoing perirectal endometriosis.  Will plan to stop Prempro.  I also put in a prescription for ibuprofen 600 mg every 6 hours.

## 2024-05-09 NOTE — TELEPHONE ENCOUNTER
Patient Call    Who are you speaking with? Patient    If it is not the patient, are they listed on an active communication consent form? N/A   What is the reason for this call? Patient calling to speak with Dr. Greene.  She left him a message 5/7/24 requesting he give her a call regarding her medical condtion.  She is experiencing increased pain.   Does this require a call back? yes   If a call back is required, please list [facility] call back number [phone]   If a call back is required, advise that a message will be forwarded to their care team and someone will return their call as soon as possible.   Did you relay this information to the patient? yes

## 2024-05-17 ENCOUNTER — TELEPHONE (OUTPATIENT)
Dept: HEMATOLOGY ONCOLOGY | Facility: CLINIC | Age: 34
End: 2024-05-17

## 2024-05-17 DIAGNOSIS — G89.29 CHRONIC PELVIC PAIN IN FEMALE: ICD-10-CM

## 2024-05-17 DIAGNOSIS — R10.2 CHRONIC PELVIC PAIN IN FEMALE: ICD-10-CM

## 2024-05-17 DIAGNOSIS — N80.9 ENDOMETRIOSIS: ICD-10-CM

## 2024-05-17 DIAGNOSIS — R10.84 GENERALIZED ABDOMINAL PAIN: ICD-10-CM

## 2024-05-17 RX ORDER — OXYCODONE HYDROCHLORIDE 5 MG/1
5 TABLET ORAL 3 TIMES DAILY PRN
Qty: 60 TABLET | Refills: 0 | Status: SHIPPED | OUTPATIENT
Start: 2024-05-17

## 2024-05-17 RX ORDER — METHOCARBAMOL 750 MG/1
750 TABLET, FILM COATED ORAL EVERY 6 HOURS PRN
Qty: 60 TABLET | Refills: 0 | Status: CANCELLED | OUTPATIENT
Start: 2024-05-17

## 2024-05-17 RX ORDER — OXYCODONE HYDROCHLORIDE 5 MG/1
5 TABLET ORAL 2 TIMES DAILY
Qty: 60 TABLET | Refills: 0 | Status: SHIPPED | OUTPATIENT
Start: 2024-05-17 | End: 2024-05-17

## 2024-05-17 NOTE — TELEPHONE ENCOUNTER
Patient Call    Who are you speaking with? Patient    If it is not the patient, are they listed on an active communication consent form? N/A   What is the reason for this call? Patient is calling requesting that the office call the pharmacy to let them know that she is taking her Oxycodone TID since 5/15/24.    [id number] - [address] [address]  Phone: [phone]     Does this require a call back? No   If a call back is required, please list best call back number na   If a call back is required, advise that a message will be forwarded to their care team and someone will return their call as soon as possible.   Did you relay this information to the patient? N/A     
The script sent today reflects the TID dosing. [employer] message sent to patient.  
1 person assist

## 2024-05-17 NOTE — TELEPHONE ENCOUNTER
Please advise if ok to continue to fill Methocarbamol continuously.   This was just filled 7 days ago.     If so, are you able to adjust the quantity for 30 days supply?

## 2024-05-20 DIAGNOSIS — R10.84 GENERALIZED ABDOMINAL PAIN: ICD-10-CM

## 2024-05-20 DIAGNOSIS — N80.9 ENDOMETRIOSIS: ICD-10-CM

## 2024-05-20 DIAGNOSIS — G89.29 CHRONIC PELVIC PAIN IN FEMALE: ICD-10-CM

## 2024-05-20 DIAGNOSIS — R10.2 CHRONIC PELVIC PAIN IN FEMALE: ICD-10-CM

## 2024-05-20 RX ORDER — METHOCARBAMOL 750 MG/1
750 TABLET, FILM COATED ORAL EVERY 6 HOURS PRN
Qty: 60 TABLET | Refills: 0 | Status: CANCELLED | OUTPATIENT
Start: 2024-05-20

## 2024-05-20 NOTE — PROGRESS NOTES
Colon and Rectal Surgery   Lucie Camacho 33 y.o. female MRN: [id number]   Encounter: [id number]  05/22/24   10:21 AM        ASSESSMENT:    Lucie is a 33-year-old female,Multiple endometriosis surgeries, approximately 6 by her description/discussion.  I have seen her for colonoscopy 2 years prior to one of her gynOnc operations, she continues with ongoing Pelvic pain requiring narcotics.    We reviewed MRI imaging showing implant in the upper rectum, personally reviewed the imaging.  I did discuss with her that we did not see any transmural lesion at the colonoscopy, would repeat a sigmoidoscopy to evaluate for full-thickness endometrial disease.  She does understand that there may be additional disease below the resolution of imaging, she also understands that a resection of this area may not improve her pain and may even worsen it given the multiple operations.  I will discuss with Dr. Greene indications for operation as needed as well.    In the meantime we will schedule sigmoidoscopy, we had a surgical discussion in case of decision this way,We discussed robotic low anterior resection, with partial proctectomy and anastomosis versus disc excision and repair, likely with Dr. Greene on a day that he is available given the concern for ongoing endometriosis that is below the resolution of imaging.    PLAN:  Sigmoidoscopy will be scheduled upcoming  CC:  MD Dr. Ramona Crenshaw      HPI  Lucie Camacho is a 33 y.o. female with a history of endometriosis referred by Dr. Popeye Greene for surgical consultation.     Per 4/23/24 office visit note from Dr. Greene; the patient has severe pelvic endometriosis. She is status post multiple prior medical therapies, laparoscopies recently status post radical resection of pelvic endometriosis including bilateral oophorectomy, vaginectomy in May 2023. That surgery was complicated by postoperative vaginal apex abscess. Since the operation, she has had ongoing  pelvic pain requiring narcotic therapy. She has seen pain management and tried local injection.    She states that she has a bowel movement daily with soft and formed stool. She states that she has lower abdominal pain throughout the day. When she has a bowel movement she has rectal pain. Sometimes she has rectal bleeding with bowel movements.     Last CT abdomen pelvis with contrast 7/13/23  Impression:  Hysterectomy with resolution of the previously noted collection in the vaginal cuff region/prerectal soft tissues.  Under distention urinary bladder with questionable wall thickening. Consider correlation with urinalysis to exclude cystitis.    Last US transvaginal 4/16/24  Summary of sonographic findings:  Bowel endometriosis   Pelvica adhesive disease     Last MRI of the pelvis with and without contrast 5/4/24   IMPRESSION:  Asymmetric wall thickening in the anterior wall of high rectum measuring measuring 1.4 x 2.8 cm, located approximately 10-11 cm from the anal verge near the area of the vaginal cuff. This is nonspecific, but likely corresponds to the endometriotic implant described on recent ultrasound. There is associated small linear scar extending from the rectum to the vaginal cuff.  Status post hysterectomy and bilateral oophorectomy.   No suspicious pelvic mass.    Last colonoscopy was performed on 04/24/2023 with a 3 year recall. The procedure revealed a 4-6 mm polyp in the hepatic flexure, and otherwise normal mucosa in the whole colon with no obvious mucosal lesions to suggest transmural disease from endometriosis.   Diagnosis:   Hepatic flexure polypectomy:  - Fragments of tubular adenoma and benign colonic mucosa.  - No evidence of high grade dysplasia or malignancy.    Last CBC 4/15/24 was within normal limits except for MPV = 7.0(L), ALC = 0.6(L).  Last CMP 4/15/24 was within normal limits except for GLUC = 100(H), TBILI = 1.2 (H).      Historical Information   Past Medical History:   Diagnosis  Date    Anxiety     Chicken pox     Depression     Endometriosis     GERD (gastroesophageal reflux disease)     Headache     Occasional     HSV-1 infection     IBS (irritable bowel syndrome)     Kidney stone     Kidney stone on left side     Multiple thyroid nodules     Obesity     Renal calculi      Past Surgical History:   Procedure Laterality Date    APPENDECTOMY  2021     SECTION      x2    CYSTOSCOPY N/A 2023    Procedure: CYSTOSCOPY;  Surgeon: Popeye Greene MD;  [facility];  Service: Gynecology Oncology    EXAMINATION UNDER ANESTHESIA N/A 2023    Procedure: EXAM UNDER ANESTHESIA (EUA), I& D vaginal cuff abscess;  Surgeon: Popeye Greene MD;  [facility];  Service: Gynecology Oncology    HYSTERECTOMY      robotic total laparoscopic hysterectomy with BS    LAPAROSCOPIC ENDOMETRIOSIS FULGURATION  2018    laparoscopic excision of endometriosis, fulguration of endometriosis, lysis of adhesions    LAPAROSCOPY  2014    with I&D     MOLE REMOVAL      face - benign     PELVIC LAPAROSCOPY Bilateral 2023    Procedure: SALPINGO-OOPHORECTOMY, LAPAROSCOPIC W/ROBOTICS, RADICAL RESECTION PELVIC ENDOMETRIOSIS;  Surgeon: Popeye Greene MD;  [facility];  Service: Gynecology Oncology    PROCTOSCOPY N/A 2023    Procedure: PROCTOSCOPY;  Surgeon: Popeye Greene MD;  [facility];  Service: Gynecology Oncology    SKIN CANCER EXCISION      abdomen    THYROID CYST EXCISION      TONSILLECTOMY      TUBAL LIGATION  02/15/2016    With lysis of adhesion and peritoneal biopsy    US GUIDED INJECTION FOR RESEARCH STUDY  2015    VAGINA SURGERY N/A 2023    Procedure: VAGINECTOMY, PARTIAL;  Surgeon: Popeye Greene MD;  [facility];  Service: Gynecology Oncology    WISDOM TOOTH EXTRACTION         Meds/Allergies       Current Outpatient Medications:     acetaminophen (TYLENOL) 650 mg CR tablet,  Take 1 tablet (650 mg total) by mouth every 8 (eight) hours as needed for mild pain, Disp: 30 tablet, Rfl: 0    estrogen, conjugated,-medroxyprogesterone (PREMPRO) 0.625-5 MG per tablet, Take 1 tablet by mouth daily, Disp: 30 tablet, Rfl: 3    naloxone (NARCAN) 4 mg/0.1 mL nasal spray, Administer 1 spray into a nostril. If no response after 2-3 minutes, give another dose in the other nostril using a new spray., Disp: 1 each, Rfl: 1    oxyCODONE (Roxicodone) 5 immediate release tablet, Take 1 tablet (5 mg total) by mouth 3 (three) times a day as needed for moderate pain Max Daily Amount: 15 mg, Disp: 60 tablet, Rfl: 0    methocarbamol (ROBAXIN) 750 mg tablet, Take 1 tablet (750 mg total) by mouth every 6 (six) hours as needed for muscle spasms, Disp: 60 tablet, Rfl: 0      Allergies   Allergen Reactions    Reglan [Metoclopramide] Other (See Comments)     Elevated heartrate    Other Blisters     Dermabond         Social History   Social History     Substance and Sexual Activity   Alcohol Use Not Currently    Comment: rarely     Social History     Substance and Sexual Activity   Drug Use No     Social History     Tobacco Use   Smoking Status Never   Smokeless Tobacco Never         Family History:   Family History   Problem Relation Age of Onset    Coronary artery disease Mother     Varicose Veins Mother     Other Mother         breast cyst - removed     Cholelithiasis Mother         had cholecystectomy    Alcohol abuse Father     Cholelithiasis Maternal Grandmother         had cholecystectomy    Uterine cancer Paternal Grandmother     Breast cancer Family         Before age 49     Uterine cancer Family     Obesity Family     Hypertension Family     Brain cancer Family     Gallbladder disease Family        Review of Systems    Objective     Current Vitals:   Vitals:    05/21/24 0811   BP: 120/78   Weight: 114 kg (252 lb)         Physical Exam:  General:no distress  Eyes:perrla/eomi  Pulm:no increased work of breathing  clear bilateral  CV:sinus  Abdomen:soft,nontender  Rectal:deferred to sigmoidoscopy  Extremities:no edema

## 2024-05-20 NOTE — TELEPHONE ENCOUNTER
Reason for call:   [x] Refill   [] Prior Auth  [] Other:     Office:   [x] PCP/Provider - Nely Brothers  [] Specialty/Provider -     Medication: Methocarbamol    Dose/Frequency: 750 mg Q 6 HRS PRN    Quantity: 60    Pharmacy: [facility]    Does the patient have enough for 3 days?   [x] Yes   [] No - Send as HP to POD

## 2024-05-21 ENCOUNTER — PREP FOR PROCEDURE (OUTPATIENT)
Age: 34
End: 2024-05-21

## 2024-05-21 ENCOUNTER — TELEPHONE (OUTPATIENT)
Age: 34
End: 2024-05-21

## 2024-05-21 ENCOUNTER — OFFICE VISIT (OUTPATIENT)
Age: 34
End: 2024-05-21
Payer: COMMERCIAL

## 2024-05-21 VITALS — BODY MASS INDEX: 44.64 KG/M2 | DIASTOLIC BLOOD PRESSURE: 78 MMHG | WEIGHT: 252 LBS | SYSTOLIC BLOOD PRESSURE: 120 MMHG

## 2024-05-21 DIAGNOSIS — N80.519 ENDOMETRIOSIS OF RECTUM: Primary | ICD-10-CM

## 2024-05-21 DIAGNOSIS — N80.519 ENDOMETRIOSIS OF RECTUM: ICD-10-CM

## 2024-05-21 PROCEDURE — 99215 OFFICE O/P EST HI 40 MIN: CPT | Performed by: COLON & RECTAL SURGERY

## 2024-05-21 NOTE — TELEPHONE ENCOUNTER
DE OV 5/21 endometriosis of rectum       Scheduled DE 5/22 [facility]; Flexible sigmoidoscopy   Handed PW to patient

## 2024-05-21 NOTE — LETTER
Lucie Camacho  [address] [facility] [address]        FLEXIBLE SIGMOIDOSCOPY WITH SEDATION INSTRUCTIONS  PLEASE NOTE…  AS OF JUNE 1, 2014, OUR OFFICE REQUIRES 48 HOURS NOTICE OF CANCELLATION/RESCHEDULE OF A PROCEDURE TO AVOID INCURRING A MISSED APPOINTMENT FEE.  Your Sigmoidoscopy Procedure has been scheduled at:  [facility].    The Date and Time of your Procedure is: May 22, 2024   Patient is to have nothing to eat or drink after midnight.    Also in preparation for this procedure, take 2 Fleet Enemas 2 hours prior to the appointment.  These enemas can be purchased over the counter in most pharmacies.  Follow the directions on the box.       You are to report to the GI Lab ([facility] 1st Floor) ONE (1) HOUR PRIOR to your procedure.    Special instructions may be needed if you are taking aspirin or any aspirin-containing medication.  Check with your family physician.      Check with your family doctor if you are taking Coumadin (a blood thinner), Plavix, Gingko biloba, Ginseng, Feverfew, and/or Deborah's Wort.  We suggest stopping these for 3 days.      If you are on DIABETIC MEDICATION (tablets or insulin) your doctor may make changes in your preparation.    Take all medications usual unless otherwise instructed.    As always, if you have any questions or concerns, feel free to call the office.  Our  staff will be happy to connect you with one of the nurses to answer any questions or address any concerns you have regarding your procedure.      Arrangements must be made for a responsible party to drive you home from the procedure.  If you do not have a responsible family member or friend to drive you home, the procedure will not be done.  Vast or a taxi is not acceptable.  It is important you notify our office of any insurance changes prior to your procedure and, if necessary, supply us with referrals from your primary care  physician.

## 2024-05-21 NOTE — TELEPHONE ENCOUNTER
Pt called asking for a refill on her Methocarbamol. Pt states she started taking the medication Q6H as it is written per Dr. Greene. Pt tried to refill medication and was unable to.     Please review medication and approve refill. Pt is asking for 120 quantity d/t taking it every 6 hours now and 60 tabs only last 15 days and its an issue with refilling. Pt will run out of medication today    Please f/u with pt

## 2024-05-22 ENCOUNTER — HOSPITAL ENCOUNTER (OUTPATIENT)
Dept: GASTROENTEROLOGY | Facility: HOSPITAL | Age: 34
Setting detail: OUTPATIENT SURGERY
Discharge: HOME | End: 2024-05-22
Attending: COLON & RECTAL SURGERY
Payer: COMMERCIAL

## 2024-05-22 ENCOUNTER — TELEPHONE (OUTPATIENT)
Dept: INTERNAL MEDICINE CLINIC | Age: 34
End: 2024-05-22

## 2024-05-22 ENCOUNTER — ANESTHESIA (OUTPATIENT)
Dept: GASTROENTEROLOGY | Facility: HOSPITAL | Age: 34
End: 2024-05-22

## 2024-05-22 ENCOUNTER — ANESTHESIA EVENT (OUTPATIENT)
Dept: GASTROENTEROLOGY | Facility: HOSPITAL | Age: 34
End: 2024-05-22

## 2024-05-22 VITALS
RESPIRATION RATE: 16 BRPM | HEART RATE: 54 BPM | OXYGEN SATURATION: 94 % | DIASTOLIC BLOOD PRESSURE: 65 MMHG | TEMPERATURE: 96.5 F | SYSTOLIC BLOOD PRESSURE: 103 MMHG

## 2024-05-22 DIAGNOSIS — R10.2 CHRONIC PELVIC PAIN IN FEMALE: ICD-10-CM

## 2024-05-22 DIAGNOSIS — R10.84 GENERALIZED ABDOMINAL PAIN: ICD-10-CM

## 2024-05-22 DIAGNOSIS — N80.519 ENDOMETRIOSIS OF RECTUM: ICD-10-CM

## 2024-05-22 DIAGNOSIS — G89.29 CHRONIC PELVIC PAIN IN FEMALE: ICD-10-CM

## 2024-05-22 DIAGNOSIS — N80.9 ENDOMETRIOSIS: ICD-10-CM

## 2024-05-22 PROCEDURE — 45330 DIAGNOSTIC SIGMOIDOSCOPY: CPT | Performed by: COLON & RECTAL SURGERY

## 2024-05-22 RX ORDER — METHOCARBAMOL 750 MG/1
750 TABLET, FILM COATED ORAL EVERY 6 HOURS PRN
Qty: 60 TABLET | Refills: 0 | Status: SHIPPED | OUTPATIENT
Start: 2024-05-22 | End: 2024-05-23 | Stop reason: SDUPTHER

## 2024-05-22 RX ORDER — PROPOFOL 10 MG/ML
INJECTION, EMULSION INTRAVENOUS CONTINUOUS PRN
Status: DISCONTINUED | OUTPATIENT
Start: 2024-05-22 | End: 2024-05-22

## 2024-05-22 RX ORDER — PROPOFOL 10 MG/ML
INJECTION, EMULSION INTRAVENOUS AS NEEDED
Status: DISCONTINUED | OUTPATIENT
Start: 2024-05-22 | End: 2024-05-22

## 2024-05-22 RX ORDER — LIDOCAINE HYDROCHLORIDE 10 MG/ML
INJECTION, SOLUTION EPIDURAL; INFILTRATION; INTRACAUDAL; PERINEURAL AS NEEDED
Status: DISCONTINUED | OUTPATIENT
Start: 2024-05-22 | End: 2024-05-22

## 2024-05-22 RX ORDER — SODIUM CHLORIDE 9 MG/ML
INJECTION, SOLUTION INTRAVENOUS CONTINUOUS PRN
Status: DISCONTINUED | OUTPATIENT
Start: 2024-05-22 | End: 2024-05-22

## 2024-05-22 RX ADMIN — LIDOCAINE HYDROCHLORIDE 50 MG: 10 INJECTION, SOLUTION EPIDURAL; INFILTRATION; INTRACAUDAL; PERINEURAL at 10:24

## 2024-05-22 RX ADMIN — PROPOFOL 140 MG: 10 INJECTION, EMULSION INTRAVENOUS at 10:24

## 2024-05-22 RX ADMIN — PROPOFOL 140 MCG/KG/MIN: 10 INJECTION, EMULSION INTRAVENOUS at 10:24

## 2024-05-22 RX ADMIN — SODIUM CHLORIDE: 0.9 INJECTION, SOLUTION INTRAVENOUS at 10:23

## 2024-05-22 NOTE — PATIENT INSTRUCTIONS
ASSESSMENT:    Lucie is a 33-year-old female,Multiple endometriosis surgeries, approximately 6 by her description/discussion.  I have seen her for colonoscopy 2 years prior to one of her gynOnc operations, she continues with ongoing Pelvic pain requiring narcotics.    We reviewed MRI imaging showing implant in the upper rectum, personally reviewed the imaging.  I did discuss with her that we did not see any transmural lesion at the colonoscopy, would repeat a sigmoidoscopy to evaluate for full-thickness endometrial disease.  She does understand that there may be additional disease below the resolution of imaging, she also understands that a resection of this area may not improve her pain and may even worsen it given the multiple operations.  I will discuss with Dr. Greene indications for operation as needed as well.    In the meantime we will schedule sigmoidoscopy, we had a surgical discussion in case of decision this way,We discussed robotic low anterior resection, with partial proctectomy and anastomosis versus disc excision and repair, likely with Dr. Greene on a day that he is available given the concern for ongoing endometriosis that is below the resolution of imaging.    PLAN:  Sigmoidoscopy will be scheduled upcoming  CC:  MD Dr. Ramona Crenshaw

## 2024-05-22 NOTE — ANESTHESIA POSTPROCEDURE EVALUATION
Post-Op Assessment Note    CV Status:  Stable  Pain Score: 0    Pain management: adequate       Mental Status:  Alert and awake   Hydration Status:  Euvolemic   PONV Controlled:  Controlled   Airway Patency:  Patent     Post Op Vitals Reviewed: Yes    No anethesia notable event occurred.    Staff: CRNA               BP   100/67   Temp 96.5   Pulse 76   Resp 16   SpO2 96%

## 2024-05-22 NOTE — ANESTHESIA PREPROCEDURE EVALUATION
Procedure:  FLEXIBLE SIGMOIDOSCOPY    Relevant Problems   MUSCULOSKELETAL   (+) Chronic bilateral low back pain with bilateral sciatica   (+) Sacroiliitis (HCC)      NEURO/PSYCH   (+) Anxiety   (+) Anxiety and depression   (+) Chronic bilateral low back pain with bilateral sciatica   (+) Chronic pelvic pain in female      Obstetrics/Gynecology   (+) Endometriosis of rectum      Other   (+) Morbid obesity (HCC)        Physical Exam    Airway    Mallampati score: III  TM Distance: >3 FB  Neck ROM: full     Dental        Cardiovascular  Cardiovascular exam normal    Pulmonary  Pulmonary exam normal     Other Findings  post-pubertal.      Anesthesia Plan  ASA Score- 3     Anesthesia Type- IV sedation with anesthesia with ASA Monitors.         Additional Monitors:     Airway Plan:            Plan Factors-Exercise tolerance (METS): >4 METS.    Chart reviewed.   Existing labs reviewed. Patient summary reviewed.    Patient is not a current smoker.              Induction- intravenous.    Postoperative Plan-     Perioperative Resuscitation Plan - Level 1 - Full Code.       Informed Consent- Anesthetic plan and risks discussed with patient.  I personally reviewed this patient with the CRNA. Discussed and agreed on the Anesthesia Plan with the CRNA..

## 2024-05-22 NOTE — INTERVAL H&P NOTE
Seen in office this week, rectal implant/endometriosis, for sigmoidoscopy today.  H&P reviewed. After examining the patient I find no changes in the patients condition since the H&P had been written.    Vitals:    05/22/24 1002   BP: 114/80   Pulse: 63   Resp: 16   Temp: 98.2 °F (36.8 °C)   SpO2: 98%

## 2024-05-23 DIAGNOSIS — R10.2 CHRONIC PELVIC PAIN IN FEMALE: ICD-10-CM

## 2024-05-23 DIAGNOSIS — G89.29 CHRONIC PELVIC PAIN IN FEMALE: ICD-10-CM

## 2024-05-23 DIAGNOSIS — N80.9 ENDOMETRIOSIS: ICD-10-CM

## 2024-05-23 DIAGNOSIS — R10.84 GENERALIZED ABDOMINAL PAIN: ICD-10-CM

## 2024-05-23 RX ORDER — METHOCARBAMOL 750 MG/1
750 TABLET, FILM COATED ORAL EVERY 6 HOURS PRN
Qty: 120 TABLET | Refills: 1 | Status: SHIPPED | OUTPATIENT
Start: 2024-05-23

## 2024-05-27 DIAGNOSIS — R10.2 CHRONIC PELVIC PAIN IN FEMALE: ICD-10-CM

## 2024-05-27 DIAGNOSIS — G89.29 CHRONIC PELVIC PAIN IN FEMALE: ICD-10-CM

## 2024-05-28 RX ORDER — ESTROGEN,CON/M-PROGEST ACET 0.625-5 MG
1 TABLET ORAL DAILY
Qty: 28 TABLET | Refills: 4 | Status: SHIPPED | OUTPATIENT
Start: 2024-05-28

## 2024-05-29 ENCOUNTER — TELEPHONE (OUTPATIENT)
Dept: OTHER | Facility: HOSPITAL | Age: 34
End: 2024-05-29

## 2024-05-29 NOTE — TELEPHONE ENCOUNTER
I discussed with Lucie by phone today, we did review her normal sigmoidoscopy, and I had discussions with Dr. Greene that while she does appear to have endometrial infiltration of the rectum at least on imaging, though this is not full-thickness on endoscopic views that she may ultimately require surgery for this, however with ongoing pelvic pain and bowel troubles that this may not be an isolated only reason for her symptoms and certainly an additional operation may only cause her further pelvic pain as well as the surgical risks associated, she will continue with follow-up with him for the time being, knows to continue with hydration and high-fiber diet, MiraLAX as needed to continue with daily formed bowel movements which she did say that she is having at this point which is new for her, and she understands the conversation as reviewed.

## 2024-06-04 ENCOUNTER — TELEPHONE (OUTPATIENT)
Dept: HEMATOLOGY ONCOLOGY | Facility: CLINIC | Age: 34
End: 2024-06-04

## 2024-06-04 DIAGNOSIS — R10.2 CHRONIC PELVIC PAIN IN FEMALE: ICD-10-CM

## 2024-06-04 DIAGNOSIS — G89.29 CHRONIC PELVIC PAIN IN FEMALE: ICD-10-CM

## 2024-06-04 DIAGNOSIS — N80.9 ENDOMETRIOSIS: ICD-10-CM

## 2024-06-04 DIAGNOSIS — N80.519 ENDOMETRIOSIS OF RECTUM: Primary | ICD-10-CM

## 2024-06-04 RX ORDER — OXYCODONE HYDROCHLORIDE 5 MG/1
5 TABLET ORAL 3 TIMES DAILY PRN
Qty: 60 TABLET | Refills: 0 | Status: SHIPPED | OUTPATIENT
Start: 2024-06-04

## 2024-06-04 NOTE — TELEPHONE ENCOUNTER
Medication Refill Request   Who are you speaking with? Patient   If it is not the patient, are they listed on an active communication consent form?     N/A   Which medication is being requested for refill?  Please list medication name and dosage oxyCODONE (Roxicodone) 5 immediate release tablet    How many pills does the patient have left?  2   Preferred Pharmacy / Address  [id number] [address]    Who is the prescribing provider? JACKY Lee   Call back number  [phone]   Relevant Information

## 2024-06-04 NOTE — TELEPHONE ENCOUNTER
Patient Call    Who are you speaking with? Patient    If it is not the patient, are they listed on an active communication consent form? N/A   What is the reason for this call? Patient calling to speak with Dr. Greene.  She states he told her to call him, she's not sure of reason   Does this require a call back? yes   If a call back is required, please list best call back number [phone]   If a call back is required, advise that a message will be forwarded to their care team and someone will return their call as soon as possible.   Did you relay this information to the patient? yes

## 2024-06-21 DIAGNOSIS — N80.9 ENDOMETRIOSIS: ICD-10-CM

## 2024-06-21 DIAGNOSIS — G89.29 CHRONIC PELVIC PAIN IN FEMALE: ICD-10-CM

## 2024-06-21 DIAGNOSIS — R10.2 CHRONIC PELVIC PAIN IN FEMALE: ICD-10-CM

## 2024-06-21 RX ORDER — OXYCODONE HYDROCHLORIDE 5 MG/1
5 TABLET ORAL 3 TIMES DAILY PRN
Qty: 60 TABLET | Refills: 0 | Status: SHIPPED | OUTPATIENT
Start: 2024-06-21

## 2024-07-01 ENCOUNTER — TELEPHONE (OUTPATIENT)
Age: 34
End: 2024-07-01

## 2024-07-01 ENCOUNTER — PATIENT MESSAGE (OUTPATIENT)
Dept: GYNECOLOGIC ONCOLOGY | Facility: CLINIC | Age: 34
End: 2024-07-01

## 2024-07-01 NOTE — TELEPHONE ENCOUNTER
Patient calling to report that she accidentally took two 5mg (10mg) oxycodone this afternoon at one time. As a result, she reports no pain. Prescription is written for 5mg oxy TID. Patient reports taking two oxy results in not having any pain but by taking oxy as prescribed she continues to have 6-7/10 pain. Pain most intense when having a bowel movement or with gas. Patient now wondering if she can take 10mg at a time and for prescription to be changed. Please advise.

## 2024-07-03 ENCOUNTER — TELEPHONE (OUTPATIENT)
Age: 34
End: 2024-07-03

## 2024-07-08 ENCOUNTER — TELEPHONE (OUTPATIENT)
Dept: GYNECOLOGIC ONCOLOGY | Facility: CLINIC | Age: 34
End: 2024-07-08

## 2024-07-08 ENCOUNTER — TELEPHONE (OUTPATIENT)
Age: 34
End: 2024-07-08

## 2024-07-08 DIAGNOSIS — R10.2 CHRONIC PELVIC PAIN IN FEMALE: ICD-10-CM

## 2024-07-08 DIAGNOSIS — N80.9 ENDOMETRIOSIS: ICD-10-CM

## 2024-07-08 DIAGNOSIS — G89.29 CHRONIC PELVIC PAIN IN FEMALE: ICD-10-CM

## 2024-07-08 RX ORDER — OXYCODONE HYDROCHLORIDE 5 MG/1
TABLET ORAL
Qty: 24 TABLET | Refills: 0 | Status: SHIPPED | OUTPATIENT
Start: 2024-07-08 | End: 2024-07-15

## 2024-07-08 RX ORDER — OXYCODONE HYDROCHLORIDE 5 MG/1
TABLET ORAL
Qty: 24 TABLET | Refills: 0 | Status: SHIPPED | OUTPATIENT
Start: 2024-07-08 | End: 2024-07-08 | Stop reason: SDUPTHER

## 2024-07-08 NOTE — TELEPHONE ENCOUNTER
Pt called in with concerns regarding her oxyCODONE (Roxicodone) 5 immediate release tablet medication. She stated that she doesn't have enough until the next refill. Also stated that there is some sort of miscommunication between the pharmacy and the office that she wants to clear up so that she can get her medication. Please reach out to Lucie for clarification at [phone].

## 2024-07-08 NOTE — TELEPHONE ENCOUNTER
Patient is returning call in regards to her medication refill. She got a message stating her pharmacy will have to order it and not be in stock until Wednesday. She states she has enough for 1 dose tomorrow and will be completley out. She would like to see if she can switch it to a different pharmacy. She said she has transport today if it can be sent to this pharmacy and  today so she doesn't run out       [facility]  [address] [facility] [address]

## 2024-07-08 NOTE — TELEPHONE ENCOUNTER
I left a message informing the patient that her medication refill was sent to [facility] at [address]  in [place].

## 2024-07-08 NOTE — TELEPHONE ENCOUNTER
Returned patient's call with concern regarding filling her newly changed oxycodone script. Patient called [facility] in [place] and they told her that someone from the office would need to call to clarify the change in script. Called [facility] to inquire. They need to override the script from one tab to two tabs BID. Pharmacist was able to successfully override. Script will be ready in 30 minutes. Called patient back. She is appreciative. No other questions or concerns.

## 2024-07-08 NOTE — PROGRESS NOTES
DORIE MEDINA reviewed. Last narcotic fill was 6/22/24. Patient has been in communication with Dr. Greene regarding dosing. She notes 6 tablets remaining and is taking 2 tablets BID. Will provide 24 tablets until Dr. Greene's return to the office.

## 2024-07-15 ENCOUNTER — TELEPHONE (OUTPATIENT)
Age: 34
End: 2024-07-15

## 2024-07-15 DIAGNOSIS — G89.29 CHRONIC PELVIC PAIN IN FEMALE: Primary | ICD-10-CM

## 2024-07-15 DIAGNOSIS — R10.2 CHRONIC PELVIC PAIN IN FEMALE: Primary | ICD-10-CM

## 2024-07-15 DIAGNOSIS — N80.9 ENDOMETRIOSIS: ICD-10-CM

## 2024-07-15 RX ORDER — OXYCODONE HYDROCHLORIDE 10 MG/1
10 TABLET ORAL 2 TIMES DAILY PRN
Qty: 60 TABLET | Refills: 0 | Status: SHIPPED | OUTPATIENT
Start: 2024-07-15

## 2024-07-15 NOTE — TELEPHONE ENCOUNTER
Pt called stating she has 2 pills oxycodone 5mg left,is in need of a refill. Has F/U appt sched with Dr Greene 7/23/24 in the [facility] office.Pt uses [id number] - [place], [place] - [address] [place] [address]    RX needed for oxycodone 5mg Take 2 po BID prn,pt stating she is taking 2 tablet twice daily every day.

## 2024-07-15 NOTE — TELEPHONE ENCOUNTER
Patient calling from [phone]     HIPAA verified.    Question about : oxyCODONE (Roxicodone) 5 immediate release tablet     How long was this to have lasted in relation to her next appmt on 7/23?

## 2024-07-15 NOTE — TELEPHONE ENCOUNTER
Patient called in and wanted to verify reason for receiving medication for Oxycodone which was enough to last until today. Patient has an appointment on 07/23/24 at 11:00 am with Dr. Greene. Patient states will be out of medication today. Would like to know what to do until appointment or if refill can be sent [facility] [place]. Please call when done at [phone]

## 2024-07-18 NOTE — TELEPHONE ENCOUNTER
This writer is covering Oral Chemo and Retail drug auths. Patients oxycodone 10mg was pending and needed auth. This writer called [facility] benefit to start auth however, decided to hangup and call [facility] pharmacy to check if filled and picked up prior to completing auth. This writer was informed by the pharmacist that patient picked the medication up but it went to [facility] for $11 and didn't go through insurance.

## 2024-07-25 DIAGNOSIS — R10.84 GENERALIZED ABDOMINAL PAIN: ICD-10-CM

## 2024-07-25 DIAGNOSIS — R10.2 CHRONIC PELVIC PAIN IN FEMALE: ICD-10-CM

## 2024-07-25 DIAGNOSIS — G89.29 CHRONIC PELVIC PAIN IN FEMALE: ICD-10-CM

## 2024-07-25 DIAGNOSIS — N80.9 ENDOMETRIOSIS: ICD-10-CM

## 2024-07-25 RX ORDER — METHOCARBAMOL 750 MG/1
750 TABLET, FILM COATED ORAL EVERY 6 HOURS PRN
Qty: 120 TABLET | Refills: 0 | Status: SHIPPED | OUTPATIENT
Start: 2024-07-25

## 2024-08-09 DIAGNOSIS — G89.29 CHRONIC PELVIC PAIN IN FEMALE: ICD-10-CM

## 2024-08-09 DIAGNOSIS — R10.2 CHRONIC PELVIC PAIN IN FEMALE: ICD-10-CM

## 2024-08-09 DIAGNOSIS — N80.9 ENDOMETRIOSIS: ICD-10-CM

## 2024-08-09 RX ORDER — OXYCODONE HYDROCHLORIDE 10 MG/1
10 TABLET ORAL 2 TIMES DAILY PRN
Qty: 60 TABLET | Refills: 0 | Status: SHIPPED | OUTPATIENT
Start: 2024-08-09

## 2024-08-17 DIAGNOSIS — N80.9 ENDOMETRIOSIS: ICD-10-CM

## 2024-08-17 DIAGNOSIS — R10.2 CHRONIC PELVIC PAIN IN FEMALE: ICD-10-CM

## 2024-08-17 DIAGNOSIS — G89.29 CHRONIC PELVIC PAIN IN FEMALE: ICD-10-CM

## 2024-08-17 DIAGNOSIS — R10.84 GENERALIZED ABDOMINAL PAIN: ICD-10-CM

## 2024-08-19 RX ORDER — METHOCARBAMOL 750 MG/1
750 TABLET, FILM COATED ORAL EVERY 6 HOURS PRN
Qty: 120 TABLET | Refills: 0 | Status: SHIPPED | OUTPATIENT
Start: 2024-08-19

## 2024-09-03 ENCOUNTER — TELEPHONE (OUTPATIENT)
Dept: GYNECOLOGIC ONCOLOGY | Facility: CLINIC | Age: 34
End: 2024-09-03

## 2024-09-03 NOTE — TELEPHONE ENCOUNTER
Phoned patient - LMM to call office to schedule a pre-op appointment with Dr. Greene.  Call back number provided [phone].

## 2024-09-03 NOTE — TELEPHONE ENCOUNTER
Patient returned my call - appointment with Dr. Greene (pre-op) 9/20/2024.  Patient call transferred to [facility] Dr. King's office for a pre-op appointment for the joint with Dr. Greene.

## 2024-09-09 DIAGNOSIS — N80.9 ENDOMETRIOSIS: ICD-10-CM

## 2024-09-09 DIAGNOSIS — R10.2 CHRONIC PELVIC PAIN IN FEMALE: ICD-10-CM

## 2024-09-09 DIAGNOSIS — G89.29 CHRONIC PELVIC PAIN IN FEMALE: ICD-10-CM

## 2024-09-09 NOTE — TELEPHONE ENCOUNTER
Medication: oxycodone 10mg  PDMP   08/12/2024 08/09/2024 oxyCODONE HCL IR 10 MG TAB (Tablet) 60.0 30 10 MG 30.0 NADIRA ROSE   07/15/2024 07/15/2024 oxyCODONE HCL IR 10 MG TAB (Tablet) 60.0 30 10 MG 30.0 NADIRA ROSE    Refill must be reviewed and completed by the office or provider. The refill is unable to be approved or denied by the medication management team.

## 2024-09-10 RX ORDER — OXYCODONE HYDROCHLORIDE 10 MG/1
10 TABLET ORAL 2 TIMES DAILY PRN
Qty: 60 TABLET | Refills: 0 | Status: SHIPPED | OUTPATIENT
Start: 2024-09-10

## 2024-09-17 DIAGNOSIS — R10.2 CHRONIC PELVIC PAIN IN FEMALE: ICD-10-CM

## 2024-09-17 DIAGNOSIS — N80.9 ENDOMETRIOSIS: ICD-10-CM

## 2024-09-17 DIAGNOSIS — R10.84 GENERALIZED ABDOMINAL PAIN: ICD-10-CM

## 2024-09-17 DIAGNOSIS — G89.29 CHRONIC PELVIC PAIN IN FEMALE: ICD-10-CM

## 2024-09-17 RX ORDER — METHOCARBAMOL 750 MG/1
750 TABLET, FILM COATED ORAL EVERY 6 HOURS PRN
Qty: 120 TABLET | Refills: 0 | Status: CANCELLED | OUTPATIENT
Start: 2024-09-17

## 2024-09-18 NOTE — TELEPHONE ENCOUNTER
Last office visit 4/24  Next Office Visit not schedule sent patient my chart message to schedule a follow up appointment.

## 2024-09-19 ENCOUNTER — OFFICE VISIT (OUTPATIENT)
Dept: INTERNAL MEDICINE CLINIC | Age: 34
End: 2024-09-19
Payer: COMMERCIAL

## 2024-09-19 VITALS
DIASTOLIC BLOOD PRESSURE: 70 MMHG | SYSTOLIC BLOOD PRESSURE: 110 MMHG | OXYGEN SATURATION: 98 % | TEMPERATURE: 98 F | WEIGHT: 247 LBS | HEIGHT: 63 IN | HEART RATE: 65 BPM | BODY MASS INDEX: 43.77 KG/M2

## 2024-09-19 DIAGNOSIS — N80.9 ENDOMETRIOSIS: ICD-10-CM

## 2024-09-19 DIAGNOSIS — F32.A ANXIETY AND DEPRESSION: ICD-10-CM

## 2024-09-19 DIAGNOSIS — G89.29 CHRONIC PELVIC PAIN IN FEMALE: ICD-10-CM

## 2024-09-19 DIAGNOSIS — E66.01 MORBID OBESITY (HCC): Chronic | ICD-10-CM

## 2024-09-19 DIAGNOSIS — F41.9 ANXIETY AND DEPRESSION: ICD-10-CM

## 2024-09-19 DIAGNOSIS — N80.519 ENDOMETRIOSIS OF RECTUM: Primary | ICD-10-CM

## 2024-09-19 DIAGNOSIS — Z90.79 HISTORY OF TOTAL ABDOMINAL HYSTERECTOMY AND BILATERAL SALPINGO-OOPHORECTOMY: ICD-10-CM

## 2024-09-19 DIAGNOSIS — Z90.722 HISTORY OF TOTAL ABDOMINAL HYSTERECTOMY AND BILATERAL SALPINGO-OOPHORECTOMY: ICD-10-CM

## 2024-09-19 DIAGNOSIS — Z90.710 HISTORY OF TOTAL ABDOMINAL HYSTERECTOMY AND BILATERAL SALPINGO-OOPHORECTOMY: ICD-10-CM

## 2024-09-19 DIAGNOSIS — R10.2 CHRONIC PELVIC PAIN IN FEMALE: ICD-10-CM

## 2024-09-19 DIAGNOSIS — R10.84 GENERALIZED ABDOMINAL PAIN: ICD-10-CM

## 2024-09-19 PROCEDURE — 99214 OFFICE O/P EST MOD 30 MIN: CPT | Performed by: INTERNAL MEDICINE

## 2024-09-19 RX ORDER — METHOCARBAMOL 750 MG/1
750 TABLET, FILM COATED ORAL EVERY 6 HOURS PRN
Qty: 120 TABLET | Refills: 0 | Status: SHIPPED | OUTPATIENT
Start: 2024-09-19

## 2024-09-19 NOTE — ASSESSMENT & PLAN NOTE
Orders:    methocarbamol (ROBAXIN) 750 mg tablet; Take 1 tablet (750 mg total) by mouth every 6 (six) hours as needed for muscle spasms

## 2024-09-19 NOTE — ASSESSMENT & PLAN NOTE
Depression Screening Follow-up Plan: Patient's depression screening was positive with a PHQ-9 score of 7. Clinically patient does not have depression. No treatment is required.

## 2024-09-19 NOTE — PROGRESS NOTES
Ambulatory Visit  Name: Lucie Camacho      : 1990      MRN: [id number]  Encounter Provider: Nely Brothers MD  Encounter Date: 2024   Encounter department: [facility]    Assessment & Plan  Endometriosis    Orders:    methocarbamol (ROBAXIN) 750 mg tablet; Take 1 tablet (750 mg total) by mouth every 6 (six) hours as needed for muscle spasms    Ambulatory Referral to Endocrinology; Future    Generalized abdominal pain    Orders:    methocarbamol (ROBAXIN) 750 mg tablet; Take 1 tablet (750 mg total) by mouth every 6 (six) hours as needed for muscle spasms    Chronic pelvic pain in female    Orders:    methocarbamol (ROBAXIN) 750 mg tablet; Take 1 tablet (750 mg total) by mouth every 6 (six) hours as needed for muscle spasms    Endometriosis of rectum    Orders:    Ambulatory Referral to Endocrinology; Future    Morbid obesity (HCC)    Orders:    Ambulatory Referral to Nutrition Services; Future    Anxiety and depression  Depression Screening Follow-up Plan: Patient's depression screening was positive with a PHQ-9 score of 7. Clinically patient does not have depression. No treatment is required.         History of total abdominal hysterectomy and bilateral salpingo-oophorectomy    Orders:    Ambulatory Referral to Endocrinology; Future       History of Present Illness     HPI  This is a very pleasant 33 years young lady who is here today for the regular follow-up unfortunately she got multiple surgeries for her endometriosis now she had endometriosis on the rectal area she had history of hysterectomy and bilateral salpingo-oophorectomy I will recommend her to see the endocrine doctor if there is any way we can find it out that where is she getting her estrogen and progesterone for her endometrial problems.    Blood pressure is good    Weight is high at BMI of 43 need to lose weight she cannot do some cardiovascular exercises because of the knee back and her  abdominal pain I will recommend her to keep an eye on her diet may not be a bad idea to see that nutritionist to follow some kind of dietary restrictions you are.    Chronic back pain will continue with the methocarbamol she is getting oxycodone from her gynecologist because of the lower abdominal pain  History obtained from : patient  Review of Systems   Constitutional:  Negative for chills and fatigue.   HENT:  Negative for congestion, ear pain, hearing loss, postnasal drip, sinus pressure, sore throat and voice change.    Eyes:  Negative for pain, discharge and visual disturbance.   Respiratory:  Negative for cough, chest tightness and shortness of breath.    Cardiovascular:  Negative for chest pain, palpitations and leg swelling.   Gastrointestinal:  Positive for abdominal distention and abdominal pain (cramping). Negative for blood in stool, diarrhea, nausea and rectal pain.   Genitourinary:  Negative for difficulty urinating, dysuria and urgency.   Musculoskeletal:  Positive for arthralgias (Knee pain) and back pain. Negative for joint swelling.   Skin:  Negative for rash.   Allergic/Immunologic: Negative for environmental allergies and food allergies.   Neurological:  Negative for dizziness, tremors, weakness, numbness and headaches.   Hematological:  Negative for adenopathy.   Psychiatric/Behavioral:  Negative for behavioral problems and hallucinations.      Medical History Reviewed by provider this encounter:       Current Outpatient Medications on File Prior to Visit   Medication Sig Dispense Refill    acetaminophen (TYLENOL) 650 mg CR tablet Take 1 tablet (650 mg total) by mouth every 8 (eight) hours as needed for mild pain 30 tablet 0    naloxone (NARCAN) 4 mg/0.1 mL nasal spray Administer 1 spray into a nostril. If no response after 2-3 minutes, give another dose in the other nostril using a new spray. 1 each 1    oxyCODONE (ROXICODONE) 10 MG TABS Take 1 tablet (10 mg total) by mouth 2 (two) times a day  "as needed for moderate pain Take 2 tablets PO BID prn moderate to severe pain Max Daily Amount: 20 mg 60 tablet 0    [DISCONTINUED] methocarbamol (ROBAXIN) 750 mg tablet take 1 tablet by mouth every 6 hours if needed for muscle spasm 120 tablet 0    [DISCONTINUED] estrogen, conjugated,-medroxyprogesterone (Prempro) 0.625-5 MG per tablet take 1 tablet by mouth once daily 28 tablet 4     No current facility-administered medications on file prior to visit.          Objective     /70 (BP Location: Left arm, Patient Position: Sitting, Cuff Size: Large)   Pulse 65   Temp 98 °F (36.7 °C) (Temporal)   Ht 5' 3\" (1.6 m)   Wt 112 kg (247 lb)   SpO2 98%   BMI 43.75 kg/m²     Physical Exam  Vitals and nursing note reviewed.   Constitutional:       General: She is not in acute distress.     Appearance: She is well-developed. She is obese.   HENT:      Head: Normocephalic and atraumatic.   Eyes:      Conjunctiva/sclera: Conjunctivae normal.   Cardiovascular:      Rate and Rhythm: Normal rate and regular rhythm.      Heart sounds: No murmur heard.  Pulmonary:      Effort: Pulmonary effort is normal. No respiratory distress.      Breath sounds: Normal breath sounds.   Abdominal:      Palpations: Abdomen is soft.      Tenderness: There is abdominal tenderness in the right lower quadrant, suprapubic area and left lower quadrant.   Musculoskeletal:         General: No swelling.      Cervical back: Neck supple.   Skin:     General: Skin is warm and dry.      Capillary Refill: Capillary refill takes less than 2 seconds.   Neurological:      Mental Status: She is alert.   Psychiatric:         Mood and Affect: Mood normal.         "

## 2024-09-20 ENCOUNTER — OFFICE VISIT (OUTPATIENT)
Dept: GYNECOLOGIC ONCOLOGY | Facility: CLINIC | Age: 34
End: 2024-09-20
Payer: COMMERCIAL

## 2024-09-20 VITALS
BODY MASS INDEX: 43.68 KG/M2 | TEMPERATURE: 98.1 F | HEART RATE: 71 BPM | RESPIRATION RATE: 18 BRPM | DIASTOLIC BLOOD PRESSURE: 84 MMHG | WEIGHT: 246.5 LBS | OXYGEN SATURATION: 99 % | HEIGHT: 63 IN | SYSTOLIC BLOOD PRESSURE: 138 MMHG

## 2024-09-20 DIAGNOSIS — N80.519 ENDOMETRIOSIS OF RECTUM: Primary | ICD-10-CM

## 2024-09-20 DIAGNOSIS — E66.01 MORBID OBESITY (HCC): Chronic | ICD-10-CM

## 2024-09-20 PROCEDURE — 99215 OFFICE O/P EST HI 40 MIN: CPT | Performed by: OBSTETRICS & GYNECOLOGY

## 2024-09-20 RX ORDER — METRONIDAZOLE 500 MG/100ML
500 INJECTION, SOLUTION INTRAVENOUS ONCE
OUTPATIENT
Start: 2024-09-20 | End: 2024-09-20

## 2024-09-20 RX ORDER — POLYETHYLENE GLYCOL 3350 17 G/17G
POWDER, FOR SOLUTION ORAL
Qty: 238 G | Refills: 0 | Status: SHIPPED | OUTPATIENT
Start: 2024-09-20

## 2024-09-20 RX ORDER — SODIUM CHLORIDE, SODIUM LACTATE, POTASSIUM CHLORIDE, CALCIUM CHLORIDE 600; 310; 30; 20 MG/100ML; MG/100ML; MG/100ML; MG/100ML
125 INJECTION, SOLUTION INTRAVENOUS CONTINUOUS
OUTPATIENT
Start: 2024-09-20

## 2024-09-20 RX ORDER — HEPARIN SODIUM 5000 [USP'U]/ML
5000 INJECTION, SOLUTION INTRAVENOUS; SUBCUTANEOUS
OUTPATIENT
Start: 2024-09-21 | End: 2024-09-22

## 2024-09-20 RX ORDER — ACETAMINOPHEN 325 MG/1
975 TABLET ORAL ONCE
OUTPATIENT
Start: 2024-09-20 | End: 2024-09-20

## 2024-09-20 RX ORDER — CEFAZOLIN SODIUM 2 G/50ML
2000 SOLUTION INTRAVENOUS ONCE
OUTPATIENT
Start: 2024-09-20 | End: 2024-09-20

## 2024-09-20 RX ORDER — NEOMYCIN SULFATE 500 MG/1
1000 TABLET ORAL 3 TIMES DAILY
Qty: 6 TABLET | Refills: 0 | Status: SHIPPED | OUTPATIENT
Start: 2024-09-20 | End: 2024-09-21

## 2024-09-20 RX ORDER — METRONIDAZOLE 500 MG/1
500 TABLET ORAL ONCE
Qty: 1 TABLET | Refills: 0 | Status: SHIPPED | OUTPATIENT
Start: 2024-09-20 | End: 2024-09-20

## 2024-09-20 NOTE — ASSESSMENT & PLAN NOTE
33-year-old with severe pelvic endometriosis status post multiple prior medical therapies, laparoscopies recently status post radical resection of pelvic endometriosis including bilateral oophorectomy, vaginectomy in May 2023. That surgery was complicated by postoperative vaginal apex abscess. Since the operation, she has had ongoing pelvic pain requiring narcotic therapy. She has seen pain management and tried local injection.There is evidence of a deep rectal implant measuring 2.39 x 1.17 cm. Her performance status is 0.  1.  We reviewed medical management of her endometriosis including starting aromatase inhibitor therapy.  Given that she does not have ovarian function, medication such as Lupron/Myfembree may not be as effective.  We discussed the potential risks of aromatase inhibitor therapy including the risks of arthralgias, hot flashes, possible osteoporosis.  She would prefer not to have medical management.  2.  I then discussed surgical management.  I discussed the risks of examination under anesthesia, possible robotic assisted resection of pelvic endometriosis, possible upper vaginectomy, possible colectomy with re-anastomosis, possible colostomy, possible exploratory laparotomy and all other indicated procedures.  She understands the risks of the operation including additional risks of bowel injury, bladder injury, ureteral injury due to previous pelvic surgery complicated by vaginal cuff abscess.  She also understands that there is a risk that her pain may not improve significantly after surgery.  She understands the risks of the operation and agrees to proceed as outlined.  Consent for surgery was obtained by me in the office.  3.  She will follow-up with Dr. King in the office as scheduled for preoperative evaluation.

## 2024-09-20 NOTE — PROGRESS NOTES
Assessment/Plan:    Problem List Items Addressed This Visit          Digestive    Endometriosis of rectum - Primary     33-year-old with severe pelvic endometriosis status post multiple prior medical therapies, laparoscopies recently status post radical resection of pelvic endometriosis including bilateral oophorectomy, vaginectomy in May 2023. That surgery was complicated by postoperative vaginal apex abscess. Since the operation, she has had ongoing pelvic pain requiring narcotic therapy. She has seen pain management and tried local injection.There is evidence of a deep rectal implant measuring 2.39 x 1.17 cm. Her performance status is 0.  1.  We reviewed medical management of her endometriosis including starting aromatase inhibitor therapy.  Given that she does not have ovarian function, medication such as Lupron/Myfembree may not be as effective.  We discussed the potential risks of aromatase inhibitor therapy including the risks of arthralgias, hot flashes, possible osteoporosis.  She would prefer not to have medical management.  2.  I then discussed surgical management.  I discussed the risks of examination under anesthesia, possible robotic assisted resection of pelvic endometriosis, possible upper vaginectomy, possible colectomy with re-anastomosis, possible colostomy, possible exploratory laparotomy and all other indicated procedures.  She understands the risks of the operation including additional risks of bowel injury, bladder injury, ureteral injury due to previous pelvic surgery complicated by vaginal cuff abscess.  She also understands that there is a risk that her pain may not improve significantly after surgery.  She understands the risks of the operation and agrees to proceed as outlined.  Consent for surgery was obtained by me in the office.  3.  She will follow-up with Dr. King in the office as scheduled for preoperative evaluation.         Relevant Medications    neomycin (MYCIFRADIN) 500  mg tablet    metroNIDAZOLE (FLAGYL) 500 mg tablet    polyethylene glycol (MiraLax) 17 GM/SCOOP powder    Other Relevant Orders    Case request operating room: LAPAROSCOPY PELVIC SURGERY W/ROBOTICS, COLECTOMY, SUBTOTAL LAPAROSCOPIC (Completed)    Type and screen    CBC and Platelet    Comprehensive metabolic panel    HEMOGLOBIN A1C W/ EAG ESTIMATION    Protime-INR    EKG 12 lead    XR chest pa and lateral       Other    Morbid obesity (HCC) (Chronic)     May contribute to increased risk of surgery such as wound separation, wound infection, hernia                CHIEF COMPLAINT: Treatment discussion, pelvic pain            Patient ID: Lucie Camacho is a 33 y.o. female  Who returns for a treatment discussion.  Her last pelvic ultrasound was 4/16/2024 which revealed a 2.39 x 1.1 cm implant on the surface of the rectum.  There was an additional pelvic implant measuring 0.8 x 0.6 cm.  She has been off combined hormone replacement therapy for some time now.  Her hot flashes are manageable.  She recently presented to the emergency department at [facility] for worsening abdominal pain and was found to have a normal CBC and CMP.  No additional imaging was performed.  She continues to have deep pelvic pain particular with bowel movements.  The pain is somewhat controlled with narcotic therapy.  She is currently taking oxycodone 10 mg twice daily.        Review of Systems   Constitutional:  Negative for activity change and unexpected weight change.   HENT: Negative.     Eyes: Negative.    Respiratory: Negative.     Cardiovascular: Negative.    Gastrointestinal:  Positive for abdominal pain. Negative for abdominal distention.   Endocrine: Negative.    Genitourinary:  Positive for pelvic pain. Negative for vaginal bleeding.   Musculoskeletal: Negative.    Skin: Negative.    Allergic/Immunologic: Negative.    Neurological: Negative.    Hematological: Negative.    Psychiatric/Behavioral: Negative.         Current Outpatient  Medications   Medication Sig Dispense Refill    acetaminophen (TYLENOL) 650 mg CR tablet Take 1 tablet (650 mg total) by mouth every 8 (eight) hours as needed for mild pain 30 tablet 0    methocarbamol (ROBAXIN) 750 mg tablet Take 1 tablet (750 mg total) by mouth every 6 (six) hours as needed for muscle spasms 120 tablet 0    metroNIDAZOLE (FLAGYL) 500 mg tablet Take 1 tablet (500 mg total) by mouth once for 1 dose Take at 9 PM the night before the procedure 1 tablet 0    naloxone (NARCAN) 4 mg/0.1 mL nasal spray Administer 1 spray into a nostril. If no response after 2-3 minutes, give another dose in the other nostril using a new spray. 1 each 1    neomycin (MYCIFRADIN) 500 mg tablet Take 2 tablets (1,000 mg total) by mouth 3 (three) times a day for 3 doses Take at 1 PM, 4 PM, and 9 PM the day before procedure. 6 tablet 0    oxyCODONE (ROXICODONE) 10 MG TABS Take 1 tablet (10 mg total) by mouth 2 (two) times a day as needed for moderate pain Take 2 tablets PO BID prn moderate to severe pain Max Daily Amount: 20 mg 60 tablet 0    polyethylene glycol (MiraLax) 17 GM/SCOOP powder Mix with 64 oz Gatorade, begin 4 PM day before surgery per bowel prep instructions. 238 g 0     No current facility-administered medications for this visit.       Allergies   Allergen Reactions    Reglan [Metoclopramide] Other (See Comments)     Elevated heartrate    Other Blisters     Dermabond       Past Medical History:   Diagnosis Date    Anxiety     Chicken pox     Depression     Endometriosis     GERD (gastroesophageal reflux disease)     Headache     Occasional     HSV-1 infection     IBS (irritable bowel syndrome)     Kidney stone     Kidney stone on left side     Multiple thyroid nodules     Obesity     Renal calculi        Past Surgical History:   Procedure Laterality Date    APPENDECTOMY  2021     SECTION      x2    CYSTOSCOPY N/A 2023    Procedure: CYSTOSCOPY;  Surgeon: Popeye Grenee MD;  Location:   MAIN OR;  Service: Gynecology Oncology    EXAMINATION UNDER ANESTHESIA N/A 2023    Procedure: EXAM UNDER ANESTHESIA (EUA), I& D vaginal cuff abscess;  Surgeon: Popeye Greene MD;  [facility];  Service: Gynecology Oncology    HYSTERECTOMY      robotic total laparoscopic hysterectomy with BS    LAPAROSCOPIC ENDOMETRIOSIS FULGURATION  2018    laparoscopic excision of endometriosis, fulguration of endometriosis, lysis of adhesions    LAPAROSCOPY  2014    with I&D     MOLE REMOVAL      face - benign     PELVIC LAPAROSCOPY Bilateral 2023    Procedure: SALPINGO-OOPHORECTOMY, LAPAROSCOPIC W/ROBOTICS, RADICAL RESECTION PELVIC ENDOMETRIOSIS;  Surgeon: Popeye Greene MD;  [facility];  Service: Gynecology Oncology    PROCTOSCOPY N/A 2023    Procedure: PROCTOSCOPY;  Surgeon: Popeye Greene MD;  [facility];  Service: Gynecology Oncology    SKIN CANCER EXCISION      abdomen    THYROID CYST EXCISION      TONSILLECTOMY      TUBAL LIGATION  02/15/2016    With lysis of adhesion and peritoneal biopsy    US GUIDED INJECTION FOR RESEARCH STUDY  2015    VAGINA SURGERY N/A 2023    Procedure: VAGINECTOMY, PARTIAL;  Surgeon: Popeye Greene MD;  [facility];  Service: Gynecology Oncology    WISDOM TOOTH EXTRACTION         OB History          2    Para   2    Term   2            AB        Living   2         SAB        IAB        Ectopic        Multiple        Live Births                     Family History   Problem Relation Age of Onset    Coronary artery disease Mother     Varicose Veins Mother     Other Mother         breast cyst - removed     Cholelithiasis Mother         had cholecystectomy    Alcohol abuse Father     Cholelithiasis Maternal Grandmother         had cholecystectomy    Uterine cancer Paternal Grandmother     Breast cancer Family         Before age 49     Uterine cancer Family     Obesity Family   "   Hypertension Family     Brain cancer Family     Gallbladder disease Family        The following portions of the patient's history were reviewed and updated as appropriate: allergies, current medications, past family history, past medical history, past social history, past surgical history, and problem list.      Objective:    Blood pressure 138/84, pulse 71, temperature 98.1 °F (36.7 °C), resp. rate 18, height 5' 3\" (1.6 m), weight 112 kg (246 lb 8 oz), SpO2 99%.  Body mass index is 43.67 kg/m².    Physical Exam  Vitals reviewed.   Constitutional:       General: She is not in acute distress.     Appearance: Normal appearance. She is obese. She is not ill-appearing.   HENT:      Head: Normocephalic and atraumatic.      Mouth/Throat:      Mouth: Mucous membranes are moist.   Eyes:      General: No scleral icterus.        Right eye: No discharge.         Left eye: No discharge.      Conjunctiva/sclera: Conjunctivae normal.   Pulmonary:      Effort: Pulmonary effort is normal.   Musculoskeletal:      Right lower leg: No edema.      Left lower leg: No edema.   Skin:     General: Skin is warm and dry.      Coloration: Skin is not jaundiced.      Findings: No rash.   Neurological:      General: No focal deficit present.      Mental Status: She is alert and oriented to person, place, and time.      Cranial Nerves: No cranial nerve deficit.      Sensory: No sensory deficit.      Motor: No weakness.      Gait: Gait normal.   Psychiatric:         Mood and Affect: Mood normal.         Behavior: Behavior normal.         Thought Content: Thought content normal.         Judgment: Judgment normal.           Lab Results   Component Value Date     17.3 04/27/2023     Lab Results   Component Value Date    WBC 5.63 10/05/2023    HGB 12.8 10/05/2023    HCT 37.7 10/05/2023    MCV 85 10/05/2023     10/05/2023     Lab Results   Component Value Date     02/26/2014    K 3.9 08/29/2024     08/29/2024    CO2 26 " 08/29/2024    ANIONGAP 7 02/26/2014    BUN 11 08/29/2024    CREATININE 0.72 08/29/2024    GLUCOSE 97 02/26/2014    GLUF 88 04/27/2023    CALCIUM 9.3 08/29/2024    CORRECTEDCA 9.2 07/07/2021    AST 16 08/29/2024    ALT 21 08/29/2024    ALKPHOS 69 08/29/2024    EGFR 114 08/29/2024        Trend:  Lab Results   Component Value Date     17.3 04/27/2023

## 2024-09-20 NOTE — LETTER
September 20, 2024     Chauncey King MD  [address] [place] [id number]    Patient: Lucie Camacho   YOB: 1990   Date of Visit: 9/20/2024       Dear Dr. King:    Thank you for referring Lucie Camacho to me for evaluation. Below are my notes for this consultation.    If you have questions, please do not hesitate to call me. I look forward to following your patient along with you.         Sincerely,        Popeye Greene MD        CC: MD Popeye Crenshaw MD  9/20/2024  4:58 PM  Sign when Signing Visit  Assessment/Plan:    Problem List Items Addressed This Visit          Digestive    Endometriosis of rectum - Primary     33-year-old with severe pelvic endometriosis status post multiple prior medical therapies, laparoscopies recently status post radical resection of pelvic endometriosis including bilateral oophorectomy, vaginectomy in May 2023. That surgery was complicated by postoperative vaginal apex abscess. Since the operation, she has had ongoing pelvic pain requiring narcotic therapy. She has seen pain management and tried local injection.There is evidence of a deep rectal implant measuring 2.39 x 1.17 cm. Her performance status is 0.  1.  We reviewed medical management of her endometriosis including starting aromatase inhibitor therapy.  Given that she does not have ovarian function, medication such as Lupron/Myfembree may not be as effective.  We discussed the potential risks of aromatase inhibitor therapy including the risks of arthralgias, hot flashes, possible osteoporosis.  She would prefer not to have medical management.  2.  I then discussed surgical management.  I discussed the risks of examination under anesthesia, possible robotic assisted resection of pelvic endometriosis, possible upper vaginectomy, possible colectomy with re-anastomosis, possible colostomy, possible exploratory laparotomy and all other indicated  procedures.  She understands the risks of the operation including additional risks of bowel injury, bladder injury, ureteral injury due to previous pelvic surgery complicated by vaginal cuff abscess.  She also understands that there is a risk that her pain may not improve significantly after surgery.  She understands the risks of the operation and agrees to proceed as outlined.  Consent for surgery was obtained by me in the office.  3.  She will follow-up with Dr. King in the office as scheduled for preoperative evaluation.         Relevant Medications    neomycin (MYCIFRADIN) 500 mg tablet    metroNIDAZOLE (FLAGYL) 500 mg tablet    polyethylene glycol (MiraLax) 17 GM/SCOOP powder    Other Relevant Orders    Case request operating room: LAPAROSCOPY PELVIC SURGERY W/ROBOTICS, COLECTOMY, SUBTOTAL LAPAROSCOPIC (Completed)    Type and screen    CBC and Platelet    Comprehensive metabolic panel    HEMOGLOBIN A1C W/ EAG ESTIMATION    Protime-INR    EKG 12 lead    XR chest pa and lateral       Other    Morbid obesity (HCC) (Chronic)     May contribute to increased risk of surgery such as wound separation, wound infection, hernia                CHIEF COMPLAINT: Treatment discussion, pelvic pain            Patient ID: Lucie Camacho is a 33 y.o. female  Who returns for a treatment discussion.  Her last pelvic ultrasound was 4/16/2024 which revealed a 2.39 x 1.1 cm implant on the surface of the rectum.  There was an additional pelvic implant measuring 0.8 x 0.6 cm.  She has been off combined hormone replacement therapy for some time now.  Her hot flashes are manageable.  She recently presented to the emergency department at [facility] for worsening abdominal pain and was found to have a normal CBC and CMP.  No additional imaging was performed.  She continues to have deep pelvic pain particular with bowel movements.  The pain is somewhat controlled with narcotic therapy.  She is currently taking oxycodone 10 mg twice  daily.        Review of Systems   Constitutional:  Negative for activity change and unexpected weight change.   HENT: Negative.     Eyes: Negative.    Respiratory: Negative.     Cardiovascular: Negative.    Gastrointestinal:  Positive for abdominal pain. Negative for abdominal distention.   Endocrine: Negative.    Genitourinary:  Positive for pelvic pain. Negative for vaginal bleeding.   Musculoskeletal: Negative.    Skin: Negative.    Allergic/Immunologic: Negative.    Neurological: Negative.    Hematological: Negative.    Psychiatric/Behavioral: Negative.         Current Outpatient Medications   Medication Sig Dispense Refill   • acetaminophen (TYLENOL) 650 mg CR tablet Take 1 tablet (650 mg total) by mouth every 8 (eight) hours as needed for mild pain 30 tablet 0   • methocarbamol (ROBAXIN) 750 mg tablet Take 1 tablet (750 mg total) by mouth every 6 (six) hours as needed for muscle spasms 120 tablet 0   • metroNIDAZOLE (FLAGYL) 500 mg tablet Take 1 tablet (500 mg total) by mouth once for 1 dose Take at 9 PM the night before the procedure 1 tablet 0   • naloxone (NARCAN) 4 mg/0.1 mL nasal spray Administer 1 spray into a nostril. If no response after 2-3 minutes, give another dose in the other nostril using a new spray. 1 each 1   • neomycin (MYCIFRADIN) 500 mg tablet Take 2 tablets (1,000 mg total) by mouth 3 (three) times a day for 3 doses Take at 1 PM, 4 PM, and 9 PM the day before procedure. 6 tablet 0   • oxyCODONE (ROXICODONE) 10 MG TABS Take 1 tablet (10 mg total) by mouth 2 (two) times a day as needed for moderate pain Take 2 tablets PO BID prn moderate to severe pain Max Daily Amount: 20 mg 60 tablet 0   • polyethylene glycol (MiraLax) 17 GM/SCOOP powder Mix with 64 oz Gatorade, begin 4 PM day before surgery per bowel prep instructions. 238 g 0     No current facility-administered medications for this visit.       Allergies   Allergen Reactions   • Reglan [Metoclopramide] Other (See Comments)     Elevated  heartrate   • Other Blisters     Dermabond       Past Medical History:   Diagnosis Date   • Anxiety    • Chicken pox    • Depression    • Endometriosis    • GERD (gastroesophageal reflux disease)    • Headache     Occasional    • HSV-1 infection    • IBS (irritable bowel syndrome)    • Kidney stone    • Kidney stone on left side    • Multiple thyroid nodules    • Obesity    • Renal calculi        Past Surgical History:   Procedure Laterality Date   • APPENDECTOMY  2021   •  SECTION      x2   • CYSTOSCOPY N/A 2023    Procedure: CYSTOSCOPY;  Surgeon: Popeye Greene MD;  [facility];  Service: Gynecology Oncology   • EXAMINATION UNDER ANESTHESIA N/A 2023    Procedure: EXAM UNDER ANESTHESIA (EUA), I& D vaginal cuff abscess;  Surgeon: Popeye Greene MD;  [facility];  Service: Gynecology Oncology   • HYSTERECTOMY      robotic total laparoscopic hysterectomy with BS   • LAPAROSCOPIC ENDOMETRIOSIS FULGURATION  2018    laparoscopic excision of endometriosis, fulguration of endometriosis, lysis of adhesions   • LAPAROSCOPY  2014    with I&D    • MOLE REMOVAL      face - benign    • PELVIC LAPAROSCOPY Bilateral 2023    Procedure: SALPINGO-OOPHORECTOMY, LAPAROSCOPIC W/ROBOTICS, RADICAL RESECTION PELVIC ENDOMETRIOSIS;  Surgeon: Popeye Greene MD;  [facility];  Service: Gynecology Oncology   • PROCTOSCOPY N/A 2023    Procedure: PROCTOSCOPY;  Surgeon: Popeye Greene MD;  [facility];  Service: Gynecology Oncology   • SKIN CANCER EXCISION      abdomen   • THYROID CYST EXCISION     • TONSILLECTOMY     • TUBAL LIGATION  02/15/2016    With lysis of adhesion and peritoneal biopsy   • US GUIDED INJECTION FOR RESEARCH STUDY  2015   • VAGINA SURGERY N/A 2023    Procedure: VAGINECTOMY, PARTIAL;  Surgeon: Popeye Greene MD;  [facility];  Service: Gynecology Oncology   • WISDOM TOOTH  "EXTRACTION         OB History          2    Para   2    Term   2            AB        Living   2         SAB        IAB        Ectopic        Multiple        Live Births                     Family History   Problem Relation Age of Onset   • Coronary artery disease Mother    • Varicose Veins Mother    • Other Mother         breast cyst - removed    • Cholelithiasis Mother         had cholecystectomy   • Alcohol abuse Father    • Cholelithiasis Maternal Grandmother         had cholecystectomy   • Uterine cancer Paternal Grandmother    • Breast cancer Family         Before age 49    • Uterine cancer Family    • Obesity Family    • Hypertension Family    • Brain cancer Family    • Gallbladder disease Family        The following portions of the patient's history were reviewed and updated as appropriate: allergies, current medications, past family history, past medical history, past social history, past surgical history, and problem list.      Objective:    Blood pressure 138/84, pulse 71, temperature 98.1 °F (36.7 °C), resp. rate 18, height 5' 3\" (1.6 m), weight 112 kg (246 lb 8 oz), SpO2 99%.  Body mass index is 43.67 kg/m².    Physical Exam  Vitals reviewed.   Constitutional:       General: She is not in acute distress.     Appearance: Normal appearance. She is obese. She is not ill-appearing.   HENT:      Head: Normocephalic and atraumatic.      Mouth/Throat:      Mouth: Mucous membranes are moist.   Eyes:      General: No scleral icterus.        Right eye: No discharge.         Left eye: No discharge.      Conjunctiva/sclera: Conjunctivae normal.   Pulmonary:      Effort: Pulmonary effort is normal.   Musculoskeletal:      Right lower leg: No edema.      Left lower leg: No edema.   Skin:     General: Skin is warm and dry.      Coloration: Skin is not jaundiced.      Findings: No rash.   Neurological:      General: No focal deficit present.      Mental Status: She is alert and oriented to person, place, " and time.      Cranial Nerves: No cranial nerve deficit.      Sensory: No sensory deficit.      Motor: No weakness.      Gait: Gait normal.   Psychiatric:         Mood and Affect: Mood normal.         Behavior: Behavior normal.         Thought Content: Thought content normal.         Judgment: Judgment normal.           Lab Results   Component Value Date     17.3 04/27/2023     Lab Results   Component Value Date    WBC 5.63 10/05/2023    HGB 12.8 10/05/2023    HCT 37.7 10/05/2023    MCV 85 10/05/2023     10/05/2023     Lab Results   Component Value Date     02/26/2014    K 3.9 08/29/2024     08/29/2024    CO2 26 08/29/2024    ANIONGAP 7 02/26/2014    BUN 11 08/29/2024    CREATININE 0.72 08/29/2024    GLUCOSE 97 02/26/2014    GLUF 88 04/27/2023    CALCIUM 9.3 08/29/2024    CORRECTEDCA 9.2 07/07/2021    AST 16 08/29/2024    ALT 21 08/29/2024    ALKPHOS 69 08/29/2024    EGFR 114 08/29/2024        Trend:  Lab Results   Component Value Date     17.3 04/27/2023

## 2024-09-23 ENCOUNTER — PATIENT MESSAGE (OUTPATIENT)
Dept: GYNECOLOGIC ONCOLOGY | Facility: CLINIC | Age: 34
End: 2024-09-23

## 2024-09-23 ENCOUNTER — TELEPHONE (OUTPATIENT)
Dept: GYNECOLOGIC ONCOLOGY | Facility: CLINIC | Age: 34
End: 2024-09-23

## 2024-09-23 DIAGNOSIS — N80.519 ENDOMETRIOSIS OF RECTUM: Primary | ICD-10-CM

## 2024-09-23 DIAGNOSIS — R10.2 CHRONIC PELVIC PAIN IN FEMALE: ICD-10-CM

## 2024-09-23 DIAGNOSIS — G89.29 CHRONIC PELVIC PAIN IN FEMALE: ICD-10-CM

## 2024-09-23 RX ORDER — OXYCODONE HYDROCHLORIDE 10 MG/1
10 TABLET ORAL EVERY 8 HOURS SCHEDULED
Qty: 90 TABLET | Refills: 0 | Status: SHIPPED | OUTPATIENT
Start: 2024-09-23

## 2024-09-23 NOTE — PROGRESS NOTES
She was taking 2 pills twice daily to obtain better comfort.  I therefore discussed starting 1 pill 3 times daily to reduce the overall dose of narcotics but provide all day relief.  She is amenable to the change.  A new prescription for 10 mg of oxycodone every 8 hours, 90 tabs, was sent to her pharmacy.

## 2024-09-23 NOTE — TELEPHONE ENCOUNTER
"Patient calling to report that she has been taking double of the prescribed oxycodone. States she thought she could take 20mg of oxy BID. See in chart that it is 10mg BID. Patient now states that because she doubled her dosage she will run out of pain medicine. Patient also reports she has anxiety and is afraid she will be \"in trouble\". Patient sent [employer] photo of different prescription instructions. Please call patient back to discuss.   "

## 2024-10-02 ENCOUNTER — PATIENT MESSAGE (OUTPATIENT)
Dept: GYNECOLOGIC ONCOLOGY | Facility: CLINIC | Age: 34
End: 2024-10-02

## 2024-10-10 ENCOUNTER — TELEPHONE (OUTPATIENT)
Age: 34
End: 2024-10-10

## 2024-10-10 ENCOUNTER — OFFICE VISIT (OUTPATIENT)
Dept: ENDOCRINOLOGY | Facility: CLINIC | Age: 34
End: 2024-10-10
Payer: COMMERCIAL

## 2024-10-10 VITALS
HEIGHT: 63 IN | HEART RATE: 64 BPM | BODY MASS INDEX: 42.95 KG/M2 | SYSTOLIC BLOOD PRESSURE: 118 MMHG | DIASTOLIC BLOOD PRESSURE: 76 MMHG | WEIGHT: 242.4 LBS | TEMPERATURE: 98.1 F

## 2024-10-10 DIAGNOSIS — E04.1 THYROID NODULE: Primary | ICD-10-CM

## 2024-10-10 DIAGNOSIS — L68.0 HIRSUTISM: ICD-10-CM

## 2024-10-10 DIAGNOSIS — N80.9 ENDOMETRIOSIS: ICD-10-CM

## 2024-10-10 DIAGNOSIS — Z90.79 HISTORY OF TOTAL ABDOMINAL HYSTERECTOMY AND BILATERAL SALPINGO-OOPHORECTOMY: ICD-10-CM

## 2024-10-10 DIAGNOSIS — N80.519 ENDOMETRIOSIS OF RECTUM: ICD-10-CM

## 2024-10-10 DIAGNOSIS — Z90.722 HISTORY OF TOTAL ABDOMINAL HYSTERECTOMY AND BILATERAL SALPINGO-OOPHORECTOMY: ICD-10-CM

## 2024-10-10 DIAGNOSIS — Z90.710 HISTORY OF TOTAL ABDOMINAL HYSTERECTOMY AND BILATERAL SALPINGO-OOPHORECTOMY: ICD-10-CM

## 2024-10-10 PROCEDURE — 99244 OFF/OP CNSLTJ NEW/EST MOD 40: CPT | Performed by: STUDENT IN AN ORGANIZED HEALTH CARE EDUCATION/TRAINING PROGRAM

## 2024-10-10 NOTE — PROGRESS NOTES
Ambulatory Visit  Name: Lucie Camacho      : 1990      MRN: [id number]  Encounter Provider: Chilo Key MD  Encounter Date: 10/10/2024   Encounter department: [facility] [place]    Assessment & Plan  Endometriosis    Latricia is referred to evaluate for recurrent endometriosis,   We did reviewed in details possible theories of endometriosis development, although there is no known particular endocrinopathies which is either considered to be etiology or to increase risk of development,   She was previously on JUDI which currently in not taking, and she was recommended aromatase inhibitors which she did not wish to start.  We did discussed that given , early surgically induced menopause , we need to monitor closely for osteoporosis.  Given her weight and BMI and other non- specific symptoms, we agreed to check for Cushing's syndrome,   She has some symptoms of hyperandrogenism, including hirsutism, and prior history of AUB, therefor PCOS is in differential although other differential diagnosis , should be ruled out.  Further evaluation pending results.            Orders:    Ambulatory Referral to Endocrinology    Endometriosis of rectum    Orders:    Ambulatory Referral to Endocrinology    History of total abdominal hysterectomy and bilateral salpingo-oophorectomy    Orders:    Ambulatory Referral to Endocrinology      History of Present Illness     Lucie Camacho is a 33 y.o. female who is referred by Nely Brothers MD, for recurrent endometriosis,     Lucie endorses pelvic endometriosis, for which she received medical therapies and undergone radical resection of pelvic endometriosis,including bilateral oophorectomy in .  She was on combined oral contraceptive, and she did not wish to start aromatase inhibitors as per OB/GYN note.    She reports history of hysterectomy at age 25, due to menorrhagia and dyspareunia       She has 2 biologic kids, no prior  "issues with fertility.    History of AUB since her menarche, on JUDI since age 15,    No history of IFG,       History of thyroid of thyroid nodule s/p ? Lobectomy      Reports excessive hair growth, with Ferriman Gallwey score 8, more lower back,    Reports mild acne,     Current weight :  242 IB  No recent weight gain,     No violaceous striae   Reports easy bruising          History obtained from : patient  Review of Systems   Constitutional:  Positive for fatigue and unexpected weight change. Negative for appetite change.   Gastrointestinal:  Positive for abdominal pain, nausea and vomiting.   Endocrine: Negative for cold intolerance.   Musculoskeletal:  Positive for arthralgias.     Medical History Reviewed by provider this encounter:       Current Outpatient Medications on File Prior to Visit   Medication Sig Dispense Refill    acetaminophen (TYLENOL) 650 mg CR tablet Take 1 tablet (650 mg total) by mouth every 8 (eight) hours as needed for mild pain 30 tablet 0    methocarbamol (ROBAXIN) 750 mg tablet Take 1 tablet (750 mg total) by mouth every 6 (six) hours as needed for muscle spasms 120 tablet 0    naloxone (NARCAN) 4 mg/0.1 mL nasal spray Administer 1 spray into a nostril. If no response after 2-3 minutes, give another dose in the other nostril using a new spray. 1 each 1    oxyCODONE (ROXICODONE) 10 MG TABS Take 1 tablet (10 mg total) by mouth every 8 (eight) hours Max Daily Amount: 30 mg 90 tablet 0    polyethylene glycol (MiraLax) 17 GM/SCOOP powder Mix with 64 oz Gatorade, begin 4 PM day before surgery per bowel prep instructions. (Patient not taking: Reported on 10/10/2024) 238 g 0     No current facility-administered medications on file prior to visit.          Objective     /76 (BP Location: Left arm, Patient Position: Sitting, Cuff Size: Large)   Pulse 64   Temp 98.1 °F (36.7 °C) (Tympanic)   Ht 5' 3\" (1.6 m)   Wt 110 kg (242 lb 6.4 oz)   BMI 42.94 kg/m²     Physical Exam  Vitals and " nursing note reviewed.   Constitutional:       General: She is not in acute distress.     Appearance: She is well-developed. She is obese.   HENT:      Head: Normocephalic and atraumatic.   Cardiovascular:      Rate and Rhythm: Normal rate and regular rhythm.   Pulmonary:      Effort: Pulmonary effort is normal. No respiratory distress.   Abdominal:      Palpations: Abdomen is soft.   Musculoskeletal:         General: No swelling.      Cervical back: Neck supple.   Skin:     General: Skin is warm and dry.   Neurological:      Mental Status: She is alert.       Administrative Statements   I have spent a total time of 40 minutes in caring for this patient on the day of the visit/encounter including Diagnostic results, Prognosis, Risks and benefits of tx options, Instructions for management, Patient and family education, Importance of tx compliance, Risk factor reductions, Impressions, Counseling / Coordination of care, Documenting in the medical record, Reviewing / ordering tests, medicine, procedures  , and Obtaining or reviewing history  .    Component      Latest Ref Rng 7/11/2023   TSH 3RD GENERATON      0.450 - 4.500 uIU/mL 0.513

## 2024-10-10 NOTE — TELEPHONE ENCOUNTER
VM to reschedule 10/11/24 appointment with Dr. King due to dr emergency.  Waiting response from patient.

## 2024-10-11 ENCOUNTER — TELEPHONE (OUTPATIENT)
Dept: GYNECOLOGIC ONCOLOGY | Facility: CLINIC | Age: 34
End: 2024-10-11

## 2024-10-11 ENCOUNTER — TELEPHONE (OUTPATIENT)
Age: 34
End: 2024-10-11

## 2024-10-11 NOTE — TELEPHONE ENCOUNTER
Patient returned my call to discuss a new date for surgery of 1/20/25.  Pre Op and Post op instructions reviewed.  Patient will  pre-op supplies and information from [facility] office [place].  Post op appointment scheduled.   All questions/concerns addressed.  Provided patient with call back number for any questions/concerns.

## 2024-10-11 NOTE — TELEPHONE ENCOUNTER
Patient called asks if needs to fast for labs etc, informed her no but should drink water and also needs to go to lab between 7-9 am.

## 2024-10-13 DIAGNOSIS — G89.29 CHRONIC PELVIC PAIN IN FEMALE: ICD-10-CM

## 2024-10-13 DIAGNOSIS — R10.84 GENERALIZED ABDOMINAL PAIN: ICD-10-CM

## 2024-10-13 DIAGNOSIS — N80.9 ENDOMETRIOSIS: ICD-10-CM

## 2024-10-13 DIAGNOSIS — R10.2 CHRONIC PELVIC PAIN IN FEMALE: ICD-10-CM

## 2024-10-14 RX ORDER — METHOCARBAMOL 750 MG/1
750 TABLET, FILM COATED ORAL EVERY 6 HOURS PRN
Qty: 120 TABLET | Refills: 0 | Status: SHIPPED | OUTPATIENT
Start: 2024-10-14

## 2024-10-16 NOTE — PROGRESS NOTES
Colon and Rectal Surgery   Lucie Camacho 33 y.o. female MRN: [id number]   Encounter: [id number]  10/18/24   4:06 PM  Ambulatory Visit  Name: Lucie Camacho      : 1990      MRN: [id number]  Encounter Provider: Chauncey King MD  Encounter Date: 10/18/2024   Encounter department: [facility] COLON AND RECTAL SURGERY [place]    Assessment & Plan  Endometriosis of rectum  Lucie returns today, she has discussed with Dr. Greene and planning extirpation of disease for infiltrative endometriosis affecting the rectum.  We discussed my portion of procedure today,  Robotic assisted versus laparoscopic possible open low anterior resection discussed, in a face-to-face, personal, informed consent process, the benefits, alternatives,risks including not limited to bleeding, infection, risks of anesthesia, open surgery, DVT/PE, heart attack, stroke, death, damage to local structures(e.g. ureter, duodenum),anastomotic leak requiring reoperation, temporary versus permanent stoma. They understood these risks, signed informed consent, and wish to proceed.    -Risk benefits alternatives were all discussed today  -All her questions were answered to her satisfaction  -Consent was completed  -I reassured her about our anesthesiologists and their expertise with pain care, did ask her to discuss all options including epidural which she had some concerns about today with them at the day of procedure especially with her preoperative narcotic needs           HPI  Lucie Camacho is a 33 y.o. female is here today for surgical planning of resection of pelvic endometriosis as requested by Dr. Greene.     Planning joint surgery on 25     Historical Information   Past Medical History:   Diagnosis Date    Anxiety     Chicken pox     Depression     Endometriosis     GERD (gastroesophageal reflux disease)     Headache     Occasional     HSV-1 infection     IBS (irritable bowel syndrome)     Kidney stone     Kidney  stone on left side     Multiple thyroid nodules     Obesity     Renal calculi      Past Surgical History:   Procedure Laterality Date    APPENDECTOMY  2021     SECTION      x2    CYSTOSCOPY N/A 2023    Procedure: CYSTOSCOPY;  Surgeon: Popeye Greene MD;  Location: [facility];  Service: Gynecology Oncology    EXAMINATION UNDER ANESTHESIA N/A 2023    Procedure: EXAM UNDER ANESTHESIA (EUA), I& D vaginal cuff abscess;  Surgeon: Popeye Greene MD;  [facility];  Service: Gynecology Oncology    HYSTERECTOMY      robotic total laparoscopic hysterectomy with BS    LAPAROSCOPIC ENDOMETRIOSIS FULGURATION  2018    laparoscopic excision of endometriosis, fulguration of endometriosis, lysis of adhesions    LAPAROSCOPY  2014    with I&D     MOLE REMOVAL      face - benign     PELVIC LAPAROSCOPY Bilateral 2023    Procedure: SALPINGO-OOPHORECTOMY, LAPAROSCOPIC W/ROBOTICS, RADICAL RESECTION PELVIC ENDOMETRIOSIS;  Surgeon: Popeye Greene MD;  [facility];  Service: Gynecology Oncology    PROCTOSCOPY N/A 2023    Procedure: PROCTOSCOPY;  Surgeon: Popeye Greene MD;  [facility];  Service: Gynecology Oncology    SKIN CANCER EXCISION      abdomen    THYROID CYST EXCISION      TONSILLECTOMY      TUBAL LIGATION  02/15/2016    With lysis of adhesion and peritoneal biopsy    US GUIDED INJECTION FOR RESEARCH STUDY  2015    VAGINA SURGERY N/A 2023    Procedure: VAGINECTOMY, PARTIAL;  Surgeon: Popeye Greene MD;  [facility];  Service: Gynecology Oncology    WISDOM TOOTH EXTRACTION         Meds/Allergies       Current Outpatient Medications:     acetaminophen (TYLENOL) 650 mg CR tablet, Take 1 tablet (650 mg total) by mouth every 8 (eight) hours as needed for mild pain, Disp: 30 tablet, Rfl: 0    methocarbamol (ROBAXIN) 750 mg tablet, take 1 tablet by mouth every 6 hours if needed for muscle spasm, Disp:  "120 tablet, Rfl: 0    naloxone (NARCAN) 4 mg/0.1 mL nasal spray, Administer 1 spray into a nostril. If no response after 2-3 minutes, give another dose in the other nostril using a new spray., Disp: 1 each, Rfl: 1    oxyCODONE (ROXICODONE) 10 MG TABS, Take 1 tablet (10 mg total) by mouth every 8 (eight) hours Max Daily Amount: 30 mg, Disp: 90 tablet, Rfl: 0    polyethylene glycol (MiraLax) 17 GM/SCOOP powder, Mix with 64 oz Gatorade, begin 4 PM day before surgery per bowel prep instructions. (Patient not taking: Reported on 10/10/2024), Disp: 238 g, Rfl: 0      Allergies   Allergen Reactions    Reglan [Metoclopramide] Other (See Comments)     Elevated heartrate    Other Blisters     Dermabond         Social History   Social History     Substance and Sexual Activity   Alcohol Use Not Currently    Comment: rarely     Social History     Substance and Sexual Activity   Drug Use No     Social History     Tobacco Use   Smoking Status Never   Smokeless Tobacco Never         Family History:   Family History   Problem Relation Age of Onset    Coronary artery disease Mother     Varicose Veins Mother     Other Mother         breast cyst - removed     Cholelithiasis Mother         had cholecystectomy    Alcohol abuse Father     Cholelithiasis Maternal Grandmother         had cholecystectomy    Uterine cancer Paternal Grandmother     Breast cancer Family         Before age 49     Uterine cancer Family     Obesity Family     Hypertension Family     Brain cancer Family     Gallbladder disease Family      Objective     Current Vitals:   Vitals:    10/18/24 1500   Weight: 111 kg (244 lb)   Height: 5' 3\" (1.6 m)       Physical Exam:  General:no distress  Neck:supple  Pulm:no increased work of breathing  Extremities:no edema        "

## 2024-10-17 ENCOUNTER — APPOINTMENT (OUTPATIENT)
Dept: LAB | Facility: IMAGING CENTER | Age: 34
End: 2024-10-17
Payer: COMMERCIAL

## 2024-10-17 DIAGNOSIS — Z11.4 ENCOUNTER FOR SCREENING FOR HIV: ICD-10-CM

## 2024-10-17 DIAGNOSIS — L68.0 HIRSUTISM: ICD-10-CM

## 2024-10-17 DIAGNOSIS — E04.1 THYROID NODULE: ICD-10-CM

## 2024-10-17 LAB
CORTIS AM PEAK SERPL-MCNC: 9.8 UG/DL (ref 6.7–22.6)
HIV 1+2 AB+HIV1 P24 AG SERPL QL IA: NORMAL
HIV 2 AB SERPL QL IA: NORMAL
HIV1 AB SERPL QL IA: NORMAL
HIV1 P24 AG SERPL QL IA: NORMAL
TSH SERPL DL<=0.05 MIU/L-ACNC: 1.11 UIU/ML (ref 0.45–4.5)

## 2024-10-17 PROCEDURE — 36415 COLL VENOUS BLD VENIPUNCTURE: CPT

## 2024-10-17 PROCEDURE — 82533 TOTAL CORTISOL: CPT

## 2024-10-17 PROCEDURE — 82627 DEHYDROEPIANDROSTERONE: CPT

## 2024-10-17 PROCEDURE — 87389 HIV-1 AG W/HIV-1&-2 AB AG IA: CPT

## 2024-10-17 PROCEDURE — 84403 ASSAY OF TOTAL TESTOSTERONE: CPT

## 2024-10-17 PROCEDURE — 84402 ASSAY OF FREE TESTOSTERONE: CPT

## 2024-10-17 PROCEDURE — 84443 ASSAY THYROID STIM HORMONE: CPT

## 2024-10-18 ENCOUNTER — TELEPHONE (OUTPATIENT)
Age: 34
End: 2024-10-18

## 2024-10-18 ENCOUNTER — OFFICE VISIT (OUTPATIENT)
Age: 34
End: 2024-10-18
Payer: COMMERCIAL

## 2024-10-18 VITALS — HEIGHT: 63 IN | WEIGHT: 244 LBS | BODY MASS INDEX: 43.23 KG/M2

## 2024-10-18 DIAGNOSIS — N80.519 ENDOMETRIOSIS OF RECTUM: Primary | ICD-10-CM

## 2024-10-18 LAB
DHEA-S SERPL-MCNC: 178 UG/DL (ref 84.8–378)
TESTOST FREE SERPL-MCNC: 1.2 PG/ML (ref 0–4.2)
TESTOST SERPL-MCNC: 12 NG/DL (ref 8–60)

## 2024-10-18 PROCEDURE — 99215 OFFICE O/P EST HI 40 MIN: CPT | Performed by: COLON & RECTAL SURGERY

## 2024-10-18 NOTE — LETTER
Lucie Camacho  [address] [facility] [address]        10/18/2024    Dear Lucie Camacho,    Your surgery is scheduled for: January 20, 2024    The hospital will call the evening prior to your surgery with your expected arrival time.   Location:[address] [address]    CHECK LIST PRIOR TO INPATIENT SURGERY  It is your responsibility to obtain any/all referrals needed for your surgery if required by your insurance. Our office will contact you to discuss your insurance coverage for this procedure.    Special instructions required if you are taking any blood thinners. Please verify with the prescribing physician. Examples include Coumadin, Plavix, Xarelto, Eliquis, Pradaxa, etc.    Please check with your family physician if you are taking the following medications: Aspirin or any Aspirin containing medication, Gingko biloba, Ginseng, Feverfew, and/or [place]’s Wort. We suggest stopping these for 3 days.     The night before and the day of your surgery, wash from your neck to groin with chlorhexidine soap.  This soap is available at most retail pharmacies under such brand names as Hibiclens, Endure or Aplicare.    Pre-admission testing Required: YES X     If Yes, what type:  BLOOD-WORK  X     BOWEL PREPARATION REQUIRED: YES X  If yes, start date: 1/19/25. Please see attached bowel preparation instructions.    NOTHING TO EAT OR DRINK AFTER MIDNIGHT PRIOR TO SURGERY.    Please do not hesitate to call our office with any questions regarding your surgery.                           MIRALAX/GATORADE SURGERY PREPARATION INSTRUCTIONS    Purchase:   (1) 238g bottle of MiraLax or Glycolax - no prescription needed   4 Dulcolax Laxative Tablets - no prescription needed   64 oz. bottle of Gatorade (NOT RED), Crystal Light, or another clear liquid of  choice.   **REMEMBER** A prescription for antibiotics has been called into your pharmacy for  prior to your  surgery.    One Day Prior to Surgery  Have a normal breakfast, light lunch, and clear liquids thereafter.  It is important that you drink plenty of clear liquids throughout the day to prevent dehydration.  CLEAR LIQUIDS INCLUDE:  Water, Clear Fruit Juice (Apple, Cranberry, Grape), Black Coffee, Tea, Ginger Ale, Gatorade, Leonardo-Aid, Hi-C, plain Jello, Ice Pops, Clear Broth or Bouillon, Crystal Light, Lemonade, Soda (regular or diet).    No Milk Products!    At 1:00 pm, take (4) Dulcolax Tablets with 8 oz. of water.  Swallow the tablets whole with a full glass of water.  The package may direct you not to exceed (2) tablets at any time but for the purpose of this examination, you should take (4).    At 1:00 pm, take your first dose of antibiotic as prescribed.    At 1:00 pm, Mix the 238g bottle of MiraLax in 64 oz. of Gatorade and shake the solution until the MiraLax is dissolved.  Drink 8 oz. glassfuls at your own pace.  It may take 3-4 hours to drink all the solution.    At 10:00 pm, take your last dose of antibiotic as prescribed.    REMEMBER TO STAY CLOSE TO TOILET FACILITIES.    The Day of Surgery  Take nothing by mouth until after surgery.                                          Post-Operative Care Information  Abdominal Surgery  What are my post-operative instructions?   The following information and instructions are for your continued care after discharge from the hospital. Please read the information (including the After Visit Summary provided to you at the time of discharge) carefully. If you have any questions or concerns, please contact the clinical staff at [phone]    How do I take care of my incision?  Your incision(s) may have surgical glue, dissolvable sutures with white pieces of tape called steri strips, or staples.   If you have steri strips or surgical glue, do not remove them. Steri strips will fall off naturally in 7-10 days. Surgical glue (Exofin) will fall off over a period of up to 2-3  weeks.   Do not put any topical ointments or lotions on the incisions.   Avoid tight clothing around your incision(s) or fabric that may irritate your skin such as wool, hooks and lolis.     Should I continue taking my prescribed medications?   Take medications as prescribed. Please review the After Visit Summary provided to you at the time of discharge for details.     How will I manage my pain at home?  For best pain control take your pain medication at regular intervals for the first couple of days, about every 4-6 hours. This will prevent pain build-up, which occurs when medication is taken on an as needed basis.   Use only as much prescription pain medication as you need (i.e. 1 tablet instead of 2).  Taper off the prescription pain medication as pain decreases, stopping narcotics first.   Use over-the-counter acetaminophen (Tylenolâ) if your doctor allows. When using over-the-counter medication, never use more than what is prescribed on the package directions. Do not take more than 3000mg of Tylenolâ in a 24 hour period. Do not take more than 2400mg of Ibuprofen (such as Motrinâ or Advilâ) in a 24 hour period.   While taking prescription pain medication you may not drive, work, or drink alcohol.     Are there any diet restrictions?   Continue low fiber diet up to 1 week post op.  (This allows the bowel to rest)    It is normal for bowel movements to be loose and occur more frequently post-operatively. This should improve over time.  During this time pay attention to hydration  1.5 weeks post op you may slowly begin to add fiber back into your diet.    By 2 weeks post op you may resume a normal high fiber diet.     What activity restrictions will I have?   Walk as much as tolerated.  Do not lift, pull, or push objects greater than 10 pounds for 6 weeks. This includes vacuuming, lifting children or groceries, walking the dog, mowing lawns, snow shoveling, etc. Please discuss other specific physical activities  with the doctor at your post op appointment.   Do not drive while taking pain medications. You may drive when you have no pain - usually in 1-2 weeks following surgery.   You may climb stairs in moderation but do not overtire.  Resume sexual activity after you are cleared by your doctor.               When will I receive follow-up care?   You will be scheduled to return to see your physician in the office typically in 3-4 weeks after surgery.    If you do not have a scheduled appointment at the time of discharge, please call the office at [phone].    When should I call my doctor?   Notify your doctor for any of the following signs and symptoms of possible infection:   Temperature above 101 degrees.   Increase in pain or discomfort.   Redness, swelling, drainage at incision site(s). A small amount of clear yellow or pinkish-yellow drainage is normal  If incision begins to open.     Call if you have any changes in your overall health such as having:   Nausea   Vomiting   Chills   profuse (excessive) sweating   inability to urinate or completely empty bladder   diarrhea   constipation

## 2024-10-18 NOTE — ASSESSMENT & PLAN NOTE
Lucie returns today, she has discussed with Dr. Greene and planning extirpation of disease for infiltrative endometriosis affecting the rectum.  We discussed my portion of procedure today,  Robotic assisted versus laparoscopic possible open low anterior resection discussed, in a face-to-face, personal, informed consent process, the benefits, alternatives,risks including not limited to bleeding, infection, risks of anesthesia, open surgery, DVT/PE, heart attack, stroke, death, damage to local structures(e.g. ureter, duodenum),anastomotic leak requiring reoperation, temporary versus permanent stoma. They understood these risks, signed informed consent, and wish to proceed.    -Risk benefits alternatives were all discussed today  -All her questions were answered to her satisfaction  -Consent was completed  -I reassured her about our anesthesiologists and their expertise with pain care, did ask her to discuss all options including epidural which she had some concerns about today with them at the day of procedure especially with her preoperative narcotic needs

## 2024-10-20 DIAGNOSIS — G89.29 CHRONIC PELVIC PAIN IN FEMALE: ICD-10-CM

## 2024-10-20 DIAGNOSIS — N80.519 ENDOMETRIOSIS OF RECTUM: ICD-10-CM

## 2024-10-20 DIAGNOSIS — R10.2 CHRONIC PELVIC PAIN IN FEMALE: ICD-10-CM

## 2024-10-21 DIAGNOSIS — E66.813 CLASS 3 SEVERE OBESITY WITH SERIOUS COMORBIDITY AND BODY MASS INDEX (BMI) OF 40.0 TO 44.9 IN ADULT, UNSPECIFIED OBESITY TYPE (HCC): Primary | ICD-10-CM

## 2024-10-21 DIAGNOSIS — E66.01 CLASS 3 SEVERE OBESITY WITH SERIOUS COMORBIDITY AND BODY MASS INDEX (BMI) OF 40.0 TO 44.9 IN ADULT, UNSPECIFIED OBESITY TYPE (HCC): Primary | ICD-10-CM

## 2024-10-21 NOTE — TELEPHONE ENCOUNTER
Medication: oxycodone 10mg  PDMP   09/23/2024 09/23/2024 oxyCODONE HCL IR 10 MG TAB (Tablet) 90.0 30 10 MG 45.0 NADIRA ROSE  09/10/2024 09/10/2024 oxyCODONE HCL IR 10 MG TAB (Tablet) 60.0 15 10 MG 60.0 NADIRA ROSE

## 2024-10-21 NOTE — TELEPHONE ENCOUNTER
FYI:   Pt called req refill on her Oxycodone 10 mg po q8  to pharmacy:   [id number] - [place] - [address] [place] [address]   Last refilled 9/23/24.Qty 90    If possible to send refill today, has transportation issues and only has a ride on Mondays.  Pls advise

## 2024-10-22 RX ORDER — OXYCODONE HYDROCHLORIDE 10 MG/1
10 TABLET ORAL EVERY 8 HOURS SCHEDULED
Qty: 90 TABLET | Refills: 0 | Status: SHIPPED | OUTPATIENT
Start: 2024-10-22

## 2024-10-24 DIAGNOSIS — Z00.6 ENCOUNTER FOR EXAMINATION FOR NORMAL COMPARISON OR CONTROL IN CLINICAL RESEARCH PROGRAM: ICD-10-CM

## 2024-11-08 ENCOUNTER — TELEPHONE (OUTPATIENT)
Dept: GYNECOLOGIC ONCOLOGY | Facility: CLINIC | Age: 34
End: 2024-11-08

## 2024-11-08 NOTE — TELEPHONE ENCOUNTER
Phoned patient - spoke with patient new date of surgery of 2/7/25 discussed and confired by patient.  New post op appointment scheduled.

## 2024-11-08 NOTE — TELEPHONE ENCOUNTER
Phoned patient and LMM surgery date change needed Dr. Greene unavailable on 1/20/25, new surgery date held for 2/7/2025.  Requesting a call back from patient to confirm.

## 2024-11-11 DIAGNOSIS — G89.29 CHRONIC PELVIC PAIN IN FEMALE: ICD-10-CM

## 2024-11-11 DIAGNOSIS — N80.9 ENDOMETRIOSIS: ICD-10-CM

## 2024-11-11 DIAGNOSIS — R10.2 CHRONIC PELVIC PAIN IN FEMALE: ICD-10-CM

## 2024-11-11 DIAGNOSIS — R10.84 GENERALIZED ABDOMINAL PAIN: ICD-10-CM

## 2024-11-12 RX ORDER — METHOCARBAMOL 750 MG/1
750 TABLET, FILM COATED ORAL EVERY 6 HOURS PRN
Qty: 120 TABLET | Refills: 0 | Status: SHIPPED | OUTPATIENT
Start: 2024-11-12

## 2024-11-18 DIAGNOSIS — R10.2 CHRONIC PELVIC PAIN IN FEMALE: ICD-10-CM

## 2024-11-18 DIAGNOSIS — N80.519 ENDOMETRIOSIS OF RECTUM: ICD-10-CM

## 2024-11-18 DIAGNOSIS — G89.29 CHRONIC PELVIC PAIN IN FEMALE: ICD-10-CM

## 2024-11-18 RX ORDER — OXYCODONE HYDROCHLORIDE 10 MG/1
10 TABLET ORAL EVERY 8 HOURS SCHEDULED
Qty: 90 TABLET | Refills: 0 | Status: SHIPPED | OUTPATIENT
Start: 2024-11-18

## 2024-11-18 NOTE — TELEPHONE ENCOUNTER
Refill must be reviewed and completed by the office or provider. The refill is unable to be approved or denied by the medication management team.        Patient Id Prescription # Filled Written Drug Label Qty Days Strength MME** Prescriber Pharmacy Payment REFILL #/Auth State Detail  1 [id number] 10/22/2024 10/22/2024 oxyCODONE HCL IR 10 MG TAB (Tablet) 90.0 30 10 MG 45.0 [employer] [facility] [place] Commercial [place] 0 / 0 [place]   1 [id number] 09/23/2024 09/23/2024 oxyCODONE HCL IR 10 MG TAB (Tablet) 90.0 30 10 MG 45.0 [employer] [facility] [place] Commercial [place] 0 / 0 [place]   1 [id number] 09/10/2024 09/10/2024 oxyCODONE HCL IR 10 MG TAB (Tablet) 60.0 15 10 MG 60.0 [facility] Commercial [place] 0 / 0 [place]   1 [id number] 08/12/2024 08/09/2024 oxyCODONE HCL IR 10 MG TAB (Tablet) 60.0 30 10 MG 30.0 [facility] Commercial [place] 0 / 0 [place]

## 2024-11-22 ENCOUNTER — APPOINTMENT (OUTPATIENT)
Dept: LAB | Facility: IMAGING CENTER | Age: 34
End: 2024-11-22

## 2024-11-22 DIAGNOSIS — Z00.6 ENCOUNTER FOR EXAMINATION FOR NORMAL COMPARISON OR CONTROL IN CLINICAL RESEARCH PROGRAM: ICD-10-CM

## 2024-11-22 PROCEDURE — 36415 COLL VENOUS BLD VENIPUNCTURE: CPT

## 2024-12-03 DIAGNOSIS — N80.9 ENDOMETRIOSIS: ICD-10-CM

## 2024-12-03 DIAGNOSIS — R10.84 GENERALIZED ABDOMINAL PAIN: ICD-10-CM

## 2024-12-03 DIAGNOSIS — G89.29 CHRONIC PELVIC PAIN IN FEMALE: ICD-10-CM

## 2024-12-03 DIAGNOSIS — R10.2 CHRONIC PELVIC PAIN IN FEMALE: ICD-10-CM

## 2024-12-04 RX ORDER — METHOCARBAMOL 750 MG/1
750 TABLET, FILM COATED ORAL EVERY 6 HOURS PRN
Qty: 120 TABLET | Refills: 0 | Status: SHIPPED | OUTPATIENT
Start: 2024-12-04

## 2024-12-08 LAB
APOB+LDLR+PCSK9 GENE MUT ANL BLD/T: NOT DETECTED
BRCA1+BRCA2 DEL+DUP + FULL MUT ANL BLD/T: NOT DETECTED
MLH1+MSH2+MSH6+PMS2 GN DEL+DUP+FUL M: NOT DETECTED

## 2024-12-16 DIAGNOSIS — R10.2 CHRONIC PELVIC PAIN IN FEMALE: ICD-10-CM

## 2024-12-16 DIAGNOSIS — G89.29 CHRONIC PELVIC PAIN IN FEMALE: ICD-10-CM

## 2024-12-16 DIAGNOSIS — N80.519 ENDOMETRIOSIS OF RECTUM: ICD-10-CM

## 2024-12-17 ENCOUNTER — TELEPHONE (OUTPATIENT)
Age: 34
End: 2024-12-17

## 2024-12-17 RX ORDER — OXYCODONE HYDROCHLORIDE 10 MG/1
10 TABLET ORAL EVERY 8 HOURS SCHEDULED
Qty: 90 TABLET | Refills: 0 | Status: SHIPPED | OUTPATIENT
Start: 2024-12-17

## 2024-12-17 NOTE — TELEPHONE ENCOUNTER
Received a phone call from patient.  Patient inquiring about Oxycodone refill that was requested yesterday.  Patient also stated that the medications that were prescribed prior to surgery have been lost and she needs another prescription for them.  Please advise.

## 2024-12-17 NOTE — TELEPHONE ENCOUNTER
Refill must be reviewed and completed by the office or provider. The refill is unable to be approved or denied by the medication management team.      Patient Id Prescription # Filled Written Drug Label Qty Days Strength MME** Prescriber Pharmacy Payment REFILL #/Auth State Detail   1 [id number] 11/20/2024 11/18/2024 oxyCODONE HCL IR 10 MG TAB (Tablet) 90.0 30 10 MG 45.0 [facility] [facility] [place] Commercial [place] 0 / 0 [place]    1 [id number] 10/22/2024 10/22/2024 oxyCODONE HCL IR 10 MG TAB (Tablet) 90.0 30 10 MG 45.0 [employer] [facility] [place] Commercial [place] 0 / 0 [place]    1 [id number] 09/23/2024 09/23/2024 oxyCODONE HCL IR 10 MG TAB (Tablet) 90.0 30 10 MG 45.0 [employer]  [place] [facility] Commercial [place] 0 / 0 [place]    1 [id number] 09/10/2024 09/10/2024 oxyCODONE HCL IR 10 MG TAB (Tablet) 60.0 15 10 MG 60.0 NADIRA [employer] [facility] [place] Commercial [place] 0 / 0 [place]    1 [id number] 08/12/2024 08/09/2024 oxyCODONE HCL IR 10 MG TAB (Tablet) 60.0 30 10 MG 30.0 [employer]  [place] [facility] [employer] 0 / 0 [place] Patient Complete Details   1 [id number] 07/15/2024 07/15/2024 oxyCODONE HCL IR 10 MG TAB (Tablet) 60.0 30 10 MG 30.0 [facility] [facility] Commercial [place] 0 / 0 [place]

## 2024-12-18 ENCOUNTER — TELEPHONE (OUTPATIENT)
Dept: GYNECOLOGIC ONCOLOGY | Facility: CLINIC | Age: 34
End: 2024-12-18

## 2024-12-18 NOTE — TELEPHONE ENCOUNTER
Called and left a message that the post op on 2/21 was rescheduled.  Patient was rescheduled to 2/24 at 3 pm in [place] with Dr. Greene.

## 2024-12-27 ENCOUNTER — TELEPHONE (OUTPATIENT)
Age: 34
End: 2024-12-27

## 2024-12-27 DIAGNOSIS — R39.89 BLADDER PAIN: ICD-10-CM

## 2024-12-27 DIAGNOSIS — N20.0 NEPHROLITHIASIS: Primary | ICD-10-CM

## 2024-12-27 RX ORDER — PHENAZOPYRIDINE HYDROCHLORIDE 100 MG/1
100 TABLET, FILM COATED ORAL 3 TIMES DAILY PRN
Qty: 12 TABLET | Refills: 0 | Status: SHIPPED | OUTPATIENT
Start: 2024-12-27

## 2024-12-27 RX ORDER — TAMSULOSIN HYDROCHLORIDE 0.4 MG/1
0.4 CAPSULE ORAL
Qty: 4 CAPSULE | Refills: 0 | Status: SHIPPED | OUTPATIENT
Start: 2024-12-27

## 2024-12-27 NOTE — TELEPHONE ENCOUNTER
AUGUSTINEN chart reviewed via [facility]. Patient with 4mm non-obstructing renal stone with low colony count UTI. She was provided rx for keflex. Patient continues to take oxycodone 10 mg PO TID. Will plan for additional of pyrdium and flomax x 4 days.

## 2024-12-27 NOTE — TELEPHONE ENCOUNTER
Pt calling in reporting that she has a UTI and kidney stones and would like to know if there is anything that she's able to do to help alleviate her symptoms. Please advise, thank you.

## 2024-12-31 DIAGNOSIS — N20.0 NEPHROLITHIASIS: ICD-10-CM

## 2024-12-31 RX ORDER — TAMSULOSIN HYDROCHLORIDE 0.4 MG/1
CAPSULE ORAL
Qty: 4 CAPSULE | Refills: 0 | Status: SHIPPED | OUTPATIENT
Start: 2024-12-31 | End: 2025-01-07 | Stop reason: ALTCHOICE

## 2025-01-02 DIAGNOSIS — R10.2 PELVIC PAIN: Primary | ICD-10-CM

## 2025-01-02 RX ORDER — KETOROLAC TROMETHAMINE 10 MG/1
10 TABLET, FILM COATED ORAL EVERY 6 HOURS PRN
Qty: 20 TABLET | Refills: 0 | Status: SHIPPED | OUTPATIENT
Start: 2025-01-02 | End: 2025-01-07

## 2025-01-07 ENCOUNTER — OFFICE VISIT (OUTPATIENT)
Dept: INTERNAL MEDICINE CLINIC | Age: 35
End: 2025-01-07
Payer: COMMERCIAL

## 2025-01-07 VITALS
WEIGHT: 244 LBS | HEART RATE: 69 BPM | BODY MASS INDEX: 43.23 KG/M2 | HEIGHT: 63 IN | TEMPERATURE: 98 F | DIASTOLIC BLOOD PRESSURE: 84 MMHG | SYSTOLIC BLOOD PRESSURE: 122 MMHG | OXYGEN SATURATION: 99 %

## 2025-01-07 DIAGNOSIS — R10.2 CHRONIC PELVIC PAIN IN FEMALE: ICD-10-CM

## 2025-01-07 DIAGNOSIS — R10.84 GENERALIZED ABDOMINAL PAIN: ICD-10-CM

## 2025-01-07 DIAGNOSIS — N94.10 DYSPAREUNIA IN FEMALE: ICD-10-CM

## 2025-01-07 DIAGNOSIS — N80.9 ENDOMETRIOSIS: ICD-10-CM

## 2025-01-07 DIAGNOSIS — N80.519 ENDOMETRIOSIS OF RECTUM: Primary | ICD-10-CM

## 2025-01-07 DIAGNOSIS — G89.29 CHRONIC PELVIC PAIN IN FEMALE: ICD-10-CM

## 2025-01-07 PROCEDURE — 99214 OFFICE O/P EST MOD 30 MIN: CPT | Performed by: INTERNAL MEDICINE

## 2025-01-07 RX ORDER — CEPHALEXIN 500 MG/1
1 CAPSULE ORAL 2 TIMES DAILY
COMMUNITY
Start: 2024-12-27 | End: 2025-01-15

## 2025-01-07 RX ORDER — METHOCARBAMOL 750 MG/1
750 TABLET, FILM COATED ORAL EVERY 6 HOURS PRN
Qty: 120 TABLET | Refills: 0 | Status: SHIPPED | OUTPATIENT
Start: 2025-01-07

## 2025-01-07 NOTE — ASSESSMENT & PLAN NOTE
Secondary to endometriosis  Orders:    methocarbamol (ROBAXIN) 750 mg tablet; Take 1 tablet (750 mg total) by mouth every 6 (six) hours as needed for muscle spasms

## 2025-01-07 NOTE — PROGRESS NOTES
Name: Lucie Camacho      : 1990      MRN: [id number]  Encounter Provider: Nely Brothers MD  Encounter Date: 2025   Encounter department: [facility]  :  Assessment & Plan  Endometriosis of rectum  Patient is a scheduled for surgery in February       Chronic pelvic pain in female  Secondary to endometriosis  Orders:    methocarbamol (ROBAXIN) 750 mg tablet; Take 1 tablet (750 mg total) by mouth every 6 (six) hours as needed for muscle spasms    Dyspareunia in female  Secondary to endometriosis       Endometriosis    Orders:    methocarbamol (ROBAXIN) 750 mg tablet; Take 1 tablet (750 mg total) by mouth every 6 (six) hours as needed for muscle spasms    Generalized abdominal pain    Orders:    methocarbamol (ROBAXIN) 750 mg tablet; Take 1 tablet (750 mg total) by mouth every 6 (six) hours as needed for muscle spasms    Chronic pelvic pain in female    Orders:    methocarbamol (ROBAXIN) 750 mg tablet; Take 1 tablet (750 mg total) by mouth every 6 (six) hours as needed for muscle spasms           History of Present Illness     Very pleasant 34 years young lady who is here today for the regular follow-up    Complaining of back pain pelvic pain these are nothing new symptoms she had problems with the recurrent endometriosis and multiple GYN surgery now she was diagnosed with the rectal endometriosis and she was planning to go for surgery for that in December that was changed to now February and we will follow    Lower back pain could be secondary to the same problem she is a taking Robaxin and continue to take her hydrocodone    Sometimes complain of epigastric pain and some tenderness but no heartburns no nausea or vomiting she does not have any diarrhea occasionally the bowel movements are hard and dry    Reviewed her medications    She was diagnosed with the left renal calculi and also UTI and she is on Keflex overall she is doing much better      Review of Systems  "  Constitutional:  Positive for fatigue. Negative for chills.   HENT:  Negative for congestion, ear pain, hearing loss, postnasal drip, sinus pressure, sore throat and voice change.    Eyes:  Negative for pain, discharge and visual disturbance.   Respiratory:  Negative for cough, chest tightness and shortness of breath.    Cardiovascular:  Negative for chest pain, palpitations and leg swelling.   Gastrointestinal:  Negative for abdominal pain, blood in stool, diarrhea, nausea and rectal pain.        Hard bowel movement   Genitourinary:  Positive for pelvic pain. Negative for difficulty urinating, dysuria and urgency.        Patient was just treated for a UTI and a kidney stone left kidney   Musculoskeletal:  Positive for back pain. Negative for arthralgias and joint swelling.   Skin:  Negative for rash.   Allergic/Immunologic: Negative for environmental allergies and food allergies.   Neurological:  Negative for dizziness, tremors, weakness, numbness and headaches.   Hematological:  Negative for adenopathy.   Psychiatric/Behavioral:  Negative for behavioral problems and hallucinations.        Objective   /84 (BP Location: Left arm, Patient Position: Sitting, Cuff Size: Large)   Pulse 69   Temp 98 °F (36.7 °C) (Temporal)   Ht 5' 3\" (1.6 m)   Wt 111 kg (244 lb)   SpO2 99%   BMI 43.22 kg/m²      Physical Exam  Constitutional:       Appearance: She is obese.   HENT:      Head: Normocephalic.   Eyes:      Pupils: Pupils are equal, round, and reactive to light.   Cardiovascular:      Rate and Rhythm: Normal rate and regular rhythm.      Heart sounds: No murmur heard.  Pulmonary:      Breath sounds: Normal breath sounds.   Abdominal:      General: Bowel sounds are normal.      Palpations: Abdomen is soft.      Tenderness: There is abdominal tenderness in the epigastric area.   Musculoskeletal:      Cervical back: Normal range of motion.   Skin:     General: Skin is warm.   Neurological:      Mental Status: She " is alert and oriented to person, place, and time.   Psychiatric:         Behavior: Behavior normal.         Thought Content: Thought content normal.

## 2025-01-13 ENCOUNTER — TELEPHONE (OUTPATIENT)
Age: 35
End: 2025-01-13

## 2025-01-13 DIAGNOSIS — G89.29 CHRONIC PELVIC PAIN IN FEMALE: ICD-10-CM

## 2025-01-13 DIAGNOSIS — N80.519 ENDOMETRIOSIS OF RECTUM: ICD-10-CM

## 2025-01-13 DIAGNOSIS — R10.2 CHRONIC PELVIC PAIN IN FEMALE: ICD-10-CM

## 2025-01-13 RX ORDER — OXYCODONE HYDROCHLORIDE 10 MG/1
10 TABLET ORAL EVERY 8 HOURS SCHEDULED
Qty: 90 TABLET | Refills: 0 | Status: SHIPPED | OUTPATIENT
Start: 2025-01-13

## 2025-01-13 NOTE — TELEPHONE ENCOUNTER
Refill must be reviewed and completed by the office or provider. The refill is unable to be approved or denied by the medication management team.      Patient Id Prescription # Filled Written Drug Label Qty Days Strength MME** Prescriber Pharmacy Payment REFILL #/Auth State Detail   1 [id number] 12/18/2024 12/17/2024 oxyCODONE HCL IR 10 MG TAB (Tablet) 90.0 30 10 MG 45.0 [employer] [facility] [place] Commercial Insurance 0 / 0 [place]    1 [id number] 11/20/2024 11/18/2024 oxyCODONE HCL IR 10 MG TAB (Tablet) 90.0 30 10 MG 45.0 [employer] [facility] [place] Commercial Insurance 0 / 0 [place]    1 [id number] 10/22/2024 10/22/2024 oxyCODONE HCL IR 10 MG TAB (Tablet) 90.0 30 10 MG 45.0 [facility] Commercial Insurance 0 / 0 [place]

## 2025-01-13 NOTE — TELEPHONE ENCOUNTER
Phoned patient to discuss the need for medical clearance, informed patient according to the surgical scheduling form, it is not indicated that a medical clearance is requested.  Patient confirmed.

## 2025-01-15 ENCOUNTER — ANESTHESIA EVENT (OUTPATIENT)
Dept: PERIOP | Facility: HOSPITAL | Age: 35
DRG: 230 | End: 2025-01-15
Payer: COMMERCIAL

## 2025-01-15 NOTE — PRE-PROCEDURE INSTRUCTIONS
Pre-Surgery Instructions:   Medication Instructions    acetaminophen (TYLENOL) 650 mg CR tablet Hold day of surgery.    methocarbamol (ROBAXIN) 750 mg tablet Uses PRN- OK to take day of surgery    oxyCODONE (ROXICODONE) 10 MG TABS Take day of surgery.    Medication instructions for day surgery reviewed. Please use only a sip of water to take your instructed medications. Avoid all over the counter vitamins, supplements and NSAIDS for one week prior to surgery per anesthesia guidelines. Tylenol is ok to take as needed.     You will receive a call one business day prior to surgery with an arrival time and hospital directions. If your surgery is scheduled on a Monday, the hospital will be calling you on the Friday prior to your surgery. If you have not heard from anyone by 8pm, please call the hospital supervisor through the hospital  at [phone]. ([place] [phone] or [place] [phone]).    Do not eat or drink anything after midnight the night before your surgery, including candy, mints, lifesavers, or chewing gum. Do not drink alcohol 24hrs before your surgery. Try not to smoke at least 24hrs before your surgery.       Follow the pre surgery showering instructions as listed in the “My Surgical Experience Booklet” or otherwise provided by your surgeon's office. Do not use a blade to shave the surgical area 1 week before surgery. It is okay to use a clean electric clippers up to 24 hours before surgery. Do not apply any lotions, creams, including makeup, cologne, deodorant, or perfumes after showering on the day of your surgery. Do not use dry shampoo, hair spray, hair gel, or any type of hair products.     No contact lenses, eye make-up, or artificial eyelashes. Remove nail polish, including gel polish, and any artificial, gel, or acrylic nails if possible. Remove all jewelry including rings and body piercing jewelry.     Wear causal clothing that is easy to take on and off. Consider your type of  surgery.    Keep any valuables, jewelry, piercings at home. Please bring any specially ordered equipment (sling, braces) if indicated.    Arrange for a responsible person to drive you to and from the hospital on the day of your surgery. Please confirm the visitor policy for the day of your procedure when you receive your phone call with an arrival time.     Call the surgeon's office with any new illnesses, exposures, or additional questions prior to surgery.    Please reference your “My Surgical Experience Booklet” for additional information to prepare for your upcoming surgery.    Pt verbalized understanding of shower, med, pre-op ensure, bowel prep and antibiotic instructions.

## 2025-01-23 ENCOUNTER — OFFICE VISIT (OUTPATIENT)
Dept: LAB | Facility: CLINIC | Age: 35
End: 2025-01-23
Payer: COMMERCIAL

## 2025-01-23 ENCOUNTER — APPOINTMENT (OUTPATIENT)
Dept: LAB | Facility: CLINIC | Age: 35
End: 2025-01-23
Payer: COMMERCIAL

## 2025-01-23 ENCOUNTER — LAB REQUISITION (OUTPATIENT)
Dept: LAB | Facility: HOSPITAL | Age: 35
End: 2025-01-23
Payer: COMMERCIAL

## 2025-01-23 DIAGNOSIS — N80.519 ENDOMETRIOSIS OF RECTUM: ICD-10-CM

## 2025-01-23 DIAGNOSIS — N80.519 ENDOMETRIOSIS OF THE RECTUM, UNSPECIFIED DEPTH: ICD-10-CM

## 2025-01-23 LAB
ABO GROUP BLD: NORMAL
ALBUMIN SERPL BCG-MCNC: 4 G/DL (ref 3.5–5)
ALP SERPL-CCNC: 59 U/L (ref 34–104)
ALT SERPL W P-5'-P-CCNC: 40 U/L (ref 7–52)
ANION GAP SERPL CALCULATED.3IONS-SCNC: 7 MMOL/L (ref 4–13)
AST SERPL W P-5'-P-CCNC: 23 U/L (ref 13–39)
BILIRUB SERPL-MCNC: 0.58 MG/DL (ref 0.2–1)
BLD GP AB SCN SERPL QL: NEGATIVE
BUN SERPL-MCNC: 13 MG/DL (ref 5–25)
CALCIUM SERPL-MCNC: 8.8 MG/DL (ref 8.4–10.2)
CHLORIDE SERPL-SCNC: 109 MMOL/L (ref 96–108)
CO2 SERPL-SCNC: 28 MMOL/L (ref 21–32)
CREAT SERPL-MCNC: 0.63 MG/DL (ref 0.6–1.3)
ERYTHROCYTE [DISTWIDTH] IN BLOOD BY AUTOMATED COUNT: 13.2 % (ref 11.6–15.1)
EST. AVERAGE GLUCOSE BLD GHB EST-MCNC: 88 MG/DL
GFR SERPL CREATININE-BSD FRML MDRD: 119 ML/MIN/1.73SQ M
GLUCOSE P FAST SERPL-MCNC: 92 MG/DL (ref 65–99)
HBA1C MFR BLD: 4.7 %
HCT VFR BLD AUTO: 36.3 % (ref 34.8–46.1)
HGB BLD-MCNC: 12.1 G/DL (ref 11.5–15.4)
INR PPP: 0.97 (ref 0.85–1.19)
MCH RBC QN AUTO: 28.5 PG (ref 26.8–34.3)
MCHC RBC AUTO-ENTMCNC: 33.3 G/DL (ref 31.4–37.4)
MCV RBC AUTO: 85 FL (ref 82–98)
PLATELET # BLD AUTO: 187 THOUSANDS/UL (ref 149–390)
PMV BLD AUTO: 9.4 FL (ref 8.9–12.7)
POTASSIUM SERPL-SCNC: 3.5 MMOL/L (ref 3.5–5.3)
PROT SERPL-MCNC: 6.4 G/DL (ref 6.4–8.4)
PROTHROMBIN TIME: 13.6 SECONDS (ref 12.3–15)
RBC # BLD AUTO: 4.25 MILLION/UL (ref 3.81–5.12)
RH BLD: POSITIVE
SODIUM SERPL-SCNC: 144 MMOL/L (ref 135–147)
SPECIMEN EXPIRATION DATE: NORMAL
WBC # BLD AUTO: 7.6 THOUSAND/UL (ref 4.31–10.16)

## 2025-01-23 PROCEDURE — 80053 COMPREHEN METABOLIC PANEL: CPT

## 2025-01-23 PROCEDURE — 85027 COMPLETE CBC AUTOMATED: CPT

## 2025-01-23 PROCEDURE — 86901 BLOOD TYPING SEROLOGIC RH(D): CPT | Performed by: OBSTETRICS & GYNECOLOGY

## 2025-01-23 PROCEDURE — 86850 RBC ANTIBODY SCREEN: CPT | Performed by: OBSTETRICS & GYNECOLOGY

## 2025-01-23 PROCEDURE — 86900 BLOOD TYPING SEROLOGIC ABO: CPT | Performed by: OBSTETRICS & GYNECOLOGY

## 2025-01-23 PROCEDURE — 83036 HEMOGLOBIN GLYCOSYLATED A1C: CPT

## 2025-01-23 PROCEDURE — 93005 ELECTROCARDIOGRAM TRACING: CPT

## 2025-01-23 PROCEDURE — 36415 COLL VENOUS BLD VENIPUNCTURE: CPT

## 2025-01-23 PROCEDURE — 85610 PROTHROMBIN TIME: CPT

## 2025-01-25 LAB
ATRIAL RATE: 48 BPM
P AXIS: 52 DEGREES
PR INTERVAL: 178 MS
QRS AXIS: 51 DEGREES
QRSD INTERVAL: 96 MS
QT INTERVAL: 426 MS
QTC INTERVAL: 381 MS
T WAVE AXIS: 44 DEGREES
VENTRICULAR RATE: 48 BPM

## 2025-01-25 PROCEDURE — 93010 ELECTROCARDIOGRAM REPORT: CPT | Performed by: STUDENT IN AN ORGANIZED HEALTH CARE EDUCATION/TRAINING PROGRAM

## 2025-01-30 DIAGNOSIS — R10.84 GENERALIZED ABDOMINAL PAIN: ICD-10-CM

## 2025-01-30 DIAGNOSIS — G89.29 CHRONIC PELVIC PAIN IN FEMALE: ICD-10-CM

## 2025-01-30 DIAGNOSIS — R10.2 CHRONIC PELVIC PAIN IN FEMALE: ICD-10-CM

## 2025-01-30 DIAGNOSIS — N80.9 ENDOMETRIOSIS: ICD-10-CM

## 2025-01-30 RX ORDER — METHOCARBAMOL 750 MG/1
750 TABLET, FILM COATED ORAL EVERY 6 HOURS PRN
Qty: 120 TABLET | Refills: 0 | Status: ON HOLD | OUTPATIENT
Start: 2025-01-30

## 2025-01-31 ENCOUNTER — APPOINTMENT (OUTPATIENT)
Dept: PREADMISSION TESTING | Facility: HOSPITAL | Age: 35
End: 2025-01-31
Payer: COMMERCIAL

## 2025-01-31 ENCOUNTER — HOSPITAL ENCOUNTER (OUTPATIENT)
Dept: RADIOLOGY | Facility: IMAGING CENTER | Age: 35
End: 2025-01-31
Payer: COMMERCIAL

## 2025-01-31 DIAGNOSIS — N80.519 ENDOMETRIOSIS OF RECTUM: ICD-10-CM

## 2025-01-31 PROCEDURE — 71046 X-RAY EXAM CHEST 2 VIEWS: CPT

## 2025-02-01 ENCOUNTER — DOCUMENTATION (OUTPATIENT)
Dept: OTHER | Facility: HOSPITAL | Age: 35
End: 2025-02-01

## 2025-02-01 ENCOUNTER — TELEPHONE (OUTPATIENT)
Dept: OTHER | Facility: OTHER | Age: 35
End: 2025-02-01

## 2025-02-01 DIAGNOSIS — B37.31 VAGINAL CANDIDIASIS: Primary | ICD-10-CM

## 2025-02-01 RX ORDER — FLUCONAZOLE 150 MG/1
150 TABLET ORAL ONCE
Qty: 1 TABLET | Refills: 0 | Status: SHIPPED | OUTPATIENT
Start: 2025-02-01 | End: 2025-02-01

## 2025-02-01 NOTE — TELEPHONE ENCOUNTER
Pt called stating she believe she has developed a yeast infection, as a late side effect of the antibiotics she was in.     On call provider paged.

## 2025-02-04 ENCOUNTER — TELEPHONE (OUTPATIENT)
Age: 35
End: 2025-02-04

## 2025-02-04 DIAGNOSIS — B37.31 VAGINAL CANDIDIASIS: ICD-10-CM

## 2025-02-04 DIAGNOSIS — N80.519 ENDOMETRIOSIS OF RECTUM: ICD-10-CM

## 2025-02-04 RX ORDER — METRONIDAZOLE 500 MG/1
500 TABLET ORAL ONCE
Qty: 1 TABLET | Refills: 0 | Status: SHIPPED | OUTPATIENT
Start: 2025-02-04 | End: 2025-02-04

## 2025-02-04 RX ORDER — FLUCONAZOLE 150 MG/1
TABLET ORAL
Qty: 1 TABLET | Refills: 0 | OUTPATIENT
Start: 2025-02-04

## 2025-02-04 RX ORDER — NEOMYCIN SULFATE 500 MG/1
1000 TABLET ORAL 3 TIMES DAILY
Qty: 6 TABLET | Refills: 0 | Status: SHIPPED | OUTPATIENT
Start: 2025-02-04 | End: 2025-02-05

## 2025-02-04 NOTE — TELEPHONE ENCOUNTER
Received a phone call from patient.  Patient stated that she was prescribed Neomycin and Metronidazole back in September for her upcoming surgery on 2/7.  Patient stated that she cannot find prescriptions and will need them to be reordered.  Patient saw Dr. Greene on 9/20 and instructions were to start taking those medications now.  Patient stated that she did not take them and cannot find them in her house now.  Patient was prescribed Fluconazole yesterday and she is reporting that it is not helping as of yet.  Please call patient with any further recommendations.

## 2025-02-04 NOTE — TELEPHONE ENCOUNTER
Can you confirm he wants neomycin and flagyl? If so, I will re-send. Can you also let her know I would wait another 24 hrs to see if she receives relief from fluconazole.

## 2025-02-07 ENCOUNTER — ANESTHESIA (OUTPATIENT)
Dept: PERIOP | Facility: HOSPITAL | Age: 35
DRG: 230 | End: 2025-02-07
Payer: COMMERCIAL

## 2025-02-07 ENCOUNTER — HOSPITAL ENCOUNTER (INPATIENT)
Facility: HOSPITAL | Age: 35
LOS: 4 days | Discharge: HOME | DRG: 230 | End: 2025-02-11
Attending: OBSTETRICS & GYNECOLOGY | Admitting: OBSTETRICS & GYNECOLOGY
Payer: COMMERCIAL

## 2025-02-07 DIAGNOSIS — R10.2 CHRONIC PELVIC PAIN IN FEMALE: Primary | ICD-10-CM

## 2025-02-07 DIAGNOSIS — G89.29 CHRONIC PELVIC PAIN IN FEMALE: Primary | ICD-10-CM

## 2025-02-07 DIAGNOSIS — N80.519 ENDOMETRIOSIS OF RECTUM: ICD-10-CM

## 2025-02-07 LAB
GLUCOSE SERPL-MCNC: 123 MG/DL (ref 65–140)
GLUCOSE SERPL-MCNC: 144 MG/DL (ref 65–140)

## 2025-02-07 PROCEDURE — 0U5B8ZZ DESTRUCTION OF ENDOMETRIUM, VIA NATURAL OR ARTIFICIAL OPENING ENDOSCOPIC: ICD-10-PCS | Performed by: OBSTETRICS & GYNECOLOGY

## 2025-02-07 PROCEDURE — NC001 PR NO CHARGE: Performed by: OBSTETRICS & GYNECOLOGY

## 2025-02-07 PROCEDURE — 44207 L COLECTOMY/COLOPROCTOSTOMY: CPT | Performed by: PHYSICIAN ASSISTANT

## 2025-02-07 PROCEDURE — NC001 PR NO CHARGE: Performed by: PHYSICIAN ASSISTANT

## 2025-02-07 PROCEDURE — 52005 CYSTO W/URTRL CATHJ: CPT | Performed by: OBSTETRICS & GYNECOLOGY

## 2025-02-07 PROCEDURE — 82330 ASSAY OF CALCIUM: CPT

## 2025-02-07 PROCEDURE — 88307 TISSUE EXAM BY PATHOLOGIST: CPT | Performed by: PATHOLOGY

## 2025-02-07 PROCEDURE — 0D1B4Z4 BYPASS ILEUM TO CUTANEOUS, PERCUTANEOUS ENDOSCOPIC APPROACH: ICD-10-PCS | Performed by: OBSTETRICS & GYNECOLOGY

## 2025-02-07 PROCEDURE — 82948 REAGENT STRIP/BLOOD GLUCOSE: CPT

## 2025-02-07 PROCEDURE — 85014 HEMATOCRIT: CPT

## 2025-02-07 PROCEDURE — S2900 ROBOTIC SURGICAL SYSTEM: HCPCS | Performed by: COLON & RECTAL SURGERY

## 2025-02-07 PROCEDURE — 44207 L COLECTOMY/COLOPROCTOSTOMY: CPT | Performed by: COLON & RECTAL SURGERY

## 2025-02-07 PROCEDURE — 82947 ASSAY GLUCOSE BLOOD QUANT: CPT

## 2025-02-07 PROCEDURE — 84295 ASSAY OF SERUM SODIUM: CPT

## 2025-02-07 PROCEDURE — 84132 ASSAY OF SERUM POTASSIUM: CPT

## 2025-02-07 PROCEDURE — 0T788DZ DILATION OF BILATERAL URETERS WITH INTRALUMINAL DEVICE, VIA NATURAL OR ARTIFICIAL OPENING ENDOSCOPIC: ICD-10-PCS | Performed by: OBSTETRICS & GYNECOLOGY

## 2025-02-07 PROCEDURE — 58662 LAPAROSCOPY EXCISE LESIONS: CPT | Performed by: OBSTETRICS & GYNECOLOGY

## 2025-02-07 PROCEDURE — 88305 TISSUE EXAM BY PATHOLOGIST: CPT | Performed by: PATHOLOGY

## 2025-02-07 PROCEDURE — 99024 POSTOP FOLLOW-UP VISIT: CPT | Performed by: COLON & RECTAL SURGERY

## 2025-02-07 PROCEDURE — S2900 ROBOTIC SURGICAL SYSTEM: HCPCS | Performed by: OBSTETRICS & GYNECOLOGY

## 2025-02-07 PROCEDURE — 0UB94ZZ EXCISION OF UTERUS, PERCUTANEOUS ENDOSCOPIC APPROACH: ICD-10-PCS | Performed by: OBSTETRICS & GYNECOLOGY

## 2025-02-07 PROCEDURE — 82803 BLOOD GASES ANY COMBINATION: CPT

## 2025-02-07 PROCEDURE — 8E0W4CZ ROBOTIC ASSISTED PROCEDURE OF TRUNK REGION, PERCUTANEOUS ENDOSCOPIC APPROACH: ICD-10-PCS | Performed by: OBSTETRICS & GYNECOLOGY

## 2025-02-07 RX ORDER — HEPARIN SODIUM 5000 [USP'U]/ML
5000 INJECTION, SOLUTION INTRAVENOUS; SUBCUTANEOUS EVERY 8 HOURS SCHEDULED
Status: DISCONTINUED | OUTPATIENT
Start: 2025-02-07 | End: 2025-02-09

## 2025-02-07 RX ORDER — SODIUM CHLORIDE, SODIUM LACTATE, POTASSIUM CHLORIDE, CALCIUM CHLORIDE 600; 310; 30; 20 MG/100ML; MG/100ML; MG/100ML; MG/100ML
75 INJECTION, SOLUTION INTRAVENOUS CONTINUOUS
Status: CANCELLED | OUTPATIENT
Start: 2025-02-07

## 2025-02-07 RX ORDER — ROPIVACAINE HYDROCHLORIDE 2 MG/ML
INJECTION, SOLUTION EPIDURAL; INFILTRATION; PERINEURAL AS NEEDED
Status: DISCONTINUED | OUTPATIENT
Start: 2025-02-07 | End: 2025-02-07

## 2025-02-07 RX ORDER — ACETAMINOPHEN 10 MG/ML
1000 INJECTION, SOLUTION INTRAVENOUS EVERY 6 HOURS
Status: DISCONTINUED | OUTPATIENT
Start: 2025-02-07 | End: 2025-02-07

## 2025-02-07 RX ORDER — PROPOFOL 10 MG/ML
INJECTION, EMULSION INTRAVENOUS AS NEEDED
Status: DISCONTINUED | OUTPATIENT
Start: 2025-02-07 | End: 2025-02-07

## 2025-02-07 RX ORDER — HYDROXYZINE HYDROCHLORIDE 25 MG/1
25 TABLET, FILM COATED ORAL EVERY 6 HOURS PRN
Status: DISCONTINUED | OUTPATIENT
Start: 2025-02-07 | End: 2025-02-11 | Stop reason: HOSPADM

## 2025-02-07 RX ORDER — HYDROMORPHONE HCL/PF 1 MG/ML
0.5 SYRINGE (ML) INJECTION EVERY 2 HOUR PRN
Status: DISCONTINUED | OUTPATIENT
Start: 2025-02-07 | End: 2025-02-11 | Stop reason: HOSPADM

## 2025-02-07 RX ORDER — MAGNESIUM HYDROXIDE 1200 MG/15ML
LIQUID ORAL AS NEEDED
Status: DISCONTINUED | OUTPATIENT
Start: 2025-02-07 | End: 2025-02-07 | Stop reason: HOSPADM

## 2025-02-07 RX ORDER — BUPIVACAINE HYDROCHLORIDE 2.5 MG/ML
INJECTION, SOLUTION EPIDURAL; INFILTRATION; INTRACAUDAL AS NEEDED
Status: DISCONTINUED | OUTPATIENT
Start: 2025-02-07 | End: 2025-02-07 | Stop reason: HOSPADM

## 2025-02-07 RX ORDER — DEXAMETHASONE SODIUM PHOSPHATE 10 MG/ML
INJECTION, SOLUTION INTRAMUSCULAR; INTRAVENOUS AS NEEDED
Status: DISCONTINUED | OUTPATIENT
Start: 2025-02-07 | End: 2025-02-07

## 2025-02-07 RX ORDER — HYDROMORPHONE HCL/PF 1 MG/ML
0.5 SYRINGE (ML) INJECTION EVERY 2 HOUR PRN
Status: DISCONTINUED | OUTPATIENT
Start: 2025-02-07 | End: 2025-02-07

## 2025-02-07 RX ORDER — SODIUM CHLORIDE, SODIUM LACTATE, POTASSIUM CHLORIDE, CALCIUM CHLORIDE 600; 310; 30; 20 MG/100ML; MG/100ML; MG/100ML; MG/100ML
125 INJECTION, SOLUTION INTRAVENOUS CONTINUOUS
Status: DISCONTINUED | OUTPATIENT
Start: 2025-02-07 | End: 2025-02-07

## 2025-02-07 RX ORDER — MIDAZOLAM HYDROCHLORIDE 2 MG/2ML
INJECTION, SOLUTION INTRAMUSCULAR; INTRAVENOUS AS NEEDED
Status: DISCONTINUED | OUTPATIENT
Start: 2025-02-07 | End: 2025-02-07

## 2025-02-07 RX ORDER — LIDOCAINE HYDROCHLORIDE AND EPINEPHRINE 15; 5 MG/ML; UG/ML
INJECTION, SOLUTION EPIDURAL
Status: COMPLETED | OUTPATIENT
Start: 2025-02-07 | End: 2025-02-07

## 2025-02-07 RX ORDER — SODIUM CHLORIDE 9 MG/ML
INJECTION, SOLUTION INTRAVENOUS CONTINUOUS PRN
Status: DISCONTINUED | OUTPATIENT
Start: 2025-02-07 | End: 2025-02-07

## 2025-02-07 RX ORDER — ACETAMINOPHEN 325 MG/1
975 TABLET ORAL EVERY 6 HOURS PRN
Status: DISCONTINUED | OUTPATIENT
Start: 2025-02-08 | End: 2025-02-07

## 2025-02-07 RX ORDER — SODIUM CHLORIDE, SODIUM LACTATE, POTASSIUM CHLORIDE, CALCIUM CHLORIDE 600; 310; 30; 20 MG/100ML; MG/100ML; MG/100ML; MG/100ML
INJECTION, SOLUTION INTRAVENOUS CONTINUOUS PRN
Status: DISCONTINUED | OUTPATIENT
Start: 2025-02-07 | End: 2025-02-07

## 2025-02-07 RX ORDER — CEFAZOLIN SODIUM 2 G/50ML
2000 SOLUTION INTRAVENOUS ONCE
Status: DISCONTINUED | OUTPATIENT
Start: 2025-02-07 | End: 2025-02-07

## 2025-02-07 RX ORDER — HYDROMORPHONE HCL IN WATER/PF 6 MG/30 ML
0.2 PATIENT CONTROLLED ANALGESIA SYRINGE INTRAVENOUS
Status: DISCONTINUED | OUTPATIENT
Start: 2025-02-07 | End: 2025-02-07 | Stop reason: HOSPADM

## 2025-02-07 RX ORDER — KETOROLAC TROMETHAMINE 30 MG/ML
INJECTION, SOLUTION INTRAMUSCULAR; INTRAVENOUS AS NEEDED
Status: DISCONTINUED | OUTPATIENT
Start: 2025-02-07 | End: 2025-02-07

## 2025-02-07 RX ORDER — ACETAMINOPHEN 325 MG/1
650 TABLET ORAL EVERY 6 HOURS
Status: DISCONTINUED | OUTPATIENT
Start: 2025-02-07 | End: 2025-02-07

## 2025-02-07 RX ORDER — ROCURONIUM BROMIDE 10 MG/ML
INJECTION, SOLUTION INTRAVENOUS AS NEEDED
Status: DISCONTINUED | OUTPATIENT
Start: 2025-02-07 | End: 2025-02-07

## 2025-02-07 RX ORDER — SODIUM CHLORIDE, SODIUM LACTATE, POTASSIUM CHLORIDE, CALCIUM CHLORIDE 600; 310; 30; 20 MG/100ML; MG/100ML; MG/100ML; MG/100ML
125 INJECTION, SOLUTION INTRAVENOUS CONTINUOUS
Status: DISCONTINUED | OUTPATIENT
Start: 2025-02-07 | End: 2025-02-08

## 2025-02-07 RX ORDER — FENTANYL CITRATE 50 UG/ML
INJECTION, SOLUTION INTRAMUSCULAR; INTRAVENOUS AS NEEDED
Status: DISCONTINUED | OUTPATIENT
Start: 2025-02-07 | End: 2025-02-07

## 2025-02-07 RX ORDER — ACETAMINOPHEN 325 MG/1
975 TABLET ORAL ONCE
Status: COMPLETED | OUTPATIENT
Start: 2025-02-07 | End: 2025-02-07

## 2025-02-07 RX ORDER — DIPHENHYDRAMINE HYDROCHLORIDE 50 MG/ML
12.5 INJECTION INTRAMUSCULAR; INTRAVENOUS EVERY 6 HOURS PRN
Status: DISCONTINUED | OUTPATIENT
Start: 2025-02-07 | End: 2025-02-11 | Stop reason: HOSPADM

## 2025-02-07 RX ORDER — LIDOCAINE HYDROCHLORIDE 10 MG/ML
INJECTION, SOLUTION EPIDURAL; INFILTRATION; INTRACAUDAL; PERINEURAL AS NEEDED
Status: DISCONTINUED | OUTPATIENT
Start: 2025-02-07 | End: 2025-02-07

## 2025-02-07 RX ORDER — ONDANSETRON 2 MG/ML
4 INJECTION INTRAMUSCULAR; INTRAVENOUS EVERY 6 HOURS PRN
Status: DISCONTINUED | OUTPATIENT
Start: 2025-02-07 | End: 2025-02-11 | Stop reason: HOSPADM

## 2025-02-07 RX ORDER — ACETAMINOPHEN 325 MG/1
650 TABLET ORAL EVERY 6 HOURS PRN
Status: DISCONTINUED | OUTPATIENT
Start: 2025-02-07 | End: 2025-02-07

## 2025-02-07 RX ORDER — KETOROLAC TROMETHAMINE 30 MG/ML
15 INJECTION, SOLUTION INTRAMUSCULAR; INTRAVENOUS EVERY 6 HOURS SCHEDULED
Status: DISCONTINUED | OUTPATIENT
Start: 2025-02-07 | End: 2025-02-07

## 2025-02-07 RX ORDER — HYDROMORPHONE HCL/PF 1 MG/ML
SYRINGE (ML) INJECTION AS NEEDED
Status: DISCONTINUED | OUTPATIENT
Start: 2025-02-07 | End: 2025-02-07

## 2025-02-07 RX ORDER — ACETAMINOPHEN 10 MG/ML
1000 INJECTION, SOLUTION INTRAVENOUS EVERY 6 HOURS
Status: COMPLETED | OUTPATIENT
Start: 2025-02-07 | End: 2025-02-08

## 2025-02-07 RX ORDER — ONDANSETRON 2 MG/ML
INJECTION INTRAMUSCULAR; INTRAVENOUS AS NEEDED
Status: DISCONTINUED | OUTPATIENT
Start: 2025-02-07 | End: 2025-02-07

## 2025-02-07 RX ORDER — FENTANYL CITRATE/PF 50 MCG/ML
50 SYRINGE (ML) INJECTION
Status: DISCONTINUED | OUTPATIENT
Start: 2025-02-07 | End: 2025-02-07 | Stop reason: HOSPADM

## 2025-02-07 RX ORDER — CEFAZOLIN SODIUM 1 G/3ML
INJECTION, POWDER, FOR SOLUTION INTRAMUSCULAR; INTRAVENOUS AS NEEDED
Status: DISCONTINUED | OUTPATIENT
Start: 2025-02-07 | End: 2025-02-07

## 2025-02-07 RX ORDER — ACETAMINOPHEN 325 MG/1
975 TABLET ORAL EVERY 6 HOURS SCHEDULED
Status: DISCONTINUED | OUTPATIENT
Start: 2025-02-08 | End: 2025-02-11 | Stop reason: HOSPADM

## 2025-02-07 RX ORDER — PROPOFOL 10 MG/ML
INJECTION, EMULSION INTRAVENOUS CONTINUOUS PRN
Status: DISCONTINUED | OUTPATIENT
Start: 2025-02-07 | End: 2025-02-07

## 2025-02-07 RX ORDER — HEPARIN SODIUM 5000 [USP'U]/ML
5000 INJECTION, SOLUTION INTRAVENOUS; SUBCUTANEOUS
Status: DISCONTINUED | OUTPATIENT
Start: 2025-02-07 | End: 2025-02-07

## 2025-02-07 RX ORDER — HEPARIN SODIUM 5000 [USP'U]/ML
INJECTION, SOLUTION INTRAVENOUS; SUBCUTANEOUS AS NEEDED
Status: DISCONTINUED | OUTPATIENT
Start: 2025-02-07 | End: 2025-02-07

## 2025-02-07 RX ORDER — METRONIDAZOLE 500 MG/100ML
INJECTION, SOLUTION INTRAVENOUS CONTINUOUS PRN
Status: DISCONTINUED | OUTPATIENT
Start: 2025-02-07 | End: 2025-02-07

## 2025-02-07 RX ORDER — ONDANSETRON 2 MG/ML
4 INJECTION INTRAMUSCULAR; INTRAVENOUS EVERY 4 HOURS PRN
Status: DISCONTINUED | OUTPATIENT
Start: 2025-02-07 | End: 2025-02-07

## 2025-02-07 RX ORDER — METRONIDAZOLE 500 MG/100ML
500 INJECTION, SOLUTION INTRAVENOUS ONCE
Status: DISCONTINUED | OUTPATIENT
Start: 2025-02-07 | End: 2025-02-07

## 2025-02-07 RX ADMIN — ROCURONIUM 10 MG: 50 INJECTION, SOLUTION INTRAVENOUS at 10:43

## 2025-02-07 RX ADMIN — SODIUM CHLORIDE, SODIUM LACTATE, POTASSIUM CHLORIDE, AND CALCIUM CHLORIDE: .6; .31; .03; .02 INJECTION, SOLUTION INTRAVENOUS at 11:34

## 2025-02-07 RX ADMIN — HYDROMORPHONE HYDROCHLORIDE 1 MG: 1 INJECTION, SOLUTION INTRAMUSCULAR; INTRAVENOUS; SUBCUTANEOUS at 08:36

## 2025-02-07 RX ADMIN — ROCURONIUM 100 MG: 50 INJECTION, SOLUTION INTRAVENOUS at 07:53

## 2025-02-07 RX ADMIN — PHENYLEPHRINE HYDROCHLORIDE 100 MCG: 10 INJECTION INTRAVENOUS at 11:03

## 2025-02-07 RX ADMIN — ROCURONIUM 10 MG: 50 INJECTION, SOLUTION INTRAVENOUS at 10:58

## 2025-02-07 RX ADMIN — LIDOCAINE HYDROCHLORIDE AND EPINEPHRINE 5 ML: 15; 5 INJECTION, SOLUTION EPIDURAL at 07:24

## 2025-02-07 RX ADMIN — FENTANYL CITRATE 100 MCG: 50 INJECTION INTRAMUSCULAR; INTRAVENOUS at 07:54

## 2025-02-07 RX ADMIN — Medication: at 13:46

## 2025-02-07 RX ADMIN — FENTANYL CITRATE 100 MCG: 50 INJECTION INTRAMUSCULAR; INTRAVENOUS at 08:42

## 2025-02-07 RX ADMIN — SODIUM CHLORIDE, SODIUM LACTATE, POTASSIUM CHLORIDE, AND CALCIUM CHLORIDE 125 ML/HR: .6; .31; .03; .02 INJECTION, SOLUTION INTRAVENOUS at 23:24

## 2025-02-07 RX ADMIN — METRONIDAZOLE: 500 SOLUTION INTRAVENOUS at 08:32

## 2025-02-07 RX ADMIN — HEPARIN SODIUM 5000 UNITS: 5000 INJECTION INTRAVENOUS; SUBCUTANEOUS at 16:04

## 2025-02-07 RX ADMIN — SODIUM CHLORIDE, SODIUM LACTATE, POTASSIUM CHLORIDE, AND CALCIUM CHLORIDE: .6; .31; .03; .02 INJECTION, SOLUTION INTRAVENOUS at 08:43

## 2025-02-07 RX ADMIN — FENTANYL CITRATE 50 MCG: 50 INJECTION INTRAMUSCULAR; INTRAVENOUS at 07:21

## 2025-02-07 RX ADMIN — ROPIVACAINE HYDROCHLORIDE 5 MG: 2 INJECTION, SOLUTION EPIDURAL; INFILTRATION at 11:55

## 2025-02-07 RX ADMIN — ROPIVACAINE HYDROCHLORIDE 5 MG: 2 INJECTION, SOLUTION EPIDURAL; INFILTRATION at 11:59

## 2025-02-07 RX ADMIN — HEPARIN SODIUM 5000 UNITS: 5000 INJECTION INTRAVENOUS; SUBCUTANEOUS at 23:25

## 2025-02-07 RX ADMIN — KETOROLAC TROMETHAMINE 30 MG: 30 INJECTION, SOLUTION INTRAMUSCULAR; INTRAVENOUS at 12:01

## 2025-02-07 RX ADMIN — HYDROMORPHONE HYDROCHLORIDE 0.5 MG: 1 INJECTION, SOLUTION INTRAMUSCULAR; INTRAVENOUS; SUBCUTANEOUS at 16:10

## 2025-02-07 RX ADMIN — FENTANYL CITRATE 100 MCG: 50 INJECTION INTRAMUSCULAR; INTRAVENOUS at 08:25

## 2025-02-07 RX ADMIN — FENTANYL CITRATE 50 MCG: 50 INJECTION INTRAMUSCULAR; INTRAVENOUS at 07:52

## 2025-02-07 RX ADMIN — PROPOFOL 200 MG: 10 INJECTION, EMULSION INTRAVENOUS at 07:52

## 2025-02-07 RX ADMIN — DEXMEDETOMIDINE HYDROCHLORIDE 20 MCG: 100 INJECTION, SOLUTION INTRAVENOUS at 10:07

## 2025-02-07 RX ADMIN — ACETAMINOPHEN 1000 MG: 10 INJECTION INTRAVENOUS at 23:25

## 2025-02-07 RX ADMIN — CEFAZOLIN 2000 MG: 1 INJECTION, POWDER, FOR SOLUTION INTRAMUSCULAR; INTRAVENOUS at 08:28

## 2025-02-07 RX ADMIN — SODIUM CHLORIDE, SODIUM LACTATE, POTASSIUM CHLORIDE, AND CALCIUM CHLORIDE 75 ML/HR: .6; .31; .03; .02 INJECTION, SOLUTION INTRAVENOUS at 16:02

## 2025-02-07 RX ADMIN — MIDAZOLAM 2 MG: 1 INJECTION INTRAMUSCULAR; INTRAVENOUS at 07:20

## 2025-02-07 RX ADMIN — SODIUM CHLORIDE: 0.9 INJECTION, SOLUTION INTRAVENOUS at 07:44

## 2025-02-07 RX ADMIN — ACETAMINOPHEN 975 MG: 325 TABLET, FILM COATED ORAL at 06:26

## 2025-02-07 RX ADMIN — SODIUM CHLORIDE, SODIUM LACTATE, POTASSIUM CHLORIDE, AND CALCIUM CHLORIDE: .6; .31; .03; .02 INJECTION, SOLUTION INTRAVENOUS at 07:05

## 2025-02-07 RX ADMIN — FENTANYL CITRATE 100 MCG: 50 INJECTION INTRAMUSCULAR; INTRAVENOUS at 08:30

## 2025-02-07 RX ADMIN — LIDOCAINE HYDROCHLORIDE 5 ML: 10 INJECTION, SOLUTION EPIDURAL; INFILTRATION; INTRACAUDAL; PERINEURAL at 07:52

## 2025-02-07 RX ADMIN — DIPHENHYDRAMINE HYDROCHLORIDE 25 MG: 50 INJECTION, SOLUTION INTRAMUSCULAR; INTRAVENOUS at 15:40

## 2025-02-07 RX ADMIN — DEXMEDETOMIDINE HYDROCHLORIDE 20 MCG: 100 INJECTION, SOLUTION INTRAVENOUS at 09:50

## 2025-02-07 RX ADMIN — DEXAMETHASONE SODIUM PHOSPHATE 10 MG: 10 INJECTION, SOLUTION INTRAMUSCULAR; INTRAVENOUS at 08:45

## 2025-02-07 RX ADMIN — PROPOFOL 30 MCG/KG/MIN: 10 INJECTION, EMULSION INTRAVENOUS at 08:39

## 2025-02-07 RX ADMIN — FENTANYL CITRATE 100 MCG: 50 INJECTION, SOLUTION INTRAMUSCULAR; INTRAVENOUS at 11:54

## 2025-02-07 RX ADMIN — SODIUM CHLORIDE, SODIUM LACTATE, POTASSIUM CHLORIDE, AND CALCIUM CHLORIDE: .6; .31; .03; .02 INJECTION, SOLUTION INTRAVENOUS at 08:57

## 2025-02-07 RX ADMIN — ROCURONIUM 20 MG: 50 INJECTION, SOLUTION INTRAVENOUS at 09:45

## 2025-02-07 RX ADMIN — SUGAMMADEX 400 MG: 100 INJECTION, SOLUTION INTRAVENOUS at 12:15

## 2025-02-07 RX ADMIN — ONDANSETRON 4 MG: 2 INJECTION INTRAMUSCULAR; INTRAVENOUS at 12:01

## 2025-02-07 RX ADMIN — SODIUM CHLORIDE: 0.9 INJECTION, SOLUTION INTRAVENOUS at 08:15

## 2025-02-07 RX ADMIN — ROCURONIUM 10 MG: 50 INJECTION, SOLUTION INTRAVENOUS at 11:29

## 2025-02-07 RX ADMIN — SODIUM CHLORIDE, SODIUM LACTATE, POTASSIUM CHLORIDE, AND CALCIUM CHLORIDE: .6; .31; .03; .02 INJECTION, SOLUTION INTRAVENOUS at 11:59

## 2025-02-07 RX ADMIN — ROPIVACAINE HYDROCHLORIDE: 2 INJECTION, SOLUTION EPIDURAL; INFILTRATION at 16:48

## 2025-02-07 RX ADMIN — HYDROMORPHONE HYDROCHLORIDE 0.5 MG: 1 INJECTION, SOLUTION INTRAMUSCULAR; INTRAVENOUS; SUBCUTANEOUS at 20:29

## 2025-02-07 RX ADMIN — HEPARIN SODIUM 5000 UNITS: 5000 INJECTION INTRAVENOUS; SUBCUTANEOUS at 08:32

## 2025-02-07 NOTE — OP NOTE
OPERATIVE REPORT  PATIENT NAME: Luice Camacho    :  1990  MRN: [id number]  Pt Location: [facility] OR ROOM 14    SURGERY DATE: 2025    Surgeons and Role:  Panel 1:     * Popeye Greene MD - Primary     * Delores Saravia PA-C - Assisting-she was required, no qualified resident available for bedside assist position robotic docking, suction, traction/countertraction, assistance with anastomosis, robot undocking and closure       * Ronald Varner MD - Assisting     * Gerard Lunsford MD - Fellow  Panel 2:     * Chauncey King MD - Primary    Preop Diagnosis:  Endometriosis of rectum [N80.519]    Post-Op Diagnosis Codes:     * Endometriosis of rectum [N80.519]    Procedure(s):  EXAM UNDER ANESTHESIA.   PELVIC SURGERY LAPAROSCOPIC W ROBOTICS  CYSTOSCOPY; INSERTION OF URETERAL STENTS    Dr. King  -Robotic assisted low anterior resection, including sigmoid and partial proctectomy  -EEA 29 colorectal anastomosis  -Intraoperative flexible sigmoidoscopy    Specimen(s):  ID Type Source Tests Collected by Time Destination   1 : LEFT PELVIC SIDE WALL ENDOMETRIOSIS Tissue Soft Tissue, Other TISSUE EXAM Popeye Greene MD 2025 0903    2 : right pelvic sidewall endometriosis Tissue Pelvic TISSUE EXAM Popeye Greene MD 2025 0932    3 : recto-vago septum endometriosis Tissue Pelvic TISSUE EXAM Popeye Greene MD 2025 0939    4 : RECTUM (ENDOMETRIOSIS) Tissue Rectum TISSUE EXAM Chauncey King MD 2025 1151        Estimated Blood Loss:   Minimal    Drains:  Closed/Suction Drain Right RLQ Bulb 19 Fr. (Active)   Number of days: 0       Urethral Catheter Non-latex 16 Fr. (Active)   Collection Container Standard drainage bag 25 0845   Number of days: 0       [REMOVED] Ureteral Internal Stent Left ureter (Removed)   Number of days: 0       [REMOVED] Ureteral Internal Stent Right ureter (Removed)   Number of days: 0       Anesthesia Type:    General    Operative Indications:  Endometriosis of rectum [N80.519]    Operative Findings:  -Infiltrative anterior lesion at rectosigmoid/proximal rectum, not amenable to disc excision, low anterior resection performed with EEA 29 colorectal anastomosis, intact anastomotic rings, negative bubble leak test      Complications:   None    Procedure and Technique:  Lucie returns today, she has discussed with Dr. Greene and planning extirpation of disease for infiltrative endometriosis affecting the rectum.  We discussed my portion of procedure today,  Office visit reviewed:  Robotic assisted versus laparoscopic possible open low anterior resection discussed, in a face-to-face, personal, informed consent process, the benefits, alternatives,risks including not limited to bleeding, infection, risks of anesthesia, open surgery, DVT/PE, heart attack, stroke, death, damage to local structures(e.g. ureter, duodenum),anastomotic leak requiring reoperation, temporary versus permanent stoma. They understood these risks, signed informed consent, and wish to proceed.    Upon my scrubbing into the operation with Dr. Greene he had already established robotic ports, pelvic views showed obvious infiltrative anterior rectal lesion, I began to dissect this free of the perirectal fat to establish possibility of disc excision, this took up approximately one third of the anterior circumference and was concerned that this would not be fully resected and would be a higher staple line risk versus a limited low anterior resection.    We continued into the presacral space down below the lesion, with attention to the stented ureters, continuing circumferentially off the pelvic sidewall and anteriorly where he had prior  the rectovaginal septum.    Selected an area approximately 3 cm below the distal extent of the lesion on supple rectum, using the vessel sealer the mesentery was taken in a tailored excision up to this point and up  to the descending/sigmoid junction proximally.    Multiple fires of the 45 sure form blue load curved stapler were undertaken until the distal extent was resected, we ensured that there was good hemostasis and that the lateral attachments were freed up to see good redundancy falling into the pelvis.    At this point prepared for anastomosis,began with 8 cm transverse incision suprapubic in a Pfannenstiel fashion through her prior incision. Subcutaneous tissues were entered with electrocautery to the fascia. The fascia was incised transversely and scored avoiding the rectus sheath. Fascial flaps were raised off of the rectus sheath musculature. The sheath was entered in the midline exposing the peritoneal cavity which was entered with scissors.  Ashutosh wound protector was placed for the duration of the case until closure.    The grasped staple line and was brought up and through and placed onto green towels to prevent contamination, the descending/sigmoid junction was incised and resected with electrocautery, passing specimen off of the field, 2-0 Prolene suture was used to place pursestring, to double tie the EEA 29 anvil.    I then went below as perineal  placed the EEA 29 stapler up to the rectal stump, spike was deployed until orange flash was visible, through the middle of the staple line, above  placed the anvil down with audible click with no intervening tissues, ensuring the vaginal cuff and the small bowel were out of the field.  After appropriate compression time the stapler was fired, removed showing 2 intact anastomotic rings.    Warm irrigation was filled in the pelvis while above  placed finger clamp, flexible sigmoidoscopy was performed, showing good staple line at approximately 10 to 12 cm, healthy circumferential bleeding and intact, negative bubble leak test on multiple insufflation/desufflation cycles.    I removed the ureteral stents prior to changing gown/gloves, and  draping the bottom side to prepare for closure.  Irrigation was undertaken and ran clean, 19 round drain was looped into the pelvis through the right lower quadrant 12 mm port.  0 Vicryl UR 6 was used to close the fascia on the 12 mm site around the drain to prevent hernia.  All port sites were then closed with 4-0 Monocryl, Steri-Strips and/or adhesive dressing to avoid sterile glue which she has listed allergy to.  Nylon suture was used to secure the drain in place.    Fascia was then closed after correct counts in the following fashion, peritoneum was closed vertically with running locked 2-0 Vicryl suture. This was followed by 0 Vicryl interrupted sutures to reapproximate the rectus sheath. Followed by #1 PDS from either side of the fascial closure. 4-0 Monocryl on the skin with Mepilex dressing.    All sponge, needle, instrument/RF wand counts were correct I was present/scrubbed for the entirety of my portion of the procedure.  Patient was planned for extubation, was in stable condition at the end of the procedure for return to the recovery room.    Patient Disposition:  PACU     This procedure was not performed to treat colon cancer through resection           SIGNATURE: Chauncey King MD  DATE: February 7, 2025  TIME: 12:04 PM

## 2025-02-07 NOTE — INTERVAL H&P NOTE
Lucie returns today, she has discussed with Dr. Greene and planning extirpation of disease for infiltrative endometriosis affecting the rectum.  We discussed my portion of procedure today,  Office visit reviewed:  Robotic assisted versus laparoscopic possible open low anterior resection discussed, in a face-to-face, personal, informed consent process, the benefits, alternatives,risks including not limited to bleeding, infection, risks of anesthesia, open surgery, DVT/PE, heart attack, stroke, death, damage to local structures(e.g. ureter, duodenum),anastomotic leak requiring reoperation, temporary versus permanent stoma. They understood these risks, signed informed consent, and wish to proceed.     -Risk benefits alternatives were all discussed today  -All her questions were answered to her satisfaction  -Consent was completed  -I reassured her about our anesthesiologists and their expertise with pain care, did ask her to discuss all options including epidural which she had some concerns about today with them at the day of procedure especially with her preoperative narcotic needs        H&P reviewed. After examining the patient I find no changes in the patients condition since the H&P had been written.  Gen:no distress  HEENT:Perr[place]/eomi  CV:sinus  Lung:clear bilateral  Abd:soft,nontender  Ext: no edema    Vitals:    02/07/25 0556   BP: 121/82   Pulse: 99   Resp: 18   Temp: 98 °F (36.7 °C)   SpO2: 100%

## 2025-02-07 NOTE — OP NOTE
OPERATIVE REPORT  PATIENT NAME: Lucie Camacho    :  1990  MRN: [id number]  Pt Location: [facility] OR ROOM 14    SURGERY DATE: 2025    Surgeons and Role:  Panel 1:     * Popeye Greene MD - Primary     * Delores Saravia PA-C - Assisting     * Ronald Varner MD - Assisting     * Gerard Lunsford MD - Fellow  Panel 2:     * Chauncey King MD - Primary    Preop Diagnosis:  Endometriosis of rectum [N80.519]    Post-Op Diagnosis Codes:     * Endometriosis of rectum [N80.519]    Procedure(s):  ROBOTIC LYSIS OF PELVIC ADHESIONS. RESECTION OF PELVIC PERITONEAL AND RECTOVAGINAL ENDOMETRIOSIS  RESECTION COLON LOW ANTERIOR LAPAROSCOPIC WITH ROBOTICS  CYSTOSCOPY; INSERTION OF URETERAL STENTS  SIGMOIDOSCOPY FLEXIBLE    Specimen(s):  ID Type Source Tests Collected by Time Destination   1 : LEFT PELVIC SIDE WALL ENDOMETRIOSIS Tissue Soft Tissue, Other TISSUE EXAM Popeye Greene MD 2025  9:03 AM    2 : right pelvic sidewall endometriosis Tissue Pelvic TISSUE EXAM Popeye Greene MD 2025  9:32 AM    3 : recto-vago septum endometriosis Tissue Pelvic TISSUE EXAM Popeye Greene MD 2025  9:39 AM    4 : RECTUM (ENDOMETRIOSIS) Tissue Rectum TISSUE EXAM Chauncey King MD 2025 11:51 AM        Estimated Blood Loss:   50 mL    Drains:  Closed/Suction Drain Right RLQ Bulb 19 Fr. (Active)   Site Description Unable to view 25 1400   Dressing Status Clean;Dry;Intact 25 1400   Drainage Appearance Serosanguineous 25 1400   Status To bulb suction 25 1400   Output (mL) 55 mL 25 1345   Number of days: 0       Urethral Catheter Non-latex 16 Fr. (Active)   Site Assessment Clean;Skin intact 25 1227   Collection Container Standard drainage bag 25 1227   Securement Method Securing device (Describe) 25 1227   Output (mL) 75 mL 25 1345   Number of days: 0       [REMOVED] Ureteral Internal Stent Left ureter (Removed)    Number of days: 0       [REMOVED] Ureteral Internal Stent Right ureter (Removed)   Number of days: 0       Anesthesia Type:   General    Operative Indications:  Endometriosis of rectum [N80.519]  34-year-old with severe pelvic endometriosis, chronic pelvic pain with multiple previous surgeries, measurable rectal/rectovaginal endometriotic implant who presents for definitive operative management.    Operative Findings:  1.  Examination under anesthesia revealed a thickened vaginal apex.  2.  On laparoscopy, there were adhesions present from the rectum to the vagina and bladder.  There were adhesions present from the sigmoid colon to the left pelvic sidewall.  There are adhesions from the rectal mesentery to the right pelvic sidewall.  3.  There was endometriosis present on the left pelvic sidewall which measured in total approximately 3 x 1.5 cm.  The implant that was present on the rectovaginal septum measured approximately 1 x 1.5 cm.  The endometriosis was resected completely.  4.  Cystoscopy revealed a grossly normal bladder without evidence of interstitial cystitis.  The bilateral ureteral catheters were placed easily.        Complications:   None    Procedure and Technique:  After informed consent was obtained, the patient was taken the operating room where general endotracheal anesthesia was administered without incident.  She was then prepped and draped in the normal sterile fashion in the low dorsolithotomy position.  Examination under anesthesia revealed the above-mentioned findings.  Attention was first turned to the abdomen.  0.25% Marcaine was used to infiltrate the skin prior to placement of any trocar.  The first insertion site was at the previous supraumbilical robotic site in the midline.  An 8 mm skin incision was made using an 11 blade scalpel.  This was passed an 8 mm robotic trocar under direct visualization into the abdominal cavity.  The abdomen is then insufflated to 15 mmHg using CO2 gas.   Findings on laparoscopy are noted as above.  Attention was then turned to the cystoscopy and bilateral ureteral stent placement.  A 30 degree cystoscope was inserted into the bladder with findings noted as above.  Bilateral 5 [place]alan ureteral catheters were then able to be inserted into the ureteral orifices.  The cystoscope was removed.  The catheters were then attached to the Felipe drainage bag and a Felipe catheter was inserted and attached to the drainage bag as well.  Gloves were then exchanged.  Attention was then returned to the abdomen.  Additional trocars then placed under direct visualization.  2 robotic trocars were placed on the left side of the abdomen, 1 on the right side of the abdomen and a 5 mm assistant port was placed in between the right sided robotic trocar and the midline robotic trocar in the right upper quadrant.  The robot was docked.  A total of 5 trocar sites were utilized.  Attention was first turned to the left pelvic sidewall endometriosis.  Incision was made on the peritoneum measuring approximately 4 x 2 cm.  The endometriosis in the left pelvic sidewall was then removed.  The adhesions between the sigmoid colon and the left pelvic sidewall were then lysed.  The pararectal space was able to be read developed.  The ureter was able to be identified coursing normally.  The stent was visible within the ureter.  Attention was then turned to the right side.  Adhesions from the rectal mesentery to the right pelvic sidewall were then lysed.  The rectum was able to be mobilized more medially.  The pararectal space was able to be developed.  The ureter was able to be identified.  The stent was noted in situ.  An EEA sizer was then placed in the vagina.  The vagina was then mobilized anteriorly.  Adhesions between the bladder and the vagina were then lysed.  The bladder was then able to be taken down below the vaginal apex using sharp dissection as well as monopolar cautery.  The vaginal apex  endometriotic lesion was then able to be identified.  Dense adhesions were present between the rectum and the vagina.  These were lysed sharply.  The rectovaginal septum was then able to be developed.  The endometriotic implant that was present at the rectovaginal septum was partially transected with some remaining with the rectum and some remaining with the vagina.  The rectum was completely dropped away from the vagina and the rectovaginal septum developed completely.  The right ureter was then tracked until the level of the uterine vasculature and the nodule at the vaginal apex was able to be removed using monopolar cautery.  It was sent as a rectovaginal septum nodule.  Again it measured in total approximate 1.5 x 1 cm.  The vagina was not entered during this dissection.  The procedure was then turned over to Dr. King for the segmental rectal resection and reanastomosis which will be dictated separately.  Estimated blood loss for this portion of procedure is approximately 50 mL.  There were no complications.   I was present for the entire procedure., A co-surgeon was required because of skills and techniques relevant to speciality., and A physician assistant was required during the procedure for retraction, tissue handling, dissection and suturing.    Patient Disposition:  PACU                SIGNATURE: Popeye Greene MD  DATE: February 7, 2025  TIME: 2:10 PM

## 2025-02-07 NOTE — PROGRESS NOTES
Postop check- Colorectal   Name: Lucie Camacho 34 y.o. female I MRN: [id number]  Unit/Bed#: [facility] 921-01 I Date of Admission: 2/7/2025   Date of Service: 2/7/2025 I Hospital Day: 0    Assessment & Plan  Endometriosis of rectum  POD#0 s/p Robotic JHON, resection of pelvic peritoneal and rectovaginal endometriosis, LAR, including sigmoid and partial proctectomy, bilateral ureteral stent placement by Dr. King and Dr. Greene.    Felipe: 500 mL, sanguinous  Nandini drain: 60 mL serosanguineous    -Clear liquid diet  -LR at 75  -Felipe in place  -NANDINI drain in place  -Strict I's and O's  -SQH for DVT prophylaxis  -Epidural in place, appreciate acute pain service recommendation  -As needed antiemetics  -All other care per primary team, gynecology oncology    Colorectal service will follow.    Subjective   General: Patient complaining of significant pruritus.  Patient is tearful, upset secondary to statements made about her pain control tolerance.  Pain: Currently with an epidural in place  N/V: Denies any  Tolerating Diet: Clear liquid diet  -Flatus and -BM  OOB and ambulating starting 2/8    Objective :  Temp:  [97.2 °F (36.2 °C)-98.1 °F (36.7 °C)] 98.1 °F (36.7 °C)  HR:  [53-99] 78  BP: ()/(50-82) 93/53  Resp:  [12-20] 13  SpO2:  [92 %-100 %] 99 %  O2 Device: Nasal cannula  Nasal Cannula O2 Flow Rate (L/min):  [2 L/min] 2 L/min    I/O         02/05 0701 02/06 0700 02/06 0701  02/07 0700 02/07 0701 02/08 0700    I.V. (mL/kg)   4500 (40.5)    IV Piggyback   100    Total Intake(mL/kg)   4600 (41.4)    Urine (mL/kg/hr)   225 (0.2)    Drains   55    Blood   50    Total Output   330    Net   +4270                 Lines/Drains/Airways       Active Status       Name Placement date Placement time Site Days    Epidural Catheter 02/07/25 02/07/25  0710  -- less than 1    Closed/Suction Drain Right RLQ Bulb 19 Fr. 02/07/25  1137  RLQ  less than 1    Urethral Catheter Non-latex 16 Fr. 02/07/25  0845  Non-latex  less than 1  "                 Physical Exam   General: Pt is AAOx3, sitting up in bed in no acute distress, tearful   HEENT: normocephalic, dry mucous membranes   Neck: Supple, non-tender, ROM intact   CV: RRR, no murmurs, gallops, rubs. S1 and S2.   Resp: Lung sounds clear to auscultation B/L, normal respiratory effort no wheezes, rhonchi, rhales   Abd: Soft, with appropriate mild postoperative tenderness, non-distended, non-tympanitic.  Hypoactive bowelsounds all 4 quadrants. No rebound or guarding. Abdominal incisions clean, dry, intact, with Steri-Strips in place. Felipe: 500 mL sanguinous, NANDINI drain: 60 mL serosanguineous   Ext: Warm with no cyanosis, no edema, no deformities. ROM intact   Skin: No rashes, bruises, ulcers.      Lab Results: I have reviewed the following results:  No results for input(s): \"WBC\", \"HGB\", \"HCT\", \"PLT\", \"BANDSPCT\", \"SODIUM\", \"K\", \"CL\", \"CO2\", \"BUN\", \"CREATININE\", \"GLUC\", \"CAIONIZED\", \"MG\", \"PHOS\", \"AST\", \"ALT\", \"ALB\", \"TBILI\", \"DBILI\", \"ALKPHOS\", \"PTT\", \"INR\", \"HSTNI0\", \"HSTNI2\", \"BNP\", \"LACTICACID\" in the last 72 hours.    Imaging Results Review: No pertinent imaging studies reviewed.  Other Study Results Review: No additional pertinent studies reviewed.    VTE Pharmacologic Prophylaxis: Heparin  VTE Mechanical Prophylaxis: sequential compression device    Iliana Knox    "

## 2025-02-07 NOTE — ANESTHESIA PREPROCEDURE EVALUATION
Procedure:  EXAM UNDER ANESTHESIA  - POSSIBLE PELVIC SURGERY LAPAROSCOPIC W ROBOTICS (Pelvis)  RESECTION COLON LOW ANTERIOR LAPAROSCOPIC WITH ROBOTICS (Abdomen)    Relevant Problems   MUSCULOSKELETAL   (+) Chronic bilateral low back pain with bilateral sciatica      NEURO/PSYCH   (+) Anxiety   (+) Anxiety and depression   (+) Chronic bilateral low back pain with bilateral sciatica   (+) Chronic pelvic pain in female      Obstetrics/Gynecology   (+) Endometriosis of rectum      Other   (+) Morbid obesity (HCC)        Physical Exam    Airway    Mallampati score: II  TM Distance: >3 FB       Dental       Cardiovascular      Pulmonary      Other Findings  post-pubertal.    Anesthesia Plan  ASA Score- 2     Anesthesia Type- general with ASA Monitors.         Additional Monitors:     Airway Plan: ETT.    Comment: Epidural for post pain .       Plan Factors-    Chart reviewed. EKG reviewed.  Existing labs reviewed. Patient summary reviewed.    Patient is not a current smoker.  Patient did not smoke on day of surgery.            Induction- intravenous.    Postoperative Plan- Plan for postoperative opioid use. Planned trial extubation    Perioperative Resuscitation Plan - Level 1 - Full Code.       Informed Consent- Anesthetic plan and risks discussed with patient.  I personally reviewed this patient with the CRNA. Discussed and agreed on the Anesthesia Plan with the CRNA..      NPO Status:  No vitals data found for the desired time range.

## 2025-02-07 NOTE — H&P
Assessment/Plan:    34-year-old with severe pelvic endometriosis status post multiple prior medical therapies, laparoscopies recently status post radical resection of pelvic endometriosis including bilateral oophorectomy, vaginectomy in May 2023.  That surgery was complicated by postoperative vaginal apex abscess.  Since the operation, she has had ongoing pelvic pain requiring narcotic therapy.  There is evidence of a deep rectal implant measuring 2.39 x 1.17 centimeters.  Her performance status is 0.  1.  Plan for examination under anesthesia, possible robotic assisted resection of pelvic endometriosis, possible upper vaginectomy, possible colectomy with 3 anastomosis, possible colostomy, possible exploratory laparotomy and all other indicated procedures.        CHIEF COMPLAINT: Here for surgery            Patient ID: Lucie Camacho is a 34 y.o. female  Who presents for surgery for persistent deep pelvic endometriosis and severe pelvic pain.  She continues to require chronic narcotics for the pain.  She had a CT renal protocol on 12/25/2024 which did not reveal any evidence of measurable endometriosis/abscess/collection.  No other interval change in medications or medical history since her last visit the office.  She is nervous about the surgery today.        Review of Systems   Constitutional:  Negative for activity change and unexpected weight change.   HENT: Negative.     Eyes: Negative.    Respiratory: Negative.     Cardiovascular: Negative.    Gastrointestinal:  Negative for abdominal distention and abdominal pain.   Endocrine: Negative.    Genitourinary:  Negative for pelvic pain and vaginal bleeding.   Musculoskeletal: Negative.    Skin: Negative.    Allergic/Immunologic: Negative.    Neurological: Negative.    Hematological: Negative.    Psychiatric/Behavioral:  The patient is nervous/anxious.        Current Facility-Administered Medications   Medication Dose Route Frequency Provider Last Rate Last  Admin    ceFAZolin (ANCEF) IVPB (premix in dextrose) 2,000 mg 50 mL  2,000 mg Intravenous Once Popeye Greene MD        heparin (porcine) subcutaneous injection 5,000 Units  5,000 Units Subcutaneous On Call To OR Popeye Greene MD        lactated ringers infusion  125 mL/hr Intravenous Continuous Popeye Greene MD        metroNIDAZOLE (FLAGYL) IVPB (premix) 500 mg 100 mL  500 mg Intravenous Once Popeye Greene MD         Facility-Administered Medications Ordered in Other Encounters   Medication Dose Route Frequency Provider Last Rate Last Admin    sodium chloride 0.9 % infusion   Intravenous Continuous PRN Jewell Ndiaye CRNA   New Bag at 25 0705       Allergies   Allergen Reactions    Reglan [Metoclopramide] Other (See Comments)     Elevated heartrate    Other Blisters     Dermabond       Past Medical History:   Diagnosis Date    Anxiety     Chicken pox     Depression     Endometriosis     GERD (gastroesophageal reflux disease)     Headache     Occasional     HSV-1 infection     IBS (irritable bowel syndrome)     Kidney stone     Kidney stone on left side     Multiple thyroid nodules     Obesity     Renal calculi        Past Surgical History:   Procedure Laterality Date    APPENDECTOMY  2021     SECTION      x2    CYSTOSCOPY N/A 2023    Procedure: CYSTOSCOPY;  Surgeon: Popeye Greene MD;  Location: [facility];  Service: Gynecology Oncology    EXAMINATION UNDER ANESTHESIA N/A 2023    Procedure: EXAM UNDER ANESTHESIA (EUA), I& D vaginal cuff abscess;  Surgeon: Popeye Greene MD;  Location: [facility];  Service: Gynecology Oncology    HYSTERECTOMY      robotic total laparoscopic hysterectomy with BS    LAPAROSCOPIC ENDOMETRIOSIS FULGURATION  2018    laparoscopic excision of endometriosis, fulguration of endometriosis, lysis of adhesions    LAPAROSCOPY  2014    with I&D     MOLE REMOVAL      face - benign     PELVIC  "LAPAROSCOPY Bilateral 2023    Procedure: SALPINGO-OOPHORECTOMY, LAPAROSCOPIC W/ROBOTICS, RADICAL RESECTION PELVIC ENDOMETRIOSIS;  Surgeon: Popeye Greene MD;  Location: [facility];  Service: Gynecology Oncology    PROCTOSCOPY N/A 2023    Procedure: PROCTOSCOPY;  Surgeon: Popeye Greene MD;  Location: [facility];  Service: Gynecology Oncology    SKIN CANCER EXCISION      abdomen    THYROID CYST EXCISION      TONSILLECTOMY      TUBAL LIGATION  02/15/2016    With lysis of adhesion and peritoneal biopsy    US GUIDED INJECTION FOR RESEARCH STUDY  2015    VAGINA SURGERY N/A 2023    Procedure: VAGINECTOMY, PARTIAL;  Surgeon: Popeye Greene MD;  Location: [facility];  Service: Gynecology Oncology    WISDOM TOOTH EXTRACTION         OB History          2    Para   2    Term   2            AB        Living   2         SAB        IAB        Ectopic        Multiple        Live Births                     Family History   Problem Relation Age of Onset    Coronary artery disease Mother     Varicose Veins Mother     Other Mother         breast cyst - removed     Cholelithiasis Mother         had cholecystectomy    Alcohol abuse Father     Cholelithiasis Maternal Grandmother         had cholecystectomy    Uterine cancer Paternal Grandmother     Breast cancer Family         Before age 49     Uterine cancer Family     Obesity Family     Hypertension Family     Brain cancer Family     Gallbladder disease Family        The following portions of the patient's history were reviewed and updated as appropriate: allergies, current medications, past family history, past medical history, past social history, past surgical history, and problem list.      Objective:    Blood pressure 121/82, pulse 99, temperature 98 °F (36.7 °C), temperature source Temporal, resp. rate 18, height 5' 5\" (1.651 m), weight 111 kg (244 lb), SpO2 100%.  Body mass index is 40.6 kg/m².    Physical " Exam  Vitals reviewed.   Constitutional:       General: She is not in acute distress.     Appearance: Normal appearance. She is not ill-appearing.   HENT:      Head: Normocephalic and atraumatic.      Mouth/Throat:      Mouth: Mucous membranes are moist.   Eyes:      General: No scleral icterus.        Right eye: No discharge.         Left eye: No discharge.      Conjunctiva/sclera: Conjunctivae normal.   Cardiovascular:      Rate and Rhythm: Normal rate and regular rhythm.      Heart sounds: Normal heart sounds.   Pulmonary:      Effort: Pulmonary effort is normal.      Breath sounds: Normal breath sounds.   Abdominal:      Palpations: Abdomen is soft.   Musculoskeletal:      Right lower leg: No edema.      Left lower leg: No edema.   Skin:     General: Skin is warm and dry.      Coloration: Skin is not jaundiced.      Findings: No rash.   Neurological:      General: No focal deficit present.      Mental Status: She is alert and oriented to person, place, and time.      Cranial Nerves: No cranial nerve deficit.      Motor: No weakness.      Gait: Gait normal.   Psychiatric:         Mood and Affect: Mood normal.         Behavior: Behavior normal.         Thought Content: Thought content normal.         Judgment: Judgment normal.           Lab Results   Component Value Date     17.3 04/27/2023     Lab Results   Component Value Date    WBC 7.60 01/23/2025    HGB 12.1 01/23/2025    HCT 36.3 01/23/2025    MCV 85 01/23/2025     01/23/2025     Lab Results   Component Value Date     02/26/2014    K 3.5 01/23/2025     (H) 01/23/2025    CO2 28 01/23/2025    ANIONGAP 7 02/26/2014    BUN 13 01/23/2025    CREATININE 0.63 01/23/2025    GLUCOSE 97 02/26/2014    GLUF 92 01/23/2025    CALCIUM 8.8 01/23/2025    CORRECTEDCA 9.2 07/07/2021    AST 23 01/23/2025    ALT 40 01/23/2025    ALKPHOS 59 01/23/2025    EGFR 119 01/23/2025        Trend:  Lab Results   Component Value Date     17.3 04/27/2023  06-Mar-2019 23:05

## 2025-02-07 NOTE — PROGRESS NOTES
Ropivacaine 0.1% and fentanyl 5mcg/mL epidural changed to ropivacaine 0.2%. Unable to record waste in [employer]nicell, wasted 200 mL with Dawna RN.

## 2025-02-07 NOTE — ASSESSMENT & PLAN NOTE
POD#0 s/p Robotic JHON, resection of pelvic peritoneal and rectovaginal endometriosis, LAR, including sigmoid and partial proctectomy, bilateral ureteral stent placement by Dr. King and Dr. Greene.    Felipe: 500 mL, sanguinous  Nandini drain: 60 mL serosanguineous    -Clear liquid diet  -LR at 75  -Felipe in place  -NANDINI drain in place  -Strict I's and O's  -SQH for DVT prophylaxis  -Epidural in place, appreciate acute pain service recommendation  -As needed antiemetics  -All other care per primary team, gynecology oncology

## 2025-02-07 NOTE — QUICK NOTE
"Gynecologic Oncology Post-Operative Check  Lucie Camacho 34 y.o. female MRN: [id number]  Unit/Bed#: [facility] 921-01 Encounter: [id number]      SUBJECTIVE:    Pain well controlled. She is able to tolerate liquids without nausea or vomiting. Patient reporting itchiness since the surgery. Voiding status: taylor in place. Flatus: no. Bowel movement: no. Chest pain/shortness of breath: no. Calf pain: no.    OBJECTIVE:  Vitals:   /60 (BP Location: Left arm)   Pulse 64   Temp 98.8 °F (37.1 °C) (Oral)   Resp 16   Ht 5' 5\" (1.651 m)   Wt 111 kg (244 lb)   SpO2 99%   BMI 40.60 kg/m²       Intake/Output Summary (Last 24 hours) at 2/7/2025 1643  Last data filed at 2/7/2025 1541  Gross per 24 hour   Intake 4600 ml   Output 530 ml   Net 4070 ml       Invasive Devices       Peripheral Intravenous Line  Duration             Peripheral IV 02/07/25 Left Hand <1 day    Peripheral IV 02/07/25 Right Hand <1 day              Epidural Line  Duration             Epidural Catheter 02/07/25 <1 day              Drain  Duration             Closed/Suction Drain Right RLQ Bulb 19 Fr. <1 day    Urethral Catheter Non-latex 16 Fr. <1 day                    Physical Exam:   GEN: appears well, alert and oriented x 3, pleasant and cooperative   CARDIAC: regular rate  PULMONARY: nonlabored breathing  ABDOMEN: soft, no tenderness, no distention, Incision sites C/D/I with steri-strips with dried blood; abdominal padding on RLQ by RLQ NANDINI drain; NANDINI drain showing sero-sanguinous output  Genitourinary: taylor in place with blood tinged urine  EXTREMITIES: SCDs on and pumping, nontender    ASSESSMENT/PLAN:  Postop Day #0 s/p resection of peritoneal and recto-vaginal endometriosis, low anterior resection, ureteral stents placement and removal. Stable. Continue routine postoperative care.    -FEN: CLD,  ml/hr, zofran prn  -Pain control with sina tylenol, epidural in place, dilaudid prn BT; APS consulted; no NSAID's for 48 hours  -Itchiness: " benedryl and atarax prn  -FU am labs  -taylor in place; UOP adequate (425cc/8hrs; 1.33ml/kg/hr)        Ronald Varner MD  Obstetrics & Gynecology PGY-1  2/7/2025  4:43 PM

## 2025-02-07 NOTE — ANESTHESIA PROCEDURE NOTES
Epidural Block    Patient location during procedure: holding area  Start time: 2/7/2025 7:10 AM  Reason for block: procedure for pain, at surgeon's request and post-op pain management  Staffing  Performed by: Mahad Ortega MD  Authorized by: Mahad Ortega MD    Preanesthetic Checklist  Completed: patient identified, IV checked, site marked, risks and benefits discussed, surgical consent, monitors and equipment checked, pre-op evaluation and timeout performed  Epidural  Patient position: sitting  Prep: Betadine  Sedation Level: light sedation  Patient monitoring: frequent blood pressure checks, continuous pulse oximetry and heart rate  Location: lumbar, L2-3  Injection technique: MIO saline  Needle  Needle type: Tuohy   Needle gauge: 18 G  Needle insertion depth: 8 cm  Catheter type: side hole  Catheter size: 20 G  Catheter at skin depth: 14 cm  Catheter securement method: clear occlusive dressing and tape  Test dose: negativelidocaine-epinephrine (XYLOCAINE-MPF/EPINEPHRINE) 1.5 %-1:200,000 injection 3 mL - Epidural   5 mL - 2/7/2025 7:24:00 AM  Assessment  Number of attempts: 2negative aspiration for CSF, negative aspiration for heme and no paresthesia on injection  patient tolerated the procedure well with no immediate complications

## 2025-02-07 NOTE — ANESTHESIA POSTPROCEDURE EVALUATION
Post-Op Assessment Note    CV Status:  Stable  Pain Score: 0    Pain management: adequate       Mental Status:  Sleepy   Hydration Status:  Stable   PONV Controlled:  Controlled   Airway Patency:  Patent     Post Op Vitals Reviewed: Yes    No anethesia notable event occurred.    Staff: Anesthesiologist, CRNA       Last Filed PACU Vitals:  Vitals Value Taken Time   Temp 97.2 °F (36.2 °C) 02/07/25 1227   Pulse 63 02/07/25 1335   /57 02/07/25 1330   Resp 15 02/07/25 1335   SpO2 97 % 02/07/25 1335   Vitals shown include unfiled device data.    Modified Zeeshan:     Vitals Value Taken Time   Activity 2 02/07/25 1245   Respiration 2 02/07/25 1245   Circulation 2 02/07/25 1245   Consciousness 1 02/07/25 1245   Oxygen Saturation 1 02/07/25 1245     Modified Zeeshan Score: 8

## 2025-02-07 NOTE — ANESTHESIA PROCEDURE NOTES
Arterial Line Insertion    Performed by: Mahad Ortega MD  Authorized by: Mahad Ortega MD  Consent: Verbal consent obtained. Written consent obtained.  Risks and benefits: risks, benefits and alternatives were discussed  Consent given by: patient  Patient understanding: patient states understanding of the procedure being performed  Patient consent: the patient's understanding of the procedure matches consent given  Procedure consent: procedure consent matches procedure scheduled  Relevant documents: relevant documents present and verified  Test results: test results available and properly labeled  Site marked: the operative site was marked  Radiology Images: Radiology Images displayed and confirmed. If images not available, report reviewed  Patient identity confirmed: arm band  Preparation: Patient was prepped and draped in the usual sterile fashion.  Indications: multiple ABGs and hemodynamic monitoring  Orientation:  Right  Location: brachial artery  Procedure Details:  Sergio's test normal: yes  Needle gauge: 18  Number of attempts: 3    Post-procedure:  Post-procedure: dressing applied  Waveform: good waveform  Patient tolerance: patient tolerated the procedure well with no immediate complications  Comments: Ultrasound required for [place] placement

## 2025-02-08 LAB
ANION GAP SERPL CALCULATED.3IONS-SCNC: 8 MMOL/L (ref 4–13)
BUN SERPL-MCNC: 10 MG/DL (ref 5–25)
CALCIUM SERPL-MCNC: 8.5 MG/DL (ref 8.4–10.2)
CHLORIDE SERPL-SCNC: 107 MMOL/L (ref 96–108)
CO2 SERPL-SCNC: 24 MMOL/L (ref 21–32)
CREAT SERPL-MCNC: 0.71 MG/DL (ref 0.6–1.3)
ERYTHROCYTE [DISTWIDTH] IN BLOOD BY AUTOMATED COUNT: 13.1 % (ref 11.6–15.1)
GFR SERPL CREATININE-BSD FRML MDRD: 113 ML/MIN/1.73SQ M
GLUCOSE SERPL-MCNC: 91 MG/DL (ref 65–140)
HCT VFR BLD AUTO: 34.4 % (ref 34.8–46.1)
HGB BLD-MCNC: 11.2 G/DL (ref 11.5–15.4)
MAGNESIUM SERPL-MCNC: 1.6 MG/DL (ref 1.9–2.7)
MCH RBC QN AUTO: 28.1 PG (ref 26.8–34.3)
MCHC RBC AUTO-ENTMCNC: 32.6 G/DL (ref 31.4–37.4)
MCV RBC AUTO: 86 FL (ref 82–98)
PHOSPHATE SERPL-MCNC: 4.2 MG/DL (ref 2.7–4.5)
PLATELET # BLD AUTO: 196 THOUSANDS/UL (ref 149–390)
PMV BLD AUTO: 9.8 FL (ref 8.9–12.7)
POTASSIUM SERPL-SCNC: 4.1 MMOL/L (ref 3.5–5.3)
RBC # BLD AUTO: 3.99 MILLION/UL (ref 3.81–5.12)
SODIUM SERPL-SCNC: 139 MMOL/L (ref 135–147)
WBC # BLD AUTO: 12.19 THOUSAND/UL (ref 4.31–10.16)

## 2025-02-08 PROCEDURE — NC001 PR NO CHARGE: Performed by: ANESTHESIOLOGY

## 2025-02-08 PROCEDURE — 83735 ASSAY OF MAGNESIUM: CPT | Performed by: OBSTETRICS & GYNECOLOGY

## 2025-02-08 PROCEDURE — NC001 PR NO CHARGE: Performed by: STUDENT IN AN ORGANIZED HEALTH CARE EDUCATION/TRAINING PROGRAM

## 2025-02-08 PROCEDURE — 85027 COMPLETE CBC AUTOMATED: CPT | Performed by: OBSTETRICS & GYNECOLOGY

## 2025-02-08 PROCEDURE — 99024 POSTOP FOLLOW-UP VISIT: CPT | Performed by: COLON & RECTAL SURGERY

## 2025-02-08 PROCEDURE — 84100 ASSAY OF PHOSPHORUS: CPT | Performed by: OBSTETRICS & GYNECOLOGY

## 2025-02-08 PROCEDURE — 99233 SBSQ HOSP IP/OBS HIGH 50: CPT | Performed by: ANESTHESIOLOGY

## 2025-02-08 PROCEDURE — 80048 BASIC METABOLIC PNL TOTAL CA: CPT | Performed by: OBSTETRICS & GYNECOLOGY

## 2025-02-08 RX ORDER — SIMETHICONE 80 MG
80 TABLET,CHEWABLE ORAL EVERY 6 HOURS PRN
Status: DISCONTINUED | OUTPATIENT
Start: 2025-02-08 | End: 2025-02-11 | Stop reason: HOSPADM

## 2025-02-08 RX ORDER — METHOCARBAMOL 750 MG/1
750 TABLET, FILM COATED ORAL EVERY 6 HOURS SCHEDULED
Status: DISCONTINUED | OUTPATIENT
Start: 2025-02-08 | End: 2025-02-11 | Stop reason: HOSPADM

## 2025-02-08 RX ORDER — LIDOCAINE 50 MG/G
1 PATCH TOPICAL DAILY
Status: DISCONTINUED | OUTPATIENT
Start: 2025-02-08 | End: 2025-02-11 | Stop reason: HOSPADM

## 2025-02-08 RX ORDER — MAGNESIUM SULFATE HEPTAHYDRATE 40 MG/ML
4 INJECTION, SOLUTION INTRAVENOUS ONCE
Status: COMPLETED | OUTPATIENT
Start: 2025-02-08 | End: 2025-02-08

## 2025-02-08 RX ORDER — OXYCODONE HYDROCHLORIDE 10 MG/1
10 TABLET ORAL EVERY 4 HOURS PRN
Refills: 0 | Status: DISCONTINUED | OUTPATIENT
Start: 2025-02-08 | End: 2025-02-09

## 2025-02-08 RX ORDER — METHOCARBAMOL 500 MG/1
500 TABLET, FILM COATED ORAL EVERY 6 HOURS SCHEDULED
Status: DISCONTINUED | OUTPATIENT
Start: 2025-02-08 | End: 2025-02-08

## 2025-02-08 RX ORDER — OXYCODONE HYDROCHLORIDE 5 MG/1
5 TABLET ORAL EVERY 4 HOURS PRN
Refills: 0 | Status: DISCONTINUED | OUTPATIENT
Start: 2025-02-08 | End: 2025-02-09

## 2025-02-08 RX ADMIN — OXYCODONE HYDROCHLORIDE 10 MG: 10 TABLET ORAL at 20:32

## 2025-02-08 RX ADMIN — ROPIVACAINE HYDROCHLORIDE: 2 INJECTION, SOLUTION EPIDURAL; INFILTRATION at 21:00

## 2025-02-08 RX ADMIN — SODIUM CHLORIDE, SODIUM LACTATE, POTASSIUM CHLORIDE, AND CALCIUM CHLORIDE 500 ML: .6; .31; .03; .02 INJECTION, SOLUTION INTRAVENOUS at 01:36

## 2025-02-08 RX ADMIN — HYDROMORPHONE HYDROCHLORIDE 0.5 MG: 1 INJECTION, SOLUTION INTRAMUSCULAR; INTRAVENOUS; SUBCUTANEOUS at 14:27

## 2025-02-08 RX ADMIN — HYDROMORPHONE HYDROCHLORIDE 0.5 MG: 1 INJECTION, SOLUTION INTRAMUSCULAR; INTRAVENOUS; SUBCUTANEOUS at 02:58

## 2025-02-08 RX ADMIN — ROPIVACAINE HYDROCHLORIDE: 2 INJECTION, SOLUTION EPIDURAL; INFILTRATION at 07:28

## 2025-02-08 RX ADMIN — ACETAMINOPHEN 1000 MG: 10 INJECTION INTRAVENOUS at 11:02

## 2025-02-08 RX ADMIN — HYDROMORPHONE HYDROCHLORIDE 0.5 MG: 1 INJECTION, SOLUTION INTRAMUSCULAR; INTRAVENOUS; SUBCUTANEOUS at 08:02

## 2025-02-08 RX ADMIN — OXYCODONE HYDROCHLORIDE 10 MG: 10 TABLET ORAL at 11:23

## 2025-02-08 RX ADMIN — SODIUM CHLORIDE, SODIUM LACTATE, POTASSIUM CHLORIDE, AND CALCIUM CHLORIDE 125 ML/HR: .6; .31; .03; .02 INJECTION, SOLUTION INTRAVENOUS at 07:30

## 2025-02-08 RX ADMIN — ACETAMINOPHEN 975 MG: 325 TABLET, FILM COATED ORAL at 16:14

## 2025-02-08 RX ADMIN — HYDROMORPHONE HYDROCHLORIDE 0.5 MG: 1 INJECTION, SOLUTION INTRAMUSCULAR; INTRAVENOUS; SUBCUTANEOUS at 17:08

## 2025-02-08 RX ADMIN — OXYCODONE HYDROCHLORIDE 10 MG: 10 TABLET ORAL at 16:14

## 2025-02-08 RX ADMIN — ACETAMINOPHEN 1000 MG: 10 INJECTION INTRAVENOUS at 04:52

## 2025-02-08 RX ADMIN — HEPARIN SODIUM 5000 UNITS: 5000 INJECTION INTRAVENOUS; SUBCUTANEOUS at 05:05

## 2025-02-08 RX ADMIN — HEPARIN SODIUM 5000 UNITS: 5000 INJECTION INTRAVENOUS; SUBCUTANEOUS at 14:27

## 2025-02-08 RX ADMIN — HEPARIN SODIUM 5000 UNITS: 5000 INJECTION INTRAVENOUS; SUBCUTANEOUS at 20:33

## 2025-02-08 RX ADMIN — MAGNESIUM SULFATE HEPTAHYDRATE 4 G: 40 INJECTION, SOLUTION INTRAVENOUS at 09:00

## 2025-02-08 RX ADMIN — METHOCARBAMOL 750 MG: 750 TABLET ORAL at 23:06

## 2025-02-08 RX ADMIN — METHOCARBAMOL 750 MG: 750 TABLET ORAL at 11:02

## 2025-02-08 RX ADMIN — METHOCARBAMOL 750 MG: 750 TABLET ORAL at 17:08

## 2025-02-08 NOTE — QUICK NOTE
Called for low BP. Pt BP 94/50. Pain well controlled. States pt not pressing button. Decreased rate by half. Instructed to only press button if beginning to have pain.

## 2025-02-08 NOTE — PROGRESS NOTES
Progress Note - GYN Oncology   Name: Lucie Camacho 34 y.o. female I MRN: [id number]  Unit/Bed#: [facility] [place] 921-01 I Date of Admission: 2/7/2025   Date of Service: 2/8/2025 I Hospital Day: 1    Assessment & Plan  Endometriosis of rectum  34 y.o. with chronic pelvic pain with opioid dependence, endometriosis involving rectum now POD1 s/p RA-JHON, resection of pelvic and rectovaginal endometriosis, LAR, flex sig (CRS), cysto with ureteral stent insertion/removal. Overall doing well postoperatively with exception of poor pain control. Appreciate CRS recs.     FEN: CLD toast crackers, MLIV, zofran/simethicone PRN  Pain: sina tylenol, maintain epidural, dilaudid BT, appreciate APS recs; no NSAIDS for 48h postop  : taylor in place, discontinue after epidural is removed  DVT ppx: SCDs, SQH 5000 TID    Lines: taylor, NANDINI managed by CRS   Chronic pelvic pain in female  Status post several surgeries for endometriosis resection prior to most recent surgery  Opioid dependent due to chronic pelvic pain, previously on oxycodone 10 mg 3 times daily as needed.  Appreciate APS recommendations    24 Hour Events : none  Subjective : Patient reports overall doing okay.  She has an epidural in place which is moderately controlling her pain.  Her pain is currently rated as a 7 out of 10.  She notes some gas pain.  Denies flatus.  She does note significant numbness and weakness of her lower extremities as a result of her epidural.  Denies nausea, vomiting, fevers, chills, chest pain, shortness of breath.  Voiding via Taylor catheter.    Objective :  Temp:  [97.2 °F (36.2 °C)-99 °F (37.2 °C)] 98.2 °F (36.8 °C)  HR:  [46-82] 63  BP: ()/(49-72) 111/65  Resp:  [12-20] 17  SpO2:  [92 %-100 %] 99 %  O2 Device: None (Room air)  Nasal Cannula O2 Flow Rate (L/min):  [1 L/min-2 L/min] 1 L/min    UOP 0.5cc/kg/h over 24h  NANDINI 110cc ss/24h     Physical Exam  Constitutional:       General: She is not in acute distress.     Appearance: Normal appearance.    HENT:      Head: Normocephalic and atraumatic.   Abdominal:      General: There is no distension.      Palpations: Abdomen is soft.      Tenderness: There is abdominal tenderness. There is no guarding.      Comments: NANDINI knutson   Genitourinary:     Comments: Felipe blood tinged  Musculoskeletal:      Right lower leg: No edema.      Left lower leg: No edema.   Skin:     General: Skin is warm and dry.   Neurological:      General: No focal deficit present.      Mental Status: She is alert and oriented to person, place, and time.      Motor: Weakness present.   Psychiatric:         Mood and Affect: Mood normal.         Behavior: Behavior normal.         Lab Results: I have reviewed the following results:CBC/BMP:   .     02/08/25  0504   WBC 12.19*   HGB 11.2*   HCT 34.4*      SODIUM 139   K 4.1      CO2 24   BUN 10   CREATININE 0.71   GLUC 91   MG 1.6*   PHOS 4.2        Imaging Results Review: No pertinent imaging studies reviewed.  Other Study Results Review: No additional pertinent studies reviewed.    VTE Pharmacologic Prophylaxis: VTE covered by:  heparin (porcine), Subcutaneous, 5,000 Units at 02/08/25 0505     VTE Mechanical Prophylaxis: sequential compression device    I personally saw/examined the patient on 2/8/2025 as a fellow.  This is not a billable service.

## 2025-02-08 NOTE — ASSESSMENT & PLAN NOTE
34 y.o. with chronic pelvic pain with opioid dependence, endometriosis involving rectum now POD1 s/p RA-JHON, resection of pelvic and rectovaginal endometriosis, LAR, flex sig (CRS), cysto with ureteral stent insertion/removal. Overall doing well postoperatively with exception of poor pain control. Appreciate CRS recs.     FEN: CLD toast crackers, MLIV, zofran/simethicone PRN  Pain: sina tylenol, maintain epidural, dilaudid BT, appreciate APS recs; no NSAIDS for 48h postop  : taylor in place, discontinue after epidural is removed  DVT ppx: SCDs, SQH 5000 TID    Lines: taylor, NANDINI managed by CRS

## 2025-02-08 NOTE — ASSESSMENT & PLAN NOTE
Status post several surgeries for endometriosis resection prior to most recent surgery  Opioid dependent due to chronic pelvic pain, previously on oxycodone 10 mg 3 times daily as needed.  Appreciate APS recommendations

## 2025-02-08 NOTE — UTILIZATION REVIEW
Initial Clinical Review    Elective IP surgical procedure  Age/Sex: 34 y.o. female  Surgery Date: 2/7/2025  Procedure:   ROBOTIC LYSIS OF PELVIC ADHESIONS. RESECTION OF PELVIC PERITONEAL AND RECTOVAGINAL ENDOMETRIOSIS  RESECTION COLON LOW ANTERIOR LAPAROSCOPIC WITH ROBOTICS  CYSTOSCOPY; INSERTION OF URETERAL STENTS  SIGMOIDOSCOPY FLEXIBLE  Anesthesia: General  Operative Findings:   1.  Examination under anesthesia revealed a thickened vaginal apex.  2.  On laparoscopy, there were adhesions present from the rectum to the vagina and bladder.  There were adhesions present from the sigmoid colon to the left pelvic sidewall.  There are adhesions from the rectal mesentery to the right pelvic sidewall.  3.  There was endometriosis present on the left pelvic sidewall which measured in total approximately 3 x 1.5 cm.  The implant that was present on the rectovaginal septum measured approximately 1 x 1.5 cm.  The endometriosis was resected completely.  4.  Cystoscopy revealed a grossly normal bladder without evidence of interstitial cystitis.  The bilateral ureteral catheters were placed easily.      2/8 POD#1 Progress Note: Overall doing well postoperatively with exception of poor pain control. Reports pain is moderately controlled, rating 7/10 this AM. Epidural in place. Notes some gas pain, denies flatus. She does note significant numbness and weakness of her lower extremities as a result of her epidural. Abdominal tenderness one xam. NANDINI with ss output. Taylor - blood tinged.   Plan: CLD toast crackers, MLIV, zofran/simethicone PRN. APS following for pain control. Continue taylor, d/c after epidural removed.    APS note:    - epidural appears to be low coverage, however likely still with some lower pelvic coverage    - continue running Ropivacaine 0.2% at 3/4/10/3 (pt request no fentanyl in infusion, itching)     - start oxycodone 5mg/10mg PO q4h PRN moderate/severe pain    - okay to increase to 10mg/15mg PO as patient on  home oxycodone 10mg q8h PRN    - continue dilaudid 0.5mg IV q2h PRN breakthrough     - start home robaxin 750mg PO q6h sina    - start lidocaine patch, q12hr on/off, lateral to incisions on either side      Admission Orders: Date/Time/Statement:   Admission Orders (From admission, onward)       Ordered        02/07/25 1235  Inpatient Admission  Once                          Orders Placed This Encounter   Procedures    Inpatient Admission     Standing Status:   Standing     Number of Occurrences:   1     Level of Care:   Med Surg [16]     Estimated length of stay:   More than 2 Midnights     Certification:   I certify that inpatient services are medically necessary for this patient for a duration of greater than two midnights. See H&P and MD Progress Notes for additional information about the patient's course of treatment.     Diet: CLD with toast/crackers  Mobility: OOB to chair  DVT Prophylaxis: SCDs, sq hep    Medications/Pain Control:   Scheduled Medications:  acetaminophen, 1,000 mg, Intravenous, Q6H   Followed by  acetaminophen, 975 mg, Oral, Q6H SINA  heparin (porcine), 5,000 Units, Subcutaneous, Q8H SINA  lidocaine, 1 patch, Topical, Daily  methocarbamol, 750 mg, Oral, Q6H SINA    Continuous IV Infusions:  ropivacaine 0.2%, Epidural, Continuous    PRN Meds:  diphenhydrAMINE, 12.5 mg, Intravenous, Q6H PRN  HYDROmorphone, 0.5 mg, Intravenous, Q2H PRN 2/7 x2, 2/8 x2  hydrOXYzine HCL, 25 mg, Oral, Q6H PRN  lactated ringers, 1,000 mL, Intravenous, Once PRN  ondansetron, 4 mg, Intravenous, Q6H PRN  oxyCODONE, 10 mg, Oral, Q4H PRN 2/8 x1  oxyCODONE, 5 mg, Oral, Q4H PRN  simethicone, 80 mg, Oral, Q6H PRN  sodium chloride, 1,000 mL, Intravenous, Once PRN      Vital Signs (last 3 days)       Date/Time Temp Pulse Resp BP MAP (mmHg) SpO2 Calculated FIO2 (%) - Nasal Cannula Nasal Cannula O2 Flow Rate (L/min) O2 Device Patient Position - Orthostatic VS Cody Coma Scale Score Pain    02/08/25 08:48:58 98.2 °F (36.8 °C) 63 17  111/65 80 99 % -- -- -- -- -- --    02/08/25 0802 -- -- -- -- -- -- -- -- -- -- -- 8 02/08/25 0728 -- -- -- -- -- -- -- -- -- -- -- 7 02/08/25 0700 -- 52 18 104/54 71 95 % -- -- None (Room air) -- -- 7 02/08/25 0400 -- -- -- -- -- -- -- -- -- -- -- 5 02/08/25 03:58:49 98.6 °F (37 °C) 53 16 96/52 67 98 % -- -- None (Room air) -- -- --    02/08/25 03:08:33 98.5 °F (36.9 °C) 50 17 97/53 68 98 % -- -- None (Room air) -- -- --    02/08/25 02:58:05 98.3 °F (36.8 °C) 49 16 100/58 72 97 % -- -- None (Room air) -- -- --    02/08/25 0258 -- -- -- -- -- -- -- -- -- -- -- 8 02/08/25 02:42:05 98.6 °F (37 °C) 46 18 107/50 69 99 % -- -- None (Room air) -- -- --    02/08/25 02:11:15 98.6 °F (37 °C) 81 17 84/57 66 96 % -- -- None (Room air) -- -- --    02/08/25 02:04:55 98.8 °F (37.1 °C) 50 16 90/51 64 97 % -- -- None (Room air) -- -- --    02/08/25 01:13:46 98.9 °F (37.2 °C) 63 17 98/49 65 96 % -- -- None (Room air) -- -- --    02/08/25 01:03:53 98.5 °F (36.9 °C) 78 16 96/55 69 95 % -- -- None (Room air) -- -- --    02/08/25 0100 -- -- -- -- -- -- -- -- -- -- -- No Pain    02/08/25 00:33:07 98.7 °F (37.1 °C) 68 16 91/51 64 98 % -- -- None (Room air) -- -- --    02/08/25 0030 -- -- -- -- -- -- -- -- -- -- -- 6 02/07/25 23:38:56 -- 62 -- 94/50 65 99 % -- -- -- -- -- --    02/07/25 23:04:47 99 °F (37.2 °C) 74 18 99/56 70 96 % -- -- -- -- -- --    02/07/25 2300 -- -- -- -- -- -- -- -- -- -- -- 6 02/07/25 22:11:49 99 °F (37.2 °C) 78 17 118/58 78 96 % -- -- None (Room air) -- -- --    02/07/25 2100 -- -- -- -- -- -- -- -- -- -- 15 --    02/07/25 20:43:59 99 °F (37.2 °C) 65 17 -- -- 97 % -- -- -- -- -- --    02/07/25 2030 -- 75 16 98/51 67 98 % -- -- None (Room air) -- -- 8 02/07/25 2029 -- -- -- -- -- -- -- -- -- -- -- 8 02/07/25 1930 -- -- -- -- -- -- -- -- -- -- -- 6 02/07/25 1920 98.2 °F (36.8 °C) 70 16 105/63 77 100 % 24 1 L/min Nasal cannula -- -- --    02/07/25 1736 -- 62 16 108/64 79 98 % -- -- --  Lying -- --    02/07/25 1644 -- -- -- -- -- -- -- -- -- -- -- 5    02/07/25 1638 98.8 °F (37.1 °C) 64 16 102/60 78 -- -- -- -- Lying -- --    02/07/25 1610 -- -- -- -- -- -- -- -- -- -- -- 8    02/07/25 15:41:52 98.1 °F (36.7 °C) 78 16 112/72 82 99 % -- -- -- Lying -- --    02/07/25 1400 -- 53 13 93/53 68 99 % 28 2 L/min Nasal cannula -- -- No Pain    02/07/25 1346 -- 54 12 -- -- 99 % -- -- -- -- -- --    02/07/25 1345 -- 53 14 95/53 69 99 % 28 2 L/min Nasal cannula -- -- No Pain    02/07/25 1330 -- 61 13 101/57 74 96 % 28 2 L/min Nasal cannula -- -- No Pain    02/07/25 1315 -- 59 13 97/54 70 98 % 28 2 L/min Nasal cannula -- -- No Pain    02/07/25 1300 -- 65 14 96/52 68 99 % 28 2 L/min Nasal cannula -- -- No Pain    02/07/25 1245 -- 69 17 94/52 68 92 % 28 2 L/min Nasal cannula -- 15 No Pain    02/07/25 1230 -- 76 15 89/50 65 93 % -- -- -- -- -- --    02/07/25 1227 97.2 °F (36.2 °C) 82 20 96/51 64 95 % 28 2 L/min Nasal cannula -- -- --    02/07/25 0556 98 °F (36.7 °C) 99 18 121/82 -- 100 % -- -- None (Room air) -- -- 7          Weight (last 2 days)       Date/Time Weight    02/07/25 0556 111 (244)          Pertinent Labs/Diagnostic Test Results:     Results from last 7 days   Lab Units 02/08/25  0504   WBC Thousand/uL 12.19*   HEMOGLOBIN g/dL 11.2*   HEMATOCRIT % 34.4*   PLATELETS Thousands/uL 196       Results from last 7 days   Lab Units 02/08/25  0504   SODIUM mmol/L 139   POTASSIUM mmol/L 4.1   CHLORIDE mmol/L 107   CO2 mmol/L 24   ANION GAP mmol/L 8   BUN mg/dL 10   CREATININE mg/dL 0.71   EGFR ml/min/1.73sq m 113   CALCIUM mg/dL 8.5   MAGNESIUM mg/dL 1.6*   PHOSPHORUS mg/dL 4.2       Results from last 7 days   Lab Units 02/07/25  1228 02/07/25  0604   POC GLUCOSE mg/dl 123 144*     Results from last 7 days   Lab Units 02/08/25  0504   GLUCOSE RANDOM mg/dL 91       Network Utilization Review Department  ATTENTION: Please call with any questions or concerns to [phone] and carefully listen to the prompts so  that you are directed to the right person. All voicemails are confidential.   For Discharge needs, contact Care Management DC Support Team at [phone] opt. [phone]  Send all requests for admission clinical reviews, approved or denied determinations and any other requests to dedicated fax number below belonging to the campus where the patient is receiving treatment. List of dedicated fax numbers for the Facilities:  FACILITY NAME UR FAX NUMBER   ADMISSION DENIALS (Administrative/Medical Necessity) [phone]   DISCHARGE SUPPORT TEAM (NETWORK) [phone]   PARENT CHILD HEALTH (Maternity/NICU/Pediatrics) [phone]   [facility] [phone]   [facility] [phone]   [facility] [phone]   [facility] [phone]   [facility] [phone]   [facility] [phone]   [facility] [phone]   [facility] [phone]   [facility] [phone]   [facility] [phone]   [facility] [phone]   [facility] [phone]

## 2025-02-08 NOTE — PROGRESS NOTES
Progress Note - Oncology-Surgical   Name: Lucie Camacho 34 y.o. female I MRN: [id number]  Unit/Bed#: [facility] 921-01 I Date of Admission: 2/7/2025   Date of Service: 2/7/2025 I Hospital Day: 0    Assessment & Plan  Endometriosis of rectum  POD#1 s/p Robotic JHON, resection of pelvic peritoneal and rectovaginal endometriosis, LAR, including sigmoid and partial proctectomy, bilateral ureteral stent placement by Dr. King and Dr. Greene.    Taylor: 1220 mL, blood-tinged  Nandini drain: 110 mL serosanguineous    Plan:  -Clear liquid diet, add toast crackers  - EKG for bradycardia  -DC IVF  -DC taylor  -NANDINI drain in place  -Strict I's and O's  -SQH for DVT prophylaxis  -Epidural in place, appreciate acute pain service recommendation  -As needed antiemetics  -All other care per primary team, gynecology oncology    Please contact the SecureChat role for the Colorectal service with any questions/concerns.    Subjective   No acute events overnight. Patient reports pain in her abdomen, medications help control. They are tolerating their clear liquid diet. They are not having flatus or BM. They are voiding blood -tinged urine into taylor. They are using their IS to 2250. They deny nausea, vomiting, chest pain, shortness of breath, fevers, chills.      Objective :  Temp:  [97.2 °F (36.2 °C)-99 °F (37.2 °C)] 99 °F (37.2 °C)  HR:  [53-99] 78  BP: ()/(50-82) 118/58  Resp:  [12-20] 17  SpO2:  [92 %-100 %] 96 %  O2 Device: None (Room air)  Nasal Cannula O2 Flow Rate (L/min):  [1 L/min-2 L/min] 1 L/min    I/O         02/06 0701  02/07 0700 02/07 0701  02/08 0700    I.V. (mL/kg)  4551.8 (41)    IV Piggyback  100    Total Intake(mL/kg)  4651.8 (41.9)    Urine (mL/kg/hr)  625 (0.4)    Drains  90    Blood  50    Total Output  765    Net  +3886.8                Lines/Drains/Airways       Active Status       Name Placement date Placement time Site Days    Epidural Catheter 02/07/25 02/07/25  0710  -- less than 1    Closed/Suction Drain  "Right RLQ Bulb 19 Fr. 02/07/25  1137  RLQ  less than 1    Urethral Catheter Non-latex 16 Fr. 02/07/25  0845  Non-latex  less than 1                  Physical Exam  Constitutional:       General: She is not in acute distress.  HENT:      Head: Normocephalic and atraumatic.      Right Ear: External ear normal.      Left Ear: External ear normal.      Nose: Nose normal.      Mouth/Throat:      Pharynx: Oropharynx is clear.   Eyes:      Extraocular Movements: Extraocular movements intact.   Cardiovascular:      Rate and Rhythm: Normal rate.   Pulmonary:      Effort: Pulmonary effort is normal.   Abdominal:      General: There is no distension.      Palpations: Abdomen is soft.      Tenderness: There is abdominal tenderness.   Musculoskeletal:      Cervical back: Normal range of motion.   Skin:     General: Skin is warm and dry.      Comments: Incisions clean, dry, intact   Neurological:      Mental Status: She is alert. Mental status is at baseline.   Psychiatric:         Mood and Affect: Mood normal.           Lab Results: I have reviewed the following results:  No results for input(s): \"WBC\", \"HGB\", \"HCT\", \"PLT\", \"BANDSPCT\", \"SODIUM\", \"K\", \"CL\", \"CO2\", \"BUN\", \"CREATININE\", \"GLUC\", \"CAIONIZED\", \"MG\", \"PHOS\", \"AST\", \"ALT\", \"ALB\", \"TBILI\", \"DBILI\", \"ALKPHOS\", \"PTT\", \"INR\", \"HSTNI0\", \"HSTNI2\", \"BNP\", \"LACTICACID\" in the last 72 hours.    Imaging Results Review: No pertinent imaging studies reviewed.  Other Study Results Review: No additional pertinent studies reviewed.    VTE Pharmacologic Prophylaxis: VTE covered by:  heparin (porcine), Subcutaneous, 5,000 Units at 02/07/25 1604     VTE Mechanical Prophylaxis: sequential compression device  "

## 2025-02-08 NOTE — ASSESSMENT & PLAN NOTE
POD#1 s/p Robotic JHON, resection of pelvic peritoneal and rectovaginal endometriosis, LAR, including sigmoid and partial proctectomy, bilateral ureteral stent placement by Dr. King and Dr. Greene.    Taylor: 1220 mL, blood-tinged  Nandini drain: 110 mL serosanguineous    Plan:  -Clear liquid diet, add toast crackers  - EKG for bradycardia  -DC IVF  -DC taylor  -NANDINI drain in place  -Strict I's and O's  -SQH for DVT prophylaxis  -Epidural in place, appreciate acute pain service recommendation  -As needed antiemetics  -All other care per primary team, gynecology oncology

## 2025-02-08 NOTE — ASSESSMENT & PLAN NOTE
s/p robotic JHON, resection of pelvic and peritoneal and rectovaginal endometriosis (02/08/25)  s/p lumbar epidural placement (02/08/25)      - epidural appears to be low coverage, however likely still with some lower pelvic coverage    - continue running Ropivacaine 0.2% at 3/4/10/3 (pt request no fentanyl in infusion, itching)      - start oxycodone 5mg/10mg PO q4h PRN moderate/severe pain    - okay to increase to 10mg/15mg PO as patient on home oxycodone 10mg q8h PRN    - continue dilaudid 0.5mg IV q2h PRN breakthrough     - start home robaxin 750mg PO q6h sina    - start lidocaine patch, q12hr on/off, lateral to incisions on either side

## 2025-02-08 NOTE — PROGRESS NOTES
Progress Note - Acute Pain   Name: Lucie Camacho 34 y.o. female I MRN: [id number]  Unit/Bed#: [facility] 921-01 I Date of Admission: 2/7/2025   Date of Service: 2/8/2025 I Hospital Day: 1    Assessment & Plan  Chronic pelvic pain in female  s/p robotic JHON, resection of pelvic and peritoneal and rectovaginal endometriosis (02/08/25)  s/p lumbar epidural placement (02/08/25)      - epidural appears to be low coverage, however likely still with some lower pelvic coverage    - continue running Ropivacaine 0.2% at 3/4/10/3 (pt request no fentanyl in infusion, itching)      - start oxycodone 5mg/10mg PO q4h PRN moderate/severe pain    - okay to increase to 10mg/15mg PO as patient on home oxycodone 10mg q8h PRN    - continue dilaudid 0.5mg IV q2h PRN breakthrough     - start home robaxin 750mg PO q6h sina    - start lidocaine patch, q12hr on/off, lateral to incisions on either side    APS will continue to follow. Please contact Acute Pain Service - via SecureChat from [phone] with additional questions or concerns. See [employer]Lumense or [employer] for additional contacts and after hours information.     Subjective   Lucie Camacho is a 34 y.o. female with PMHx of endometriosis of the rectum now s/p robotic JHON, resection of pelvic and peritoneal and rectovaginal endometriosis (02/08/25) and s/p lumbar epidural placement (02/08/25).  APS was consulted for postoperative epidural and pain management.    This morning, patient with increased abdominal pain.  She is utilizing the epidural approrpriately however unfortunately is not covering the entire abdomen.  She has lower extremity numbness and weakness which is to be expected given the level of the epidural.  Discussed with surgery team, still likely providing some good coverage but need additional medication for areas not covered.  Discussed with nursing team and patient will start PO oxycodone for her as she takes this at home.  In addition will restart her home robaxin.   Patient was understanding and in agreement with the plan.     Pain History  Current pain location(s):  Pain Score: 8  Pain Location/Orientation: Location: Abdomen  Pain Scale: Pain Assessment Tool: 0-10  24 hour history: see assessment    Opioid requirement previous 24 hours: dilaudid 0.5mg IV x2 doses    Meds/Allergies   all current active meds have been reviewed and current meds:   Current Facility-Administered Medications:     acetaminophen (Ofirmev) injection 1,000 mg, Q6H, Last Rate: Stopped (02/08/25 0507) **FOLLOWED BY** acetaminophen (TYLENOL) tablet 975 mg, Q6H SAHRA    diphenhydrAMINE (BENADRYL) injection 12.5 mg, Q6H PRN    heparin (porcine) subcutaneous injection 5,000 Units, Q8H SAHRA **AND** [CANCELED] Platelet count, Once    HYDROmorphone (DILAUDID) injection 0.5 mg, Q2H PRN    hydrOXYzine HCL (ATARAX) tablet 25 mg, Q6H PRN    lactated ringers bolus 1,000 mL, Once PRN **AND** lactated ringers bolus 1,000 mL, Once PRN    lidocaine (LIDODERM) 5 % patch 1 patch, Daily    magnesium sulfate 4 g/100 mL IVPB (premix) 4 g, Once, Last Rate: 4 g (02/08/25 0900)    methocarbamol (ROBAXIN) tablet 750 mg, Q6H SAHRA    ondansetron (ZOFRAN) injection 4 mg, Q6H PRN    oxyCODONE (ROXICODONE) immediate release tablet 10 mg, Q4H PRN    oxyCODONE (ROXICODONE) IR tablet 5 mg, Q4H PRN    ropivacaine 0.2% PCEA, Continuous    simethicone (MYLICON) chewable tablet 80 mg, Q6H PRN    sodium chloride 0.9 % bolus 1,000 mL, Once PRN **AND** sodium chloride 0.9 % bolus 1,000 mL, Once PRN  Allergies   Allergen Reactions    Reglan [Metoclopramide] Other (See Comments)     Elevated heartrate    Other Blisters     Dermabond     Objective :  Temp:  [97.2 °F (36.2 °C)-99 °F (37.2 °C)] 98.2 °F (36.8 °C)  HR:  [46-82] 63  BP: ()/(49-72) 111/65  Resp:  [12-20] 17  SpO2:  [92 %-100 %] 99 %  O2 Device: None (Room air)  Nasal Cannula O2 Flow Rate (L/min):  [1 L/min-2 L/min] 1 L/min    Physical Exam  Vitals and nursing note reviewed.    Constitutional:       General: She is not in acute distress.     Appearance: Normal appearance. She is not ill-appearing, toxic-appearing or diaphoretic.   HENT:      Head: Normocephalic and atraumatic.      Nose: Nose normal.      Mouth/Throat:      Mouth: Mucous membranes are moist.      Pharynx: Oropharynx is clear.   Eyes:      General: No scleral icterus.     Conjunctiva/sclera: Conjunctivae normal.   Cardiovascular:      Rate and Rhythm: Normal rate and regular rhythm.      Pulses: Normal pulses.      Heart sounds: Normal heart sounds.   Pulmonary:      Effort: Pulmonary effort is normal.      Breath sounds: Normal breath sounds.   Abdominal:      General: Abdomen is flat.      Palpations: Abdomen is soft.      Tenderness: There is abdominal tenderness.   Musculoskeletal:      Cervical back: Normal range of motion and neck supple. No rigidity or tenderness.   Skin:     General: Skin is warm and dry.   Neurological:      Mental Status: She is alert.      Sensory: Sensory deficit present.      Motor: Weakness present.      Comments: Bilateral LE weakness and numbness in distribution of lumbar epidural coverage   Psychiatric:         Mood and Affect: Mood normal.         Behavior: Behavior normal.         Thought Content: Thought content normal.         Judgment: Judgment normal.      Epidural: Site clean/dry/intact, no surrounding erythema/edema/induration, infusion functioning appropriately        Lab Results: I have reviewed the following results:  Estimated Creatinine Clearance: 141.1 mL/min (by C-G formula based on SCr of 0.71 mg/dL).  Lab Results   Component Value Date    WBC 12.19 (H) 02/08/2025    WBC 13.23 (H) 02/26/2014    HGB 11.2 (L) 02/08/2025    HGB 13.9 02/26/2014    HCT 34.4 (L) 02/08/2025    HCT 41.3 02/26/2014     02/08/2025     02/26/2014         Component Value Date/Time     02/26/2014 0106    K 4.1 02/08/2025 0504    K 3.5 12/25/2024 1926     02/08/2025 0504    CL  110 (H) 12/25/2024 1926    CO2 24 02/08/2025 0504    CO2 26 12/25/2024 1926    BUN 10 02/08/2025 0504    BUN 16 12/25/2024 1926    CREATININE 0.71 02/08/2025 0504    CREATININE 0.68 12/25/2024 1926         Component Value Date/Time    CALCIUM 8.5 02/08/2025 0504    CALCIUM 8.9 12/25/2024 1926    ALKPHOS 59 01/23/2025 1216    ALKPHOS 59 12/25/2024 1926    AST 23 01/23/2025 1216    AST 14 12/25/2024 1926    ALT 40 01/23/2025 1216    ALT 21 12/25/2024 1926    TP 6.4 01/23/2025 1216    TP 6.7 12/25/2024 1926    ALB 4.0 01/23/2025 1216    ALB 4.1 12/25/2024 1926

## 2025-02-08 NOTE — PLAN OF CARE
Problem: PAIN - ADULT  Goal: Verbalizes/displays adequate comfort level or baseline comfort level  Description: Interventions:  - Encourage patient to monitor pain and request assistance  - Assess pain using appropriate pain scale  - Administer analgesics based on type and severity of pain and evaluate response  - Implement non-pharmacological measures as appropriate and evaluate response  - Consider cultural and social influences on pain and pain management  - Notify physician/advanced practitioner if interventions unsuccessful or patient reports new pain  Outcome: Progressing     Problem: INFECTION - ADULT  Goal: Absence or prevention of progression during hospitalization  Description: INTERVENTIONS:  - Assess and monitor for signs and symptoms of infection  - Monitor lab/diagnostic results  - Monitor all insertion sites, i.e. indwelling lines, tubes, and drains  - Monitor endotracheal if appropriate and nasal secretions for changes in amount and color  - [facility] appropriate cooling/warming therapies per order  - Administer medications as ordered  - Instruct and encourage patient and family to use good hand hygiene technique  - Identify and instruct in appropriate isolation precautions for identified infection/condition  Outcome: Progressing     Problem: DISCHARGE PLANNING  Goal: Discharge to home or other facility with appropriate resources  Description: INTERVENTIONS:  - Identify barriers to discharge w/patient and caregiver  - Arrange for needed discharge resources and transportation as appropriate  - Identify discharge learning needs (meds, wound care, etc.)  - Arrange for interpretive services to assist at discharge as needed  - Refer to Case Management Department for coordinating discharge planning if the patient needs post-hospital services based on physician/advanced practitioner order or complex needs related to functional status, cognitive ability, or social support system  Outcome: Progressing

## 2025-02-08 NOTE — CASE MANAGEMENT
Case Management Assessment & Discharge Planning Note    Patient name Lucie Camacho  Location [facility] 921/[facility] 921-01 MRN [id number]  : 1990 Date 2025       Current Admission Date: 2025  Current Admission Diagnosis:Chronic pelvic pain in female   Patient Active Problem List    Diagnosis Date Noted Date Diagnosed    History of total abdominal hysterectomy and bilateral salpingo-oophorectomy 2024     Sacroiliitis (HCC) 2024     Menopausal symptoms 2023     Morbid obesity (HCC) 2021     Alternating constipation and diarrhea 2021     Chronic bilateral low back pain with bilateral sciatica 01/15/2021     Anxiety and depression 01/15/2021     Dyspareunia in female 2019     Anxiety 2019     Vitamin D deficiency 2019     Thyroid nodule 2019     Chronic arthralgias of knees and hips 2019     Fatigue 2019     Chronic pelvic pain in female 10/10/2016     Endometriosis of rectum 10/10/2016       LOS (days): 1  Geometric Mean LOS (GMLOS) (days): 1.9  Days to GMLOS:0.7     OBJECTIVE:    Risk of Unplanned Readmission Score: 8.97         Current admission status: Inpatient       Preferred Pharmacy:   [id number] [address]  [address] [facility] [address]  Phone: [phone] Fax: [phone]    [facility] [address]  [address] [facility]  [address] [facility] [address]  Phone: [phone] Fax: [phone]    [facility]  [place], [place] - [address]  [address]  [place] [address]  Phone: [phone] Fax: [phone]    [id number] - [place] - [address] [facility]  [address] [facility] [address]  Phone: [phone] Fax: [phone]    Primary Care Provider: Nely Brothers MD    Primary Insurance: [facility]  Secondary Insurance:     ASSESSMENT:  Active Health Care Proxies    There are no active  Health Care Proxies on file.                 Readmission Root Cause  30 Day Readmission: No    Patient Information  Admitted from:: Home  Mental Status: Alert  During Assessment patient was accompanied by: Not accompanied during assessment  Assessment information provided by:: Patient  Primary Caregiver: Self  Support Systems: Self, Parent, Children, Family members  County of Residence: [place]  What city do you live in?: [place]  Home entry access options. Select all that apply.: Stairs  Number of steps to enter home.: 4  Type of Current Residence: 3 story home  Upon entering residence, is there a bedroom on the main floor (no further steps)?: Yes  Upon entering residence, is there a bathroom on the main floor (no further steps)?: Yes  Living Arrangements: Lives w/ Children, Lives w/ Family members, Lives w/ Parent(s)  Is patient a ?: No    Activities of Daily Living Prior to Admission  Functional Status: Independent  Completes ADLs independently?: Yes  Ambulates independently?: Yes  Does patient use assisted devices?: No  Does patient currently own DME?: No  Does patient have a history of Outpatient Therapy (PT/OT)?: No  Does the patient have a history of Short-Term Rehab?: No  Does patient have a history of HHC?: No  Does patient currently have HHC?: No         Patient Information Continued  Income Source: Unemployed  Does patient have prescription coverage?: Yes  Does patient receive dialysis treatments?: No  Does patient have a history of substance abuse?: No  Does patient have a history of Mental Health Diagnosis?: No         Means of Transportation  Means of Transport to Appts:: Family transport          DISCHARGE DETAILS:    Discharge planning discussed with:: Patient  Freedom of Choice: Yes     CM contacted family/caregiver?: No- see comments (declined)  Were Treatment Team discharge recommendations reviewed with patient/caregiver?: Yes  Did patient/caregiver verbalize understanding of  patient care needs?: Yes  Were patient/caregiver advised of the risks associated with not following Treatment Team discharge recommendations?: Yes       Treatment Team Recommendation: Home  Discharge Destination Plan:: Home  Transport at Discharge : Family         Additional Comments: CM met with patiient at bedside to complete open assessment. Pt lives with her mom, siblilings, and children.Pt does not drive, her mother drives her. Pt is independent at baseline with no DME. Pt plans to return home at d/c

## 2025-02-09 LAB
ANION GAP SERPL CALCULATED.3IONS-SCNC: 7 MMOL/L (ref 4–13)
BASOPHILS # BLD AUTO: 0 THOUSANDS/ΜL (ref 0–0.1)
BASOPHILS NFR BLD AUTO: 0 % (ref 0–1)
BUN SERPL-MCNC: 9 MG/DL (ref 5–25)
CALCIUM SERPL-MCNC: 8.6 MG/DL (ref 8.4–10.2)
CHLORIDE SERPL-SCNC: 108 MMOL/L (ref 96–108)
CO2 SERPL-SCNC: 25 MMOL/L (ref 21–32)
CREAT SERPL-MCNC: 0.73 MG/DL (ref 0.6–1.3)
EOSINOPHIL # BLD AUTO: 0 THOUSAND/ΜL (ref 0–0.61)
EOSINOPHIL NFR BLD AUTO: 0 % (ref 0–6)
ERYTHROCYTE [DISTWIDTH] IN BLOOD BY AUTOMATED COUNT: 13.2 % (ref 11.6–15.1)
GFR SERPL CREATININE-BSD FRML MDRD: 109 ML/MIN/1.73SQ M
GLUCOSE SERPL-MCNC: 89 MG/DL (ref 65–140)
HCT VFR BLD AUTO: 33.9 % (ref 34.8–46.1)
HGB BLD-MCNC: 11.2 G/DL (ref 11.5–15.4)
IMM GRANULOCYTES # BLD AUTO: 0.04 THOUSAND/UL (ref 0–0.2)
IMM GRANULOCYTES NFR BLD AUTO: 1 % (ref 0–2)
LYMPHOCYTES # BLD AUTO: 2.15 THOUSANDS/ΜL (ref 0.6–4.47)
LYMPHOCYTES NFR BLD AUTO: 28 % (ref 14–44)
MAGNESIUM SERPL-MCNC: 1.8 MG/DL (ref 1.9–2.7)
MCH RBC QN AUTO: 28.6 PG (ref 26.8–34.3)
MCHC RBC AUTO-ENTMCNC: 33 G/DL (ref 31.4–37.4)
MCV RBC AUTO: 87 FL (ref 82–98)
MONOCYTES # BLD AUTO: 0.53 THOUSAND/ΜL (ref 0.17–1.22)
MONOCYTES NFR BLD AUTO: 7 % (ref 4–12)
NEUTROPHILS # BLD AUTO: 5.03 THOUSANDS/ΜL (ref 1.85–7.62)
NEUTS SEG NFR BLD AUTO: 64 % (ref 43–75)
NRBC BLD AUTO-RTO: 0 /100 WBCS
PHOSPHATE SERPL-MCNC: 2.9 MG/DL (ref 2.7–4.5)
PLATELET # BLD AUTO: 154 THOUSANDS/UL (ref 149–390)
PMV BLD AUTO: 9.2 FL (ref 8.9–12.7)
POTASSIUM SERPL-SCNC: 3.5 MMOL/L (ref 3.5–5.3)
RBC # BLD AUTO: 3.92 MILLION/UL (ref 3.81–5.12)
SODIUM SERPL-SCNC: 140 MMOL/L (ref 135–147)
WBC # BLD AUTO: 7.75 THOUSAND/UL (ref 4.31–10.16)

## 2025-02-09 PROCEDURE — 83735 ASSAY OF MAGNESIUM: CPT

## 2025-02-09 PROCEDURE — 85025 COMPLETE CBC W/AUTO DIFF WBC: CPT

## 2025-02-09 PROCEDURE — 84100 ASSAY OF PHOSPHORUS: CPT

## 2025-02-09 PROCEDURE — NC001 PR NO CHARGE: Performed by: STUDENT IN AN ORGANIZED HEALTH CARE EDUCATION/TRAINING PROGRAM

## 2025-02-09 PROCEDURE — 99024 POSTOP FOLLOW-UP VISIT: CPT | Performed by: COLON & RECTAL SURGERY

## 2025-02-09 PROCEDURE — 99233 SBSQ HOSP IP/OBS HIGH 50: CPT | Performed by: ANESTHESIOLOGY

## 2025-02-09 PROCEDURE — 80048 BASIC METABOLIC PNL TOTAL CA: CPT

## 2025-02-09 RX ORDER — BENZOCAINE/MENTHOL 6 MG-10 MG
LOZENGE MUCOUS MEMBRANE 4 TIMES DAILY PRN
Status: DISCONTINUED | OUTPATIENT
Start: 2025-02-09 | End: 2025-02-11 | Stop reason: HOSPADM

## 2025-02-09 RX ORDER — GABAPENTIN 100 MG/1
100 CAPSULE ORAL 3 TIMES DAILY
Status: DISCONTINUED | OUTPATIENT
Start: 2025-02-09 | End: 2025-02-11 | Stop reason: HOSPADM

## 2025-02-09 RX ORDER — OXYCODONE HYDROCHLORIDE 10 MG/1
10 TABLET ORAL EVERY 4 HOURS PRN
Refills: 0 | Status: DISCONTINUED | OUTPATIENT
Start: 2025-02-09 | End: 2025-02-11 | Stop reason: HOSPADM

## 2025-02-09 RX ORDER — HEPARIN SODIUM 5000 [USP'U]/ML
5000 INJECTION, SOLUTION INTRAVENOUS; SUBCUTANEOUS EVERY 8 HOURS SCHEDULED
Status: COMPLETED | OUTPATIENT
Start: 2025-02-09 | End: 2025-02-09

## 2025-02-09 RX ORDER — MAGNESIUM SULFATE HEPTAHYDRATE 40 MG/ML
2 INJECTION, SOLUTION INTRAVENOUS ONCE
Status: COMPLETED | OUTPATIENT
Start: 2025-02-09 | End: 2025-02-09

## 2025-02-09 RX ADMIN — OXYCODONE HYDROCHLORIDE 15 MG: 5 TABLET ORAL at 19:18

## 2025-02-09 RX ADMIN — DIPHENHYDRAMINE HYDROCHLORIDE 12.5 MG: 50 INJECTION, SOLUTION INTRAMUSCULAR; INTRAVENOUS at 21:15

## 2025-02-09 RX ADMIN — METHOCARBAMOL 750 MG: 750 TABLET ORAL at 05:14

## 2025-02-09 RX ADMIN — OXYCODONE HYDROCHLORIDE 10 MG: 10 TABLET ORAL at 01:13

## 2025-02-09 RX ADMIN — HEPARIN SODIUM 5000 UNITS: 5000 INJECTION INTRAVENOUS; SUBCUTANEOUS at 05:14

## 2025-02-09 RX ADMIN — SIMETHICONE 80 MG: 80 TABLET, CHEWABLE ORAL at 10:29

## 2025-02-09 RX ADMIN — HYDROMORPHONE HYDROCHLORIDE 0.5 MG: 1 INJECTION, SOLUTION INTRAMUSCULAR; INTRAVENOUS; SUBCUTANEOUS at 10:52

## 2025-02-09 RX ADMIN — HYDROMORPHONE HYDROCHLORIDE 0.5 MG: 1 INJECTION, SOLUTION INTRAMUSCULAR; INTRAVENOUS; SUBCUTANEOUS at 07:15

## 2025-02-09 RX ADMIN — HYDROMORPHONE HYDROCHLORIDE 0.5 MG: 1 INJECTION, SOLUTION INTRAMUSCULAR; INTRAVENOUS; SUBCUTANEOUS at 12:54

## 2025-02-09 RX ADMIN — HEPARIN SODIUM 5000 UNITS: 5000 INJECTION INTRAVENOUS; SUBCUTANEOUS at 13:43

## 2025-02-09 RX ADMIN — POTASSIUM PHOSPHATE, MONOBASIC POTASSIUM PHOSPHATE, DIBASIC 21 MMOL: 224; 236 INJECTION, SOLUTION, CONCENTRATE INTRAVENOUS at 11:49

## 2025-02-09 RX ADMIN — HYDROMORPHONE HYDROCHLORIDE 0.5 MG: 1 INJECTION, SOLUTION INTRAMUSCULAR; INTRAVENOUS; SUBCUTANEOUS at 21:14

## 2025-02-09 RX ADMIN — OXYCODONE HYDROCHLORIDE 10 MG: 10 TABLET ORAL at 09:34

## 2025-02-09 RX ADMIN — METHOCARBAMOL 750 MG: 750 TABLET ORAL at 11:49

## 2025-02-09 RX ADMIN — HEPARIN SODIUM 5000 UNITS: 5000 INJECTION INTRAVENOUS; SUBCUTANEOUS at 21:14

## 2025-02-09 RX ADMIN — HYDROMORPHONE HYDROCHLORIDE 0.5 MG: 1 INJECTION, SOLUTION INTRAMUSCULAR; INTRAVENOUS; SUBCUTANEOUS at 02:49

## 2025-02-09 RX ADMIN — METHOCARBAMOL 750 MG: 750 TABLET ORAL at 17:23

## 2025-02-09 RX ADMIN — METHOCARBAMOL 750 MG: 750 TABLET ORAL at 23:13

## 2025-02-09 RX ADMIN — ACETAMINOPHEN 975 MG: 325 TABLET, FILM COATED ORAL at 11:49

## 2025-02-09 RX ADMIN — MAGNESIUM SULFATE HEPTAHYDRATE 2 G: 40 INJECTION, SOLUTION INTRAVENOUS at 10:52

## 2025-02-09 RX ADMIN — OXYCODONE HYDROCHLORIDE 15 MG: 5 TABLET ORAL at 13:43

## 2025-02-09 RX ADMIN — OXYCODONE HYDROCHLORIDE 10 MG: 10 TABLET ORAL at 05:18

## 2025-02-09 RX ADMIN — HYDROMORPHONE HYDROCHLORIDE 0.5 MG: 1 INJECTION, SOLUTION INTRAMUSCULAR; INTRAVENOUS; SUBCUTANEOUS at 16:00

## 2025-02-09 RX ADMIN — ACETAMINOPHEN 975 MG: 325 TABLET, FILM COATED ORAL at 17:23

## 2025-02-09 RX ADMIN — ACETAMINOPHEN 975 MG: 325 TABLET, FILM COATED ORAL at 23:13

## 2025-02-09 RX ADMIN — ACETAMINOPHEN 975 MG: 325 TABLET, FILM COATED ORAL at 05:14

## 2025-02-09 NOTE — ASSESSMENT & PLAN NOTE
s/p robotic JHON, resection of pelvic and peritoneal and rectovaginal endometriosis (02/08/25)  s/p lumbar epidural placement (02/08/25)      - Epidural no longer providing relief, however patient had heparin this morning so unable to remove catheter, PCEA discontinued and epidural to be capped    - Continue oxycodone 10mg/15mg PO q4h PRN moderate/severe pain    - continue dilaudid 0.5mg IV q2h PRN breakthrough     - continue home robaxin 750mg PO q6h sina    - start gabapentin 100mg q8h    - continue lidocaine patch, q12hr on/off, lateral to incisions on either side

## 2025-02-09 NOTE — DISCHARGE SUMMARY
Discharge Summary - Gynecologic Oncology  Lucie Camacho 34 y.o. female MRN: [id number]  Unit/Bed#: [facility] 921-01 Encounter: [id number]    Admission Date: 2/7/2025   Discharge Date: 02/11/25     Attending Physician: Dr. Popeye Greene    Consulting Physician(s): KISHAN THOMPSON    Admitting Diagnosis:   Endometriosis of rectum [N80.519]  Post-operative status    Discharge Diagnosis: same    Procedures Performed: resection of pelvic peritoneal and rectovaginal endometriosis, robotic lysis of pelvic adhesions, resection of colon low anterior laparoscopic with robotics, cystoscopy, insertion of ureteral stents    Hospital Course: The patient presented on day of admission for the above mentioned procedure.  She was admitted for pain control and postoperative management.     The patient's hospital course was uncomplicated.  On HD 2/POD 1 (2/8), the patient was doing well.  Her pain was not well controlled.  Her Hb fell from 12.1 preoperatively to 11.2 postoperatively. APS had been consulted, and they had put her pain regimen of tylenol, oxy 5/10 prn, dilaudid 0.5mg prn, sina robaxin, and lidocaine patch, as well as an epidural.    On HD 3/POD 2 (2/9), APS had added sina gabapentin and increased her oxy prn dose to 10/15 instead of 5/10. Because the patient had taken heparin in the morning, her epidural catheter was going to be removed the next day. Her epidural was capped and subsequently removed the next day. Once she was able to ambulate, her taylor was removed and she passed her void trial.     The patient was discharged home on POD  in stable condition. She was asked to follow up with Dr. Greene in 2 weeks for a postoperative appointment.     Lab Results:   Lab Results   Component Value Date    WBC 7.75 02/09/2025    HGB 11.2 (L) 02/09/2025    HCT 33.9 (L) 02/09/2025    MCV 87 02/09/2025     02/09/2025     Lab Results   Component Value Date    GLUCOSE 97 02/26/2014    CALCIUM 8.6 02/09/2025     02/26/2014    K  "3.5 02/09/2025    CO2 25 02/09/2025     02/09/2025    BUN 9 02/09/2025    CREATININE 0.73 02/09/2025     Lab Results   Component Value Date/Time    POCGLU 123 02/07/2025 12:28 PM    POCGLU 144 (H) 02/07/2025 06:04 AM     No results found for: \"PTT\"  Lab Results   Component Value Date    INR 0.97 01/23/2025    INR 1.02 04/27/2023    INR 1.0 04/24/2020    PROTIME 13.6 01/23/2025    PROTIME 13.6 04/27/2023     Pathology: pending    Condition at Discharge: good     Discharge Medications: See after visit summary for reconciled discharge medications provided to patient and family.      Discharge instructions/Information to patient and family: See after visit summary for information provided to patient and family.      Provisions for Follow-Up Care: See after visit summary for information related to follow-up care and any pertinent home health orders.      Disposition: See After Visit Summary for discharge disposition information.    Planned Readmission: No    Tanvi Terrell  PGY-1 OB/GYN  02/11/25  7:27 AM       "

## 2025-02-09 NOTE — ASSESSMENT & PLAN NOTE
34 y.o. with chronic pelvic pain with opioid dependence, endometriosis involving rectum now POD2 s/p RA-JHON, resection of pelvic and rectovaginal endometriosis, LAR, flex sig (CRS), cysto with ureteral stent insertion/removal. Overall doing well postoperatively with exception of poor pain control. Appreciate CRS recs.     FEN: Low res diet, MLIV, zofran/simethicone PRN  Pain: sina tylenol, cap epidural with plan for removal tomorrow, oxy 10/15 PRN, robaxin sina, dilaudid BT, appreciate APS recs; no NSAIDS for 48h postop  : taylor in place, may discontinue today when motor function returns after capping epidural.   DVT ppx: SCDs, SQH 5000 TID- held after MN for epidural removal      Lines: NANDINI taylor managed by CRS

## 2025-02-09 NOTE — PROGRESS NOTES
Progress Note - Acute Pain   Name: Lucie Camacho 34 y.o. female I MRN: [id number]  Unit/Bed#: [facility] 921-01 I Date of Admission: 2/7/2025   Date of Service: 2/9/2025 I Hospital Day: 2    Assessment & Plan  Chronic pelvic pain in female  s/p robotic JHON, resection of pelvic and peritoneal and rectovaginal endometriosis (02/08/25)  s/p lumbar epidural placement (02/08/25)      - Epidural no longer providing relief, however patient had heparin this morning so unable to remove catheter, PCEA discontinued and epidural to be capped    - Continue oxycodone 10mg/15mg PO q4h PRN moderate/severe pain    - continue dilaudid 0.5mg IV q2h PRN breakthrough     - continue home robaxin 750mg PO q6h sina    - start gabapentin 100mg q8h    - continue lidocaine patch, q12hr on/off, lateral to incisions on either side  Endometriosis of rectum      APS will continue to follow. Please contact Acute Pain Service - via [employer] from [phone] with additional questions or concerns. See [employer] or [employer] for additional contacts and after hours information.     Subjective   Lucie Camacho is a 34 y.o. female seen resting in bed this morning. She states she believes her epidural stopped working early this morning as she had full return of sensation, including pain, earlier today. She states her legs are no longer numb and she was able to feel her heparin injection this morning. Discussed with primary team discontinuing PCEA and capping epidural to patient can ambulate. I encouraged the patient to utilize her PRN pain medications to improve her pain. Since she had heparin this morning we will remove her epidural tomorrow.    Pain History  Current pain location(s):  Pain Score: 8  Pain Location/Orientation: Location: Abdomen  Pain Scale: Pain Assessment Tool: 0-10  24 hour history: See above    Meds/Allergies   all current active meds have been reviewed  Allergies   Allergen Reactions    Reglan [Metoclopramide] Other (See Comments)      Elevated heartrate    Other Blisters     Dermabond     Objective :  Temp:  [98.2 °F (36.8 °C)-98.6 °F (37 °C)] 98.5 °F (36.9 °C)  HR:  [61-78] 61  BP: (102-125)/(62-72) 125/72  Resp:  [14-16] 16  SpO2:  [95 %-100 %] 97 %  O2 Device: None (Room air)    Physical Exam  Vitals and nursing note reviewed.   Constitutional:       Appearance: Normal appearance.   HENT:      Head: Normocephalic and atraumatic.      Right Ear: External ear normal.      Left Ear: External ear normal.      Nose: Nose normal.      Mouth/Throat:      Mouth: Mucous membranes are moist.      Pharynx: Oropharynx is clear.   Eyes:      Conjunctiva/sclera: Conjunctivae normal.   Cardiovascular:      Rate and Rhythm: Normal rate and regular rhythm.      Pulses: Normal pulses.      Heart sounds: Normal heart sounds.   Pulmonary:      Effort: Pulmonary effort is normal.      Breath sounds: Normal breath sounds.   Abdominal:      General: Abdomen is flat. Bowel sounds are normal.      Palpations: Abdomen is soft.      Tenderness: There is abdominal tenderness.   Musculoskeletal:         General: Normal range of motion.      Cervical back: Normal range of motion and neck supple.   Skin:     General: Skin is warm and dry.      Capillary Refill: Capillary refill takes less than 2 seconds.   Neurological:      General: No focal deficit present.      Mental Status: She is alert and oriented to person, place, and time. Mental status is at baseline.   Psychiatric:         Mood and Affect: Mood normal.        Epidural: Site clean/dry/intact, no surrounding erythema/edema/induration, infusion functioning appropriately        Lab Results: I have reviewed the following results:  Estimated Creatinine Clearance: 141.1 mL/min (by C-G formula based on SCr of 0.71 mg/dL).  Lab Results   Component Value Date    WBC 7.75 02/09/2025    WBC 13.23 (H) 02/26/2014    HGB 11.2 (L) 02/09/2025    HGB 13.9 02/26/2014    HCT 33.9 (L) 02/09/2025    HCT 41.3 02/26/2014      02/09/2025     02/26/2014         Component Value Date/Time     02/26/2014 0106    K 4.1 02/08/2025 0504    K 3.5 12/25/2024 1926     02/08/2025 0504     (H) 12/25/2024 1926    CO2 24 02/08/2025 0504    CO2 26 12/25/2024 1926    BUN 10 02/08/2025 0504    BUN 16 12/25/2024 1926    CREATININE 0.71 02/08/2025 0504    CREATININE 0.68 12/25/2024 1926         Component Value Date/Time    CALCIUM 8.5 02/08/2025 0504    CALCIUM 8.9 12/25/2024 1926    ALKPHOS 59 01/23/2025 1216    ALKPHOS 59 12/25/2024 1926    AST 23 01/23/2025 1216    AST 14 12/25/2024 1926    ALT 40 01/23/2025 1216    ALT 21 12/25/2024 1926    TP 6.4 01/23/2025 1216    TP 6.7 12/25/2024 1926    ALB 4.0 01/23/2025 1216    ALB 4.1 12/25/2024 1926

## 2025-02-09 NOTE — PLAN OF CARE
Problem: PAIN - ADULT  Goal: Verbalizes/displays adequate comfort level or baseline comfort level  Description: Interventions:  - Encourage patient to monitor pain and request assistance  - Assess pain using appropriate pain scale  - Administer analgesics based on type and severity of pain and evaluate response  - Implement non-pharmacological measures as appropriate and evaluate response  - Consider cultural and social influences on pain and pain management  - Notify physician/advanced practitioner if interventions unsuccessful or patient reports new pain  Outcome: Progressing     Problem: INFECTION - ADULT  Goal: Absence or prevention of progression during hospitalization  Description: INTERVENTIONS:  - Assess and monitor for signs and symptoms of infection  - Monitor lab/diagnostic results  - Monitor all insertion sites, i.e. indwelling lines, tubes, and drains  - Monitor endotracheal if appropriate and nasal secretions for changes in amount and color  - [place] appropriate cooling/warming therapies per order  - Administer medications as ordered  - Instruct and encourage patient and family to use good hand hygiene technique  - Identify and instruct in appropriate isolation precautions for identified infection/condition  Outcome: Progressing     Problem: DISCHARGE PLANNING  Goal: Discharge to home or other facility with appropriate resources  Description: INTERVENTIONS:  - Identify barriers to discharge w/patient and caregiver  - Arrange for needed discharge resources and transportation as appropriate  - Identify discharge learning needs (meds, wound care, etc.)  - Arrange for interpretive services to assist at discharge as needed  - Refer to Case Management Department for coordinating discharge planning if the patient needs post-hospital services based on physician/advanced practitioner order or complex needs related to functional status, cognitive ability, or social support system  Outcome: Progressing

## 2025-02-09 NOTE — ASSESSMENT & PLAN NOTE
POD#1 s/p Robotic JHON, resection of pelvic peritoneal and rectovaginal endometriosis, LAR, including sigmoid and partial proctectomy, bilateral ureteral stent placement by Dr. King and Dr. Greene.    Taylor: 1.6 L, blood-tinged  Nandini drain: 110 mL serosanguineous    Plan:  -Advance to lo res diet  -DC IVF  -DC taylor  -NANDINI drain in place  -Strict I's and O's  -SQH for DVT prophylaxis  -Epidural in place, appreciate acute pain service recommendation  -As needed antiemetics  -All other care per primary team, gynecology oncology

## 2025-02-09 NOTE — PROGRESS NOTES
Progress Note - GYN Oncology   Name: Lucie Camacho 34 y.o. female I MRN: [id number]  Unit/Bed#: [facility] 921-01 I Date of Admission: 2/7/2025   Date of Service: 2/9/2025 I Hospital Day: 2    Assessment & Plan  Endometriosis of rectum  34 y.o. with chronic pelvic pain with opioid dependence, endometriosis involving rectum now POD2 s/p RA-JHON, resection of pelvic and rectovaginal endometriosis, LAR, flex sig (CRS), cysto with ureteral stent insertion/removal. Overall doing well postoperatively with exception of poor pain control. Appreciate CRS recs.     FEN: Low res diet, MLIV, zofran/simethicone PRN  Pain: sina tylenol, cap epidural with plan for removal tomorrow, oxy 10/15 PRN, robaxin sina, dilaudid BT, appreciate APS recs; no NSAIDS for 48h postop  : taylor in place, may discontinue today when motor function returns after capping epidural.   DVT ppx: SCDs, SQH 5000 TID- held after MN for epidural removal      Lines: taylor, NANDINI managed by CRS   Chronic pelvic pain in female  Status post several surgeries for endometriosis resection prior to most recent surgery  Opioid dependent due to chronic pelvic pain, previously on oxycodone 10 mg 3 times daily as needed.  Epidural no longer providing relief, will plan for capping trial and removal tomorrow as discussed with APS. Will hold AM heparin dose. Appreciate APS recommendations      24 Hour Events : NAEON  Subjective :   Patient reports pain is not well-controlled.  She reports that the epidural is not providing any relief although continues to make her legs numb and limiting her ambulation.  She denies any nausea vomiting.  She is passing flatus and notes gas pain throughout her abdomen.  She is not ambulating and notes back pain from laying in bed.  She is voiding via Taylor catheter. She also notes pruritus surrounding her robotic port site incisions, she has had a prior skin reaction to Dermabond and feels that this is similar.  It is not particularly bothersome to  her.    Objective :  Temp:  [98.2 °F (36.8 °C)-98.6 °F (37 °C)] 98.5 °F (36.9 °C)  HR:  [61-78] 61  BP: (102-125)/(62-72) 125/72  Resp:  [14-16] 16  SpO2:  [95 %-100 %] 97 %  O2 Device: None (Room air)    Physical Exam  Constitutional:       General: She is not in acute distress.     Appearance: Normal appearance.   HENT:      Head: Normocephalic and atraumatic.   Pulmonary:      Effort: Pulmonary effort is normal. No respiratory distress.   Abdominal:      General: There is no distension.      Palpations: Abdomen is soft.      Tenderness: There is abdominal tenderness. There is no guarding or rebound.      Comments: Robotic port site incisions clean dry and intact with Steri-Strips.  Some surrounding pruritic, raised erythema likely skin reaction to Mastisol.  Mini laparotomy Mepilex clean dry. NANDINI minimal Ss.    Genitourinary:     Comments: Felipe blood tinged  Musculoskeletal:      Right lower leg: No edema.      Left lower leg: No edema.   Skin:     General: Skin is warm and dry.   Neurological:      General: No focal deficit present.      Mental Status: She is alert and oriented to person, place, and time.   Psychiatric:         Mood and Affect: Mood normal.         Behavior: Behavior normal.         Lab Results: I have reviewed the following results:CBC/BMP:   .     02/09/25  0959   WBC 7.75   HGB 11.2*   HCT 33.9*      SODIUM 140   K 3.5      CO2 25   BUN 9   CREATININE 0.73   GLUC 89   MG 1.8*   PHOS 2.9        Imaging Results Review: No pertinent imaging studies reviewed.  Other Study Results Review: No additional pertinent studies reviewed.    VTE Pharmacologic Prophylaxis: VTE covered by:  heparin (porcine), Subcutaneous     VTE Mechanical Prophylaxis: sequential compression device    I personally saw/examined the patient on 2/9/2025 as a fellow.  This is not a billable service.

## 2025-02-09 NOTE — ASSESSMENT & PLAN NOTE
Status post several surgeries for endometriosis resection prior to most recent surgery  Opioid dependent due to chronic pelvic pain, previously on oxycodone 10 mg 3 times daily as needed.  Epidural no longer providing relief, will plan for capping trial and removal tomorrow as discussed with APS. Will hold AM heparin dose. Appreciate APS recommendations

## 2025-02-09 NOTE — PROGRESS NOTES
Progress Note - Oncology-Surgical   Name: Lucie Camacho 34 y.o. female I MRN: [id number]  Unit/Bed#: [facility] 921-01 I Date of Admission: 2/7/2025   Date of Service: 2/9/2025 I Hospital Day: 2    Assessment & Plan  Endometriosis of rectum  POD#1 s/p Robotic JHON, resection of pelvic peritoneal and rectovaginal endometriosis, LAR, including sigmoid and partial proctectomy, bilateral ureteral stent placement by Dr. King and Dr. Greene.    Taylor: 1.6 L, blood-tinged  Nandini drain: 110 mL serosanguineous    Plan:  -Advance to lo res diet  -DC IVF  -DC taylor  -NANDINI drain in place  -Strict I's and O's  -SQH for DVT prophylaxis  -Epidural in place, appreciate acute pain service recommendation  -As needed antiemetics  -All other care per primary team, gynecology oncology    Please contact the SecureChat role for the Colorectal service with any questions/concerns.    Subjective   No acute events overnight. Patient reports pain in her abdomen and back. They are tolerating their diet. They are having flatus but no BM. They are voiding. They are not ambulating. They are using their IS to 2250. They deny nausea, vomiting, chest pain, shortness of breath, fevers, chills.      Objective :  Temp:  [98.2 °F (36.8 °C)-98.6 °F (37 °C)] 98.4 °F (36.9 °C)  HR:  [52-78] 63  BP: (102-115)/(54-70) 107/65  Resp:  [14-18] 14  SpO2:  [95 %-100 %] 100 %  O2 Device: None (Room air)    I/O         02/07 0701  02/08 0700 02/08 0701  02/09 0700    P.O.  50    I.V. (mL/kg) 6237.7 (56.2) 163.7 (1.5)    IV Piggyback 800     Total Intake(mL/kg) 7037.7 (63.4) 213.7 (1.9)    Urine (mL/kg/hr) 1220 (0.5) 1650 (0.6)    Drains 110 60    Stool 0     Blood 50     Total Output 1380 1710    Net +5657.7 -1496.3          Unmeasured Stool Occurrence 0 x           Lines/Drains/Airways       Active Status       Name Placement date Placement time Site Days    Epidural Catheter 02/07/25 02/07/25  0710  -- 1    Closed/Suction Drain Right RLQ Bulb 19 Fr. 02/07/25  1134   RLQ  1    Urethral Catheter Non-latex 16 Fr. 02/07/25  0845  Non-latex  1                  Physical Exam  Constitutional:       General: She is not in acute distress.  HENT:      Head: Normocephalic and atraumatic.      Right Ear: External ear normal.      Left Ear: External ear normal.      Nose: Nose normal.      Mouth/Throat:      Pharynx: Oropharynx is clear.   Eyes:      Extraocular Movements: Extraocular movements intact.   Cardiovascular:      Rate and Rhythm: Normal rate.   Pulmonary:      Effort: Pulmonary effort is normal.   Abdominal:      General: There is no distension.      Palpations: Abdomen is soft.      Tenderness: There is abdominal tenderness.   Musculoskeletal:      Cervical back: Normal range of motion.   Skin:     General: Skin is warm and dry.   Neurological:      Mental Status: She is alert. Mental status is at baseline.   Psychiatric:         Mood and Affect: Mood normal.           Lab Results: I have reviewed the following results:  Recent Labs     02/08/25  0504   WBC 12.19*   HGB 11.2*   HCT 34.4*      SODIUM 139   K 4.1      CO2 24   BUN 10   CREATININE 0.71   GLUC 91   MG 1.6*   PHOS 4.2       Imaging Results Review: No pertinent imaging studies reviewed.  Other Study Results Review: No additional pertinent studies reviewed.    VTE Pharmacologic Prophylaxis: VTE covered by:  heparin (porcine), Subcutaneous, 5,000 Units at 02/09/25 0514     VTE Mechanical Prophylaxis: sequential compression device

## 2025-02-09 NOTE — ASSESSMENT & PLAN NOTE
POD#1 s/p Robotic JHON, resection of pelvic peritoneal and rectovaginal endometriosis, LAR, including sigmoid and partial proctectomy, bilateral ureteral stent placement by Dr. King and Dr. Greene.    Taylor: 3 L, blood-tinged  Nandini drain: 40 mL serosanguineous    Plan:  -Continue lo res diet  -DC taylor  -NANDINI drain in place  -Strict I's and O's  -SQH for DVT prophylaxis  -Epidural to be removed today, appreciate acute pain service recommendations for oral multimodal regimen  -As needed antiemetics  -All other care per primary team, gynecology oncology

## 2025-02-09 NOTE — PROGRESS NOTES
Progress Note - Surgery-General   Name: Lucie Camacho 34 y.o. female I MRN: [id number]  Unit/Bed#: [facility] 921-01 I Date of Admission: 2/7/2025   Date of Service: 2/9/2025 I Hospital Day: 2    Assessment & Plan  Endometriosis of rectum  POD#1 s/p Robotic JHON, resection of pelvic peritoneal and rectovaginal endometriosis, LAR, including sigmoid and partial proctectomy, bilateral ureteral stent placement by Dr. King and Dr. Greene.    Taylor: 3 L, blood-tinged  Nandini drain: 40 mL serosanguineous    Plan:  -Continue lo res diet  -DC taylor  -NANDINI drain in place  -Strict I's and O's  -SQH for DVT prophylaxis  -Epidural to be removed today, appreciate acute pain service recommendations for oral multimodal regimen  -As needed antiemetics  -All other care per primary team, gynecology oncology    Please contact the SecureChat role for the Colorectal service with any questions/concerns.    Subjective   No acute events overnight. Patient reports pain is slightly worse after epidural was shut off but seems to be improving this AM. They are tolerating their diet. They are having flatus no BM. They are voiding into taylor. They are ambulating. They are using their IS to 2500. They deny nausea, vomiting, chest pain, shortness of breath, fevers, chills.      Objective :  Temp:  [98.2 °F (36.8 °C)-98.5 °F (36.9 °C)] 98.5 °F (36.9 °C)  HR:  [61-78] 61  BP: (102-125)/(62-72) 125/72  Resp:  [14-16] 16  SpO2:  [95 %-100 %] 97 %  O2 Device: None (Room air)    I/O         02/07 0701  02/08 0700 02/08 0701  02/09 0700 02/09 0701  02/10 0700    P.O.  50     I.V. (mL/kg) 6237.7 (56.2) 163.7 (1.5)     IV Piggyback 800      Total Intake(mL/kg) 7037.7 (63.4) 213.7 (1.9)     Urine (mL/kg/hr) 1220 (0.5) 1650 (0.6) 1225 (1)    Drains 110 60 30    Stool 0      Blood 50      Total Output 1380 1710 1255    Net +5657.7 -1496.3 -1255           Unmeasured Stool Occurrence 0 x            Lines/Drains/Airways       Active Status       Name Placement date  Placement time Site Days    Epidural Catheter 02/07/25 02/07/25  0710  -- 2    Closed/Suction Drain Right RLQ Bulb 19 Fr. 02/07/25  1137  RLQ  2    Urethral Catheter Non-latex 16 Fr. 02/07/25  0845  Non-latex  2                  Physical Exam  Constitutional:       General: She is not in acute distress.  HENT:      Head: Normocephalic and atraumatic.      Right Ear: External ear normal.      Left Ear: External ear normal.      Nose: Nose normal.      Mouth/Throat:      Pharynx: Oropharynx is clear.   Eyes:      Extraocular Movements: Extraocular movements intact.   Cardiovascular:      Rate and Rhythm: Normal rate.   Pulmonary:      Effort: Pulmonary effort is normal.   Abdominal:      General: There is no distension.      Palpations: Abdomen is soft.      Tenderness: There is abdominal tenderness.   Musculoskeletal:      Cervical back: Normal range of motion.   Skin:     General: Skin is warm and dry.      Comments: Incisions clean, dry, intact. Mepilex in place   Neurological:      Mental Status: She is alert. Mental status is at baseline.   Psychiatric:         Mood and Affect: Mood normal.           Lab Results: I have reviewed the following results:  Recent Labs     02/09/25  0959   WBC 7.75   HGB 11.2*   HCT 33.9*      SODIUM 140   K 3.5      CO2 25   BUN 9   CREATININE 0.73   GLUC 89   MG 1.8*   PHOS 2.9       Imaging Results Review: No pertinent imaging studies reviewed.  Other Study Results Review: No additional pertinent studies reviewed.    VTE Pharmacologic Prophylaxis: VTE covered by:  heparin (porcine), Subcutaneous, 5,000 Units at 02/09/25 1343     VTE Mechanical Prophylaxis: sequential compression device

## 2025-02-09 NOTE — UTILIZATION REVIEW
"NOTIFICATION OF INPATIENT ADMISSION   AUTHORIZATION REQUEST   SERVICING FACILITY:   [facility]  Address: [address] [place] [address]  Tax ID: [id number]  NPI: [id number] ATTENDING PROVIDER:  Attending Name and NPI#: Popeye Greene Md [[id number]]  Address: [address] [place] [address]  Phone: [phone]   ADMISSION INFORMATION:  Place of Service: Inpatient [facility]  Place of Service Code: 21  Inpatient Admission Date/Time: 2/7/25 12:36 PM  Discharge Date/Time: No discharge date for patient encounter.  Admitting Diagnosis Code/Description:  Endometriosis of rectum [N80.519]     UTILIZATION REVIEW CONTACT:  Soniya \"Rosa\"Zurdo Utilization   Network Utilization Review Department  Phone: [phone]  Fax: [phone]  Email: [email or web address]  Contact for approvals/pending authorizations, clinical reviews, and discharge.     PHYSICIAN ADVISORY SERVICES:  Medical Necessity Denial & Sgpt-if-Aukp Review  Phone: [phone]  Fax: [phone]  Email: [email or web address]     DISCHARGE SUPPORT TEAM:  For Patients Discharge Needs & Updates  Phone: [phone] opt. 2 Fax: [phone]  Email: [email or web address]     "

## 2025-02-10 VITALS
OXYGEN SATURATION: 97 % | BODY MASS INDEX: 40.65 KG/M2 | HEART RATE: 70 BPM | RESPIRATION RATE: 18 BRPM | SYSTOLIC BLOOD PRESSURE: 109 MMHG | HEIGHT: 65 IN | DIASTOLIC BLOOD PRESSURE: 69 MMHG | TEMPERATURE: 97.9 F | WEIGHT: 244 LBS

## 2025-02-10 LAB
BASE EXCESS BLDA CALC-SCNC: -5 MMOL/L (ref -2–3)
CA-I BLD-SCNC: 1.19 MMOL/L (ref 1.12–1.32)
GLUCOSE SERPL-MCNC: 119 MG/DL (ref 65–140)
HCO3 BLDA-SCNC: 20.1 MMOL/L (ref 22–28)
HCT VFR BLD CALC: 35 % (ref 34.8–46.1)
HGB BLDA-MCNC: 11.9 G/DL (ref 11.5–15.4)
PCO2 BLD: 21 MMOL/L (ref 21–32)
PCO2 BLD: 35 MM HG (ref 36–44)
PH BLD: 7.37 [PH] (ref 7.35–7.45)
PO2 BLD: 114 MM HG (ref 75–129)
POTASSIUM BLD-SCNC: 3.7 MMOL/L (ref 3.5–5.3)
SAO2 % BLD FROM PO2: 98 % (ref 60–85)
SODIUM BLD-SCNC: 139 MMOL/L (ref 136–145)
SPECIMEN SOURCE: ABNORMAL

## 2025-02-10 PROCEDURE — 99024 POSTOP FOLLOW-UP VISIT: CPT | Performed by: OBSTETRICS & GYNECOLOGY

## 2025-02-10 PROCEDURE — 99024 POSTOP FOLLOW-UP VISIT: CPT | Performed by: COLON & RECTAL SURGERY

## 2025-02-10 PROCEDURE — 97162 PT EVAL MOD COMPLEX 30 MIN: CPT

## 2025-02-10 PROCEDURE — 97166 OT EVAL MOD COMPLEX 45 MIN: CPT

## 2025-02-10 PROCEDURE — 99232 SBSQ HOSP IP/OBS MODERATE 35: CPT | Performed by: ANESTHESIOLOGY

## 2025-02-10 RX ORDER — HEPARIN SODIUM 5000 [USP'U]/ML
5000 INJECTION, SOLUTION INTRAVENOUS; SUBCUTANEOUS EVERY 8 HOURS SCHEDULED
Status: DISCONTINUED | OUTPATIENT
Start: 2025-02-10 | End: 2025-02-11 | Stop reason: HOSPADM

## 2025-02-10 RX ORDER — IBUPROFEN 600 MG/1
600 TABLET, FILM COATED ORAL EVERY 6 HOURS SCHEDULED
Status: DISCONTINUED | OUTPATIENT
Start: 2025-02-10 | End: 2025-02-11 | Stop reason: HOSPADM

## 2025-02-10 RX ADMIN — METHOCARBAMOL 750 MG: 750 TABLET ORAL at 12:13

## 2025-02-10 RX ADMIN — ACETAMINOPHEN 975 MG: 325 TABLET, FILM COATED ORAL at 17:50

## 2025-02-10 RX ADMIN — HEPARIN SODIUM 5000 UNITS: 5000 INJECTION INTRAVENOUS; SUBCUTANEOUS at 17:49

## 2025-02-10 RX ADMIN — ACETAMINOPHEN 975 MG: 325 TABLET, FILM COATED ORAL at 23:47

## 2025-02-10 RX ADMIN — HYDROMORPHONE HYDROCHLORIDE 0.5 MG: 1 INJECTION, SOLUTION INTRAMUSCULAR; INTRAVENOUS; SUBCUTANEOUS at 07:12

## 2025-02-10 RX ADMIN — IBUPROFEN 600 MG: 600 TABLET, FILM COATED ORAL at 23:47

## 2025-02-10 RX ADMIN — DIPHENHYDRAMINE HYDROCHLORIDE 12.5 MG: 50 INJECTION, SOLUTION INTRAMUSCULAR; INTRAVENOUS at 19:32

## 2025-02-10 RX ADMIN — IBUPROFEN 600 MG: 600 TABLET, FILM COATED ORAL at 17:50

## 2025-02-10 RX ADMIN — METHOCARBAMOL 750 MG: 750 TABLET ORAL at 04:43

## 2025-02-10 RX ADMIN — HYDROMORPHONE HYDROCHLORIDE 0.5 MG: 1 INJECTION, SOLUTION INTRAMUSCULAR; INTRAVENOUS; SUBCUTANEOUS at 10:54

## 2025-02-10 RX ADMIN — HYDROMORPHONE HYDROCHLORIDE 0.5 MG: 1 INJECTION, SOLUTION INTRAMUSCULAR; INTRAVENOUS; SUBCUTANEOUS at 19:23

## 2025-02-10 RX ADMIN — IBUPROFEN 600 MG: 600 TABLET, FILM COATED ORAL at 12:13

## 2025-02-10 RX ADMIN — OXYCODONE HYDROCHLORIDE 15 MG: 5 TABLET ORAL at 04:35

## 2025-02-10 RX ADMIN — HYDROMORPHONE HYDROCHLORIDE 0.5 MG: 1 INJECTION, SOLUTION INTRAMUSCULAR; INTRAVENOUS; SUBCUTANEOUS at 01:45

## 2025-02-10 RX ADMIN — OXYCODONE HYDROCHLORIDE 15 MG: 5 TABLET ORAL at 09:13

## 2025-02-10 RX ADMIN — METHOCARBAMOL 750 MG: 750 TABLET ORAL at 17:50

## 2025-02-10 RX ADMIN — OXYCODONE HYDROCHLORIDE 15 MG: 5 TABLET ORAL at 13:26

## 2025-02-10 RX ADMIN — OXYCODONE HYDROCHLORIDE 15 MG: 5 TABLET ORAL at 00:21

## 2025-02-10 RX ADMIN — OXYCODONE HYDROCHLORIDE 15 MG: 5 TABLET ORAL at 17:50

## 2025-02-10 RX ADMIN — OXYCODONE HYDROCHLORIDE 15 MG: 5 TABLET ORAL at 22:03

## 2025-02-10 RX ADMIN — HYDROMORPHONE HYDROCHLORIDE 0.5 MG: 1 INJECTION, SOLUTION INTRAMUSCULAR; INTRAVENOUS; SUBCUTANEOUS at 16:15

## 2025-02-10 RX ADMIN — ACETAMINOPHEN 975 MG: 325 TABLET, FILM COATED ORAL at 12:13

## 2025-02-10 RX ADMIN — IBUPROFEN 600 MG: 600 TABLET, FILM COATED ORAL at 09:13

## 2025-02-10 RX ADMIN — DIPHENHYDRAMINE HYDROCHLORIDE 12.5 MG: 50 INJECTION, SOLUTION INTRAMUSCULAR; INTRAVENOUS at 12:13

## 2025-02-10 RX ADMIN — METHOCARBAMOL 750 MG: 750 TABLET ORAL at 23:47

## 2025-02-10 NOTE — PHYSICAL THERAPY NOTE
Physical Therapy Evaluation    Patient Name: Lucie Camacho    Today's Date: 2/10/2025     Problem List  Principal Problem:    Chronic pelvic pain in female  Active Problems:    Endometriosis of rectum       Past Medical History  Past Medical History:   Diagnosis Date    Anxiety     Chicken pox     Depression     Endometriosis     GERD (gastroesophageal reflux disease)     Headache     Occasional     HSV-1 infection     IBS (irritable bowel syndrome)     Kidney stone     Kidney stone on left side     Multiple thyroid nodules     Obesity     Renal calculi         Past Surgical History  Past Surgical History:   Procedure Laterality Date    APPENDECTOMY  2021     SECTION      x2    CYSTOSCOPY N/A 2023    Procedure: CYSTOSCOPY;  Surgeon: Popeye Greene MD;  [facility];  Service: Gynecology Oncology    CYSTOSCOPY N/A 2025    Procedure: CYSTOSCOPY; INSERTION OF URETERAL STENTS;  Surgeon: Popeye Greene MD;  [facility];  Service: Gynecology Oncology    EXAMINATION UNDER ANESTHESIA N/A 2023    Procedure: EXAM UNDER ANESTHESIA (EUA), I& D vaginal cuff abscess;  Surgeon: Popeye Greene MD;  [facility];  Service: Gynecology Oncology    HYSTERECTOMY      robotic total laparoscopic hysterectomy with BS    LAPAROSCOPIC ENDOMETRIOSIS FULGURATION  2018    laparoscopic excision of endometriosis, fulguration of endometriosis, lysis of adhesions    LAPAROSCOPY  2014    with I&D     MOLE REMOVAL      face - benign     PELVIC LAPAROSCOPY Bilateral 2023    Procedure: SALPINGO-OOPHORECTOMY, LAPAROSCOPIC W/ROBOTICS, RADICAL RESECTION PELVIC ENDOMETRIOSIS;  Surgeon: Popeye Greene MD;  [facility];  Service: Gynecology Oncology    PELVIC LAPAROSCOPY N/A 2025    Procedure: ROBOTIC LYSIS OF PELVIC ADHESIONS, RESECTION OF PELVIC PERITONEAL AND RECTOVAGINAL  ENDOMETRIOSIS;  Surgeon: Popeye Greene MD;  [facility];  Service: Gynecology Oncology    IL LAPS COLECTOMY PRTL W/COLOPXTSTMY LW ANAST N/A 2/7/2025    Procedure: RESECTION COLON LOW ANTERIOR LAPAROSCOPIC WITH ROBOTICS;  Surgeon: Chauncey King MD;  [facility];  Service: Colorectal    IL SIGMOIDOSCOPY FLX DX W/COLLJ SPEC BR/WA IF PFRMD N/A 2/7/2025    Procedure: SIGMOIDOSCOPY FLEXIBLE;  Surgeon: Chauncey King MD;  [facility];  Service: Colorectal    PROCTOSCOPY N/A 5/18/2023    Procedure: PROCTOSCOPY;  Surgeon: Popeye Greene MD;  [facility];  Service: Gynecology Oncology    SKIN CANCER EXCISION      abdomen    THYROID CYST EXCISION      TONSILLECTOMY      TUBAL LIGATION  02/15/2016    With lysis of adhesion and peritoneal biopsy    US GUIDED INJECTION FOR RESEARCH STUDY  05/30/2015    VAGINA SURGERY N/A 5/18/2023    Procedure: VAGINECTOMY, PARTIAL;  Surgeon: Popeye Greene MD;  [facility];  Service: Gynecology Oncology    WISDOM TOOTH EXTRACTION           02/10/25 1107   PT Last Visit   PT Visit Date 02/10/25   Note Type   Note type Evaluation   Pain Assessment   Pain Assessment Tool 0-10   Pain Score 8   Pain Location/Orientation Location: Abdomen   Pain Onset/Description Onset: Ongoing   Patient's Stated Pain Goal No pain   Hospital Pain Intervention(s) Repositioned;Ambulation/increased activity;Emotional support   Restrictions/Precautions   Weight Bearing Precautions Per Order No   Other Precautions Multiple lines;Pain  (+NANDINI drain)   Home Living   Type of Home House  (3 SH)   Home Layout Multi-level;Performs ADLs on one level;Able to live on main level with bedroom/bathroom;Stairs to enter with rails  (4 NILDA)   Bathroom Shower/Tub Tub/shower unit   Bathroom Toilet Standard   Bathroom Equipment Other (Comment);Shower chair  (pt reports she has a shower chair and may have BSC)   Bathroom Accessibility Accessible   Home  Equipment Other (Comment)  (pt reports she may have a RW)   Additional Comments pt reports living with mother, children and sister in 3 Sh with 4 NILDA. 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)   Comments pt reports independence PTA   General   Family/Caregiver Present No   Cognition   Overall Cognitive Status WFL   Arousal/Participation Cooperative   Orientation Level Oriented X4   Memory Within functional limits   Following Commands Follows all commands and directions without difficulty   Comments pt pleasant and cooperative   Subjective   Subjective pt agreeable to mobilize   RLE Assessment   RLE Assessment WFL   LLE Assessment   LLE Assessment WFL   Light Touch   RLE Light Touch Impaired   RLE Light Touch Comments pt reports numbness on her right lateral thigh down to her knee   LLE Light Touch Grossly intact   Bed Mobility   Supine to Sit 5  Supervision   Additional items HOB elevated;Increased time required;Verbal cues   Sit to Supine Unable to assess   Additional Comments pt OOB in chair at end of session   Transfers   Sit to Stand 5  Supervision   Additional items Increased time required;Verbal cues   Stand to Sit 5  Supervision   Additional items Increased time required;Armrests   Additional Comments no AD   Ambulation/Elevation   Gait pattern Wide IRISH;Short stride;Antalgic   Gait Assistance 5  Supervision   Assistive Device None   Distance 60'+250'   Stair Management Assistance Not tested   Balance   Static Sitting Good   Dynamic Sitting Fair +   Static Standing Fair   Dynamic Standing Fair -   Ambulatory Fair -   Endurance Deficit   Endurance Deficit Yes   Endurance Deficit  Description pt limited by pain   Activity Tolerance   Activity Tolerance Patient limited by pain   Medical Staff Made Aware YOGI Ramírez   Nurse Made Aware yes-RN cleared   Assessment   Prognosis Good   Problem List Pain   Assessment Pt is an 34 y.o. female presenting to [facility] on 2/7/25 for primary medical dx of chronic pelvic pain. Pt is currently s/p robotic JHON, resection of pelvic and peritoneal and rectovaginal endometriosis.  has a past medical history of Anxiety, Chicken pox, Depression, Endometriosis, GERD (gastroesophageal reflux disease), Headache, HSV-1 infection, IBS (irritable bowel syndrome), Kidney stone, Kidney stone on left side, Multiple thyroid nodules, Obesity, and Renal calculi. Pt presents as a moderate complexity evaluation due to Ongoing medical management for primary dx, Decreased activity tolerance compared to baseline, Increased assistance needed from caregiver at current time, Trending lab values, Continuous pulse oximetry monitoring , NANDINI drain in place at current time, s/p surgical intervention. Pt typically resides with family in 3  with 4 NILDA. Pt was independent PTA. Pt currently requires S for bed mobility, S for transfers  and S for ambulating >250'. Pt is limited by pain however presents around her functional baseline and demonstrates safety with mobility. Pt has no concerns returning home at current level of function and has supportive family and significant other that can assist as needed. Pt can continue mobilize with staff while in the hospital to promote mobility. Pt has no further acute PT needs or post acute PT needs. The patient's AM-PAC Basic Mobility Inpatient Short Form Raw Score is 17. A Raw score of greater than or equal to 16 suggests the patient may benefit from discharge to home. Please also refer to the recommendation of the Physical Therapist for safe discharge planning. Pt left upright in chair with call bell and personal items within reach and all needs met.    Barriers to Discharge None   Goals   Patient Goals to go home   PT Treatment Day 0   Plan   PT Frequency Other (Comment)  (PT eval only)   Discharge Recommendation   Rehab Resource Intensity Level, PT No post-acute rehabilitation needs   AM-PAC Basic Mobility Inpatient   Turning in Flat Bed Without Bedrails 3   Lying on Back to Sitting on Edge of Flat Bed Without Bedrails 3   Moving Bed to Chair 3   Standing Up From Chair Using Arms 3   Walk in Room 3   Climb 3-5 Stairs With Railing 2   Basic Mobility Inpatient Raw Score 17   Basic Mobility Standardized Score 39.67   [facility] Highest Level Of Mobility   -[facility] Goal 5: Stand one or more mins   -HL Achieved 8: Walk 250 feet ot more   Modified Van Zandt Scale   Modified [place] Z[place]t Scale 2   MARIFER HoweT

## 2025-02-10 NOTE — PROGRESS NOTES
Progress Note - GYN Oncology   Name: Lucie Camacho 34 y.o. female I MRN: [id number]  Unit/Bed#: [facility] 921-01 I Date of Admission: 2/7/2025   Date of Service: 2/10/2025 I Hospital Day: 3     Assessment & Plan  Endometriosis of rectum  34 y.o. with chronic pelvic pain with opioid dependence, endometriosis involving rectum now POD3 s/p RA-JHON, resection of pelvic and rectovaginal endometriosis, LAR, flex sig (CRS), cysto with ureteral stent insertion/removal. Overall doing well postoperatively with exception of poor pain control. Appreciate CRS recs.     FEN: Low res diet, MLIV, zofran/simethicone PRN  Pain: sina tylenol, epidural capped yesterday with plan for removal today, oxy 10/15 PRN, robaxin sina, dilaudid BT, appreciate APS recs; no NSAIDS for 48h postop, consider starting today  : taylor in place, may discontinue today when motor function returns after capping epidural.   DVT ppx: SCDs, SQH 5000 TID- held this am for epidural removal    Lines: claudia, NANDINI managed by CRS   Chronic pelvic pain in female  Status post several surgeries for endometriosis resection prior to most recent surgery  Opioid dependent due to chronic pelvic pain, previously on oxycodone 10 mg 3 times daily as needed.  Epidural no longer providing relief, capped yesterday and plan for removal today as discussed with APS. AM heparin dose held. Appreciate APS recommendations      GYNECOLOGY  Subjective   Lucie reports her pain is slightly better controlled this morning, and that is she able to ambulate well and move her legs with the epidural turned off. Passing flatus but no BM. No nausea or vomiting, tolerating diet.     Objective :  Temp:  [97.8 °F (36.6 °C)-98.5 °F (36.9 °C)] 98.3 °F (36.8 °C)  HR:  [61-78] 69  BP: (108-127)/(72-89) 108/75  Resp:  [14-16] 14  SpO2:  [97 %-99 %] 99 %  O2 Device: None (Room air)    Physical Exam  Vitals reviewed.   Constitutional:       General: She is not in acute distress.     Appearance: Normal  appearance.   HENT:      Head: Normocephalic and atraumatic.   Eyes:      Extraocular Movements: Extraocular movements intact.   Cardiovascular:      Rate and Rhythm: Normal rate and regular rhythm.   Pulmonary:      Effort: Pulmonary effort is normal.   Abdominal:      General: Abdomen is flat. There is no distension.      Palpations: Abdomen is soft.      Tenderness: There is abdominal tenderness.      Comments: Incisions clean/dry/intact with mepilex in place   Genitourinary:     Comments: deferred  Musculoskeletal:         General: Normal range of motion.      Cervical back: Normal range of motion.   Skin:     General: Skin is warm and dry.   Neurological:      Mental Status: She is alert and oriented to person, place, and time.   Psychiatric:         Mood and Affect: Mood normal.         Behavior: Behavior normal.         Jessica Tejada,    Obstetrics & Gynecology PGY-II  02/10/25  6:09 AM

## 2025-02-10 NOTE — ASSESSMENT & PLAN NOTE
34 y.o. with chronic pelvic pain with opioid dependence, endometriosis involving rectum now POD3 s/p RA-JHON, resection of pelvic and rectovaginal endometriosis, LAR, flex sig (CRS), cysto with ureteral stent insertion/removal. Overall doing well postoperatively with exception of poor pain control. Appreciate CRS recs.     FEN: Low res diet, MLIV, zofran/simethicone PRN  Pain: sina tylenol, epidural capped yesterday with plan for removal today, oxy 10/15 PRN, robaxin sina, dilaudid BT, appreciate APS recs; no NSAIDS for 48h postop, consider starting today  : taylor in place, may discontinue today when motor function returns after capping epidural.   DVT ppx: SCDs, SQH 5000 TID- held this am for epidural removal    Lines: NANDINI taylor managed by CRS

## 2025-02-10 NOTE — ASSESSMENT & PLAN NOTE
s/p robotic JHON, resection of pelvic and peritoneal and rectovaginal endometriosis (02/08/25)  s/p lumbar epidural placement (02/08/25)      - Epidural removed this morning, tip intact    - Continue oxycodone 10mg/15mg PO q4h PRN moderate/severe pain    - continue dilaudid 0.5mg IV q2h PRN breakthrough     - continue home robaxin 750mg PO q6h sina    - continue gabapentin 100mg q8h    - continue lidocaine patch, q12hr on/off, lateral to incisions on either side

## 2025-02-10 NOTE — PLAN OF CARE
Problem: PAIN - ADULT  Goal: Verbalizes/displays adequate comfort level or baseline comfort level  Description: Interventions:  - Encourage patient to monitor pain and request assistance  - Assess pain using appropriate pain scale  - Administer analgesics based on type and severity of pain and evaluate response  - Implement non-pharmacological measures as appropriate and evaluate response  - Consider cultural and social influences on pain and pain management  - Notify physician/advanced practitioner if interventions unsuccessful or patient reports new pain  2/9/2025 1953 by Maine Staples RN  Outcome: Progressing  2/9/2025 1953 by Maine Staples RN  Outcome: Progressing     Problem: INFECTION - ADULT  Goal: Absence or prevention of progression during hospitalization  Description: INTERVENTIONS:  - Assess and monitor for signs and symptoms of infection  - Monitor lab/diagnostic results  - Monitor all insertion sites, i.e. indwelling lines, tubes, and drains  - Monitor endotracheal if appropriate and nasal secretions for changes in amount and color  - [place] appropriate cooling/warming therapies per order  - Administer medications as ordered  - Instruct and encourage patient and family to use good hand hygiene technique  - Identify and instruct in appropriate isolation precautions for identified infection/condition  Outcome: Progressing     Problem: SAFETY ADULT  Goal: Maintain or return to baseline ADL function  Description: INTERVENTIONS:  -  Assess patient's ability to carry out ADLs; assess patient's baseline for ADL function and identify physical deficits which impact ability to perform ADLs (bathing, care of mouth/teeth, toileting, grooming, dressing, etc.)  - Assess/evaluate cause of self-care deficits   - Assess range of motion  - Assess patient's mobility; develop plan if impaired  - Assess patient's need for assistive devices and provide as appropriate  - Encourage maximum independence but  intervene and supervise when necessary  - Involve family in performance of ADLs  - Assess for home care needs following discharge   - Consider OT consult to assist with ADL evaluation and planning for discharge  - Provide patient education as appropriate  Outcome: Progressing     Problem: DISCHARGE PLANNING  Goal: Discharge to home or other facility with appropriate resources  Description: INTERVENTIONS:  - Identify barriers to discharge w/patient and caregiver  - Arrange for needed discharge resources and transportation as appropriate  - Identify discharge learning needs (meds, wound care, etc.)  - Arrange for interpretive services to assist at discharge as needed  - Refer to Case Management Department for coordinating discharge planning if the patient needs post-hospital services based on physician/advanced practitioner order or complex needs related to functional status, cognitive ability, or social support system  Outcome: Progressing     Problem: Knowledge Deficit  Goal: Patient/family/caregiver demonstrates understanding of disease process, treatment plan, medications, and discharge instructions  Description: Complete learning assessment and assess knowledge base.  Interventions:  - Provide teaching at level of understanding  - Provide teaching via preferred learning methods  Outcome: Progressing

## 2025-02-10 NOTE — OCCUPATIONAL THERAPY NOTE
Occupational Therapy Evaluation     Patient Name: Lucie Camacho  Today's Date: 2/10/2025  Problem List  Principal Problem:    Chronic pelvic pain in female  Active Problems:    Endometriosis of rectum    Past Medical History  Past Medical History:   Diagnosis Date    Anxiety     Chicken pox     Depression     Endometriosis     GERD (gastroesophageal reflux disease)     Headache     Occasional     HSV-1 infection     IBS (irritable bowel syndrome)     Kidney stone     Kidney stone on left side     Multiple thyroid nodules     Obesity     Renal calculi      Past Surgical History  Past Surgical History:   Procedure Laterality Date    APPENDECTOMY  2021     SECTION      x2    CYSTOSCOPY N/A 2023    Procedure: CYSTOSCOPY;  Surgeon: Popeye Greene MD;  [facility];  Service: Gynecology Oncology    CYSTOSCOPY N/A 2025    Procedure: CYSTOSCOPY; INSERTION OF URETERAL STENTS;  Surgeon: Popeye Greene MD;  [facility];  Service: Gynecology Oncology    EXAMINATION UNDER ANESTHESIA N/A 2023    Procedure: EXAM UNDER ANESTHESIA (EUA), I& D vaginal cuff abscess;  Surgeon: Popeye Greene MD;  [facility];  Service: Gynecology Oncology    HYSTERECTOMY      robotic total laparoscopic hysterectomy with BS    LAPAROSCOPIC ENDOMETRIOSIS FULGURATION  2018    laparoscopic excision of endometriosis, fulguration of endometriosis, lysis of adhesions    LAPAROSCOPY  2014    with I&D     MOLE REMOVAL      face - benign     PELVIC LAPAROSCOPY Bilateral 2023    Procedure: SALPINGO-OOPHORECTOMY, LAPAROSCOPIC W/ROBOTICS, RADICAL RESECTION PELVIC ENDOMETRIOSIS;  Surgeon: Popeye Greene MD;  [facility];  Service: Gynecology Oncology    PELVIC LAPAROSCOPY N/A 2025    Procedure: ROBOTIC LYSIS OF PELVIC ADHESIONS, RESECTION OF PELVIC PERITONEAL AND RECTOVAGINAL ENDOMETRIOSIS;  Surgeon: Popeye Greene MD;   [facility];  Service: Gynecology Oncology    RI LAPS COLECTOMY PRTL W/COLOPXTSTMY LW ANAST N/A 2/7/2025    Procedure: RESECTION COLON LOW ANTERIOR LAPAROSCOPIC WITH ROBOTICS;  Surgeon: Chauncey King MD;  [facility];  Service: Colorectal    RI SIGMOIDOSCOPY FLX DX W/COLLJ SPEC BR/WA IF PFRMD N/A 2/7/2025    Procedure: SIGMOIDOSCOPY FLEXIBLE;  Surgeon: Chauncey King MD;  [facility];  Service: Colorectal    PROCTOSCOPY N/A 5/18/2023    Procedure: PROCTOSCOPY;  Surgeon: Popeye Greene MD;  [facility];  Service: Gynecology Oncology    SKIN CANCER EXCISION      abdomen    THYROID CYST EXCISION      TONSILLECTOMY      TUBAL LIGATION  02/15/2016    With lysis of adhesion and peritoneal biopsy    US GUIDED INJECTION FOR RESEARCH STUDY  05/30/2015    VAGINA SURGERY N/A 5/18/2023    Procedure: VAGINECTOMY, PARTIAL;  Surgeon: Popeye Greene MD;  [facility];  Service: Gynecology Oncology    WISDOM TOOTH EXTRACTION          02/10/25 1047   OT Last Visit   OT Visit Date 02/10/25   Note Type   Note type Evaluation   Pain Assessment   Pain Assessment Tool 0-10   Pain Score 8   Pain Location/Orientation Location: Abdomen   Effect of Pain on Daily Activities limits comfort and activity tolerance   Patient's Stated Pain Goal No pain   Hospital Pain Intervention(s) Repositioned;Ambulation/increased activity;Emotional support   Restrictions/Precautions   Weight Bearing Precautions Per Order No   Other Precautions Multiple lines;Pain  (epidural)   Home Living   Type of Home House   Home Layout Multi-level;Bed/bath upstairs;Performs ADLs on one level;Able to live on main level with bedroom/bathroom  (3 SH w/ 4 NILDA; available FFSU w/ full bathroom)   Bathroom Shower/Tub Tub/shower unit   Bathroom Toilet Standard   Bathroom Equipment Shower chair  (Pt reports that she also may have a BSC.)   Home Equipment   (Pt reports that she may have a RW.)   Prior Function    Level of [place] Independent with ADLs;Independent with functional mobility;Independent with IADLS   Lives With Family  (Pt lives w/ her mother, children (9 and 11 y/o), and siblings.)   Receives Help From Family   IADLs Independent with meal prep;Independent with medication management;Family/Friend/Other provides transportation   Falls in the last 6 months 0   Lifestyle   Autonomy Pt reports I w/ ADLs, IADLs, and fxnl mobility w/o AD at baseline; - driving.   Reciprocal Relationships Pt lives w/ her mother, children (9 and 11 y/o), and siblings. Her significant other is also available to assist as needed during the week.   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   Intrinsic Gratification Pt enjoys being active and caring for others.   General   Family/Caregiver Present No   ADL   Where Assessed Edge of bed   Eating Assistance 7  Independent   Grooming Assistance 7  Independent   UB Bathing Assistance 6  Modified Independent   LB Bathing Assistance 5  Supervision/Setup   UB Dressing Assistance 6  Modified independent   LB Dressing Assistance 5  Supervision/Setup   Toileting Assistance  5  Supervision/Setup   Bed Mobility   Supine to Sit 5  Supervision   Additional items HOB elevated;Bedrails;Increased time required   Transfers   Sit to Stand 5  Supervision   Additional items Increased time required   Stand to Sit 5  Supervision   Additional items Increased time required   Additional Comments completed w/o AD   Functional Mobility   Functional Mobility 5  Supervision   Additional Comments Pt ambulated household distance w/ S and w/o AD.   Additional items   (no AD)   Balance   Static Sitting Fair +   Dynamic Sitting Fair +   Static Standing Fair   Dynamic Standing Fair   Ambulatory Fair   Activity Tolerance   Activity Tolerance Patient tolerated treatment well   Medical Staff Made Aware PT Savi, due to pt's medical complexity and multiple comorbidities   Nurse Made Aware RN clearance prior to session  "  RUE Assessment   RUE Assessment WFL   LUE Assessment   LUE Assessment WFL   Hand Function   Gross Motor Coordination Functional   Fine Motor Coordination Functional   Cognition   Overall Cognitive Status WFL   Arousal/Participation Alert;Responsive;Cooperative   Attention Within functional limits   Orientation Level Oriented X4   Memory Within functional limits   Following Commands Follows all commands and directions without difficulty   Comments Pt identified by name and . Pt was pleasant, cooperative, and willing to participate in OT evaluation.   Assessment   Assessment Pt is a 34 y.o. female seen for OT evaluation s/p admission to [facility] on [facility]25. Pt diagnosed with Chronic pelvic pain; s/p \"robotic assisted resection of pelvic/rectovaginal endometriosis, lysis of adhesions, LAR with flexible sigmoidoscopy, and cystoscopy with bilateral ureteral catheter placement and removal\" on . Pt has a significant PMH impacting occupational performance including: Anxiety, Chicken pox, Depression, Endometriosis, GERD (gastroesophageal reflux disease), Headache, HSV-1 infection, IBS (irritable bowel syndrome), Kidney stone, Kidney stone on left side, Multiple thyroid nodules, Obesity, and Renal calculi. Pt with active OT evaluation and treatment orders and activity orders. PTA, pt living with her mother, children (9 and 13 y/o), and siblings in a 3 SH w/ 4 NILDA and available FFSU w/ full bathroom. Pt reports I w/ ADLs, IADLs, and fxnl mobility w/o AD at baseline; - driving. Pt agreeable and willing to participate in OT evaluation. During evaluation, pt was I for eating and grooming, mod I for UB ADLs, and S for LB ADLs and toileting. Pt also required S for bed mobility, transfers, and fxnl mobility w/o AD. Pt performing ADLs at/near baseline level of independence and reports having good social support upon return home. No further acute OT needs identified at this time. Recommend continued active ADL " participation and mobilization with hospital staff while in the hospital to increase pt’s endurance and strength upon D/C. From OT standpoint, recommend D/C to home with family support when medically cleared. D/C pt from OT caseload at this time.   Goals   Patient Goals to go home   Plan   OT Frequency Eval only   Discharge Recommendation   Rehab Resource Intensity Level, OT No post-acute rehabilitation needs   AM-PAC Daily Activity Inpatient   Lower Body Dressing 3   Bathing 3   Toileting 3   Upper Body Dressing 4   Grooming 4   Eating 4   Daily Activity Raw Score 21   Daily Activity Standardized Score (Calc for Raw Score >=11) 44.27   AM-PAC Applied Cognition Inpatient   Following a Speech/Presentation 4   Understanding Ordinary Conversation 4   Taking Medications 4   Remembering Where Things Are Placed or Put Away 4   Remembering List of 4-5 Errands 4   Taking Care of Complicated Tasks 4   Applied Cognition Raw Score 24   Applied Cognition Standardized Score 62.21       The patient's raw score on the AM-PAC Daily Activity Inpatient Short Form is 21. A raw score of greater than or equal to 19 suggests the patient may benefit from discharge to home. Please refer to the recommendation of the Occupational Therapist for safe discharge planning.    LEILA Pacheco, OTR/L

## 2025-02-10 NOTE — ASSESSMENT & PLAN NOTE
Status post several surgeries for endometriosis resection prior to most recent surgery  Opioid dependent due to chronic pelvic pain, previously on oxycodone 10 mg 3 times daily as needed.  Epidural no longer providing relief, capped yesterday and plan for removal today as discussed with APS. AM heparin dose held. Appreciate APS recommendations

## 2025-02-10 NOTE — PROGRESS NOTES
Progress Note - Acute Pain   Name: Lucie Camacho 34 y.o. female I MRN: [id number]  [facility] I Date of Admission: 2/7/2025   Date of Service: 2/10/2025 I Hospital Day: 3    Assessment & Plan  Chronic pelvic pain in female  s/p robotic JHON, resection of pelvic and peritoneal and rectovaginal endometriosis (02/08/25)  s/p lumbar epidural placement (02/08/25)      - Epidural removed this morning, tip intact    - Continue oxycodone 10mg/15mg PO q4h PRN moderate/severe pain    - continue dilaudid 0.5mg IV q2h PRN breakthrough     - continue home robaxin 750mg PO q6h sina    - continue gabapentin 100mg q8h    - continue lidocaine patch, q12hr on/off, lateral to incisions on either side  Endometriosis of rectum      APS will sign off at this time. Thank you for the consult. All opioids and other analgesics to be written at discretion of primary team. Please contact Acute Pain Service - via [employer] from [phone] with additional questions or concerns. See [email or web address] or [email or web address] for additional contacts and after hours information.    Subjective   Lucie Camacho is a 34 y.o. female seen sitting in bed after just returning to her room after walking. She states her pain is near her chronic baseline pain and her current regimen is making it tolerable for her. Her heparin was held this morning and I removed her epidural.    Pain History  Current pain location(s):  Pain Score: 9  Pain Location/Orientation: Location: Abdomen  Pain Scale: Pain Assessment Tool: 0-10  24 hour history: See above    Meds/Allergies   all current active meds have been reviewed  Allergies   Allergen Reactions    Reglan [Metoclopramide] Other (See Comments)     Elevated heartrate    Other Blisters     Dermabond     Objective :  Temp:  [97.8 °F (36.6 °C)-98.3 °F (36.8 °C)] 98.1 °F (36.7 °C)  HR:  [69-78] 75  BP: (108-132)/(75-89) 132/85  Resp:  [14-16] 14  SpO2:  [97 %-99 %] 97 %  O2 Device: None (Room air)    Physical Exam  Vitals and  nursing note reviewed.   Constitutional:       Appearance: Normal appearance. She is normal weight.   HENT:      Head: Normocephalic and atraumatic.      Right Ear: External ear normal.      Left Ear: External ear normal.      Nose: Nose normal.      Mouth/Throat:      Mouth: Mucous membranes are moist.      Pharynx: Oropharynx is clear.   Eyes:      Conjunctiva/sclera: Conjunctivae normal.   Cardiovascular:      Rate and Rhythm: Normal rate and regular rhythm.      Pulses: Normal pulses.      Heart sounds: Normal heart sounds.   Pulmonary:      Effort: Pulmonary effort is normal.      Breath sounds: Normal breath sounds.   Abdominal:      General: Abdomen is flat. Bowel sounds are normal.      Palpations: Abdomen is soft.   Musculoskeletal:         General: Normal range of motion.      Cervical back: Normal range of motion and neck supple.   Skin:     General: Skin is warm and dry.      Capillary Refill: Capillary refill takes less than 2 seconds.   Neurological:      General: No focal deficit present.      Mental Status: She is alert and oriented to person, place, and time. Mental status is at baseline.   Psychiatric:         Mood and Affect: Mood normal.        Epidural:   Catheter removed, tip intact, site is clean and without tenderness to palpation      Lab Results: I have reviewed the following results:  Estimated Creatinine Clearance: 137.3 mL/min (by C-G formula based on SCr of 0.73 mg/dL).  Lab Results   Component Value Date    WBC 7.75 02/09/2025    WBC 13.23 (H) 02/26/2014    HGB 11.2 (L) 02/09/2025    HGB 13.9 02/26/2014    HCT 33.9 (L) 02/09/2025    HCT 41.3 02/26/2014     02/09/2025     02/26/2014         Component Value Date/Time     02/26/2014 0106    K 3.5 02/09/2025 0959    K 3.5 12/25/2024 1926     02/09/2025 0959     (H) 12/25/2024 1926    CO2 25 02/09/2025 0959    CO2 21 02/07/2025 0914    CO2 26 12/25/2024 1926    BUN 9 02/09/2025 0959    BUN 16 12/25/2024 1926     CREATININE 0.73 02/09/2025 0959    CREATININE 0.68 12/25/2024 1926         Component Value Date/Time    CALCIUM 8.6 02/09/2025 0959    CALCIUM 8.9 12/25/2024 1926    ALKPHOS 59 01/23/2025 1216    ALKPHOS 59 12/25/2024 1926    AST 23 01/23/2025 1216    AST 14 12/25/2024 1926    ALT 40 01/23/2025 1216    ALT 21 12/25/2024 1926    TP 6.4 01/23/2025 1216    TP 6.7 12/25/2024 1926    ALB 4.0 01/23/2025 1216    ALB 4.1 12/25/2024 1926

## 2025-02-11 PROCEDURE — NC001 PR NO CHARGE: Performed by: OBSTETRICS & GYNECOLOGY

## 2025-02-11 PROCEDURE — 99024 POSTOP FOLLOW-UP VISIT: CPT | Performed by: OBSTETRICS & GYNECOLOGY

## 2025-02-11 RX ORDER — IBUPROFEN 600 MG/1
600 TABLET, FILM COATED ORAL EVERY 6 HOURS SCHEDULED
Qty: 30 TABLET | Refills: 0 | Status: SHIPPED | OUTPATIENT
Start: 2025-02-11

## 2025-02-11 RX ORDER — LIDOCAINE 50 MG/G
1 PATCH TOPICAL DAILY
Qty: 30 PATCH | Refills: 0 | Status: SHIPPED | OUTPATIENT
Start: 2025-02-11 | End: 2025-02-13

## 2025-02-11 RX ORDER — GABAPENTIN 100 MG/1
100 CAPSULE ORAL 3 TIMES DAILY
Qty: 90 CAPSULE | Refills: 0 | Status: SHIPPED | OUTPATIENT
Start: 2025-02-11

## 2025-02-11 RX ORDER — OXYCODONE HYDROCHLORIDE 10 MG/1
10 TABLET ORAL 3 TIMES DAILY
Qty: 63 TABLET | Refills: 0 | Status: SHIPPED | OUTPATIENT
Start: 2025-02-11 | End: 2025-03-04

## 2025-02-11 RX ADMIN — DIPHENHYDRAMINE HYDROCHLORIDE 12.5 MG: 50 INJECTION, SOLUTION INTRAMUSCULAR; INTRAVENOUS at 06:37

## 2025-02-11 RX ADMIN — HYDROMORPHONE HYDROCHLORIDE 0.5 MG: 1 INJECTION, SOLUTION INTRAMUSCULAR; INTRAVENOUS; SUBCUTANEOUS at 05:51

## 2025-02-11 RX ADMIN — IBUPROFEN 600 MG: 600 TABLET, FILM COATED ORAL at 05:51

## 2025-02-11 RX ADMIN — ACETAMINOPHEN 975 MG: 325 TABLET, FILM COATED ORAL at 05:51

## 2025-02-11 RX ADMIN — HEPARIN SODIUM 5000 UNITS: 5000 INJECTION INTRAVENOUS; SUBCUTANEOUS at 05:51

## 2025-02-11 RX ADMIN — METHOCARBAMOL 750 MG: 750 TABLET ORAL at 05:51

## 2025-02-11 RX ADMIN — OXYCODONE HYDROCHLORIDE 15 MG: 5 TABLET ORAL at 02:19

## 2025-02-11 RX ADMIN — HYDROMORPHONE HYDROCHLORIDE 0.5 MG: 1 INJECTION, SOLUTION INTRAMUSCULAR; INTRAVENOUS; SUBCUTANEOUS at 10:09

## 2025-02-11 RX ADMIN — OXYCODONE HYDROCHLORIDE 15 MG: 5 TABLET ORAL at 08:10

## 2025-02-11 NOTE — QUICK NOTE
Patient doing well this am. Would like her IV dilaudid dose increased. No nausea, no emesis. Tolerating a house diet. Urinating well on her own. OOB on her own and ambulating.  35 mls serous drainage from NANDINI.  NANDINI drain removed without incident. Dry dressing applied. Educated that area may drain especially when going for lying to standing. Dressing is to be changed and not reinforced.

## 2025-02-11 NOTE — PROGRESS NOTES
Progress Note - GYN Oncology   Name: Lucie Camacho 34 y.o. female I MRN: [id number]  Unit/Bed#: [facility] 921-01 I Date of Admission: 2/7/2025   Date of Service: 2/11/2025 I Hospital Day: 4     Assessment & Plan  Endometriosis of rectum  34 y.o. with chronic pelvic pain with opioid dependence, endometriosis involving rectum now POD3 s/p RA-JHON, resection of pelvic and rectovaginal endometriosis, LAR, flex sig (CRS), cysto with ureteral stent insertion/removal. Overall doing well postoperatively.     FEN: Low res diet, MLIV, zofran/simethicone PRN  Pain: s/p epidural removal, sina tylenol, oxy 10/15 PRN, robaxin sina, dilaudid BT, motrin 600mg q6h. Appreciate APS recs;  : s/p taylor, passed VT, adequate UO at 0.41/cc/kg/hr  DVT ppx: SCDs, SQH 5000 TID, restarted this AM after epidural removal yesterday  Lines: NANDINI removed by colorectal on 02/11   Chronic pelvic pain in female  Status post several surgeries for endometriosis resection prior to most recent surgery  Opioid dependent due to chronic pelvic pain, previously on oxycodone 10 mg 3 times daily as needed.  Epidural removed 02/10.      GYNECOLOGY  Subjective   Lucie reports feeling well this morning. Her pain is still present but somewhat under control. She states it is worse when she gets up and ambulates. Describes it as a burning feeling, similar to round ligament pain. Denies n/v, CP, SOB. She is passing gas but has not had a bowel movement.     Objective :  Temp:  [97.7 °F (36.5 °C)-98.1 °F (36.7 °C)] 97.9 °F (36.6 °C)  HR:  [70-75] 70  BP: (109-132)/(69-85) 109/69  Resp:  [18] 18  SpO2:  [97 %-98 %] 97 %    Physical Exam  Vitals reviewed.   Constitutional:       General: She is not in acute distress.     Appearance: Normal appearance.   HENT:      Head: Normocephalic and atraumatic.   Eyes:      Extraocular Movements: Extraocular movements intact.   Cardiovascular:      Rate and Rhythm: Normal rate and regular rhythm.   Pulmonary:      Effort: Pulmonary  effort is normal.   Abdominal:      General: Abdomen is flat. There is no distension.      Palpations: Abdomen is soft.      Tenderness: There is abdominal tenderness.      Comments: Incisions clean/dry/intact with mepilex in place   Genitourinary:     Comments: deferred  Musculoskeletal:         General: Normal range of motion.      Cervical back: Normal range of motion.   Skin:     General: Skin is warm and dry.   Neurological:      Mental Status: She is alert and oriented to person, place, and time.   Psychiatric:         Mood and Affect: Mood normal.         Behavior: Behavior normal.       Tanvi Terrell  PGY-1 OB/GYN  02/11/25  6:05 AM

## 2025-02-11 NOTE — ASSESSMENT & PLAN NOTE
Status post several surgeries for endometriosis resection prior to most recent surgery  Opioid dependent due to chronic pelvic pain, previously on oxycodone 10 mg 3 times daily as needed.  Epidural removed 02/10.

## 2025-02-11 NOTE — ASSESSMENT & PLAN NOTE
34 y.o. with chronic pelvic pain with opioid dependence, endometriosis involving rectum now POD3 s/p RA-JHON, resection of pelvic and rectovaginal endometriosis, LAR, flex sig (CRS), cysto with ureteral stent insertion/removal. Overall doing well postoperatively.     FEN: Low res diet, MLIV, zofran/simethicone PRN  Pain: s/p epidural removal, sina tylenol, oxy 10/15 PRN, robaxin sina, dilaudid BT, motrin 600mg q6h. Appreciate APS recs;  : s/p taylor, passed VT, adequate UO at 0.41/cc/kg/hr  DVT ppx: SCDs, SQH 5000 TID, restarted this AM after epidural removal yesterday  Lines: NANDINI removed by colorectal on 02/11

## 2025-02-12 ENCOUNTER — TELEPHONE (OUTPATIENT)
Dept: INTERNAL MEDICINE CLINIC | Facility: OTHER | Age: 35
End: 2025-02-12

## 2025-02-12 DIAGNOSIS — G89.18 POSTOPERATIVE PAIN: Primary | ICD-10-CM

## 2025-02-12 PROCEDURE — 88307 TISSUE EXAM BY PATHOLOGIST: CPT | Performed by: PATHOLOGY

## 2025-02-12 PROCEDURE — 88305 TISSUE EXAM BY PATHOLOGIST: CPT | Performed by: PATHOLOGY

## 2025-02-12 RX ORDER — KETOROLAC TROMETHAMINE 10 MG/1
10 TABLET, FILM COATED ORAL EVERY 6 HOURS PRN
Qty: 20 TABLET | Refills: 0 | Status: SHIPPED | OUTPATIENT
Start: 2025-02-12

## 2025-02-12 NOTE — TELEPHONE ENCOUNTER
LMOM for pt to call me at [facility] office    Please schedule TCM on or before 2/25    Please send me TEAMS message to see if I am available to take the call.

## 2025-02-13 ENCOUNTER — DOCUMENTATION (OUTPATIENT)
Age: 35
End: 2025-02-13

## 2025-02-13 ENCOUNTER — OFFICE VISIT (OUTPATIENT)
Dept: INTERNAL MEDICINE CLINIC | Age: 35
End: 2025-02-13
Payer: COMMERCIAL

## 2025-02-13 VITALS
DIASTOLIC BLOOD PRESSURE: 80 MMHG | HEIGHT: 65 IN | OXYGEN SATURATION: 98 % | HEART RATE: 102 BPM | BODY MASS INDEX: 39.49 KG/M2 | SYSTOLIC BLOOD PRESSURE: 126 MMHG | TEMPERATURE: 97.4 F | WEIGHT: 237 LBS

## 2025-02-13 DIAGNOSIS — N80.519 ENDOMETRIOSIS OF RECTUM: ICD-10-CM

## 2025-02-13 DIAGNOSIS — B37.31 VAGINA, CANDIDIASIS: Primary | ICD-10-CM

## 2025-02-13 DIAGNOSIS — L23.3 ALLERGIC CONTACT DERMATITIS DUE TO DRUGS IN CONTACT WITH SKIN: ICD-10-CM

## 2025-02-13 DIAGNOSIS — G89.18 POSTOPERATIVE PAIN: Primary | ICD-10-CM

## 2025-02-13 DIAGNOSIS — E66.01 MORBID OBESITY (HCC): Chronic | ICD-10-CM

## 2025-02-13 PROCEDURE — 99496 TRANSJ CARE MGMT HIGH F2F 7D: CPT | Performed by: INTERNAL MEDICINE

## 2025-02-13 RX ORDER — FLUCONAZOLE 150 MG/1
150 TABLET ORAL ONCE
Qty: 1 TABLET | Refills: 0 | Status: SHIPPED | OUTPATIENT
Start: 2025-02-13 | End: 2025-02-13

## 2025-02-13 RX ORDER — MOMETASONE FUROATE 1 MG/G
CREAM TOPICAL DAILY
Qty: 45 G | Refills: 2 | Status: SHIPPED | OUTPATIENT
Start: 2025-02-13

## 2025-02-13 NOTE — PROGRESS NOTES
She restocked the office with worsening pain.  She is not having fevers, vomiting.  No purulent discharge from the incision.  She states that the staple line is encompassing too much of her abdomen.  There is paresthesia on her thigh where it was numb previously.  We discussed that she has a baseline narcotic requirement of 10 mg of oxycodone every 8 hours which she will continue.  She is experiencing acute pain from surgery.  I recommended the following regimen in order to help with her acute postoperative pain-Toradol 10 mg every 6 hours for total of 5 days, Robaxin 750 mg every 6 hours, 2X strength Tylenol every 6 hours.  She was also informed that she can use a pill cutter and cut the 10 mg oxycodone to 5 mg pills and take those as needed.  She was instructed to let me know how many additional doses of oxycodone she has been using.

## 2025-02-13 NOTE — PROGRESS NOTES
Transition of Care Visit  Name: Lucie Camacho      : 1990      MRN: [id number]  Encounter Provider: Nely Brothers MD  Encounter Date: 2025   Encounter department: [facility]    Assessment & Plan  Postoperative pain  Status post surgery for endometriosis       Allergic contact dermatitis due to drugs in contact with skin    Orders:    mometasone (ELOCON) 0.1 % cream; Apply topically daily  Very time when she goes for the surgery and they put a solution to disinfect the area she gets these allergic reaction she is itching it is red  Morbid obesity (HCC)    Patient is on no medication for obesity       Endometriosis of rectum  There was a endometriotic lesions around the peritoneum and in the rectal area she had a multiple surgeries for endometriosis followed up by the GYN            History of Present Illness     Transitional Care Management Review:   Lucie Camacho is a 34 y.o. female here for TCM follow up.     During the TCM phone call patient stated:  TCM Call       Date and time call was made  2025  7:58 AM    Hospital care reviewed  Records reviewed    Patient was hospitialized at  [facility]    Date of Admission  25    Date of discharge  25    Diagnosis  chronic pelvic pain in female    Disposition  Home    Current Symptoms  Rash  residual pain from surgery    Cough Severity  Mild          TCM Call       Rash location  Truncal area    Post hospital issues  Reduced activity    Scheduled for follow up?  Yes    Did you obtain your prescribed medications  Yes    Do you need help managing your prescriptions or medications  No    Is transportation to your appointment needed  No    I have advised the patient to call PCP with any new or worsening symptoms  Samson Mendez [facility]          Patient is a status post surgery for endometriosis in the rectal wall area the procedure is as of below     resection of pelvic peritoneal and  rectovaginal endometriosis, robotic lysis of pelvic adhesions, resection of colon low anterior laparoscopic with robotics, cystoscopy, insertion of ureteral stents.  Below is the course of hospitalization    Hospital Course: The patient presented on day of admission for the above mentioned procedure.  She was admitted for pain control and postoperative management.      The patient's hospital course was uncomplicated.  On HD 2/POD 1 (2/8), the patient was doing well.  Her pain was not well controlled.  Her Hb fell from 12.1 preoperatively to 11.2 postoperatively. APS had been consulted, and they had put her pain regimen of tylenol, oxy 5/10 prn, dilaudid 0.5mg prn, sina robaxin, and lidocaine patch, as well as an epidural.     On HD 3/POD 2 (2/9), APS had added sina gabapentin and increased her oxy prn dose to 10/15 instead of 5/10. Because the patient had taken heparin in the morning, her epidural catheter was going to be removed the next day. Her epidural was capped and subsequently removed the next day. Once she was able to ambulate, her taylor was removed and she passed her void trial.      The patient was discharged home on POD  in stable condition. She was asked to follow up with Dr. Greene in 2 weeks for a postoperative appointment.       Patient has a rash on her abdominal area and itching where they preoperative and postoperative solution for prevention of infection.  Complaining of abdominal pain      Review of Systems   Constitutional:  Negative for chills and fatigue.   HENT:  Negative for congestion, ear pain, hearing loss, postnasal drip, sinus pressure, sore throat and voice change.    Eyes:  Negative for pain, discharge and visual disturbance.   Respiratory:  Negative for cough, chest tightness and shortness of breath.    Cardiovascular:  Negative for chest pain, palpitations and leg swelling.   Gastrointestinal:  Positive for abdominal pain. Negative for abdominal distention, blood in stool, diarrhea,  "nausea and rectal pain.   Genitourinary:  Negative for difficulty urinating, dysuria and urgency.   Musculoskeletal:  Negative for arthralgias and joint swelling.   Skin:  Positive for rash (Skin rash on the abdominal area macular papular erythematous all around the abdominal wall).   Allergic/Immunologic: Negative for environmental allergies and food allergies.   Neurological:  Negative for dizziness, tremors, weakness, numbness and headaches.   Hematological:  Negative for adenopathy.   Psychiatric/Behavioral:  Negative for behavioral problems and hallucinations.      Objective   /80 (BP Location: Left arm, Patient Position: Sitting, Cuff Size: Large)   Pulse 102   Temp (!) 97.4 °F (36.3 °C) (Temporal)   Ht 5' 5\" (1.651 m)   Wt 108 kg (237 lb)   SpO2 98%   BMI 39.44 kg/m²     Physical Exam  Constitutional:       Appearance: She is obese.   HENT:      Head: Normocephalic.   Eyes:      Pupils: Pupils are equal, round, and reactive to light.   Cardiovascular:      Rate and Rhythm: Normal rate and regular rhythm.      Heart sounds: No murmur heard.  Pulmonary:      Breath sounds: Normal breath sounds.   Abdominal:      General: Bowel sounds are normal.      Palpations: Abdomen is soft.      Comments: Multiple steps of laparoscopic surgery and also diffuse rash.  Normal and is not distended superficial tenderness otherwise unremarkable   Musculoskeletal:      Cervical back: Normal range of motion.   Skin:     General: Skin is warm.      Findings: Rash (Diffuse anterior abdominal area rash) present.   Neurological:      Mental Status: She is alert and oriented to person, place, and time.   Psychiatric:         Behavior: Behavior normal.         Thought Content: Thought content normal.             "

## 2025-02-13 NOTE — ASSESSMENT & PLAN NOTE
There was a endometriotic lesions around the peritoneum and in the rectal area she had a multiple surgeries for endometriosis followed up by the GYN

## 2025-02-17 ENCOUNTER — NURSE TRIAGE (OUTPATIENT)
Age: 35
End: 2025-02-17

## 2025-02-17 ENCOUNTER — TELEPHONE (OUTPATIENT)
Age: 35
End: 2025-02-17

## 2025-02-17 NOTE — TELEPHONE ENCOUNTER
Pt calling in reporting that her abdominal staples from her surgery are really sore and she doesn't have follow-up appt with Dr. Greene until 2/24/25. Warm transfer completed with Dr. Greene's office for further assistance.

## 2025-02-17 NOTE — TELEPHONE ENCOUNTER
Patient reports over the last 2 days she has become very sore at her incision site where the staples are. Reports they are really red and there is some dark yellow drainage and what appears to be some old blood tinged color. She did send pictures via MyChart of the drainage on the gauze and the incision site. Denies fever.     She reports in additional to the pain at incision site she is still having pain in her abdomen/bowel. The gas pain is still pretty severe or when she has a bowel movement it is pretty painful still as well. Still having loose/soft stools but she has to strain to have these and then has pain. Taking oxycodone every 8 hours but it it doesn't seem to be bringing the pain level down at all. She said Dr. Greene said she can take 1.5 tablets at a time but doesn't feel that is helping either. She has been taking tylenol as well.     Requesting call back to discuss concerns regarding her staples/incision and ongoing pain. States she did reach out to the Colon surgeon as well and is concerned because they told her she doesn't have staples.       Reason for Disposition  • Negative: Did you page the on call provider?    Protocols used: [facility] [place] NO TRIAGE REQUIRED

## 2025-02-17 NOTE — TELEPHONE ENCOUNTER
Return call placed informed that My Chart photos of post op incision and drainage were reviewed and incision has no signs of infection, the scant amount of drainage is not concerning.  Informed staples will likely be removed at her upcoming office visit.  She feels pulling at the incision and I explained this is due to tension on the staples and this will resolve once removed.      Lucie feels that generic version of pain medication is not as helpful.  Informed that if her pain is not well controlled she would need to go to the ED as generally speaking pain should be well managed with ibuprofen and tylenol and if this is not the case she would need to be evaluated for an underlying issue.  She reports she has Toradol at home, along with ibuprofen and tylenol.  Informed she can add ibuprofen with the narcotic and if not effective she would need to go to ED for further evaluation of reported pain.  Informed that Dr Greene is [facility]OO until Wednesday.

## 2025-02-24 ENCOUNTER — OFFICE VISIT (OUTPATIENT)
Dept: GYNECOLOGIC ONCOLOGY | Facility: CLINIC | Age: 35
End: 2025-02-24

## 2025-02-24 VITALS
SYSTOLIC BLOOD PRESSURE: 130 MMHG | OXYGEN SATURATION: 98 % | TEMPERATURE: 98.4 F | BODY MASS INDEX: 37.61 KG/M2 | DIASTOLIC BLOOD PRESSURE: 80 MMHG | HEART RATE: 68 BPM | WEIGHT: 226 LBS

## 2025-02-24 DIAGNOSIS — N80.519 ENDOMETRIOSIS OF RECTUM: Primary | ICD-10-CM

## 2025-02-24 PROCEDURE — 99024 POSTOP FOLLOW-UP VISIT: CPT | Performed by: OBSTETRICS & GYNECOLOGY

## 2025-02-24 RX ORDER — OXYCODONE HYDROCHLORIDE 10 MG/1
10 TABLET ORAL 3 TIMES DAILY
Qty: 63 TABLET | Refills: 0 | Status: SHIPPED | OUTPATIENT
Start: 2025-02-24 | End: 2025-03-04 | Stop reason: SDUPTHER

## 2025-02-24 NOTE — LETTER
2025     Nely Brothers MD  [address] [place] [address]    Patient: Lucie Camacho   YOB: 1990   Date of Visit: 2025       Dear Dr. Brothers:    Thank you for referring Lucie Camacho to me for evaluation. Below are my notes for this consultation.    If you have questions, please do not hesitate to call me. I look forward to following your patient along with you.         Sincerely,        Popeye Greene MD        CC: MD Popeye Reyes MD  2025  4:23 PM  Sign when Signing Visit  Name: Lucie Camacho      : 1990      MRN: [id number]  Encounter Provider: Popeye Greene MD  Encounter Date: 2025   [facility]  :  Assessment & Plan  Endometriosis of rectum  34-year-old status post robotic assisted lysis of adhesions, resection of peritoneal/rectal endometriosis, low anterior resection, flexible sigmoidoscopy on 2025.  Final pathology was consistent with rectal endometriosis.  She is recovering from surgery.  Staples removed in the office today.  She has narcotic dependence.  Her performance status is 0.  1.  We discussed ongoing activity limitations  2.  We discussed reducing opioid dose to preoperative levels-10 mg 3 times daily for 21 days with the plan to slowly dose reduce over time to avoid withdrawal symptoms.  I sent a new prescription to her pharmacy as she had used her existing prescription by adding 5 mg to her existing 10 mg dose.  3.  Return in 4 weeks for postoperative pelvic examination.  Orders:  •  oxyCODONE (ROXICODONE) 10 MG TABS; Take 1 tablet (10 mg total) by mouth 3 (three) times a day for 21 days Max Daily Amount: 30 mg            History of Present Illness  Reason for Visit / CC: Postoperative evaluation   Lucie Camacho is a 34 y.o. female   Who returns for postoperative evaluation.  She had acute  pain postoperatively addition to existing chronic pain and was taking 15 mg of oxycodone every 8 hours.  She is having occasional hot flashes.  No vaginal bleeding.  No rectal bleeding.  He is having bowel movements.  She had pain at her incision line because of the staples.  She is ambulating.  She was also taking Toradol.  No further Robaxin.  No other interval change in medications or medical history since her surgery.      Pertinent Medical History  Opioid dependence         Review of Systems A complete review of systems is negative other than that noted above in the HPI.  Current Outpatient Medications on File Prior to Visit   Medication Sig Dispense Refill   • acetaminophen (TYLENOL) 650 mg CR tablet Take 1 tablet (650 mg total) by mouth every 8 (eight) hours as needed for mild pain 30 tablet 0   • gabapentin (NEURONTIN) 100 mg capsule Take 1 capsule (100 mg total) by mouth 3 (three) times a day 90 capsule 0   • ibuprofen (MOTRIN) 600 mg tablet Take 1 tablet (600 mg total) by mouth every 6 (six) hours 30 tablet 0   • ketorolac (TORADOL) 10 mg tablet Take 1 tablet (10 mg total) by mouth every 6 (six) hours as needed for moderate pain 20 tablet 0   • methocarbamol (ROBAXIN) 750 mg tablet take 1 tablet by mouth every 6 hours if needed for muscle spasm 120 tablet 0   • mometasone (ELOCON) 0.1 % cream Apply topically daily 45 g 2   • [DISCONTINUED] oxyCODONE (ROXICODONE) 10 MG TABS Take 1 tablet (10 mg total) by mouth 3 (three) times a day for 21 days Max Daily Amount: 30 mg 63 tablet 0   • naloxone (NARCAN) 4 mg/0.1 mL nasal spray Administer 1 spray into a nostril. If no response after 2-3 minutes, give another dose in the other nostril using a new spray. 1 each 1     No current facility-administered medications on file prior to visit.         Objective  /80 (BP Location: Left arm, Patient Position: Sitting, Cuff Size: Large)   Pulse 68   Temp 98.4 °F (36.9 °C) (Temporal)   Wt 103 kg (226 lb)   SpO2 98%   " BMI 37.61 kg/m²     Body mass index is 37.61 kg/m².  Pain Screening:  Pain Score:   7 (lower abdomen)  ECOG   0  Physical Exam  Vitals reviewed.   Constitutional:       General: She is not in acute distress.     Appearance: Normal appearance.   HENT:      Head: Normocephalic and atraumatic.      Mouth/Throat:      Mouth: Mucous membranes are moist.   Pulmonary:      Effort: Pulmonary effort is normal.      Breath sounds: Normal breath sounds.   Abdominal:      Palpations: Abdomen is soft. There is no mass.      Tenderness: There is no abdominal tenderness.   Skin:     General: Skin is warm and dry.      Comments: Surgical trocar sites are intact, clean and dry without induration, erythema or purulent drainage.  Mini laparotomy incision is clean dry and intact.  Staples are removed.  No evidence of erythema or purulent discharge.   Neurological:      Mental Status: She is alert and oriented to person, place, and time.   Psychiatric:         Mood and Affect: Mood normal.         Behavior: Behavior normal.         Thought Content: Thought content normal.         Judgment: Judgment normal.          Labs: I have reviewed pertinent labs.   No results found for: \"\"  Lab Results   Component Value Date/Time    Potassium 3.5 02/09/2025 09:59 AM    Potassium 3.5 12/25/2024 07:26 PM    Chloride 108 02/09/2025 09:59 AM    Chloride 110 (H) 12/25/2024 07:26 PM    Carbon Dioxide 26 12/25/2024 07:26 PM    CO2 25 02/09/2025 09:59 AM    CO2, i-STAT 21 02/07/2025 09:14 AM    BUN 9 02/09/2025 09:59 AM    BUN 16 12/25/2024 07:26 PM    Creatinine 0.73 02/09/2025 09:59 AM    Creatinine 0.68 12/25/2024 07:26 PM    Glucose, i-STAT 119 02/07/2025 09:14 AM    Glucose, Fasting 92 01/23/2025 12:16 PM    Calcium 8.6 02/09/2025 09:59 AM    Calcium 8.9 12/25/2024 07:26 PM    AST 23 01/23/2025 12:16 PM    AST 14 12/25/2024 07:26 PM    ALT 40 01/23/2025 12:16 PM    ALT 21 12/25/2024 07:26 PM    Alkaline Phosphatase 59 01/23/2025 12:16 PM    " Alkaline Phosphatase 59 12/25/2024 07:26 PM    eGFRcr 119 12/25/2024 07:26 PM    eGFR 109 02/09/2025 09:59 AM     Lab Results   Component Value Date/Time    WBC 7.75 02/09/2025 09:59 AM    Hemoglobin 11.2 (L) 02/09/2025 09:59 AM    Hematocrit 33.9 (L) 02/09/2025 09:59 AM    MCV 87 02/09/2025 09:59 AM    Platelets 154 02/09/2025 09:59 AM     Lab Results   Component Value Date/Time    Absolute Neutrophils 5.03 02/09/2025 09:59 AM        Trend:  Lab Results   Component Value Date     17.3 04/27/2023         Other Study Results Review : Pathology reports reviewed.

## 2025-02-24 NOTE — ASSESSMENT & PLAN NOTE
34-year-old status post robotic assisted lysis of adhesions, resection of peritoneal/rectal endometriosis, low anterior resection, flexible sigmoidoscopy on 2/7/2025.  Final pathology was consistent with rectal endometriosis.  She is recovering from surgery.  Staples removed in the office today.  She has narcotic dependence.  Her performance status is 0.  1.  We discussed ongoing activity limitations  2.  We discussed reducing opioid dose to preoperative levels-10 mg 3 times daily for 21 days with the plan to slowly dose reduce over time to avoid withdrawal symptoms.  I sent a new prescription to her pharmacy as she had used her existing prescription by adding 5 mg to her existing 10 mg dose.  3.  Return in 4 weeks for postoperative pelvic examination.  Orders:    oxyCODONE (ROXICODONE) 10 MG TABS; Take 1 tablet (10 mg total) by mouth 3 (three) times a day for 21 days Max Daily Amount: 30 mg

## 2025-02-24 NOTE — PROGRESS NOTES
Name: Lucie Camacho      : 1990      MRN: [id number]  Encounter Provider: Popeye Greene MD  Encounter Date: 2025   Encounter [facility]  :  Assessment & Plan  Endometriosis of rectum  34-year-old status post robotic assisted lysis of adhesions, resection of peritoneal/rectal endometriosis, low anterior resection, flexible sigmoidoscopy on 2025.  Final pathology was consistent with rectal endometriosis.  She is recovering from surgery.  Staples removed in the office today.  She has narcotic dependence.  Her performance status is 0.  1.  We discussed ongoing activity limitations  2.  We discussed reducing opioid dose to preoperative levels-10 mg 3 times daily for 21 days with the plan to slowly dose reduce over time to avoid withdrawal symptoms.  I sent a new prescription to her pharmacy as she had used her existing prescription by adding 5 mg to her existing 10 mg dose.  3.  Return in 4 weeks for postoperative pelvic examination.  Orders:    oxyCODONE (ROXICODONE) 10 MG TABS; Take 1 tablet (10 mg total) by mouth 3 (three) times a day for 21 days Max Daily Amount: 30 mg            History of Present Illness   Reason for Visit / CC: Postoperative evaluation   Lucie Camacho is a 34 y.o. female   Who returns for postoperative evaluation.  She had acute pain postoperatively addition to existing chronic pain and was taking 15 mg of oxycodone every 8 hours.  She is having occasional hot flashes.  No vaginal bleeding.  No rectal bleeding.  He is having bowel movements.  She had pain at her incision line because of the staples.  She is ambulating.  She was also taking Toradol.  No further Robaxin.  No other interval change in medications or medical history since her surgery.      Pertinent Medical History   Opioid dependence         Review of Systems A complete review of systems is negative other than that noted above in the HPI.  Current  Outpatient Medications on File Prior to Visit   Medication Sig Dispense Refill    acetaminophen (TYLENOL) 650 mg CR tablet Take 1 tablet (650 mg total) by mouth every 8 (eight) hours as needed for mild pain 30 tablet 0    gabapentin (NEURONTIN) 100 mg capsule Take 1 capsule (100 mg total) by mouth 3 (three) times a day 90 capsule 0    ibuprofen (MOTRIN) 600 mg tablet Take 1 tablet (600 mg total) by mouth every 6 (six) hours 30 tablet 0    ketorolac (TORADOL) 10 mg tablet Take 1 tablet (10 mg total) by mouth every 6 (six) hours as needed for moderate pain 20 tablet 0    methocarbamol (ROBAXIN) 750 mg tablet take 1 tablet by mouth every 6 hours if needed for muscle spasm 120 tablet 0    mometasone (ELOCON) 0.1 % cream Apply topically daily 45 g 2    [DISCONTINUED] oxyCODONE (ROXICODONE) 10 MG TABS Take 1 tablet (10 mg total) by mouth 3 (three) times a day for 21 days Max Daily Amount: 30 mg 63 tablet 0    naloxone (NARCAN) 4 mg/0.1 mL nasal spray Administer 1 spray into a nostril. If no response after 2-3 minutes, give another dose in the other nostril using a new spray. 1 each 1     No current facility-administered medications on file prior to visit.         Objective   /80 (BP Location: Left arm, Patient Position: Sitting, Cuff Size: Large)   Pulse 68   Temp 98.4 °F (36.9 °C) (Temporal)   Wt 103 kg (226 lb)   SpO2 98%   BMI 37.61 kg/m²     Body mass index is 37.61 kg/m².  Pain Screening:  Pain Score:   7 (lower abdomen)  ECOG   0  Physical Exam  Vitals reviewed.   Constitutional:       General: She is not in acute distress.     Appearance: Normal appearance.   HENT:      Head: Normocephalic and atraumatic.      Mouth/Throat:      Mouth: Mucous membranes are moist.   Pulmonary:      Effort: Pulmonary effort is normal.      Breath sounds: Normal breath sounds.   Abdominal:      Palpations: Abdomen is soft. There is no mass.      Tenderness: There is no abdominal tenderness.   Skin:     General: Skin is warm  "and dry.      Comments: Surgical trocar sites are intact, clean and dry without induration, erythema or purulent drainage.  Mini laparotomy incision is clean dry and intact.  Staples are removed.  No evidence of erythema or purulent discharge.   Neurological:      Mental Status: She is alert and oriented to person, place, and time.   Psychiatric:         Mood and Affect: Mood normal.         Behavior: Behavior normal.         Thought Content: Thought content normal.         Judgment: Judgment normal.          Labs: I have reviewed pertinent labs.   No results found for: \"\"  Lab Results   Component Value Date/Time    Potassium 3.5 02/09/2025 09:59 AM    Potassium 3.5 12/25/2024 07:26 PM    Chloride 108 02/09/2025 09:59 AM    Chloride 110 (H) 12/25/2024 07:26 PM    Carbon Dioxide 26 12/25/2024 07:26 PM    CO2 25 02/09/2025 09:59 AM    CO2, i-STAT 21 02/07/2025 09:14 AM    BUN 9 02/09/2025 09:59 AM    BUN 16 12/25/2024 07:26 PM    Creatinine 0.73 02/09/2025 09:59 AM    Creatinine 0.68 12/25/2024 07:26 PM    Glucose, i-STAT 119 02/07/2025 09:14 AM    Glucose, Fasting 92 01/23/2025 12:16 PM    Calcium 8.6 02/09/2025 09:59 AM    Calcium 8.9 12/25/2024 07:26 PM    AST 23 01/23/2025 12:16 PM    AST 14 12/25/2024 07:26 PM    ALT 40 01/23/2025 12:16 PM    ALT 21 12/25/2024 07:26 PM    Alkaline Phosphatase 59 01/23/2025 12:16 PM    Alkaline Phosphatase 59 12/25/2024 07:26 PM    eGFRcr 119 12/25/2024 07:26 PM    eGFR 109 02/09/2025 09:59 AM     Lab Results   Component Value Date/Time    WBC 7.75 02/09/2025 09:59 AM    Hemoglobin 11.2 (L) 02/09/2025 09:59 AM    Hematocrit 33.9 (L) 02/09/2025 09:59 AM    MCV 87 02/09/2025 09:59 AM    Platelets 154 02/09/2025 09:59 AM     Lab Results   Component Value Date/Time    Absolute Neutrophils 5.03 02/09/2025 09:59 AM        Trend:  Lab Results   Component Value Date     17.3 04/27/2023         Other Study Results Review : Pathology reports reviewed.      "

## 2025-03-01 ENCOUNTER — HOSPITAL ENCOUNTER (EMERGENCY)
Facility: HOSPITAL | Age: 35
Discharge: HOME | End: 2025-03-01
Attending: EMERGENCY MEDICINE | Admitting: EMERGENCY MEDICINE
Payer: COMMERCIAL

## 2025-03-01 ENCOUNTER — APPOINTMENT (EMERGENCY)
Dept: RADIOLOGY | Facility: HOSPITAL | Age: 35
End: 2025-03-01
Payer: COMMERCIAL

## 2025-03-01 VITALS
OXYGEN SATURATION: 97 % | DIASTOLIC BLOOD PRESSURE: 63 MMHG | TEMPERATURE: 98.6 F | SYSTOLIC BLOOD PRESSURE: 128 MMHG | HEART RATE: 89 BPM | RESPIRATION RATE: 18 BRPM

## 2025-03-01 DIAGNOSIS — L02.91 ABSCESS: ICD-10-CM

## 2025-03-01 DIAGNOSIS — L03.90 CELLULITIS: ICD-10-CM

## 2025-03-01 DIAGNOSIS — T81.40XA POSTOPERATIVE INFECTION: ICD-10-CM

## 2025-03-01 DIAGNOSIS — R50.9 FEVER: ICD-10-CM

## 2025-03-01 DIAGNOSIS — R10.9 ABDOMINAL PAIN: Primary | ICD-10-CM

## 2025-03-01 PROBLEM — T81.49XA INCISIONAL ABSCESS: Status: ACTIVE | Noted: 2025-03-01

## 2025-03-01 LAB
ALBUMIN SERPL BCG-MCNC: 4.1 G/DL (ref 3.5–5)
ALP SERPL-CCNC: 75 U/L (ref 34–104)
ALT SERPL W P-5'-P-CCNC: 47 U/L (ref 7–52)
ANION GAP SERPL CALCULATED.3IONS-SCNC: 8 MMOL/L (ref 4–13)
APTT PPP: 28 SECONDS (ref 23–34)
AST SERPL W P-5'-P-CCNC: 38 U/L (ref 13–39)
BASOPHILS # BLD AUTO: 0.01 THOUSANDS/ÂΜL (ref 0–0.1)
BASOPHILS NFR BLD AUTO: 0 % (ref 0–1)
BILIRUB SERPL-MCNC: 1.23 MG/DL (ref 0.2–1)
BILIRUB UR QL STRIP: NEGATIVE
BUN SERPL-MCNC: 10 MG/DL (ref 5–25)
CALCIUM SERPL-MCNC: 9.2 MG/DL (ref 8.4–10.2)
CHLORIDE SERPL-SCNC: 105 MMOL/L (ref 96–108)
CLARITY UR: CLEAR
CO2 SERPL-SCNC: 24 MMOL/L (ref 21–32)
COLOR UR: COLORLESS
CREAT SERPL-MCNC: 0.73 MG/DL (ref 0.6–1.3)
EOSINOPHIL # BLD AUTO: 0 THOUSAND/ÂΜL (ref 0–0.61)
EOSINOPHIL NFR BLD AUTO: 0 % (ref 0–6)
ERYTHROCYTE [DISTWIDTH] IN BLOOD BY AUTOMATED COUNT: 14.1 % (ref 11.6–15.1)
EXT PREGNANCY TEST URINE: NEGATIVE
EXT. CONTROL: NORMAL
GFR SERPL CREATININE-BSD FRML MDRD: 109 ML/MIN/1.73SQ M
GLUCOSE SERPL-MCNC: 113 MG/DL (ref 65–140)
GLUCOSE UR STRIP-MCNC: NEGATIVE MG/DL
HCT VFR BLD AUTO: 37.2 % (ref 34.8–46.1)
HGB BLD-MCNC: 12.7 G/DL (ref 11.5–15.4)
HGB UR QL STRIP.AUTO: NEGATIVE
IMM GRANULOCYTES # BLD AUTO: 0.07 THOUSAND/UL (ref 0–0.2)
IMM GRANULOCYTES NFR BLD AUTO: 0 % (ref 0–2)
INR PPP: 1 (ref 0.85–1.19)
KETONES UR STRIP-MCNC: NEGATIVE MG/DL
LACTATE SERPL-SCNC: 1.2 MMOL/L (ref 0.5–2)
LEUKOCYTE ESTERASE UR QL STRIP: NEGATIVE
LIPASE SERPL-CCNC: 10 U/L (ref 11–82)
LYMPHOCYTES # BLD AUTO: 1.23 THOUSANDS/ÂΜL (ref 0.6–4.47)
LYMPHOCYTES NFR BLD AUTO: 8 % (ref 14–44)
MCH RBC QN AUTO: 29 PG (ref 26.8–34.3)
MCHC RBC AUTO-ENTMCNC: 34.1 G/DL (ref 31.4–37.4)
MCV RBC AUTO: 85 FL (ref 82–98)
MONOCYTES # BLD AUTO: 1.09 THOUSAND/ÂΜL (ref 0.17–1.22)
MONOCYTES NFR BLD AUTO: 7 % (ref 4–12)
NEUTROPHILS # BLD AUTO: 13.51 THOUSANDS/ÂΜL (ref 1.85–7.62)
NEUTS SEG NFR BLD AUTO: 85 % (ref 43–75)
NITRITE UR QL STRIP: NEGATIVE
NRBC BLD AUTO-RTO: 0 /100 WBCS
PH UR STRIP.AUTO: 7.5 [PH]
PLATELET # BLD AUTO: 239 THOUSANDS/UL (ref 149–390)
PMV BLD AUTO: 8.4 FL (ref 8.9–12.7)
POTASSIUM SERPL-SCNC: 4.1 MMOL/L (ref 3.5–5.3)
PROCALCITONIN SERPL-MCNC: 0.09 NG/ML
PROT SERPL-MCNC: 7.1 G/DL (ref 6.4–8.4)
PROT UR STRIP-MCNC: NEGATIVE MG/DL
PROTHROMBIN TIME: 13.5 SECONDS (ref 12.3–15)
RBC # BLD AUTO: 4.38 MILLION/UL (ref 3.81–5.12)
SODIUM SERPL-SCNC: 137 MMOL/L (ref 135–147)
SP GR UR STRIP.AUTO: 1 (ref 1–1.03)
UROBILINOGEN UR STRIP-ACNC: <2 MG/DL
WBC # BLD AUTO: 15.91 THOUSAND/UL (ref 4.31–10.16)

## 2025-03-01 PROCEDURE — 80053 COMPREHEN METABOLIC PANEL: CPT

## 2025-03-01 PROCEDURE — 81025 URINE PREGNANCY TEST: CPT

## 2025-03-01 PROCEDURE — 85610 PROTHROMBIN TIME: CPT

## 2025-03-01 PROCEDURE — 36415 COLL VENOUS BLD VENIPUNCTURE: CPT

## 2025-03-01 PROCEDURE — 99024 POSTOP FOLLOW-UP VISIT: CPT | Performed by: COLON & RECTAL SURGERY

## 2025-03-01 PROCEDURE — 87040 BLOOD CULTURE FOR BACTERIA: CPT

## 2025-03-01 PROCEDURE — 96366 THER/PROPH/DIAG IV INF ADDON: CPT

## 2025-03-01 PROCEDURE — NC001 PR NO CHARGE: Performed by: COLON & RECTAL SURGERY

## 2025-03-01 PROCEDURE — 87077 CULTURE AEROBIC IDENTIFY: CPT

## 2025-03-01 PROCEDURE — 84145 PROCALCITONIN (PCT): CPT

## 2025-03-01 PROCEDURE — 96365 THER/PROPH/DIAG IV INF INIT: CPT

## 2025-03-01 PROCEDURE — 85025 COMPLETE CBC W/AUTO DIFF WBC: CPT

## 2025-03-01 PROCEDURE — 96376 TX/PRO/DX INJ SAME DRUG ADON: CPT

## 2025-03-01 PROCEDURE — 99284 EMERGENCY DEPT VISIT MOD MDM: CPT

## 2025-03-01 PROCEDURE — 81003 URINALYSIS AUTO W/O SCOPE: CPT

## 2025-03-01 PROCEDURE — 74177 CT ABD & PELVIS W/CONTRAST: CPT

## 2025-03-01 PROCEDURE — 83605 ASSAY OF LACTIC ACID: CPT

## 2025-03-01 PROCEDURE — 96367 TX/PROPH/DG ADDL SEQ IV INF: CPT

## 2025-03-01 PROCEDURE — 87186 SC STD MICRODIL/AGAR DIL: CPT

## 2025-03-01 PROCEDURE — 85730 THROMBOPLASTIN TIME PARTIAL: CPT

## 2025-03-01 PROCEDURE — 87205 SMEAR GRAM STAIN: CPT

## 2025-03-01 PROCEDURE — 87070 CULTURE OTHR SPECIMN AEROBIC: CPT

## 2025-03-01 PROCEDURE — 83690 ASSAY OF LIPASE: CPT

## 2025-03-01 PROCEDURE — 96375 TX/PRO/DX INJ NEW DRUG ADDON: CPT

## 2025-03-01 PROCEDURE — 99285 EMERGENCY DEPT VISIT HI MDM: CPT | Performed by: EMERGENCY MEDICINE

## 2025-03-01 RX ORDER — SULFAMETHOXAZOLE AND TRIMETHOPRIM 800; 160 MG/1; MG/1
1 TABLET ORAL 2 TIMES DAILY
Qty: 14 TABLET | Refills: 0 | Status: SHIPPED | OUTPATIENT
Start: 2025-03-01 | End: 2025-03-08

## 2025-03-01 RX ORDER — HYDROMORPHONE HCL/PF 1 MG/ML
1 SYRINGE (ML) INJECTION ONCE
Status: COMPLETED | OUTPATIENT
Start: 2025-03-01 | End: 2025-03-01

## 2025-03-01 RX ORDER — HYDROMORPHONE HCL/PF 1 MG/ML
0.5 SYRINGE (ML) INJECTION ONCE
Status: DISCONTINUED | OUTPATIENT
Start: 2025-03-01 | End: 2025-03-01

## 2025-03-01 RX ORDER — KETOROLAC TROMETHAMINE 30 MG/ML
15 INJECTION, SOLUTION INTRAMUSCULAR; INTRAVENOUS ONCE
Status: COMPLETED | OUTPATIENT
Start: 2025-03-01 | End: 2025-03-01

## 2025-03-01 RX ORDER — ACETAMINOPHEN 160 MG/5ML
1 SUSPENSION, ORAL (FINAL DOSE FORM) ORAL ONCE
Status: COMPLETED | OUTPATIENT
Start: 2025-03-01 | End: 2025-03-01

## 2025-03-01 RX ORDER — SODIUM CHLORIDE, SODIUM GLUCONATE, SODIUM ACETATE, POTASSIUM CHLORIDE, MAGNESIUM CHLORIDE, SODIUM PHOSPHATE, DIBASIC, AND POTASSIUM PHOSPHATE .53; .5; .37; .037; .03; .012; .00082 G/100ML; G/100ML; G/100ML; G/100ML; G/100ML; G/100ML; G/100ML
1000 INJECTION, SOLUTION INTRAVENOUS ONCE
Status: COMPLETED | OUTPATIENT
Start: 2025-03-01 | End: 2025-03-01

## 2025-03-01 RX ORDER — ONDANSETRON 2 MG/ML
1 INJECTION INTRAMUSCULAR; INTRAVENOUS ONCE
Status: COMPLETED | OUTPATIENT
Start: 2025-03-01 | End: 2025-03-01

## 2025-03-01 RX ORDER — LIDOCAINE HYDROCHLORIDE AND EPINEPHRINE 10; 10 MG/ML; UG/ML
10 INJECTION, SOLUTION INFILTRATION; PERINEURAL ONCE
Status: COMPLETED | OUTPATIENT
Start: 2025-03-01 | End: 2025-03-01

## 2025-03-01 RX ORDER — HYDROMORPHONE HCL/PF 1 MG/ML
1 SYRINGE (ML) INJECTION ONCE
Refills: 0 | Status: COMPLETED | OUTPATIENT
Start: 2025-03-01 | End: 2025-03-01

## 2025-03-01 RX ORDER — DIAZEPAM 10 MG/2ML
2.5 INJECTION, SOLUTION INTRAMUSCULAR; INTRAVENOUS ONCE
Status: COMPLETED | OUTPATIENT
Start: 2025-03-01 | End: 2025-03-01

## 2025-03-01 RX ORDER — OXYCODONE HYDROCHLORIDE 5 MG/1
5 TABLET ORAL ONCE
Refills: 0 | Status: COMPLETED | OUTPATIENT
Start: 2025-03-01 | End: 2025-03-01

## 2025-03-01 RX ADMIN — VANCOMYCIN HYDROCHLORIDE 1500 MG: 10 INJECTION, POWDER, LYOPHILIZED, FOR SOLUTION INTRAVENOUS at 14:48

## 2025-03-01 RX ADMIN — HYDROMORPHONE HYDROCHLORIDE 1 MG: 1 INJECTION, SOLUTION INTRAMUSCULAR; INTRAVENOUS; SUBCUTANEOUS at 15:21

## 2025-03-01 RX ADMIN — DIAZEPAM 2.5 MG: 10 INJECTION, SOLUTION INTRAMUSCULAR; INTRAVENOUS at 17:22

## 2025-03-01 RX ADMIN — SODIUM CHLORIDE, SODIUM GLUCONATE, SODIUM ACETATE, POTASSIUM CHLORIDE, MAGNESIUM CHLORIDE, SODIUM PHOSPHATE, DIBASIC, AND POTASSIUM PHOSPHATE 1000 ML: .53; .5; .37; .037; .03; .012; .00082 INJECTION, SOLUTION INTRAVENOUS at 13:09

## 2025-03-01 RX ADMIN — HYDROMORPHONE HYDROCHLORIDE 1 MG: 1 INJECTION, SOLUTION INTRAMUSCULAR; INTRAVENOUS; SUBCUTANEOUS at 12:40

## 2025-03-01 RX ADMIN — KETOROLAC TROMETHAMINE 15 MG: 30 INJECTION, SOLUTION INTRAMUSCULAR; INTRAVENOUS at 15:21

## 2025-03-01 RX ADMIN — CEFEPIME 2000 MG: 2 INJECTION, POWDER, FOR SOLUTION INTRAVENOUS at 14:43

## 2025-03-01 RX ADMIN — DIAZEPAM 2.5 MG: 10 INJECTION, SOLUTION INTRAMUSCULAR; INTRAVENOUS at 18:11

## 2025-03-01 RX ADMIN — HYDROMORPHONE HYDROCHLORIDE 1 MG: 1 INJECTION, SOLUTION INTRAMUSCULAR; INTRAVENOUS; SUBCUTANEOUS at 17:22

## 2025-03-01 RX ADMIN — IOHEXOL 100 ML: 350 INJECTION, SOLUTION INTRAVENOUS at 13:52

## 2025-03-01 RX ADMIN — OXYCODONE HYDROCHLORIDE 5 MG: 5 TABLET ORAL at 18:23

## 2025-03-01 RX ADMIN — LIDOCAINE HYDROCHLORIDE,EPINEPHRINE BITARTRATE 10 ML: 10; .01 INJECTION, SOLUTION INFILTRATION; PERINEURAL at 17:05

## 2025-03-01 RX ADMIN — KETOROLAC TROMETHAMINE 15 MG: 30 INJECTION, SOLUTION INTRAMUSCULAR; INTRAVENOUS at 18:04

## 2025-03-01 RX ADMIN — HYDROMORPHONE HYDROCHLORIDE 1 MG: 1 INJECTION, SOLUTION INTRAMUSCULAR; INTRAVENOUS; SUBCUTANEOUS at 13:23

## 2025-03-01 NOTE — ASSESSMENT & PLAN NOTE
SubQ abscess 2.4 x 4.6 cm of pfannenstiel incision s/p 2/7/25 robotic endometriosis TLH, LAR.    Plan:  - bedside I&D, cultures, packing (can remove in 48h)  - dc home on 1w bactrim  - colorectal outpt f/u in 1 week

## 2025-03-01 NOTE — ED PROVIDER NOTES
Time reflects when diagnosis was documented in both MDM as applicable and the Disposition within this note       Time User Action Codes Description Comment    3/1/2025  4:41 PM Trevon Daley Add [R10.9] Abdominal pain     3/1/2025  4:41 PM Trevon Daley Add [R50.9] Fever     3/1/2025  4:42 PM Trevon Daley Add [L03.90] Cellulitis     3/1/2025  4:42 PM Trevon Daley Add [L02.91] Abscess     3/1/2025  4:42 PM Trevon Daley Add [T81.40XA] Postoperative infection           ED Disposition       ED Disposition   Discharge    Condition   Stable    Date/Time   Sat Mar 1, 2025  4:43 PM    Comment   Lucie Spiveygfried discharge to home/self care.                   Assessment & Plan       Medical Decision Making  Patient is a 34-year-old female presenting for evaluation of fever and abdominal pain.  Will assess for intra-abdominal infection versus abscess.  Will obtain labs CT abdomen pelvis, symptomatic treatment monitor and reassess final dispo pending    Labs notable for mild leukocytosis 15.91.  CT abdomen pelvis shows abscess and cellulitis.  Discussed with colorectal surgery and GYN onc, colorectal surgery will come and evaluate patient in emergency department    Colorectal surgery resident performed bedside I&D see separate documentation.  Recommending outpatient follow-up and 1 week of Bactrim.  Antibiotics given, will send prescription for Bactrim.  Patient hemodynamically stable and cleared for discharge outpatient follow-up.  Return precautions given patient verbalized understanding    Amount and/or Complexity of Data Reviewed  Labs: ordered.  Radiology: ordered.    Risk  OTC drugs.  Prescription drug management.             Medications   acetaminophen (FOR EMS ONLY) (TYLENOL) oral suspension 650 mg (0 mg Does not apply Given to EMS 3/1/25 1220)   ondansetron (FOR EMS ONLY) (ZOFRAN) 4 mg/2 mL injection 4 mg (0 mg Does not apply Given to EMS 3/1/25 1220)   HYDROmorphone (DILAUDID) injection 1 mg (1 mg  Intravenous Given 3/1/25 1240)   multi-electrolyte (ISOLYTE-S PH 7.4) bolus 1,000 mL (0 mL Intravenous Stopped 3/1/25 1614)   HYDROmorphone (DILAUDID) injection 1 mg (1 mg Intravenous Given 3/1/25 1323)   iohexol (OMNIPAQUE) 350 MG/ML injection (MULTI-DOSE) 100 mL (100 mL Intravenous Given 3/1/25 1352)   cefepime (MAXIPIME) 2 g/50 mL dextrose IVPB (0 mg Intravenous Stopped 3/1/25 1524)   vancomycin (VANCOCIN) 1500 mg in sodium chloride 0.9% 250 mL IVPB (1,500 mg Intravenous New Bag 3/1/25 1448)   HYDROmorphone (DILAUDID) injection 1 mg (1 mg Intravenous Given 3/1/25 1521)   ketorolac (TORADOL) injection 15 mg (15 mg Intravenous Given 3/1/25 1521)       ED Risk Strat Scores                            SBIRT 22yo+      Flowsheet Row Most Recent Value   Initial Alcohol Screen: US AUDIT-C     1. How often do you have a drink containing alcohol? 0 Filed at: 03/01/2025 1201   2. How many drinks containing alcohol do you have on a typical day you are drinking?  0 Filed at: 03/01/2025 1201   3a. Male UNDER 65: How often do you have five or more drinks on one occasion? 0 Filed at: 03/01/2025 1201   3b. FEMALE Any Age, or MALE 65+: How often do you have 4 or more drinks on one occassion? 0 Filed at: 03/01/2025 1201   Audit-C Score 0 Filed at: 03/01/2025 1201   GERARDO: How many times in the past year have you...    Used an illegal drug or used a prescription medication for non-medical reasons? Never Filed at: 03/01/2025 1201                            History of Present Illness       Chief Complaint   Patient presents with    Post-Op Infection     [facility] with c/o fever, incision hot and red to touch. 3 weeks post op bowel resection & endometriosis cystoscopy. Given 650 tylenol, 4 zofran by ems        Past Medical History:   Diagnosis Date    Anxiety     Chicken pox     Depression     Endometriosis     GERD (gastroesophageal reflux disease)     Headache     Occasional     HSV-1 infection     IBS  (irritable bowel syndrome)     Kidney stone     Kidney stone on left side     Multiple thyroid nodules     Obesity     Renal calculi       Past Surgical History:   Procedure Laterality Date    APPENDECTOMY  2021     SECTION      x2    CYSTOSCOPY N/A 2023    Procedure: CYSTOSCOPY;  Surgeon: Popeye Greene MD;  [facility];  Service: Gynecology Oncology    CYSTOSCOPY N/A 2025    Procedure: CYSTOSCOPY; INSERTION OF URETERAL STENTS;  Surgeon: Popeye Greene MD;  [facility];  Service: Gynecology Oncology    EXAMINATION UNDER ANESTHESIA N/A 2023    Procedure: EXAM UNDER ANESTHESIA (EUA), I& D vaginal cuff abscess;  Surgeon: Popeye Greene MD;  [facility];  Service: Gynecology Oncology    HYSTERECTOMY      robotic total laparoscopic hysterectomy with BS    LAPAROSCOPIC ENDOMETRIOSIS FULGURATION  2018    laparoscopic excision of endometriosis, fulguration of endometriosis, lysis of adhesions    LAPAROSCOPY  2014    with I&D     MOLE REMOVAL      face - benign     PELVIC LAPAROSCOPY Bilateral 2023    Procedure: SALPINGO-OOPHORECTOMY, LAPAROSCOPIC W/ROBOTICS, RADICAL RESECTION PELVIC ENDOMETRIOSIS;  Surgeon: Popeye Greene MD;  [facility];  Service: Gynecology Oncology    PELVIC LAPAROSCOPY N/A 2025    Procedure: ROBOTIC LYSIS OF PELVIC ADHESIONS, RESECTION OF PELVIC PERITONEAL AND RECTOVAGINAL ENDOMETRIOSIS;  Surgeon: Popeye Greene MD;  [facility];  Service: Gynecology Oncology    LA LAPS COLECTOMY PRTL W/COLOPXTSTMY LW ANAST N/A 2025    Procedure: RESECTION COLON LOW ANTERIOR LAPAROSCOPIC WITH ROBOTICS;  Surgeon: Chauncey King MD;  [facility];  Service: Colorectal    LA SIGMOIDOSCOPY FLX DX W/COLLJ SPEC BR/WA IF PFRMD N/A 2025    Procedure: SIGMOIDOSCOPY FLEXIBLE;  Surgeon: Chauncey King MD;  [facility];  Service: Colorectal    PROCTOSCOPY  N/A 5/18/2023    Procedure: PROCTOSCOPY;  Surgeon: Popeye Greene MD;  [facility];  Service: Gynecology Oncology    SKIN CANCER EXCISION      abdomen    THYROID CYST EXCISION      TONSILLECTOMY      TUBAL LIGATION  02/15/2016    With lysis of adhesion and peritoneal biopsy    US GUIDED INJECTION FOR RESEARCH STUDY  05/30/2015    VAGINA SURGERY N/A 5/18/2023    Procedure: VAGINECTOMY, PARTIAL;  Surgeon: Popeye Greene MD;  [facility];  Service: Gynecology Oncology    WISDOM TOOTH EXTRACTION        Family History   Problem Relation Age of Onset    Coronary artery disease Mother     Varicose Veins Mother     Other Mother         breast cyst - removed     Cholelithiasis Mother         had cholecystectomy    Alcohol abuse Father     Cholelithiasis Maternal Grandmother         had cholecystectomy    Uterine cancer Paternal Grandmother     Breast cancer Family         Before age 49     Uterine cancer Family     Obesity Family     Hypertension Family     Brain cancer Family     Gallbladder disease Family       Social History     Tobacco Use    Smoking status: Never    Smokeless tobacco: Never   Vaping Use    Vaping status: Never Used   Substance Use Topics    Alcohol use: Not Currently     Comment: rarely    Drug use: Never      E-Cigarette/Vaping    E-Cigarette Use Never User       E-Cigarette/Vaping Substances    Nicotine No     THC No     CBD No     Flavoring No     Other No     Unknown No       I have reviewed and agree with the history as documented.     Patient is a 34-year-old female 3-week status post bowel resection and endometritis surgery.  Complaining today of abdominal pain and fever.  Reports that symptoms began gradually have been worsening.  Pain is in the lower abdominal quadrants.  Received 650 of Tylenol and 4 of Zofran by EMS.  She is denying any other complaints at this time          Review of Systems   Constitutional:  Positive for fever. Negative for chills.    HENT:  Negative for congestion.    Respiratory:  Negative for cough and shortness of breath.    Cardiovascular:  Negative for chest pain.   Gastrointestinal:  Positive for abdominal pain. Negative for nausea and vomiting.   Genitourinary:  Negative for dysuria, frequency, hematuria, vaginal bleeding and vaginal discharge.   Musculoskeletal:  Negative for back pain.   Skin:  Negative for rash.   Neurological:  Negative for weakness, numbness and headaches.   All other systems reviewed and are negative.          Objective       ED Triage Vitals   Temperature Pulse Blood Pressure Respirations SpO2 Patient Position - Orthostatic VS   03/01/25 1157 03/01/25 1201 03/01/25 1201 03/01/25 1201 03/01/25 1201 03/01/25 1303   98.6 °F (37 °C) 98 125/75 20 98 % Lying      Temp Source Heart Rate Source BP Location FiO2 (%) Pain Score    03/01/25 1157 03/01/25 1201 03/01/25 1303 -- 03/01/25 1240    Oral Monitor Right arm  8      Vitals      Date and Time Temp Pulse SpO2 Resp BP Pain Score FACES Pain Rating User   03/01/25 1600 -- 82 98 % 18 103/60 -- -- CR   03/01/25 1545 -- 77 94 % 18 98/57 -- -- CR   03/01/25 1530 -- 83 94 % 18 112/65 -- -- CR   03/01/25 1521 -- -- -- -- -- 8 -- CR   03/01/25 1515 -- 77 100 % 18 121/56 -- -- CR   03/01/25 1500 -- 84 98 % 18 135/58 -- -- CR   03/01/25 1435 -- 86 97 % 18 104/55 -- -- CR   03/01/25 1420 -- 82 95 % 18 109/58 -- -- CR   03/01/25 1405 -- 82 95 % 19 120/69 -- -- CR   03/01/25 1350 -- -- 95 % 19 120/69 -- -- CR   03/01/25 1335 -- 88 96 % 20 120/69 -- -- CR   03/01/25 1323 -- -- -- -- -- 8 -- CR   03/01/25 1320 -- 86 97 % 20 118/56 -- -- CR   03/01/25 1303 -- 86 93 % 20 99/62 -- -- CR   03/01/25 1240 -- -- -- -- -- 8 -- CR   03/01/25 1201 -- 98 98 % 20 125/75 -- -- MR   03/01/25 1157 98.6 °F (37 °C) -- -- -- -- -- -- MR            Physical Exam  Vitals and nursing note reviewed.   Constitutional:       General: She is not in acute distress.     Appearance: She is well-developed. She is  not ill-appearing.   HENT:      Head: Normocephalic and atraumatic.      Mouth/Throat:      Mouth: Mucous membranes are moist.   Eyes:      Extraocular Movements: Extraocular movements intact.      Conjunctiva/sclera: Conjunctivae normal.   Cardiovascular:      Rate and Rhythm: Normal rate and regular rhythm.      Heart sounds: No murmur heard.  Pulmonary:      Effort: Pulmonary effort is normal. No respiratory distress.      Breath sounds: Normal breath sounds.   Abdominal:      Palpations: Abdomen is soft.      Tenderness: There is abdominal tenderness. There is no guarding or rebound.      Comments: Incision site is tender to palpation but clean dry intact no erythema or purulent drainage   Musculoskeletal:         General: Normal range of motion.      Cervical back: Normal range of motion and neck supple.      Right lower leg: No edema.      Left lower leg: No edema.   Skin:     General: Skin is warm and dry.      Capillary Refill: Capillary refill takes less than 2 seconds.   Neurological:      General: No focal deficit present.      Mental Status: She is alert.         Results Reviewed       Procedure Component Value Units Date/Time    UA w Reflex to Microscopic w Reflex to Culture [[id number]] Collected: 03/01/25 1320    Lab Status: Final result Specimen: Urine, Clean Catch Updated: 03/01/25 1331     Color, UA Colorless     Clarity, UA Clear     Specific Gravity, UA 1.005     pH, UA 7.5     Leukocytes, UA Negative     Nitrite, UA Negative     Protein, UA Negative mg/dl      Glucose, UA Negative mg/dl      Ketones, UA Negative mg/dl      Urobilinogen, UA <2.0 mg/dl      Bilirubin, UA Negative     Occult Blood, UA Negative    POCT pregnancy, urine [[id number]]  (Normal) Collected: 03/01/25 1320    Lab Status: Final result Specimen: Urine Updated: 03/01/25 1323     EXT Preg Test, Ur Negative     Control Valid    Procalcitonin [[id number]]  (Normal) Collected: 03/01/25 1235    Lab Status: Final result Specimen:  Blood from Arm, Left Updated: 03/01/25 1316     Procalcitonin 0.09 ng/ml     Lactic acid [[id number]]  (Normal) Collected: 03/01/25 1235    Lab Status: Final result Specimen: Blood from Arm, Left Updated: 03/01/25 1308     LACTIC ACID 1.2 mmol/L     Narrative:      Result may be elevated if tourniquet was used during collection.    Comprehensive metabolic panel [[id number]]  (Abnormal) Collected: 03/01/25 1235    Lab Status: Final result Specimen: Blood from Arm, Left Updated: 03/01/25 1308     Sodium 137 mmol/L      Potassium 4.1 mmol/L      Chloride 105 mmol/L      CO2 24 mmol/L      ANION GAP 8 mmol/L      BUN 10 mg/dL      Creatinine 0.73 mg/dL      Glucose 113 mg/dL      Calcium 9.2 mg/dL      AST 38 U/L      ALT 47 U/L      Alkaline Phosphatase 75 U/L      Total Protein 7.1 g/dL      Albumin 4.1 g/dL      Total Bilirubin 1.23 mg/dL      eGFR 109 ml/min/1.73sq m     Narrative:      [employer] Kid[employer] guidelines for Chronic Kidney Disease (CKD):     Stage 1 with normal or high GFR (GFR > 90 mL/min/1.73 square meters)    Stage 2 Mild CKD (GFR = 60-89 mL/min/1.73 square meters)    Stage 3A Moderate CKD (GFR = 45-59 mL/min/1.73 square meters)    Stage 3B Moderate CKD (GFR = 30-44 mL/min/1.73 square meters)    Stage 4 Severe CKD (GFR = 15-29 mL/min/1.73 square meters)    Stage 5 End Stage CKD (GFR <15 mL/min/1.73 square meters)  Note: GFR calculation is accurate only with a steady state creatinine    Lipase [[id number]]  (Abnormal) Collected: 03/01/25 1235    Lab Status: Final result Specimen: Blood from Arm, Left Updated: 03/01/25 1308     Lipase 10 u/L     Protime-INR [[id number]]  (Normal) Collected: 03/01/25 1235    Lab Status: Final result Specimen: Blood from Arm, Left Updated: 03/01/25 1303     Protime 13.5 seconds      INR 1.00    Narrative:      INR Therapeutic Range    Indication                                             INR Range      Atrial Fibrillation                                                2.0-3.0  Hypercoagulable State                                    2.0.2.3  Left Ventricular Asist Device                            2.0-3.0  Mechanical Heart Valve                                  -    Aortic(with afib, MI, embolism, HF, LA enlargement,    and/or coagulopathy)                                     2.0-3.0 (2.5-3.5)     Mitral                                                             2.5-3.5  Prosthetic/Bioprosthetic Heart Valve               2.0-3.0  Venous thromboembolism (VTE: VT, PE        2.0-3.0    APTT [[id number]]  (Normal) Collected: 03/01/25 1235    Lab Status: Final result Specimen: Blood from Arm, Left Updated: 03/01/25 1303     PTT 28 seconds     CBC and differential [[id number]]  (Abnormal) Collected: 03/01/25 1235    Lab Status: Final result Specimen: Blood from Arm, Left Updated: 03/01/25 1247     WBC 15.91 Thousand/uL      RBC 4.38 Million/uL      Hemoglobin 12.7 g/dL      Hematocrit 37.2 %      MCV 85 fL      MCH 29.0 pg      MCHC 34.1 g/dL      RDW 14.1 %      MPV 8.4 fL      Platelets 239 Thousands/uL      nRBC 0 /100 WBCs      Segmented % 85 %      Immature Grans % 0 %      Lymphocytes % 8 %      Monocytes % 7 %      Eosinophils Relative 0 %      Basophils Relative 0 %      Absolute Neutrophils 13.51 Thousands/µL      Absolute Immature Grans 0.07 Thousand/uL      Absolute Lymphocytes 1.23 Thousands/µL      Absolute Monocytes 1.09 Thousand/µL      Eosinophils Absolute 0.00 Thousand/µL      Basophils Absolute 0.01 Thousands/µL     Blood culture #2 [[id number]] Collected: 03/01/25 1235    Lab Status: In process Specimen: Blood from Arm, Left Updated: 03/01/25 1242    Blood culture #1 [[id number]] Collected: 03/01/25 1235    Lab Status: In process Specimen: Blood from Arm, Right Updated: 03/01/25 1242            CT abdomen pelvis with contrast   Final Interpretation by Corey Wesley MD (03/01 1433)      Since July 2023 there is been interval colorectal resection with  Pfannenstiel incision in the lower pelvis. Cellulitis and subcutaneous abscess along the incision site.      Top normal sized bilateral inguinal lymph nodes that are probably reactive.      Nonobstructing 4 mm calculus in the lower pole collecting system of the left kidney. No hydronephrosis or ureteral calculus.      Unchanged mild splenomegaly.         Workstation performed: [id number]             Procedures    ED Medication and Procedure Management   Prior to Admission Medications   Prescriptions Last Dose Informant Patient Reported? Taking?   acetaminophen (TYLENOL) 650 mg CR tablet  Self No No   Sig: Take 1 tablet (650 mg total) by mouth every 8 (eight) hours as needed for mild pain   gabapentin (NEURONTIN) 100 mg capsule  Self No No   Sig: Take 1 capsule (100 mg total) by mouth 3 (three) times a day   ibuprofen (MOTRIN) 600 mg tablet  Self No No   Sig: Take 1 tablet (600 mg total) by mouth every 6 (six) hours   ketorolac (TORADOL) 10 mg tablet  Self No No   Sig: Take 1 tablet (10 mg total) by mouth every 6 (six) hours as needed for moderate pain   methocarbamol (ROBAXIN) 750 mg tablet  Self No No   Sig: take 1 tablet by mouth every 6 hours if needed for muscle spasm   mometasone (ELOCON) 0.1 % cream  Self No No   Sig: Apply topically daily   naloxone (NARCAN) 4 mg/0.1 mL nasal spray  Self No No   Sig: Administer 1 spray into a nostril. If no response after 2-3 minutes, give another dose in the other nostril using a new spray.   oxyCODONE (ROXICODONE) 10 MG TABS   No No   Sig: Take 1 tablet (10 mg total) by mouth 3 (three) times a day for 21 days Max Daily Amount: 30 mg      Facility-Administered Medications: None     Patient's Medications   Discharge Prescriptions    SULFAMETHOXAZOLE-TRIMETHOPRIM (BACTRIM DS) 800-160 MG PER TABLET    Take 1 tablet by mouth 2 (two) times a day for 7 days smx-tmp DS (BACTRIM) 800-160 mg tabs (1tab q12 D10)       Start Date: 3/1/2025  End Date: 3/8/2025       Order Dose: 1 tablet        Quantity: 14 tablet    Refills: 0       ED SEPSIS DOCUMENTATION   Time reflects when diagnosis was documented in both MDM as applicable and the Disposition within this note       Time User Action Codes Description Comment    3/1/2025  4:41 PM Trevon Daley [R10.9] Abdominal pain     3/1/2025  4:41 PM Trevon Daley [R50.9] Fever     3/1/2025  4:42 PM Trevon Daley [L03.90] Cellulitis     3/1/2025  4:42 PM Trevon Daley [L02.91] Abscess     3/1/2025  4:42 PM Trevon Daley [T81.40XA] Postoperative infection                  Trevon Daley DO  03/06/25 [id number]

## 2025-03-01 NOTE — DISCHARGE INSTRUCTIONS
Packing to be removed in 48 hours. Antibiotics sent to pharmacy take entire week. Follow up with colorectal in one week. Return to ER for worsening pain fever or drainage even with antibiotics

## 2025-03-01 NOTE — ED NOTES
Pt ambulating to and from the bathroom independently with no issue.      Elma Madrigal RN  03/01/25 [id number]

## 2025-03-01 NOTE — CONSULTS
Consult - Colorectal   Name: Lucie Camacho 34 y.o. female I MRN: [id number]  Unit/Bed#: [facility] I Date of Admission: 3/1/2025   Date of Service: 3/1/2025 I Hospital Day: 0     Assessment & Plan  Incisional abscess  SubQ abscess 2.4 x 4.6 cm of pfannenstiel incision s/p 2/7/25 robotic endometriosis TLH, LAR.    Plan:  - bedside I&D, cultures, packing (can remove in 48h)  - dc home on 1w bactrim  - colorectal outpt f/u in 1 week    HPI:  Lucie Camacho is a 34 y.o. female with PMHx of endometriosis, IBS, GERD, HSV-1, thyroid nodules, kidney stones, thyroid cyst excision, 2014 laparoscopy for endometriosis with I&D, 2016 tubal ligation, peritoneal biopsy, 2021 appy, 5/2023 robotic radial endometriosis pelvic resection, BSO, partial vaginectomy, BSO, 6/2023 I&D vaginal cuff abscess, 2/7/25 robotic endometriosis TLH/LAR who presents with fever at home to 102, Pfannenstiel incision pain, erythema, warm, small cm-long dehiscence of L lateral incision. Pain started 2d ago. Reports nausea today, no emesis. Last ate dinner last night. Last BM yesterday, normal.    Objective   Patient Vitals for the past 24 hrs:   BP Temp Temp src Pulse Resp SpO2   03/01/25 1600 103/60 -- -- 82 18 98 %   03/01/25 1545 98/57 -- -- 77 18 94 %   03/01/25 1530 112/65 -- -- 83 18 94 %   03/01/25 1515 121/56 -- -- 77 18 100 %   03/01/25 1500 135/58 -- -- 84 18 98 %   03/01/25 1435 104/55 -- -- 86 18 97 %   03/01/25 1420 109/58 -- -- 82 18 95 %   03/01/25 1405 120/69 -- -- 82 19 95 %   03/01/25 1350 120/69 -- -- -- 19 95 %   03/01/25 1335 120/69 -- -- 88 20 96 %   03/01/25 1320 118/56 -- -- 86 20 97 %   03/01/25 1303 99/62 -- -- 86 20 93 %   03/01/25 1201 125/75 -- -- 98 20 98 %   03/01/25 1157 -- 98.6 °F (37 °C) Oral -- -- --       Physical Exam  Constitutional:       General: She is not in acute distress.  HENT:      Head: Normocephalic and atraumatic.   Eyes:      Extraocular Movements: Extraocular movements intact.      Conjunctiva/sclera:  Conjunctivae normal.   Cardiovascular:      Rate and Rhythm: Normal rate.      Pulses: Normal pulses.   Pulmonary:      Effort: Pulmonary effort is normal. No respiratory distress.   Abdominal:      General: There is no distension.      Palpations: Abdomen is soft.      Tenderness: There is no abdominal tenderness.   Musculoskeletal:         General: Normal range of motion.      Cervical back: Normal range of motion.   Skin:     General: Skin is warm and dry.      Findings: Erythema present.      Comments: Pfannenstiel incision indurated, tender, mildly erythematous, no fluctuance palpable, no drainage, mons tender   Neurological:      General: No focal deficit present.      Mental Status: She is alert and oriented to person, place, and time.   Psychiatric:         Mood and Affect: Mood normal.         Review of Systems   Constitutional:  Negative for chills and fever.   HENT:  Negative for ear pain and sore throat.    Eyes:  Negative for pain and visual disturbance.   Respiratory:  Negative for cough and shortness of breath.    Cardiovascular:  Negative for chest pain and palpitations.   Gastrointestinal:  Negative for abdominal pain and vomiting.   Genitourinary:  Negative for dysuria and hematuria.   Musculoskeletal:  Negative for arthralgias and back pain.   Skin:  Positive for color change and wound. Negative for rash.   Neurological:  Negative for seizures and syncope.   All other systems reviewed and are negative.      Results:  3/1 CTAP: cellulitis & subQ  2.4 x 4.6 cm abscess along Pfannenstiel incision site    Recent Labs     03/01/25  1235   WBC 15.91*   HGB 12.7      SODIUM 137   K 4.1      CO2 24   BUN 10   CREATININE 0.73   GLUC 113   CALCIUM 9.2   AST 38   ALT 47   ALKPHOS 75   TBILI 1.23*   LIPASE 10*   EGFR 109   PTT 28   INR 1.00   LACTICACID 1.2     Lab Results   Component Value Date    URINECX >100,000 cfu/ml Klebsiella pneumoniae (A) 07/11/2023    LEUKOCYTESUR Negative 03/01/2025     LEUKOCYTESUR Negative 02/26/2014    NITRITE Negative 03/01/2025    NITRITE Negative 02/26/2014    GLUCOSEU Negative 03/01/2025    GLUCOSEU Negative 02/26/2014    KETONESU Negative 03/01/2025    KETONESU Negative 02/26/2014    BLOODU Negative 03/01/2025    BLOODU Negative 02/26/2014       Lab Results: I have personally reviewed pertinent lab results.  Imaging: I have personally reviewed pertinent reports.  EKG, Pathology, and Other Studies: I have personally reviewed pertinent reports.  All current active meds have been reviewed  Counseling / Coordination of Care: Total floor / unit time spent today 30 minutes.  Greater than 50% of total time was spent with the patient and / or family counseling and / or coordination of care.

## 2025-03-02 ENCOUNTER — TELEPHONE (OUTPATIENT)
Dept: OTHER | Facility: OTHER | Age: 35
End: 2025-03-02

## 2025-03-02 ENCOUNTER — NURSE TRIAGE (OUTPATIENT)
Dept: OTHER | Facility: OTHER | Age: 35
End: 2025-03-02

## 2025-03-02 NOTE — TELEPHONE ENCOUNTER
"Patient calling with uncontrolled pain after emergency room visit 3/1/25 for abscess under incision drainage. Patient is taking oxycodone and tylenol as prescribed without relief. Patient reports bloody drainage from incision site. No information was given to patient on how to care for the site per patient and per AVS. Patient calling gyn-onc and deferred to colorectal. Colorectal provider paged and referred to general surgery.    On call Provider paged. Provider stated the patient can reinforce with gauze, utilize cold compress and pain medications and call the office tomorrow for appointment with general surgery. Patient informed and is upset with the way she was treated in the emergency room last night. Patient ethics number given. Patient stated she may go to the emergency room today at [facility] but would like a call tomorrow to be seen in the clinic.     Please call patient to schedule appointment with general surgery office tomorrow.          Answer Assessment - Initial Assessment Questions  1. SYMPTOM: \"What's the main symptom you're concerned about?\" (e.g., redness, pain, drainage)        Drainage/bleeding constantly and she is unable to measure it    2. ONSET: \"When did the issue  start?\"        Seen in ER last night    3. SURGERY: \"What surgery was performed?\"        2/7 and drained in ER yesterday 3/1      5. INCISION SITE: \"Where is the incision located?\"         Lower abdomen    6. REDNESS: \"Is there any redness at the incision site?\" If yes, ask: \"How wide across is the redness?\" (Inches, centimeters)         Yes     7. PAIN: \"Is there any pain?\" If so, ask: \"How bad is it?\"  (Scale 1-10; or mild, moderate, severe)        Constant burning in incisional area 9/10, last took oxycodone at 9am and is taking tylenol to manage pain    8. BLEEDING: \"Is there any bleeding?\" If so, ask: \"How much?\" and \"Where?\"        Yes small amount    9. DRAINAGE: \"Is there any drainage from the incision site?\" If yes, ask: " "\"What color and how much?\" (e.g., red, cloudy, pus; drops, teaspoon)        Yes blood has filled two small gauze pads.     10. FEVER: \"Do you have a fever?\" If so, ask: \"What is your temperature, how was it measured, and when did it start?\"          Patient states she does not feel like she has a fever.     11. OTHER SYMPTOMS: \"Do you have any other symptoms?\" (e.g., shaking chills, weakness, rash elsewhere on body)          Nausea on and off    Protocols used: Post-Op Incision Symptoms-ADULT-AH    "

## 2025-03-02 NOTE — TELEPHONE ENCOUNTER
"Regarding: post op / wound/ pain  ----- Message from Olena LOYOLA sent at 3/2/2025 11:02 AM EST -----  \"I went to the ED with a high fever, they drained an abscess underneath my incision. I have a 2 in wide wound. I was sent home with no information, and I'm not able to manage the pain here at home.\"        Paged to on call provider / gyn onc / states this is C&R.    "

## 2025-03-02 NOTE — TELEPHONE ENCOUNTER
Pt states she was at the ED last night with a high fever.  A abscess was drained underneath incision.  She has a 2 in wide open wound now.  She state she was sent home with no instructions and not sure what to do..  Pt states she is at home and cannot manage the pain.    Sent ESC to     Rec'd response from  stating that this is C&R.  Sent hub to nurses.

## 2025-03-02 NOTE — PROCEDURES
Incision and drain    Date/Time: 3/1/2025 7:28 PM    Performed by: Fabricio Redmond MD  Authorized by: Fabricio Redmond MD  Universal Protocol:  Timeout called at: 3/1/2025 5:00 PM.    Patient location:  ED  Location:     Type:  Seroma and surgical wound infection    Location:  Trunk    Trunk location:  Abdomen  Sedation:     Sedation type:  Anxiolysis  Anesthesia (see MAR for exact dosages):     Anesthesia method:  Local infiltration    Local anesthetic:  Lidocaine 1% WITH epi  Procedure details:     Incision types:  Single straight    Scalpel blade:  11    Wound management:  Probed and deloculated and irrigated with saline    Drainage:  Serous and serosanguinous    Wound treatment:  Packing placed    Packing materials:  1/2 in gauze  Post-procedure details:     Patient tolerance of procedure:  Tolerated well, no immediate complications

## 2025-03-02 NOTE — TELEPHONE ENCOUNTER
Reason for Disposition  • [1] Post-op pain AND [2] not controlled with pain medications    Protocols used: Post-Op Incision Symptoms-ADULT-AH

## 2025-03-03 ENCOUNTER — RESULTS FOLLOW-UP (OUTPATIENT)
Dept: EMERGENCY DEPT | Facility: HOSPITAL | Age: 35
End: 2025-03-03

## 2025-03-03 ENCOUNTER — TELEPHONE (OUTPATIENT)
Age: 35
End: 2025-03-03

## 2025-03-03 ENCOUNTER — TELEPHONE (OUTPATIENT)
Dept: GYNECOLOGIC ONCOLOGY | Facility: CLINIC | Age: 35
End: 2025-03-03

## 2025-03-03 DIAGNOSIS — N80.9 ENDOMETRIOSIS: ICD-10-CM

## 2025-03-03 DIAGNOSIS — G89.29 CHRONIC PELVIC PAIN IN FEMALE: ICD-10-CM

## 2025-03-03 DIAGNOSIS — R10.84 GENERALIZED ABDOMINAL PAIN: ICD-10-CM

## 2025-03-03 DIAGNOSIS — R10.2 CHRONIC PELVIC PAIN IN FEMALE: ICD-10-CM

## 2025-03-03 NOTE — TELEPHONE ENCOUNTER
I spoke with the patient and scheduled a 15 minute appointment with Dr. Greene on 3/4/2025 at 2:30PM in [place] per the provider's request.

## 2025-03-03 NOTE — ED ATTENDING ATTESTATION
3/1/2025  I, Sang Simpson MD, saw and evaluated the patient. I have discussed the patient with the resident/non-physician practitioner and agree with the resident's/non-physician practitioner's findings, Plan of Care, and MDM as documented in the resident's/non-physician practitioner's note, except where noted. All available labs and Radiology studies were reviewed.  I was present for key portions of any procedure(s) performed by the resident/non-physician practitioner and I was immediately available to provide assistance.       At this point I agree with the current assessment done in the Emergency Department.  I have conducted an independent evaluation of this patient a history and physical is as follows:    ED Course     34-year-old female presenting with fever, and abdominal pain and swelling.  Patient status post recent bowel resection and need from endometriosis otoscopy patient received 4 Zofran and 6 a day Tylenol prior to arrival.     Abdomen soft with tenderness over surgical incision site.     Impression: Fever, surgical site tenderness  Differential diagnosis: At risk for surgical site infection, abscess, cellulitis, intra-abdominal abscess UTI, pyelonephritis.     Plan check labs CTAP IV fluids antibiotics pain control consult, and not the surgery for evaluation anticipate admission    Patient signed out to evening team pending surgical consult    Critical Care Time  Procedures

## 2025-03-03 NOTE — TELEPHONE ENCOUNTER
Patient called in and would like to speak with Dr Greene.  She had surgery on 2/7 & this past weekend she ended up in the ER twice with an infection.  I offered to transfer her to a nurse & she said that she would rather speak with Dr Greene.

## 2025-03-03 NOTE — TELEPHONE ENCOUNTER
Received a phone call from patient.  Patient stated that she was seen in the ED over the weekend d/t wound and will now have nursing come to house to do dressing changes 3X/week.  Patient stated that she was instructed to take 2 Oxycodone tablets (total 20 mg) an hour prior to dressing change and then may take 1 tablet every six hours as needed.  A new prescription was not sent to pharmacy and patient stated that she will run out of medication quite some time before refill is permitted.  Please advise and call patient with any updates.

## 2025-03-04 ENCOUNTER — TELEPHONE (OUTPATIENT)
Dept: GYNECOLOGIC ONCOLOGY | Facility: CLINIC | Age: 35
End: 2025-03-04

## 2025-03-04 ENCOUNTER — OFFICE VISIT (OUTPATIENT)
Dept: GYNECOLOGIC ONCOLOGY | Facility: CLINIC | Age: 35
End: 2025-03-04

## 2025-03-04 VITALS
TEMPERATURE: 97.9 F | DIASTOLIC BLOOD PRESSURE: 80 MMHG | BODY MASS INDEX: 39.94 KG/M2 | SYSTOLIC BLOOD PRESSURE: 120 MMHG | HEART RATE: 76 BPM | OXYGEN SATURATION: 99 % | WEIGHT: 240 LBS

## 2025-03-04 DIAGNOSIS — N80.9 ENDOMETRIOSIS: ICD-10-CM

## 2025-03-04 DIAGNOSIS — N80.519 ENDOMETRIOSIS OF RECTUM: Primary | ICD-10-CM

## 2025-03-04 DIAGNOSIS — G89.29 CHRONIC PELVIC PAIN IN FEMALE: ICD-10-CM

## 2025-03-04 DIAGNOSIS — R10.2 CHRONIC PELVIC PAIN IN FEMALE: ICD-10-CM

## 2025-03-04 DIAGNOSIS — R10.84 GENERALIZED ABDOMINAL PAIN: ICD-10-CM

## 2025-03-04 LAB
BACTERIA WND AEROBE CULT: ABNORMAL
BACTERIA WND AEROBE CULT: ABNORMAL
GRAM STN SPEC: ABNORMAL

## 2025-03-04 PROCEDURE — 99024 POSTOP FOLLOW-UP VISIT: CPT | Performed by: OBSTETRICS & GYNECOLOGY

## 2025-03-04 RX ORDER — METHOCARBAMOL 750 MG/1
750 TABLET, FILM COATED ORAL EVERY 6 HOURS PRN
Qty: 120 TABLET | Refills: 0 | Status: SHIPPED | OUTPATIENT
Start: 2025-03-04

## 2025-03-04 RX ORDER — OXYCODONE HYDROCHLORIDE 10 MG/1
10 TABLET ORAL 3 TIMES DAILY
Qty: 63 TABLET | Refills: 0 | Status: SHIPPED | OUTPATIENT
Start: 2025-03-04 | End: 2025-03-12 | Stop reason: SDUPTHER

## 2025-03-04 NOTE — LETTER
2025     Chauncey King MD  [address]   [address]  [place] [address]    Patient: Lucie Camacho   YOB: 1990   Date of Visit: 3/4/2025       Dear Dr. King:    Thank you for referring Lucie Camacho to me for evaluation. Below are my notes for this consultation.    If you have questions, please do not hesitate to call me. I look forward to following your patient along with you.         Sincerely,        Popeye Greene MD        CC: No Recipients    Popeye Greene MD  3/4/2025  1:57 PM  Sign when Signing Visit  Name: Lucie Camacho      : 1990      MRN: [id number]  Encounter Provider: Popeye Greene MD  Encounter Date: 3/4/2025   Encounter department: [facility] [place]  :  Assessment & Plan  Endometriosis of rectum  34-year-old status post robotic assisted lysis of adhesions, resection of peritoneal/rectal endometriosis, low anterior resection, flexible sigmoidoscopy on 2025.  Postoperative course complicated by superficial wound infection and separation.  The wound has been opened.  I reviewed CT images of the abdomen pelvis, CBC, BMP.  She is on chronic narcotic therapy.  Her performance status is 1.  1.  The wound is granulating well.  No evidence of current active infection.  She has VNA available for daily dressing changes.  Return in 2 weeks for wound evaluation.  2.  We discussed pain management.  She will use 5 mg of oxycodone at the time of dressing changes in addition to her 10 mg oxycodone every 8 hours.               History of Present Illness  Reason for Visit / CC: Postoperative evaluation, postoperative wound infection Who returns for postoperative evaluation.  She presented to the ED with fever and pain on 3/1/2024.  CT abdomen pelvis was performed which demonstrated a collection in the subcutaneous tissue at the level of her mini laparotomy incision consistent with abscess.  The  wound was opened and packed.  Labs at that time revealed a total white blood cell count of 15K, hemoglobin 12.7 g/dL.  Procalcitonin was normal.  She then presented a day later to [facility] ED and had another CT scan, additional labs drawn.  She has VNA for wound care.      Pertinent Medical History    Opioid dependence           Review of Systems A complete review of systems is negative other than that noted above in the HPI.  Current Outpatient Medications on File Prior to Visit   Medication Sig Dispense Refill   • acetaminophen (TYLENOL) 650 mg CR tablet Take 1 tablet (650 mg total) by mouth every 8 (eight) hours as needed for mild pain 30 tablet 0   • gabapentin (NEURONTIN) 100 mg capsule Take 1 capsule (100 mg total) by mouth 3 (three) times a day 90 capsule 0   • ibuprofen (MOTRIN) 600 mg tablet Take 1 tablet (600 mg total) by mouth every 6 (six) hours 30 tablet 0   • ketorolac (TORADOL) 10 mg tablet Take 1 tablet (10 mg total) by mouth every 6 (six) hours as needed for moderate pain 20 tablet 0   • methocarbamol (ROBAXIN) 750 mg tablet Take 1 tablet (750 mg total) by mouth every 6 (six) hours as needed for muscle spasms 120 tablet 0   • mometasone (ELOCON) 0.1 % cream Apply topically daily 45 g 2   • oxyCODONE (ROXICODONE) 10 MG TABS Take 1 tablet (10 mg total) by mouth 3 (three) times a day for 21 days Max Daily Amount: 30 mg 63 tablet 0   • sulfamethoxazole-trimethoprim (BACTRIM DS) 800-160 mg per tablet Take 1 tablet by mouth 2 (two) times a day for 7 days smx-tmp DS (BACTRIM) 800-160 mg tabs (1tab q12 D10) 14 tablet 0   • naloxone (NARCAN) 4 mg/0.1 mL nasal spray Administer 1 spray into a nostril. If no response after 2-3 minutes, give another dose in the other nostril using a new spray. 1 each 1     No current facility-administered medications on file prior to visit.         Objective  /80 (BP Location: Left arm, Patient Position: Sitting, Cuff Size: Large)   Pulse 76   Temp 97.9 °F (36.6 °C)  "(Temporal)   Wt 109 kg (240 lb)   SpO2 99%   BMI 39.94 kg/m²     Body mass index is 39.94 kg/m².  Pain Screening:  Pain Score:   8 (lower abdomen)  ECOG   1  Physical Exam  Vitals reviewed.   Constitutional:       General: She is not in acute distress.     Appearance: Normal appearance. She is obese. She is not ill-appearing.   HENT:      Head: Normocephalic and atraumatic.      Mouth/Throat:      Mouth: Mucous membranes are moist.   Eyes:      General: No scleral icterus.        Right eye: No discharge.         Left eye: No discharge.      Conjunctiva/sclera: Conjunctivae normal.   Pulmonary:      Effort: Pulmonary effort is normal.   Musculoskeletal:      Right lower leg: No edema.      Left lower leg: No edema.   Skin:     General: Skin is warm and dry.      Coloration: Skin is not jaundiced.      Findings: No rash.      Comments: The robotic trocar sites are clean dry and intact.  The transverse suprapubic incision has been opened.  The gauze was removed.  There is excellent granulation tissue present.  There is small amount of fibrinous exudate present at the base without evidence of purulence.  The fascia is intact.  The wound dimensions are approximately 4 cm deep, 4 cm wide, 3 cm long.  The wound was then repacked with saline moistened gauze and covered with an ABD pad.   Neurological:      General: No focal deficit present.      Mental Status: She is alert and oriented to person, place, and time.      Cranial Nerves: No cranial nerve deficit.      Sensory: No sensory deficit.      Motor: No weakness.      Gait: Gait normal.   Psychiatric:         Mood and Affect: Mood normal.         Behavior: Behavior normal.         Thought Content: Thought content normal.         Judgment: Judgment normal.          Labs: I have reviewed pertinent labs.   No results found for: \"\"  Lab Results   Component Value Date/Time    Potassium 3.7 03/03/2025 06:16 AM    Chloride 108 03/03/2025 06:16 AM    Carbon Dioxide 25 " 03/03/2025 06:16 AM    BUN 7 03/03/2025 06:16 AM    Creatinine 0.65 03/03/2025 06:16 AM    Glucose, i-STAT 119 02/07/2025 09:14 AM    Glucose, Fasting 92 01/23/2025 12:16 PM    Calcium 8.5 03/03/2025 06:16 AM    AST 28 03/02/2025 10:06 PM    ALT 46 03/02/2025 10:06 PM    Alkaline Phosphatase 74 03/02/2025 10:06 PM    eGFRcr 120 03/03/2025 06:16 AM     Lab Results   Component Value Date/Time    WBC 15.91 (H) 03/01/2025 12:35 PM    Hemoglobin 12.7 03/01/2025 12:35 PM    Hematocrit 37.2 03/01/2025 12:35 PM    MCV 85 03/01/2025 12:35 PM    Platelets 239 03/01/2025 12:35 PM     Lab Results   Component Value Date/Time    Absolute Neutrophils 13.51 (H) 03/01/2025 12:35 PM        Trend:  Lab Results   Component Value Date     17.3 04/27/2023       Radiology Results Review: I personally reviewed the following image studies in PACS and associated radiology reports: CT abdomen/pelvis. My interpretation of the radiology images/reports is: Superficial wound infection.

## 2025-03-04 NOTE — ASSESSMENT & PLAN NOTE
34-year-old status post robotic assisted lysis of adhesions, resection of peritoneal/rectal endometriosis, low anterior resection, flexible sigmoidoscopy on 2/7/2025.  Postoperative course complicated by superficial wound infection and separation.  The wound has been opened.  I reviewed CT images of the abdomen pelvis, CBC, BMP.  She is on chronic narcotic therapy.  Her performance status is 1.  1.  The wound is granulating well.  No evidence of current active infection.  She has VNA available for daily dressing changes.  Return in 2 weeks for wound evaluation.  2.  We discussed pain management.  She will use 5 mg of oxycodone at the time of dressing changes in addition to her 10 mg oxycodone every 8 hours.

## 2025-03-04 NOTE — PROGRESS NOTES
Name: Lucie Camacho      : 1990      MRN: [id number]  Encounter Provider: Popeye Greene MD  Encounter Date: 3/4/2025   Encounter department: [facility] [place]  :  Assessment & Plan  Endometriosis of rectum  34-year-old status post robotic assisted lysis of adhesions, resection of peritoneal/rectal endometriosis, low anterior resection, flexible sigmoidoscopy on 2025.  Postoperative course complicated by superficial wound infection and separation.  The wound has been opened.  I reviewed CT images of the abdomen pelvis, CBC, BMP.  She is on chronic narcotic therapy.  Her performance status is 1.  1.  The wound is granulating well.  No evidence of current active infection.  She has VNA available for daily dressing changes.  Return in 2 weeks for wound evaluation.  2.  We discussed pain management.  She will use 5 mg of oxycodone at the time of dressing changes in addition to her 10 mg oxycodone every 8 hours.               History of Present Illness   Reason for Visit / CC: Postoperative evaluation, postoperative wound infection Who returns for postoperative evaluation.  She presented to the ED with fever and pain on 3/1/2024.  CT abdomen pelvis was performed which demonstrated a collection in the subcutaneous tissue at the level of her mini laparotomy incision consistent with abscess.  The wound was opened and packed.  Labs at that time revealed a total white blood cell count of 15K, hemoglobin 12.7 g/dL.  Procalcitonin was normal.  She then presented a day later to [facility] ED and had another CT scan, additional labs drawn.  She has VNA for wound care.      Pertinent Medical History     Opioid dependence           Review of Systems A complete review of systems is negative other than that noted above in the HPI.  Current Outpatient Medications on File Prior to Visit   Medication Sig Dispense Refill    acetaminophen (TYLENOL) 650 mg CR tablet Take 1 tablet  (650 mg total) by mouth every 8 (eight) hours as needed for mild pain 30 tablet 0    gabapentin (NEURONTIN) 100 mg capsule Take 1 capsule (100 mg total) by mouth 3 (three) times a day 90 capsule 0    ibuprofen (MOTRIN) 600 mg tablet Take 1 tablet (600 mg total) by mouth every 6 (six) hours 30 tablet 0    ketorolac (TORADOL) 10 mg tablet Take 1 tablet (10 mg total) by mouth every 6 (six) hours as needed for moderate pain 20 tablet 0    methocarbamol (ROBAXIN) 750 mg tablet Take 1 tablet (750 mg total) by mouth every 6 (six) hours as needed for muscle spasms 120 tablet 0    mometasone (ELOCON) 0.1 % cream Apply topically daily 45 g 2    oxyCODONE (ROXICODONE) 10 MG TABS Take 1 tablet (10 mg total) by mouth 3 (three) times a day for 21 days Max Daily Amount: 30 mg 63 tablet 0    sulfamethoxazole-trimethoprim (BACTRIM DS) 800-160 mg per tablet Take 1 tablet by mouth 2 (two) times a day for 7 days smx-tmp DS (BACTRIM) 800-160 mg tabs (1tab q12 D10) 14 tablet 0    naloxone (NARCAN) 4 mg/0.1 mL nasal spray Administer 1 spray into a nostril. If no response after 2-3 minutes, give another dose in the other nostril using a new spray. 1 each 1     No current facility-administered medications on file prior to visit.         Objective   /80 (BP Location: Left arm, Patient Position: Sitting, Cuff Size: Large)   Pulse 76   Temp 97.9 °F (36.6 °C) (Temporal)   Wt 109 kg (240 lb)   SpO2 99%   BMI 39.94 kg/m²     Body mass index is 39.94 kg/m².  Pain Screening:  Pain Score:   8 (lower abdomen)  ECOG   1  Physical Exam  Vitals reviewed.   Constitutional:       General: She is not in acute distress.     Appearance: Normal appearance. She is obese. She is not ill-appearing.   HENT:      Head: Normocephalic and atraumatic.      Mouth/Throat:      Mouth: Mucous membranes are moist.   Eyes:      General: No scleral icterus.        Right eye: No discharge.         Left eye: No discharge.      Conjunctiva/sclera: Conjunctivae  "normal.   Pulmonary:      Effort: Pulmonary effort is normal.   Musculoskeletal:      Right lower leg: No edema.      Left lower leg: No edema.   Skin:     General: Skin is warm and dry.      Coloration: Skin is not jaundiced.      Findings: No rash.      Comments: The robotic trocar sites are clean dry and intact.  The transverse suprapubic incision has been opened.  The gauze was removed.  There is excellent granulation tissue present.  There is small amount of fibrinous exudate present at the base without evidence of purulence.  The fascia is intact.  The wound dimensions are approximately 4 cm deep, 4 cm wide, 3 cm long.  The wound was then repacked with saline moistened gauze and covered with an ABD pad.   Neurological:      General: No focal deficit present.      Mental Status: She is alert and oriented to person, place, and time.      Cranial Nerves: No cranial nerve deficit.      Sensory: No sensory deficit.      Motor: No weakness.      Gait: Gait normal.   Psychiatric:         Mood and Affect: Mood normal.         Behavior: Behavior normal.         Thought Content: Thought content normal.         Judgment: Judgment normal.          Labs: I have reviewed pertinent labs.   No results found for: \"\"  Lab Results   Component Value Date/Time    Potassium 3.7 03/03/2025 06:16 AM    Chloride 108 03/03/2025 06:16 AM    Carbon Dioxide 25 03/03/2025 06:16 AM    BUN 7 03/03/2025 06:16 AM    Creatinine 0.65 03/03/2025 06:16 AM    Glucose, i-STAT 119 02/07/2025 09:14 AM    Glucose, Fasting 92 01/23/2025 12:16 PM    Calcium 8.5 03/03/2025 06:16 AM    AST 28 03/02/2025 10:06 PM    ALT 46 03/02/2025 10:06 PM    Alkaline Phosphatase 74 03/02/2025 10:06 PM    eGFRcr 120 03/03/2025 06:16 AM     Lab Results   Component Value Date/Time    WBC 15.91 (H) 03/01/2025 12:35 PM    Hemoglobin 12.7 03/01/2025 12:35 PM    Hematocrit 37.2 03/01/2025 12:35 PM    MCV 85 03/01/2025 12:35 PM    Platelets 239 03/01/2025 12:35 PM     Lab " Results   Component Value Date/Time    Absolute Neutrophils 13.51 (H) 03/01/2025 12:35 PM        Trend:  Lab Results   Component Value Date     17.3 04/27/2023       Radiology Results Review: I personally reviewed the following image studies in PACS and associated radiology reports: CT abdomen/pelvis. My interpretation of the radiology images/reports is: Superficial wound infection.

## 2025-03-05 RX ORDER — METHOCARBAMOL 750 MG/1
750 TABLET, FILM COATED ORAL EVERY 6 HOURS PRN
Qty: 120 TABLET | Refills: 0 | Status: SHIPPED | OUTPATIENT
Start: 2025-03-05

## 2025-03-06 ENCOUNTER — TELEPHONE (OUTPATIENT)
Age: 35
End: 2025-03-06

## 2025-03-06 LAB
BACTERIA BLD CULT: NORMAL
BACTERIA BLD CULT: NORMAL

## 2025-03-06 NOTE — TELEPHONE ENCOUNTER
"  \"I think the pubic mound area below my incision is more swollen than before\"    Received above MyChart message from Lucie and called her to discuss further.    She informed me that the Visiting Nurse just completed her visit, and assessed the area. Per Lucie, the nurse noted that the area looks ok, with no signs of infection or increased swelling. She also mentioned that the skin has a good color and the packing is taking less gauze.    She had no further concerns and was appreciative of the call.  "

## 2025-03-06 NOTE — TELEPHONE ENCOUNTER
Call received from Ciara from [facility] visiting nurses. Stated their orders say to change patients dressing every other day, but patient said that the provider told her it has to be daily. They have been doing daily dressing changes but need orders saying it is daily. Asking for a call back with verbal orders with the frequency.    [phone]

## 2025-03-07 ENCOUNTER — TELEPHONE (OUTPATIENT)
Age: 35
End: 2025-03-07

## 2025-03-07 NOTE — TELEPHONE ENCOUNTER
Patient calling in to report today her pubic mound is swollen with recent abscess.  She reports green/yellow mixed with blood puss on gauze with gauze change.  She reports this is the first time seeing puss on gauze, she denies fever.  She reports wound care nurse just arrived and will have her take a look. She reports concerned for an infection.  Patient just sent pictures in [employer] message for review.

## 2025-03-07 NOTE — TELEPHONE ENCOUNTER
Received a phone call from patient.  Patient stated that she had called in earlier regarding wound and has not received a call back.  Please see previous note and call patient with any further recommendations.

## 2025-03-10 ENCOUNTER — TELEPHONE (OUTPATIENT)
Dept: GYNECOLOGIC ONCOLOGY | Facility: CLINIC | Age: 35
End: 2025-03-10

## 2025-03-10 ENCOUNTER — TELEPHONE (OUTPATIENT)
Age: 35
End: 2025-03-10

## 2025-03-10 DIAGNOSIS — Z98.890 POSTOPERATIVE STATE: ICD-10-CM

## 2025-03-10 DIAGNOSIS — T81.49XA INCISIONAL ABSCESS: Primary | ICD-10-CM

## 2025-03-10 NOTE — TELEPHONE ENCOUNTER
FYI:  [facility] nurse Aleah calling back to discuss pt wound drainage.  Stated the color is now thick green ?pseudomonas.  Pt is afebrile has no other symptoms.Wound was cleaned and packed NSS with wet to dry dressing daily. They are seeing pt M-W-F and pt SO doing dressing changes other days Was told to clean wound out real well before packing by nurse.    Pt was seen in the ER on 3/8 (see media photos of drainage).  Pt will complete her po Bactrim tomorrow.    Has sched f/u appt  for 3/19.    Pls advise  Send any new orders to Aleah  [phone]

## 2025-03-10 NOTE — TELEPHONE ENCOUNTER
I left a message for the schedulers at [facility] to call me to schedule an appointment for the patient ASAP for an incisional abscess, post-operative state. I provided my direct number. I provided the number for the [facility], if I don't answer, and they can be warm transferred to Marian Pelaez.

## 2025-03-10 NOTE — TELEPHONE ENCOUNTER
Called back to nurse to further discuss wound drainage/pt of Dr Greene.LMOM to call back [facility] number given.

## 2025-03-10 NOTE — TELEPHONE ENCOUNTER
Patient was evaluated at [facility], started on abx and admitted for pain management.   Please instruct nurse we will complete abx and refer to wound center.     Karla, can you let nurse and patient know?  Gyn-onc team, can you assist with ASAP wound center appt?    Thanks!

## 2025-03-10 NOTE — TELEPHONE ENCOUNTER
Return call placed to Aleah and JF message left indicating the patient should complete ordered ATB and we will arrange appointment at the wound clinic.    Call placed and spoke with Lucie related to completing ATB and to expect a call from our office related to referral to wound clinic to help manage wound issues.

## 2025-03-10 NOTE — TELEPHONE ENCOUNTER
Aleah from [facility] would like a call back from the nurses to discuss a wound drainage issue that pt is experiencing. Pt was recently in their ER but nothing was really done. The color of the liquid was stated to be green and thick. Aleah would like a call back to discuss what should be done at [phone]. Thank you.

## 2025-03-12 ENCOUNTER — TELEPHONE (OUTPATIENT)
Age: 35
End: 2025-03-12

## 2025-03-12 DIAGNOSIS — N80.519 ENDOMETRIOSIS OF RECTUM: ICD-10-CM

## 2025-03-12 RX ORDER — OXYCODONE HYDROCHLORIDE 10 MG/1
10 TABLET ORAL 3 TIMES DAILY
Qty: 63 TABLET | Refills: 0 | Status: SHIPPED | OUTPATIENT
Start: 2025-03-12 | End: 2025-04-02

## 2025-03-12 NOTE — TELEPHONE ENCOUNTER
Spoke with patient who wanted to know if there were any updates on oxycodone status as [facility] pharmacy stated they will not refill the script until 3/16 and she took her last tablet this morning. Patient stated the pharmacy requires a new script that states there have been changes to the original prescription and it is ok to  medication today. Patient requesting a callback this afternoon with updates.     Best callback number: [phone]

## 2025-03-12 NOTE — TELEPHONE ENCOUNTER
Received call from Lucie that the pharmacist at [facility] is refusing to fill her Oxycodone prescription, as it is too soon to fill. Lucie explained that she was previously taking 1 tablet every 6 hours until she saw Dr Greene on 3/4 and he advised she take 1 tablet every 8 hours and with 1 1/2 tablets prior to wound packing, which she has been doing since last week.    She took her last dose this morning and has completely run out of the Oxy. The pharmacist advised she will not fill the medication until 3/16, unless she receives a new script from the provider stating the Oxycodone can be filled today as previous prescription from 2/24 was changed.     Please review and advise.

## 2025-03-17 ENCOUNTER — OFFICE VISIT (OUTPATIENT)
Dept: WOUND CARE | Facility: HOSPITAL | Age: 35
End: 2025-03-17
Payer: COMMERCIAL

## 2025-03-17 VITALS
TEMPERATURE: 95.9 F | DIASTOLIC BLOOD PRESSURE: 78 MMHG | BODY MASS INDEX: 40.63 KG/M2 | RESPIRATION RATE: 16 BRPM | WEIGHT: 238 LBS | SYSTOLIC BLOOD PRESSURE: 136 MMHG | HEART RATE: 72 BPM | HEIGHT: 64 IN

## 2025-03-17 DIAGNOSIS — T81.89XA NON-HEALING SURGICAL WOUND, INITIAL ENCOUNTER: Primary | ICD-10-CM

## 2025-03-17 PROCEDURE — 99213 OFFICE O/P EST LOW 20 MIN: CPT | Performed by: SURGERY

## 2025-03-17 PROCEDURE — 99203 OFFICE O/P NEW LOW 30 MIN: CPT | Performed by: SURGERY

## 2025-03-17 NOTE — PATIENT INSTRUCTIONS
Orders Placed This Encounter   Procedures    Wound cleansing and dressings Lower Abdomen     Wound location lower abdominal wound.   Change dressing 3x per week and as needed for strikethrough drainage.   You may remove the dressing and shower. Do not leave wound open to air, apply new dressing immediately.  Cleanse the wound with wound cleanser or mild soap and water, rinse, pat dry.  Pack wound lightly with Aquacel AG rope to the wound.  Cover with ABD pad.    Secure with tape.        Please try to incorporate 3 to 4 servings of protein in diet a day.  This includes lean meats, nuts, peanut butter, edamame, dairy such as eggs, milk, yogurt, protein shakes.     Standing Status:   Future     Expiration Date:   3/24/2025

## 2025-03-17 NOTE — PROGRESS NOTES
"Name: Lucie Camacho      : 1990      MRN: [id number]  Encounter Provider: Josep Abrams MD  Encounter Date: 3/17/2025   Encounter department: [facility] WOUND C[facility]  :  Assessment & Plan  Non-healing surgical wound, initial encounter  Wound clean, will start Aquacel Ag rope to wound  M-W-    Orders:  •  Wound cleansing and dressings Lower Abdomen; Future  •  Wound Procedure Treatment Lower Abdomen        History of Present Illness   Chief Complaint   Patient presents with   • New Patient Visit     Lower abd wound.     Here for wound follow up.  HPI  Objective   /78   Pulse 72   Temp (!) 95.9 °F (35.5 °C)   Resp 16   Ht 5' 4\" (1.626 m)   Wt 108 kg (238 lb)   BMI 40.85 kg/m²     Physical Exam  Wound 25 Surgical Open Surgical Incision Abdomen Lower (Active)   Wound Image   25   Wound Description Granulation tissue;Bleeding 25   Non-staged Wound Description Full thickness 25   Wound Length (cm) 0.4 cm 25   Wound Width (cm) 1.9 cm 25 08   Wound Depth (cm) 1.2 cm 25   Wound Surface Area (cm^2) 0.76 cm^2 25 08   Wound Volume (cm^3) 0.912 cm^3 25 08   Calculated Wound Volume (cm^3) 0.91 cm^3 25   Drainage Amount Moderate 25   Drainage Description Bloody;Serosanguineous 25 08   Zeenat-wound Assessment Excoriated;Pink 25 08   Dressing Status Intact 25     Lower abdomen open wound with granulation tissue throughout, zeenat wound clean    Procedures   Results from last 6 Months   Lab Units 25  1800   WOUND CULTURE  1+ Growth of Escherichia coli*  Few Colonies of           "

## 2025-03-17 NOTE — PROGRESS NOTES
Wound Procedure Treatment Lower Abdomen    Performed by: Ada Collins RN  Authorized by: Josep Abrams MD  Associated wounds:   Wound 03/17/25 Surgical Open Surgical Incision Abdomen Lower    Wound cleansed with:  NSS   Applied primary dressing:  Calcium alginate and Silver   Applied secondary dressing:  ABD   Dressing secured with:  Tape     Aquacel AG rope/

## 2025-03-17 NOTE — LETTER
[facility] W[facility]  [address] [facility] [address]  Phone#  [phone]  Fax#  [phone]    Patient:  Lucie Camacho  YOB: 1990  Phone:  [phone]  Date of Visit:  3/17/2025    Orders Placed This Encounter   Procedures   • Wound cleansing and dressings Lower Abdomen     Wound location lower abdominal wound.   Change dressing 3x per week and as needed for strikethrough drainage.   You may remove the dressing and shower. Do not leave wound open to air, apply new dressing immediately.  Cleanse the wound with wound cleanser or mild soap and water, rinse, pat dry.  Pack wound lightly with Aquacel AG rope to the wound.  Cover with ABD pad.    Secure with tape.        Please try to incorporate 3 to 4 servings of protein in diet a day.  This includes lean meats, nuts, peanut butter, edamame, dairy such as eggs, milk, yogurt, protein shakes.     Standing Status:   Future     Expiration Date:   3/24/2025         Electronically signed by Josep Abrams MD

## 2025-03-19 ENCOUNTER — OFFICE VISIT (OUTPATIENT)
Dept: GYNECOLOGIC ONCOLOGY | Facility: CLINIC | Age: 35
End: 2025-03-19

## 2025-03-19 ENCOUNTER — TELEPHONE (OUTPATIENT)
Dept: GYNECOLOGIC ONCOLOGY | Facility: CLINIC | Age: 35
End: 2025-03-19

## 2025-03-19 VITALS
TEMPERATURE: 98.4 F | DIASTOLIC BLOOD PRESSURE: 78 MMHG | SYSTOLIC BLOOD PRESSURE: 128 MMHG | BODY MASS INDEX: 41.02 KG/M2 | OXYGEN SATURATION: 98 % | HEART RATE: 78 BPM | WEIGHT: 239 LBS

## 2025-03-19 DIAGNOSIS — N80.519 ENDOMETRIOSIS OF RECTUM: Primary | ICD-10-CM

## 2025-03-19 PROCEDURE — 99024 POSTOP FOLLOW-UP VISIT: CPT | Performed by: OBSTETRICS & GYNECOLOGY

## 2025-03-19 NOTE — PROGRESS NOTES
Name: Lucie Camacho      : 1990      MRN: [id number]  Encounter Provider: Popeye Greene MD  Encounter Date: 3/19/2025   [facility]  :  Assessment & Plan  Endometriosis of rectum  34-year-old status post robotic assisted lysis of adhesions, resection of peritoneal/rectal endometriosis, low anterior resection, flexible sigmoidoscopy on 2025.  Postoperative course complicated by superficial wound infection/separation being managed with home wound care. She completed a course of Bactrim on 3/11 and is on chronic narcotic therapy.  Her performance status is 0.  1.  The wound is granulating well with resultant decrease in size.  No evidence of current active infection. Continue - dressing changes by VNA with Aquacel Ag rope. Plan for follow up in 1 month to ensure wound has healed completely.  2.  We discussed pain management and planned gradual taper of narcotic dosing with oxycodone every 8 hours for a combined total of 25 mg per day (10/10/5) followed by transition to 20mg per day (10/5/5) etc. until ideally no longer requiring any opioids for pain control. Will reassess at follow up visit in 1 month.             History of Present Illness   Reason for Visit / CC: postoperative evaluation, postoperative wound infection who returns for postoperative evaluation. Since her last visit, she presented to the ED on 3/8 for a wound check.  CT abdomen pelvis and labs were within normal limits. She has VNA on  for wound care. She saw general surgery on 3/17 and had the packing changed to Aquacel. She no longer requires pre-medication for dressing changes and feels her pain is well controlled on current oxycodone 10mg q8h. No fevers, N/V or changes in bowel/bladder habits.      Pertinent Medical History   Opioid dependence       Review of Systems A complete review of systems is negative other than that noted above in the  HPI.  Current Outpatient Medications on File Prior to Visit   Medication Sig Dispense Refill    acetaminophen (TYLENOL) 650 mg CR tablet Take 1 tablet (650 mg total) by mouth every 8 (eight) hours as needed for mild pain 30 tablet 0    ibuprofen (MOTRIN) 600 mg tablet Take 1 tablet (600 mg total) by mouth every 6 (six) hours 30 tablet 0    methocarbamol (ROBAXIN) 750 mg tablet Take 1 tablet (750 mg total) by mouth every 6 (six) hours as needed for muscle spasms 120 tablet 0    methocarbamol (ROBAXIN) 750 mg tablet take 1 tablet by mouth every 6 hours if needed for muscle spasm 120 tablet 0    oxyCODONE (ROXICODONE) 10 MG TABS Take 1 tablet (10 mg total) by mouth 3 (three) times a day for 21 days Additionally, 5 mg dose prior to dressing change, as per previous instructions. Max Daily Amount: 30 mg 63 tablet 0    gabapentin (NEURONTIN) 100 mg capsule Take 1 capsule (100 mg total) by mouth 3 (three) times a day (Patient not taking: Reported on 3/17/2025) 90 capsule 0    ketorolac (TORADOL) 10 mg tablet Take 1 tablet (10 mg total) by mouth every 6 (six) hours as needed for moderate pain (Patient not taking: Reported on 3/17/2025) 20 tablet 0    mometasone (ELOCON) 0.1 % cream Apply topically daily (Patient not taking: Reported on 3/17/2025) 45 g 2    naloxone (NARCAN) 4 mg/0.1 mL nasal spray Administer 1 spray into a nostril. If no response after 2-3 minutes, give another dose in the other nostril using a new spray. 1 each 1     No current facility-administered medications on file prior to visit.         Objective   /78 (BP Location: Left arm, Patient Position: Sitting, Cuff Size: Large)   Pulse 78   Temp 98.4 °F (36.9 °C) (Temporal)   Wt 108 kg (239 lb)   SpO2 98%   BMI 41.02 kg/m²     Body mass index is 41.02 kg/m².  Pain Screening:  Pain Score:   6 (abdomen)  ECOG   1  Physical Exam  Vitals reviewed.   Constitutional:       General: She is not in acute distress.     Appearance: Normal appearance. She is  "obese. She is not ill-appearing.   HENT:      Head: Normocephalic and atraumatic.      Mouth/Throat:      Mouth: Mucous membranes are moist.   Eyes:      General: No scleral icterus.        Right eye: No discharge.         Left eye: No discharge.      Conjunctiva/sclera: Conjunctivae normal.   Cardiovascular:      Rate and Rhythm: Normal rate and regular rhythm.   Pulmonary:      Effort: Pulmonary effort is normal.   Abdominal:      General: There is no distension.      Palpations: Abdomen is soft. There is no mass.   Musculoskeletal:      Right lower leg: No edema.      Left lower leg: No edema.   Skin:     General: Skin is warm and dry.      Coloration: Skin is not jaundiced.      Findings: No rash.      Comments: The robotic trocar sites are clean dry and intact. Aquacel rope removed from transverse suprapubic incision with excellent granulation tissue present and small amount of fibrinous exudate without evidence of purulence. The fascia is intact. Wound dimensions are approximately 0.5 cm deep, 0.5 cm wide, 1.5 cm long.  The wound was then repacked with fresh Aquacel Ag rope and covered with gauze and an ABD pad.   Neurological:      General: No focal deficit present.      Mental Status: She is alert and oriented to person, place, and time.      Cranial Nerves: No cranial nerve deficit.      Sensory: No sensory deficit.      Motor: No weakness.      Gait: Gait normal.   Psychiatric:         Mood and Affect: Mood normal.         Behavior: Behavior normal.         Thought Content: Thought content normal.         Judgment: Judgment normal.          Labs: I have reviewed pertinent labs.   No results found for: \"\"  Lab Results   Component Value Date/Time    Potassium 4.1 03/08/2025 04:39 PM    Chloride 106 03/08/2025 04:39 PM    Carbon Dioxide 23 03/08/2025 04:39 PM    BUN 9 03/08/2025 04:39 PM    Creatinine 0.74 03/08/2025 04:39 PM    Glucose, i-STAT 119 02/07/2025 09:14 AM    Glucose, Fasting 92 01/23/2025 " 12:16 PM    Calcium 9.5 03/08/2025 04:39 PM    AST 18 03/08/2025 04:39 PM    ALT 25 03/08/2025 04:39 PM    Alkaline Phosphatase 61 03/08/2025 04:39 PM    eGFRcr 110 03/08/2025 04:39 PM     Lab Results   Component Value Date/Time    WBC 15.91 (H) 03/01/2025 12:35 PM    Hemoglobin 12.7 03/01/2025 12:35 PM    Hematocrit 37.2 03/01/2025 12:35 PM    MCV 85 03/01/2025 12:35 PM    Platelets 239 03/01/2025 12:35 PM     Lab Results   Component Value Date/Time    Absolute Neutrophils 13.51 (H) 03/01/2025 12:35 PM        Trend:  Lab Results   Component Value Date     17.3 04/27/2023     Lucie Lowry MD   OB/Gyn PGY-4  4:39 PM  03/19/25

## 2025-03-19 NOTE — TELEPHONE ENCOUNTER
Called and spoke with patient that 3:30 pt canceled and if she wanted to come at that time. Patient agreed to come in at 3:30 pm today with Dr. Greene.

## 2025-03-19 NOTE — ASSESSMENT & PLAN NOTE
34-year-old status post robotic assisted lysis of adhesions, resection of peritoneal/rectal endometriosis, low anterior resection, flexible sigmoidoscopy on 2/7/2025.  Postoperative course complicated by superficial wound infection/separation being managed with home wound care. She completed a course of Bactrim on 3/11 and is on chronic narcotic therapy.  Her performance status is 0.  1.  The wound is granulating well with resultant decrease in size.  No evidence of current active infection. Continue M-W-F dressing changes by VNA with Aquacel Ag rope. Plan for follow up in 1 month to ensure wound has healed completely.  2.  We discussed pain management and planned gradual taper of narcotic dosing with oxycodone every 8 hours for a combined total of 25 mg per day (10/10/5) followed by transition to 20mg per day (10/5/5) etc. until ideally no longer requiring any opioids for pain control. Will reassess at follow up visit in 1 month.

## 2025-03-25 ENCOUNTER — OFFICE VISIT (OUTPATIENT)
Dept: GYNECOLOGIC ONCOLOGY | Facility: CLINIC | Age: 35
End: 2025-03-25

## 2025-03-25 ENCOUNTER — PATIENT MESSAGE (OUTPATIENT)
Age: 35
End: 2025-03-25

## 2025-03-25 VITALS
BODY MASS INDEX: 38.62 KG/M2 | HEART RATE: 87 BPM | OXYGEN SATURATION: 99 % | DIASTOLIC BLOOD PRESSURE: 80 MMHG | TEMPERATURE: 98 F | WEIGHT: 225 LBS | SYSTOLIC BLOOD PRESSURE: 122 MMHG

## 2025-03-25 DIAGNOSIS — F41.9 ANXIETY AND DEPRESSION: ICD-10-CM

## 2025-03-25 DIAGNOSIS — F32.A ANXIETY AND DEPRESSION: ICD-10-CM

## 2025-03-25 DIAGNOSIS — F51.01 PRIMARY INSOMNIA: ICD-10-CM

## 2025-03-25 DIAGNOSIS — N80.519 ENDOMETRIOSIS OF RECTUM: Primary | ICD-10-CM

## 2025-03-25 PROCEDURE — 99024 POSTOP FOLLOW-UP VISIT: CPT | Performed by: OBSTETRICS & GYNECOLOGY

## 2025-03-25 RX ORDER — TRAZODONE HYDROCHLORIDE 50 MG/1
50 TABLET ORAL
Qty: 30 TABLET | Refills: 3 | Status: SHIPPED | OUTPATIENT
Start: 2025-03-25

## 2025-03-25 NOTE — ASSESSMENT & PLAN NOTE
34-year-old status post robotic assisted lysis of adhesions, resection of peritoneal/rectal endometriosis, low anterior resection, flexible sigmoidoscopy 2/7/2025.  She has a superficial wound separation that is managed by wound care.  It is healing well.  Her performance status is 0.  1.  Plan to reduce narcotic dose at next refill to 25 mg daily.  She will receive a 22-day prescription with 55 10 mg tablets.  She can cut to the 10 mg tablets and two 5 mg tablets for a dosing schedule of 10/10/5.  2.  Return in 2 months for evaluation

## 2025-03-25 NOTE — PROGRESS NOTES
Name: Lucie Camacho      : 1990      MRN: [id number]  Encounter Provider: Popeye Greene MD  Encounter Date: 3/25/2025   [facility]  :  Assessment & Plan  Endometriosis of rectum  34-year-old status post robotic assisted lysis of adhesions, resection of peritoneal/rectal endometriosis, low anterior resection, flexible sigmoidoscopy 2025.  She has a superficial wound separation that is managed by wound care.  It is healing well.  Her performance status is 0.  1.  Plan to reduce narcotic dose at next refill to 25 mg daily.  She will receive a 22-day prescription with 55 10 mg tablets.  She can cut to the 10 mg tablets and two 5 mg tablets for a dosing schedule of 10/10/5.  2.  Return in 2 months for evaluation       Anxiety and depression  Referral to behavioral health  Orders:    Ambulatory referral to Psych Services; Future    Primary insomnia  We discussed appropriate sleep hygiene.  I discussed treatment options including hypnotics, over-the-counter medications, antidepressants.  Based on the risks and benefits of each approach, she is willing to try trazodone 50 mg nightly as needed.  She can increase the dose to 100 mg nightly as needed as well.  Orders:    traZODone (DESYREL) 50 mg tablet; Take 1 tablet (50 mg total) by mouth daily at bedtime            History of Present Illness   Reason for Visit / CC: Postoperative evaluation, insomnia  Lucie Camacho is a 34 y.o. female   Returns for postoperative evaluation and worsening insomnia.  Her wound is healing well.  She has VNA for wound care.  She is starting to enjoy other aspects of her life.  No other interval change in medications or medical history since her last visit.      Pertinent Medical History     Opioid dependence             Review of Systems A complete review of systems is negative other than that noted above in the HPI.  Current Outpatient Medications on  File Prior to Visit   Medication Sig Dispense Refill    acetaminophen (TYLENOL) 650 mg CR tablet Take 1 tablet (650 mg total) by mouth every 8 (eight) hours as needed for mild pain 30 tablet 0    ibuprofen (MOTRIN) 600 mg tablet Take 1 tablet (600 mg total) by mouth every 6 (six) hours 30 tablet 0    methocarbamol (ROBAXIN) 750 mg tablet Take 1 tablet (750 mg total) by mouth every 6 (six) hours as needed for muscle spasms 120 tablet 0    methocarbamol (ROBAXIN) 750 mg tablet take 1 tablet by mouth every 6 hours if needed for muscle spasm 120 tablet 0    oxyCODONE (ROXICODONE) 10 MG TABS Take 1 tablet (10 mg total) by mouth 3 (three) times a day for 21 days Additionally, 5 mg dose prior to dressing change, as per previous instructions. Max Daily Amount: 30 mg 63 tablet 0    gabapentin (NEURONTIN) 100 mg capsule Take 1 capsule (100 mg total) by mouth 3 (three) times a day (Patient not taking: Reported on 3/17/2025) 90 capsule 0    ketorolac (TORADOL) 10 mg tablet Take 1 tablet (10 mg total) by mouth every 6 (six) hours as needed for moderate pain (Patient not taking: Reported on 3/17/2025) 20 tablet 0    mometasone (ELOCON) 0.1 % cream Apply topically daily (Patient not taking: Reported on 3/17/2025) 45 g 2    naloxone (NARCAN) 4 mg/0.1 mL nasal spray Administer 1 spray into a nostril. If no response after 2-3 minutes, give another dose in the other nostril using a new spray. 1 each 1     No current facility-administered medications on file prior to visit.         Objective   /80 (BP Location: Right arm, Patient Position: Sitting, Cuff Size: Large)   Pulse 87   Temp 98 °F (36.7 °C) (Temporal)   Wt 102 kg (225 lb)   SpO2 99%   BMI 38.62 kg/m²     Body mass index is 38.62 kg/m².  Pain Screening:  Pain Score:   4 (lower abdomen)  ECOG   0  Physical Exam  Vitals reviewed.   Constitutional:       General: She is not in acute distress.     Appearance: Normal appearance.   HENT:      Head: Normocephalic and  "atraumatic.      Mouth/Throat:      Mouth: Mucous membranes are moist.   Pulmonary:      Effort: Pulmonary effort is normal.      Breath sounds: Normal breath sounds.   Abdominal:      Palpations: Abdomen is soft. There is no mass.      Tenderness: There is no abdominal tenderness.   Skin:     General: Skin is warm and dry.      Comments: Surgical trocar sites are intact, clean and dry without induration, erythema or purulent drainage.  The mini laparotomy incision has a 1.5 cm linear defect and a 5 mm depth.  It is healing well.  Excellent granulation tissue.   Neurological:      Mental Status: She is alert and oriented to person, place, and time.   Psychiatric:         Mood and Affect: Mood normal.         Behavior: Behavior normal.         Thought Content: Thought content normal.         Judgment: Judgment normal.          Labs: I have reviewed pertinent labs.   No results found for: \"\"  Lab Results   Component Value Date/Time    Potassium 4.1 03/08/2025 04:39 PM    Chloride 106 03/08/2025 04:39 PM    Carbon Dioxide 23 03/08/2025 04:39 PM    BUN 9 03/08/2025 04:39 PM    Creatinine 0.74 03/08/2025 04:39 PM    Glucose, i-STAT 119 02/07/2025 09:14 AM    Glucose, Fasting 92 01/23/2025 12:16 PM    Calcium 9.5 03/08/2025 04:39 PM    AST 18 03/08/2025 04:39 PM    ALT 25 03/08/2025 04:39 PM    Alkaline Phosphatase 61 03/08/2025 04:39 PM    eGFRcr 110 03/08/2025 04:39 PM     Lab Results   Component Value Date/Time    WBC 15.91 (H) 03/01/2025 12:35 PM    Hemoglobin 12.7 03/01/2025 12:35 PM    Hematocrit 37.2 03/01/2025 12:35 PM    MCV 85 03/01/2025 12:35 PM    Platelets 239 03/01/2025 12:35 PM     Lab Results   Component Value Date/Time    Absolute Neutrophils 13.51 (H) 03/01/2025 12:35 PM        Trend:  Lab Results   Component Value Date     17.3 04/27/2023               "

## 2025-03-25 NOTE — ASSESSMENT & PLAN NOTE
We discussed appropriate sleep hygiene.  I discussed treatment options including hypnotics, over-the-counter medications, antidepressants.  Based on the risks and benefits of each approach, she is willing to try trazodone 50 mg nightly as needed.  She can increase the dose to 100 mg nightly as needed as well.  Orders:    traZODone (DESYREL) 50 mg tablet; Take 1 tablet (50 mg total) by mouth daily at bedtime

## 2025-03-25 NOTE — PATIENT COMMUNICATION
Per MyChart response, patient has been added to appropriate wait list for services. Referral closed/completed.

## 2025-03-26 ENCOUNTER — TELEPHONE (OUTPATIENT)
Dept: OTHER | Facility: OTHER | Age: 35
End: 2025-03-26

## 2025-03-26 DIAGNOSIS — R10.2 CHRONIC PELVIC PAIN IN FEMALE: Primary | ICD-10-CM

## 2025-03-26 DIAGNOSIS — G89.29 CHRONIC PELVIC PAIN IN FEMALE: Primary | ICD-10-CM

## 2025-03-27 DIAGNOSIS — R10.2 CHRONIC PELVIC PAIN IN FEMALE: ICD-10-CM

## 2025-03-27 DIAGNOSIS — G89.29 CHRONIC PELVIC PAIN IN FEMALE: ICD-10-CM

## 2025-03-27 DIAGNOSIS — N80.519 ENDOMETRIOSIS OF RECTUM: Primary | ICD-10-CM

## 2025-03-27 RX ORDER — OXYCODONE HYDROCHLORIDE 10 MG/1
10 TABLET ORAL EVERY 8 HOURS PRN
Qty: 55 TABLET | Refills: 0 | Status: SHIPPED | OUTPATIENT
Start: 2025-03-27

## 2025-03-27 NOTE — TELEPHONE ENCOUNTER
Pt called in this evening to see if she is able to get some advice tonight regarding her wound care, she was told by her doctor she was able to shower but a visiting nurse today told her something different and she would like clarification on what she should be doing before trying to clean herself. Pt was looking to take a shower tonight before bed.     On call paged

## 2025-03-28 DIAGNOSIS — N80.9 ENDOMETRIOSIS: ICD-10-CM

## 2025-03-28 DIAGNOSIS — R10.84 GENERALIZED ABDOMINAL PAIN: ICD-10-CM

## 2025-03-28 DIAGNOSIS — R10.2 CHRONIC PELVIC PAIN IN FEMALE: ICD-10-CM

## 2025-03-28 DIAGNOSIS — G89.29 CHRONIC PELVIC PAIN IN FEMALE: ICD-10-CM

## 2025-03-28 RX ORDER — METHOCARBAMOL 750 MG/1
750 TABLET, FILM COATED ORAL EVERY 6 HOURS PRN
Qty: 120 TABLET | Refills: 0 | Status: SHIPPED | OUTPATIENT
Start: 2025-03-28

## 2025-03-31 ENCOUNTER — OFFICE VISIT (OUTPATIENT)
Dept: WOUND CARE | Facility: HOSPITAL | Age: 35
End: 2025-03-31
Payer: COMMERCIAL

## 2025-03-31 VITALS
DIASTOLIC BLOOD PRESSURE: 68 MMHG | RESPIRATION RATE: 18 BRPM | TEMPERATURE: 96.8 F | SYSTOLIC BLOOD PRESSURE: 114 MMHG | HEART RATE: 69 BPM

## 2025-03-31 DIAGNOSIS — T81.89XA NON-HEALING SURGICAL WOUND, INITIAL ENCOUNTER: Primary | ICD-10-CM

## 2025-03-31 PROCEDURE — 99212 OFFICE O/P EST SF 10 MIN: CPT | Performed by: SURGERY

## 2025-03-31 NOTE — PROGRESS NOTES
Wound Procedure Treatment Lower Abdomen    Performed by: Genoveva Kovacs RN  Authorized by: Josep Abrams MD  Associated wounds:   Wound 03/17/25 Surgical Open Surgical Incision Abdomen Lower    Wound cleansed with:  NSS   Applied primary dressing:  Other     Cosmopor

## 2025-03-31 NOTE — PATIENT INSTRUCTIONS
Orders Placed This Encounter   Procedures    Wound cleansing and dressings Lower Abdomen     Lower abdominal wound is now healed!  Please keep area protected with Cosmopor for at least 1 week. May reapply as needed.     If wound reopens, please give Wound Center a call immediatly.    Thank you for coming to our Center!     Standing Status:   Future     Expiration Date:   3/31/2025       hyperglycemia

## 2025-03-31 NOTE — PROGRESS NOTES
Name: Lucie Camacho      : 1990      MRN: [id number]  Encounter Provider: Josep Abrams MD  Encounter Date: 3/31/2025   Encounter department: [facility]OUND C[facility]  :  Assessment & Plan  Non-healing surgical wound, initial encounter  Wound healed, keep covered for a few days and leave open when no further drainage  Orders:  •  Wound cleansing and dressings Lower Abdomen; Future  •  Wound Procedure Treatment Lower Abdomen        History of Present Illness   Chief Complaint   Patient presents with   • Follow Up Wound Care Visit     Abdominal wound   Here for wound follow up.  HPI  Objective   /68   Pulse 69   Temp (!) 96.8 °F (36 °C)   Resp 18     Physical Exam   Lower abdomen transverse incision with previous open area in mid portion, clean dry and intact    Procedures   Results from last 6 Months   Lab Units 25  1800   WOUND CULTURE  1+ Growth of Escherichia coli*  Few Colonies of

## 2025-03-31 NOTE — LETTER
[facility] WOUND CARE  [address] [address]  Phone#  [phone]  Fax#  [phone]    Patient:  Lucie Camacho  YOB: 1990  Phone:  [phone]  Date of Visit:  3/31/2025    Orders Placed This Encounter   Procedures   • Wound cleansing and dressings Lower Abdomen     Lower abdominal wound is now healed!  Please keep area protected with Cosmopor for at least 1 week. May reapply as needed.     If wound reopens, please give Wound Center a call immediatly.    Thank you for coming to our Center!     Standing Status:   Future     Expiration Date:   3/31/2025   • Wound Procedure Treatment Lower Abdomen     This order was created via procedure documentation         Electronically signed by Josep Abrams MD

## 2025-04-08 ENCOUNTER — TELEPHONE (OUTPATIENT)
Age: 35
End: 2025-04-08

## 2025-04-08 NOTE — TELEPHONE ENCOUNTER
"\"I’ve been having an increase in pain in my lower abdomen and there’s a lot of pressure in my bottom like I almost always have to have a bm. It’s just really uncomfortable.\"    Patient sent [employer] message above regarding symptoms she is experiencing. Attempted to call her to discuss further. I was unable to speak with her, but left a voice message for her to call the [facility]. Message forwarded to the clinical team for review.   "

## 2025-04-08 NOTE — TELEPHONE ENCOUNTER
Received a phone call from patient.  Patient c/o abdominal pain and wants Dr. Greene to be made aware that the pain she is now experiencing, feels like the pain when she had cysts on her ovaries.  Patient also c/o feeling rectal pressure frequently and this is new for her.  Patient continues to take Oxycodone for relief.  Please call patient with any further recommendations.

## 2025-04-09 ENCOUNTER — TELEPHONE (OUTPATIENT)
Age: 35
End: 2025-04-09

## 2025-04-09 ENCOUNTER — DOCUMENTATION (OUTPATIENT)
Dept: GYNECOLOGIC ONCOLOGY | Facility: CLINIC | Age: 35
End: 2025-04-09

## 2025-04-09 NOTE — TELEPHONE ENCOUNTER
Pt stated that she spoke with Dr. Greene earlier today and would like a call back because she forgot to mentioned a few things. Pt can be reached at [phone]. Thank you.

## 2025-04-09 NOTE — TELEPHONE ENCOUNTER
Patient states she spoke with Dr. Greene earlier but there were some things she forgot to mention. States she has been having constant pressure like she's going to have a bowel movement. She states he is aware of that. States she forgot to mention the other day she tried being intimate with her boyfriend but it was painful. States she hasn't had pain like that since 2023 before her surgery and it was very uncomfortable. Reports it hurt during intercourse with a sharp pain deep in her pelvis, not really any pain in the vagina. She also reports she had cramping for a little while afterwards. States she did not experience any dryness so doesn't believe it is related to that and feels it was much deeper than that. Patient expressed she is feeling emotional and states people keep treating her like she's seeking drugs but she isn't. States Dr. Greene has never made her feel that way but other providers and people she's talked to have. States she can't imagine continuing to experience this pressure at her rectum and painful intercourse for much longer, let alone another 30 or more years. Requests call back tomorrow from Dr. Greene and any other recommendations.

## 2025-04-09 NOTE — PROGRESS NOTES
She called with symptoms of fecal urgency without fecal incontinence.  No blood in the stool.  No diarrhea.  She states that her stools are soft.  She has not been constipated.  She also notes some more pain while sitting down.  She had a CT scan of the abdomen pelvis on 4/6/2025 at [facility] which did not reveal evidence of pelvic pathology.  Labs at that time were also normal including CBC and CMP.  I will reach out to colorectal surgery to discuss symptom management.  I also suggested starting Neurontin 100 mg 3 times daily as a way of helping to manage the shooting pains.  I also discussed starting Cymbalta again.  She stated that Cymbalta made her jittery and would prefer to avoid.

## 2025-04-10 ENCOUNTER — OFFICE VISIT (OUTPATIENT)
Dept: INTERNAL MEDICINE CLINIC | Age: 35
End: 2025-04-10
Payer: COMMERCIAL

## 2025-04-10 VITALS
DIASTOLIC BLOOD PRESSURE: 80 MMHG | WEIGHT: 237.6 LBS | SYSTOLIC BLOOD PRESSURE: 122 MMHG | OXYGEN SATURATION: 98 % | HEIGHT: 64 IN | BODY MASS INDEX: 40.56 KG/M2 | TEMPERATURE: 97.9 F | HEART RATE: 56 BPM

## 2025-04-10 DIAGNOSIS — N39.41 URGE INCONTINENCE: Primary | ICD-10-CM

## 2025-04-10 DIAGNOSIS — N80.519 ENDOMETRIOSIS OF RECTUM: ICD-10-CM

## 2025-04-10 DIAGNOSIS — N20.0 LEFT RENAL STONE: ICD-10-CM

## 2025-04-10 PROCEDURE — 99213 OFFICE O/P EST LOW 20 MIN: CPT | Performed by: INTERNAL MEDICINE

## 2025-04-10 NOTE — ASSESSMENT & PLAN NOTE
Has had significant endometriosis for 12 years, requiring multiple surgeries including oophorectomy, salpingectomy, and hysterectomy. Has tried multiple therapies in the past including Lupron and Orlissa. Has been seen here and at [facility].     Plan:  - Advised patient to follow up with endocrinology & gyn onc and to consider a second opinion at a larger academic center that sees more complicated cases     Orders:    Ambulatory Referral to Urogynecology; Future

## 2025-04-10 NOTE — PROGRESS NOTES
Name: Lucie Camacho      : 1990      MRN: [id number]  Encounter Provider: Jose Woo MD  Encounter Date: 4/10/2025   Encounter department: [facility]  :  Assessment & Plan  Urge incontinence  Recent symptoms of sensation of needing to urinate at all times throughout the day. This is in the setting of recent surgery to remove portions of sigmoid colon. Has had significant endometriosis for 12 years, requiring multiple surgeries including oophorectomy, salpingectomy, and hysterectomy. Has tried multiple therapies in the past including Lupron and Orlissa. Has been seen here and at [facility].     Plan:  - Advised patient to follow up with endocrinology & gyn onc and to consider a second opinion at a larger academic center that sees more complicated cases   Orders:    Ambulatory Referral to Urogynecology; Future    Left renal stone  Has had consistent 4 mm left renal stone that is intrarenal, nonobstructing, and without hydonephrossi for the past three months. . Patient has attempted Flomax in the past unsuccessfully; a stent was temporarily placed in February and then removed. At this point still reports a pressure like ache in the left side. Is not acutely tender to palpation but does report discomfort upon percussion.     Plan:   - Based on size and location not an indication for further intervention at this time    - At this point we will hold off for plans with conservative management with hydration and spontaneous passing   Endometriosis of rectum  Has had significant endometriosis for 12 years, requiring multiple surgeries including oophorectomy, salpingectomy, and hysterectomy. Has tried multiple therapies in the past including Lupron and Orlissa. Has been seen here and at [facility].     Plan:  - Advised patient to follow up with endocrinology & gyn onc and to consider a second opinion at a larger academic center that sees more complicated cases  "    Orders:    Ambulatory Referral to Urogynecology; Future           History of Present Illness   Lucie Camacho is a 34 year old woman w/ PMH endometriosis s/p hysterectomy, oophorectomy, salpingectomy, and sigmoid colectomy with end to end anastomosis, and nephrolithiasis who presents for ED f/u for L sided flank discomfort in the setting of renal stone     Has had consistent 4 mm left renal stone that is intrarenal, nonobstructing, and without hydonephrossi for the past three months. Patient has attempted Flomax in the past unsuccessfully; a stent was temporarily placed in February and then removed. At this point still reports a pressure like ache in the left side. She went in to the ED on 04/06 because of a sensation of bladder pressure and fullness that continued for multiple days. Labs were normal and imaging showed continued renal stone with no change in characteristics so patient was discharged.     She also has had an urge sensation of constantly needing to urinate, best described as a pressure sensation. She has not brought this specific concern to the attention of her gynecologists. This has started      Review of Systems    Objective   /80 (BP Location: Left arm, Patient Position: Sitting, Cuff Size: Large)   Pulse 56   Temp 97.9 °F (36.6 °C) (Temporal)   Ht 5' 4\" (1.626 m)   Wt 108 kg (237 lb 9.6 oz)   SpO2 98%   BMI 40.78 kg/m²      Physical Exam  Constitutional:       General: She is not in acute distress.     Appearance: Normal appearance. She is not ill-appearing, toxic-appearing or diaphoretic.   HENT:      Head: Normocephalic and atraumatic.   Eyes:      Extraocular Movements: Extraocular movements intact.   Cardiovascular:      Rate and Rhythm: Normal rate and regular rhythm.   Pulmonary:      Effort: Pulmonary effort is normal. No respiratory distress.      Breath sounds: Normal breath sounds. No stridor. No wheezing, rhonchi or rales.   Chest:      Chest wall: No tenderness. "   Abdominal:      General: Abdomen is flat. Bowel sounds are normal. There is no distension.      Palpations: Abdomen is soft.      Tenderness: There is abdominal tenderness (Generalized nonspecific abdominal tenderness). There is no guarding or rebound.   Musculoskeletal:      Comments: Some L CVA soreness and discomfort upon palpation, but not acute tenderness    Skin:     General: Skin is warm and dry.   Neurological:      Mental Status: She is alert.   Psychiatric:         Mood and Affect: Mood normal.

## 2025-04-14 DIAGNOSIS — G89.29 CHRONIC PELVIC PAIN IN FEMALE: ICD-10-CM

## 2025-04-14 DIAGNOSIS — N80.519 ENDOMETRIOSIS OF RECTUM: ICD-10-CM

## 2025-04-14 DIAGNOSIS — R10.2 CHRONIC PELVIC PAIN IN FEMALE: ICD-10-CM

## 2025-04-14 RX ORDER — OXYCODONE HYDROCHLORIDE 10 MG/1
10 TABLET ORAL EVERY 8 HOURS PRN
Qty: 55 TABLET | Refills: 0 | Status: SHIPPED | OUTPATIENT
Start: 2025-04-14

## 2025-04-14 NOTE — TELEPHONE ENCOUNTER
1 [id number] 03/28/2025 03/27/2025 oxyCODONE HCL IR 10 MG TAB (Tablet) 55.0 18 10 MG 45.83 [facility] [place] 0 / 0 [place]   1 [id number] 03/12/2025 03/12/2025 oxyCODONE HCL IR 10 MG TAB (Tablet) 63.0 18 10 MG 52.50 MARTA FERRARO [facility] Commercial [place] 0 / 0 [place]   1 [id number] 02/24/2025 02/24/2025 oxyCODONE HCL IR 10 MG TAB (Tablet) 63.0 21 10 MG 45.0 [facility] [facility] Commercial Insurance 0 / 0 [place]  
DISPLAY PLAN FREE TEXT

## 2025-04-15 ENCOUNTER — TELEPHONE (OUTPATIENT)
Age: 35
End: 2025-04-15

## 2025-04-15 DIAGNOSIS — N80.519 ENDOMETRIOSIS OF RECTUM: ICD-10-CM

## 2025-04-15 DIAGNOSIS — R10.2 CHRONIC PELVIC PAIN IN FEMALE: ICD-10-CM

## 2025-04-15 DIAGNOSIS — G89.29 CHRONIC PELVIC PAIN IN FEMALE: ICD-10-CM

## 2025-04-15 NOTE — TELEPHONE ENCOUNTER
Patient states was trying to  her Oxycodone 10 mg  and was told the prescription was written incorrect and was not able to be filled. Pharmacy only told patient medication was written wrong. Please contact pharmacy to change script at [id number] - [place], [place] - [facility]  to fix prescription

## 2025-04-15 NOTE — TELEPHONE ENCOUNTER
Phone call received from the patient reporting she was told by her [facility] pharmacy that the prescription for oxycodone 10 mg was not written correctly and needs to be corrected.      Please send new prescription for oxycodone 10 mg for patient  at the [facility] in [facility].

## 2025-04-15 NOTE — TELEPHONE ENCOUNTER
Call placed to pharmacy. Clarification provided.   Oxycodone 10 mg BID and 5 mg QD (total dose 25 mg/d), #55/0.

## 2025-04-16 RX ORDER — OXYCODONE HYDROCHLORIDE 10 MG/1
TABLET ORAL
Qty: 55 TABLET | Refills: 0 | OUTPATIENT
Start: 2025-04-16

## 2025-04-24 DIAGNOSIS — G89.29 CHRONIC PELVIC PAIN IN FEMALE: ICD-10-CM

## 2025-04-24 DIAGNOSIS — R10.84 GENERALIZED ABDOMINAL PAIN: ICD-10-CM

## 2025-04-24 DIAGNOSIS — N80.9 ENDOMETRIOSIS: ICD-10-CM

## 2025-04-24 DIAGNOSIS — R10.2 CHRONIC PELVIC PAIN IN FEMALE: ICD-10-CM

## 2025-04-24 RX ORDER — METHOCARBAMOL 750 MG/1
750 TABLET, FILM COATED ORAL EVERY 6 HOURS PRN
Qty: 120 TABLET | Refills: 0 | Status: SHIPPED | OUTPATIENT
Start: 2025-04-24

## 2025-04-27 DIAGNOSIS — R10.2 CHRONIC PELVIC PAIN IN FEMALE: Primary | ICD-10-CM

## 2025-04-27 DIAGNOSIS — G89.29 CHRONIC PELVIC PAIN IN FEMALE: Primary | ICD-10-CM

## 2025-04-28 ENCOUNTER — TELEPHONE (OUTPATIENT)
Dept: GYNECOLOGIC ONCOLOGY | Facility: CLINIC | Age: 35
End: 2025-04-28

## 2025-04-28 NOTE — TELEPHONE ENCOUNTER
Called and spoke with patient about scheduled PT pelvic.  Patient is scheduled for 5/20 at 11 am in [place].  Patient is on a wait list to get in sooner.  Patient asked what the PT is about?  Told her that its for the chronic pelvic pain and they will discuss what they will do it that consults and the plan. Then patient can discuss with Dr. Greene on 5/27 about the PT.  Patient gave her verbal understanding.

## 2025-05-01 ENCOUNTER — EVALUATION (OUTPATIENT)
Dept: PHYSICAL THERAPY | Facility: REHABILITATION | Age: 35
End: 2025-05-01
Attending: OBSTETRICS & GYNECOLOGY
Payer: COMMERCIAL

## 2025-05-01 DIAGNOSIS — G89.29 CHRONIC PELVIC PAIN IN FEMALE: ICD-10-CM

## 2025-05-01 DIAGNOSIS — N80.9 ENDOMETRIOSIS: Primary | ICD-10-CM

## 2025-05-01 DIAGNOSIS — N80.519 ENDOMETRIOSIS OF RECTUM: ICD-10-CM

## 2025-05-01 DIAGNOSIS — L90.5 SCAR TISSUE: ICD-10-CM

## 2025-05-01 DIAGNOSIS — R10.2 CHRONIC PELVIC PAIN IN FEMALE: ICD-10-CM

## 2025-05-01 DIAGNOSIS — M62.89 PELVIC FLOOR TENSION: ICD-10-CM

## 2025-05-01 PROCEDURE — 97162 PT EVAL MOD COMPLEX 30 MIN: CPT | Performed by: PHYSICAL THERAPIST

## 2025-05-01 PROCEDURE — 97530 THERAPEUTIC ACTIVITIES: CPT | Performed by: PHYSICAL THERAPIST

## 2025-05-01 NOTE — PROGRESS NOTES
PT Evaluation     Today's date: 2025  Patient name: Lucie Camacho  : 1990  MRN: [id number]  Referring provider: Popeye Greene*  Dx:   Encounter Diagnosis     ICD-10-CM    1. Endometriosis  N80.9       2. Chronic pelvic pain in female  R10.2 Ambulatory Referral to Physical Therapy    G89.29       3. Scar tissue  L90.5       4. Pelvic floor tension  M62.89                      Assessment    Assessment details: Lucie Camacho is a 34 y.o. female who presents with concerns of dyspareunia, scar tissue, pelvic pain, lower back pain, and muscle weakness.  Examination reveals pelvic floor muscle tension and tenderness, abdominal and pelvic floor weakness, diminished coordination with deep core stabilizers, rigid scar tissue and pain after lying supine..       The plan of care was discussed and included education regarding pelvic floor anatomy, explanation of exam technique, explanation of exam findings and discussion of treatment plan as well as the importance of patient compliance and adherence to physical therapy visits. Patient would benefit from skilled physical therapy services  to address deficits and ultimately meet goal of independent self management of condition.     Patient provided written and verbal consent for pelvic floor muscle exam: yes          Plan    Frequency: 1x week  Duration in weeks: 12  Plan of Care beginning date: 2025  Plan of Care expiration date: 2025  Treatment plan discussed with: patient    PT Pelvic Floor Subjective:   History of Present Illness:   Patient reports that she is having period cramps and pain in her L LQ which she describes the way her ovarian cyst felt in the past. She also experiences pain in the rectum. Reports pelvic pain with increased activity, tight clothing and intercourse and when her back is hurting.     Age 25 - partial hysterectomy  Age 32 - ovary removal         Recurrent probem    Quality of life: good    Social Support:      Lives in:  Multiple-level home    Lives with:  Parents and adult children (2 sisters, brother, 2 children, 1 niece)    Relationship status: domestic partnership    Work status: unemployed  Hand dominance:  Right  OB/ gyn History    Gestational History:     Prior Pregnancy: Yes      Number of prior pregnancies: 2    Number of term pregnancies: 2    Delivery Type:  section      Delivery Complications:  Son -   Daughter -    Bladder Function:     Voiding Difficulties positive for: urgency       Voiding Difficulties comments:     Voiding frequency: every 3-4 hours    Urinary leakage: no urine leakage    Nocturia (episodes per night): 0    Painful urination: No      Intake (ounces): Water intake (oz): 60-70. Coffee intake (oz): 32 oz. Soda intake (oz): Coke 1 bottle.   Bowel Function:     Voiding DIfficulties: painful defecating      Bowel frequency: daily and multiple times a day    Laurier Stool Scale: type 4  Sexual Function:     Sexually Active:  Sexually active    Lubrication Use: Yes    pain does not cause abstinence    Patient wishes to return to having intercourse: currently unable to have intercourse but wants to  Pain:     Current pain ratin    At best pain ratin    At worst pain ratin  Diagnostic Tests:     None    Treatments:     None    Patient Goals:     Patient goals for therapy:  Improved pain management, improved quality of life, relaxation, improved comfort, improved bladder or bowel function, fully empty bladder or bowels and decreased pain      Objective       Abdominal Assessment:        Skin inspection:   scars present.   Number of scars: 1  Additional skin inspection details: Suprapubic scar is deep and poorly mobile with adhesions evident in both cephalad and caudal directions. Sensation intact and tender. Approximated and healed along all edges.       General Perineum Exam:   perineum intact.     General perineum exam comments: No unusual discharge, irritation,  organ prolapse or skin breakdown evident    PFM tenderness reported along bilateral pubo and iliococcygeus muscles rating 4/10, left obturator internus rating 6/10, left periurethral muscles rating 5/10, right obturator internus rating 3/10. Moderate tension throughout.  Patient is able to actively contract her pelvic floor but is challenged to fully relax and lengthen with verbal and manual cueing.    Visual Inspection of Perineum:   Excursion of perineal body in cephalad direction with contraction of pelvic floor muscles (PFM): fair  and good  Excursion of perineal body in caudal direction with relaxation of pelvic floor muscles (PFM): weak  Involuntary contraction with coughing: yes  Involuntary relaxation with bearing down: yes  Cotton swab test: non-tender  Sphincter Tone Resting: normal  Sphincter Tone Squeeze: normal  Sensation: intact    Pelvic Organ Prolapse   no pelvic organ prolapse  Perineal body inspection: elevated   Atrophic changes: erythema noted       Pelvic Floor Muscle Exam:      Breathing pattern with contraction: holding breath   Pelvic floor muscle relaxation is incomplete.   60% pelvic floor relaxation        PERFECT Score   Power right: 2/5   Power left: 2/5   Endurance (seconds to max): 6   Repetitions (before fatigue): 3         Pelvic exam completed: vaginally            Precautions:   Patient Active Problem List   Diagnosis   • Chronic pelvic pain in female   • Endometriosis of rectum   • Chronic arthralgias of knees and hips   • Fatigue   • Anxiety   • Vitamin D deficiency   • Thyroid nodule   • Dyspareunia in female   • Chronic bilateral low back pain with bilateral sciatica   • Anxiety and depression   • Alternating constipation and diarrhea   • Morbid obesity (HCC)   • Menopausal symptoms   • Sacroiliitis (HCC)   • History of total abdominal hysterectomy and bilateral salpingo-oophorectomy   • Postoperative pain   • Incisional abscess   • Primary insomnia         PRO EVAL RE-EVAL  DISCHARGE   PFDI 84.38     LIDIA-18      VPQ      CPSI-NIH      PGQ          POC Expires Auth Status Start Date Exp Date PT Visit Limit DA expires DA provider                  Date of Service 5/1           Visits Used            Visits Remaining                        Manuals                                                            Neuro Re-Ed                                                                                                Ther Ex                                                                                    Ther Activity            Education Anatomy and POC                                                                        Modalities

## 2025-05-02 RX ORDER — OXYCODONE HYDROCHLORIDE 10 MG/1
10 TABLET ORAL EVERY 8 HOURS PRN
Qty: 55 TABLET | Refills: 0 | Status: SHIPPED | OUTPATIENT
Start: 2025-05-02

## 2025-05-02 NOTE — TELEPHONE ENCOUNTER
Refill must be reviewed and completed by the office or provider. The refill is unable to be approved or denied by the medication management team.    Refill can not be delegated       Patient Id Prescription # Sold Filled Written Drug Label Qty Days Strength MME* Prescriber Pharmacy Payment REFILL #/Auth State Detail  1 [id number] ** 04/15/2025 04/14/2025 oxyCODONE HCL IR 10 MG TAB (Tablet) 55.0 22 10 MG 37.50 NADIRA MILTON [facility]  [place] [place] 0 / 0 [place]   1 [id number] ** 03/28/2025 03/27/2025 oxyCODONE HCL IR 10 MG TAB (Tablet) 55.0 18 10 MG 45.83 NADIRA HOWELL [facility]  [place] Commercial [place] 0 / 0 [place]

## 2025-05-12 ENCOUNTER — APPOINTMENT (OUTPATIENT)
Dept: PHYSICAL THERAPY | Facility: REHABILITATION | Age: 35
End: 2025-05-12
Attending: OBSTETRICS & GYNECOLOGY
Payer: COMMERCIAL

## 2025-05-15 ENCOUNTER — OFFICE VISIT (OUTPATIENT)
Dept: PHYSICAL THERAPY | Facility: REHABILITATION | Age: 35
End: 2025-05-15
Attending: OBSTETRICS & GYNECOLOGY
Payer: COMMERCIAL

## 2025-05-15 DIAGNOSIS — L90.5 SCAR TISSUE: ICD-10-CM

## 2025-05-15 DIAGNOSIS — R10.2 CHRONIC PELVIC PAIN IN FEMALE: Primary | ICD-10-CM

## 2025-05-15 DIAGNOSIS — N80.9 ENDOMETRIOSIS: ICD-10-CM

## 2025-05-15 DIAGNOSIS — G89.29 CHRONIC PELVIC PAIN IN FEMALE: Primary | ICD-10-CM

## 2025-05-15 DIAGNOSIS — M62.89 PELVIC FLOOR TENSION: ICD-10-CM

## 2025-05-15 PROCEDURE — 97140 MANUAL THERAPY 1/> REGIONS: CPT | Performed by: PHYSICAL THERAPIST

## 2025-05-15 PROCEDURE — 97112 NEUROMUSCULAR REEDUCATION: CPT | Performed by: PHYSICAL THERAPIST

## 2025-05-15 PROCEDURE — 97110 THERAPEUTIC EXERCISES: CPT | Performed by: PHYSICAL THERAPIST

## 2025-05-15 NOTE — HOME EXERCISE EDUCATION
Program_ID:[id number]   Access Code: [email or web address]  URL: [email or web address]  Date: 05-  Prepared By: Marycarmen Doyle    Program Notes      Exercises      - Supine Posterior Pelvic Tilt - 1 x daily - 7 x weekly - 2 sets - 10 reps - 5 hold      - Supine Hip Adduction Isometric with Ball - 1 x daily - 7 x weekly - 2 sets - 10 reps - 5 hold      - Supine March - 1 x daily - 7 x weekly - 2 sets - 10 reps

## 2025-05-15 NOTE — PROGRESS NOTES
Daily Note     Today's date: 5/15/2025  Patient name: Lucie Camacho  : 1990  MRN: [id number]  Referring provider: Popeye Greene*  Dx:   Encounter Diagnosis     ICD-10-CM    1. Chronic pelvic pain in female  R10.2     G89.29       2. Endometriosis  N80.9       3. Scar tissue  L90.5       4. Pelvic floor tension  M62.89           Patient reports that she is having period cramps and pain in her L LQ which she describes the way her ovarian cyst felt in the past. She also experiences pain in the rectum. Reports pelvic pain with increased activity, tight clothing and intercourse and when her back is hurting.      Age 25 - partial hysterectomy  Age 32 - ovary removal                   Subjective: Patient reports that she was gardening x 2-3 hours this week. She is uncomfortable kneeling due to sensitivities with sensation of dirt so she bends over. LBP has increased for this reason. Rates pain 6-7/10.       Objective: See treatment diary below      Assessment: Tolerated treatment well. Patient would benefit from continued PT. Good fascial release in the area of the sigmoid and caudal end of the descending colon. Advised to hydrate well and would benefit from full visceral restriction assessment next visit. Issued HEP with instructions to do HEP 5 days a week. Mild discomfrot when she stood at end of session.      Plan: Continue per plan of care.      Precautions:   Patient Active Problem List   Diagnosis    Chronic pelvic pain in female    Endometriosis of rectum    Chronic arthralgias of knees and hips    Fatigue    Anxiety    Vitamin D deficiency    Thyroid nodule    Dyspareunia in female    Chronic bilateral low back pain with bilateral sciatica    Anxiety and depression    Alternating constipation and diarrhea    Morbid obesity (HCC)    Menopausal symptoms    Sacroiliitis (HCC)    History of total abdominal hysterectomy and bilateral salpingo-oophorectomy    Postoperative pain    Incisional  "abscess    Primary insomnia         PRO EVAL RE-EVAL DISCHARGE   PFDI 84.38     LIDIA-18      VPQ      CPSI-NIH      PGQ          POC Expires Auth Status Start Date Exp Date PT Visit Limit DA expires DA provider   7/24/25               Date of Service 5/1 5/15          Visits Used            Visits Remaining                          Manuals            VM  LG                                              Neuro Re-Ed                        PPT  10          PPT + add  10          PPT + abd  YCO x10          PPT + marches  2x10                                              Ther Ex            Nustep aerobic warmup  L4x10'          HS stretch/hip circles w/ strap  5x30\" each                                                          Ther Activity            Education Anatomy and POC                                                                          Modalities                                           "

## 2025-05-16 ENCOUNTER — OFFICE VISIT (OUTPATIENT)
Dept: ENDOCRINOLOGY | Facility: CLINIC | Age: 35
End: 2025-05-16
Payer: COMMERCIAL

## 2025-05-16 VITALS
RESPIRATION RATE: 18 BRPM | WEIGHT: 242.4 LBS | TEMPERATURE: 98.1 F | HEIGHT: 64 IN | BODY MASS INDEX: 41.38 KG/M2 | OXYGEN SATURATION: 98 % | DIASTOLIC BLOOD PRESSURE: 74 MMHG | HEART RATE: 83 BPM | SYSTOLIC BLOOD PRESSURE: 116 MMHG

## 2025-05-16 DIAGNOSIS — N80.519 ENDOMETRIOSIS OF RECTUM: Primary | ICD-10-CM

## 2025-05-16 DIAGNOSIS — E66.01 MORBID OBESITY (HCC): Chronic | ICD-10-CM

## 2025-05-16 PROCEDURE — 99213 OFFICE O/P EST LOW 20 MIN: CPT | Performed by: STUDENT IN AN ORGANIZED HEALTH CARE EDUCATION/TRAINING PROGRAM

## 2025-05-16 NOTE — PROGRESS NOTES
Name: Lucie Camacho      : 1990      MRN: [id number]  Encounter Provider: Chilo Key MD  Encounter Date: 2025   Encounter department: [facility]    Chief Complaint   Patient presents with   • Follow-up   :  Assessment & Plan  Morbid obesity (HCC)    Clinical suspicious for Cushing's is low, although a test for late night salivary cortisol was ordered  Regular daily exercise and balanced diet emphasized.    Orders:  •  SALIVARY CORTISOL, TWO SPECIMENS; Future    Endometriosis of rectum  She has a history of widespread endometriosis and underwent she underwent robotic assisted lysis of adhesions, resection of peritoneal/rectal endometriosis, low anterior resection, flexible sigmoidoscopy. she experienced wound infection, requiring wound care and narcotic therapy for pain management. She is closely following with OB/GYN, we discussed that pathophysiology of endometriosis is complicated, different theories recommended, there is no unifying theory, one which is endocrine disrupting chemicals suggests estrogen sensitivity and progesterone resistance.   She is s/p oophorectomy and previously recommended aromatase inhibitors which she declined.   testosterone, DHEA, and thyroid tests were all within normal limits, no further endocrinology evaluation at this time required.  Given her surgical menopause status, there is a need to monitor her bone density closely. She was advised to engage in weight-bearing exercises and walking to improve bone density. A daily  calcium requirement of 1200 mg preferably from food was recommended, along with a daily dose of 2000 units of vitamin D.              History of Present Illness   History of Present Illness    Lucie Camacho is a 34 y.o. female who presents for follow up for endometriosis and weight management.      She was last seen in 2024 for her widespread endometriosis. Her OB/GYN note indicates that  "she underwent robotic assisted lysis of adhesions, resection of peritoneal/rectal endometriosis, low anterior resection, flexible sigmoidoscopy Post-surgery, she experienced an infection and has been dealing with intermittent issues since then, including persistent abdominal pain. She has undergone hysterectomy and oophorectomy and has 2 children. She maintains an active lifestyle, caring for her home and children, engaging in gardening, and exercising daily, although she finds it increasingly difficult due to her body aches.     She reports swelling in her knees and hands, particularly in the mornings, and generalized body aches. Her lower back pain is most severe upon waking but tends to improve as the day progresses. However, there are days when her back pain is unrelenting. She has not yet consulted with a rheumatologist.    She has been diagnosed with a kidney stone since last year, which has not yet passed. A referral to a urologist was made, but she has not received any communication from them.          Pertinent Medical History           Review of Systems as per [facility]  Medical History Reviewed by provider this encounter:     .  Medications Ordered Prior to Encounter[1]      Medical History Reviewed by provider this encounter:     .    Objective   /74 (BP Location: Right arm, Patient Position: Sitting, Cuff Size: Adult)   Pulse 83   Temp 98.1 °F (36.7 °C) (Temporal)   Resp 18   Ht 5' 4\" (1.626 m)   Wt 110 kg (242 lb 6.4 oz)   SpO2 98%   BMI 41.61 kg/m²      Body mass index is 41.61 kg/m².  Wt Readings from Last 3 Encounters:   05/16/25 110 kg (242 lb 6.4 oz)   04/10/25 108 kg (237 lb 9.6 oz)   03/25/25 102 kg (225 lb)     Physical Exam  Constitutional:       General: She is not in acute distress.     Appearance: She is not ill-appearing.   HENT:      Head: Normocephalic and atraumatic.   Pulmonary:      Effort: Pulmonary effort is normal. No respiratory distress.     Neurological:      Mental " "Status: She is oriented to person, place, and time.       Physical Exam      Results  Laboratory Studies  Testosterone levels were normal. DHEA levels were normal. Thyroid test was normal.  Labs:   Lab Results   Component Value Date    HGBA1C 4.7 01/23/2025    HGBA1C 4.6 04/27/2023     Lab Results   Component Value Date    CREATININE 0.68 04/06/2025    CREATININE 0.74 03/08/2025    CREATININE 0.65 03/03/2025    BUN 11 04/06/2025     02/26/2014    K 3.9 04/06/2025     04/06/2025    CO2 26 04/06/2025     GFR, Calculated   Date Value Ref Range Status   04/24/2020 119 >60 mL/min/1.73m2 Final     Comment:     mL/min per 1.73 square meters                                            Normal Function or Mild Renal    Disease (if clinically at risk):  >or=60  Moderately Decreased:                30-59  Severely Decreased:                  15-29  Renal Failure:                         <15                                            -American GFR: multiply reported GFR by 1.16    Please note that the eGFR is based on the CKD-EPI calculation, and is not intended to be used for drug dosing.     eGFRcr   Date Value Ref Range Status   04/06/2025 118 >59 Final     Lab Results   Component Value Date    HDL 37 (L) 03/08/2022    TRIG 138 03/08/2022     Lab Results   Component Value Date    ALT 22 04/06/2025    AST 19 04/06/2025    ALKPHOS 74 04/06/2025     Lab Results   Component Value Date    FIV7CPWCVLJN 1.110 10/17/2024    SBN5LSQMLEGZ 0.513 07/11/2023    XLA6RCYATURV 0.545 03/08/2022     Component      Latest Ref Rng 10/17/2024   TESTOSTERONE FREE      0.0 - 4.2 pg/mL 1.2    Testosterone, Total, LC/MS      8 - 60 ng/dL 12    TSH 3RD GENERATON      0.450 - 4.500 uIU/mL 1.110    DHEA-SO4      84.8 - 378.0 ug/dL 178.0    Cortisol - AM      6.7 - 22.6 ug/dL 9.8          No results found for: \"FREET4\", \"TSI\"    Patient Instructions   late-night salivary gland cortisol for 2 nights in a row, you are to  the test " kit from the lab, and you will collect sample between 11 PM and midnight there are detailed instruction how to collect your salivary cortisol, there should be a swab, which you will keep sponge into your mouth, gently chew and roll it for 2 minutes, and return it to the tube without touching it, close the tube and refrigerate the sample before returning to the lab, and this will be done in 2 nights in a row, and you not supposed to brush or eat or drink 15 minutes prior to sample collections    Discussed with the patient and all questioned fully answered. She will call me if any problems arise.             [1]  Current Outpatient Medications on File Prior to Visit   Medication Sig Dispense Refill   • acetaminophen (TYLENOL) 650 mg CR tablet Take 1 tablet (650 mg total) by mouth every 8 (eight) hours as needed for mild pain 30 tablet 0   • ibuprofen (MOTRIN) 600 mg tablet Take 1 tablet (600 mg total) by mouth every 6 (six) hours 30 tablet 0   • methocarbamol (ROBAXIN) 750 mg tablet take 1 tablet by mouth every 6 hours if needed for muscle spasm 120 tablet 0   • oxyCODONE (ROXICODONE) 10 MG TABS Take 1 tablet (10 mg total) by mouth every 8 (eight) hours as needed for severe pain Take 10 mg twice daily and 5mg once daily for a total daily dose of 25mg. Max Daily Amount: 30 mg 55 tablet 0   • traZODone (DESYREL) 50 mg tablet Take 1 tablet (50 mg total) by mouth daily at bedtime 30 tablet 3   • gabapentin (NEURONTIN) 100 mg capsule Take 1 capsule (100 mg total) by mouth 3 (three) times a day (Patient not taking: Reported on 5/16/2025) 90 capsule 0   • ketorolac (TORADOL) 10 mg tablet Take 1 tablet (10 mg total) by mouth every 6 (six) hours as needed for moderate pain (Patient not taking: Reported on 3/17/2025) 20 tablet 0   • methocarbamol (ROBAXIN) 750 mg tablet Take 1 tablet (750 mg total) by mouth every 6 (six) hours as needed for muscle spasms (Patient not taking: Reported on 5/16/2025) 120 tablet 0   • mometasone  (ELOCON) 0.1 % cream Apply topically daily (Patient not taking: Reported on 3/17/2025) 45 g 2   • naloxone (NARCAN) 4 mg/0.1 mL nasal spray Administer 1 spray into a nostril. If no response after 2-3 minutes, give another dose in the other nostril using a new spray. (Patient not taking: Reported on 5/16/2025) 1 each 1     No current facility-administered medications on file prior to visit.

## 2025-05-16 NOTE — ASSESSMENT & PLAN NOTE
Clinical suspicious for Cushing's is low, although a test for late night salivary cortisol was ordered  Regular daily exercise and balanced diet emphasized.    Orders:  •  SALIVARY CORTISOL, TWO SPECIMENS; Future

## 2025-05-16 NOTE — PATIENT INSTRUCTIONS
late-night salivary gland cortisol for 2 nights in a row, you are to  the test kit from the lab, and you will collect sample between 11 PM and midnight there are detailed instruction how to collect your salivary cortisol, there should be a swab, which you will keep sponge into your mouth, gently chew and roll it for 2 minutes, and return it to the tube without touching it, close the tube and refrigerate the sample before returning to the lab, and this will be done in 2 nights in a row, and you not supposed to brush or eat or drink 15 minutes prior to sample collections

## 2025-05-19 DIAGNOSIS — R10.2 CHRONIC PELVIC PAIN IN FEMALE: ICD-10-CM

## 2025-05-19 DIAGNOSIS — G89.29 CHRONIC PELVIC PAIN IN FEMALE: ICD-10-CM

## 2025-05-19 DIAGNOSIS — N80.9 ENDOMETRIOSIS: ICD-10-CM

## 2025-05-19 DIAGNOSIS — R10.84 GENERALIZED ABDOMINAL PAIN: ICD-10-CM

## 2025-05-19 DIAGNOSIS — N80.519 ENDOMETRIOSIS OF RECTUM: ICD-10-CM

## 2025-05-19 NOTE — PROGRESS NOTES
"Daily Note     Today's date: 2025  Patient name: Lucie Camacho  : 1990  MRN: [id number]  Referring provider: Popeye Greene*  Dx:   Encounter Diagnosis     ICD-10-CM    1. Chronic pelvic pain in female  R10.2     G89.29       2. Endometriosis  N80.9       3. Scar tissue  L90.5       4. Pelvic floor tension  M62.89           Patient reports that she is having period cramps and pain in her L LQ which she describes the way her ovarian cyst felt in the past. She also experiences pain in the rectum. Reports pelvic pain with increased activity, tight clothing and intercourse and when her back is hurting.      Age 25 - partial hysterectomy  Age 32 - ovary removal                   Subjective: Yesterday she felt more rectal pain which she woke up with and it lasted \"at least 60% of the day, not constant but not persistent.\" She took her normal medication and layed down and used a hot blanket. She fell asleep and woke and her pain had resolved.       Objective: See treatment diary below      Assessment: Tolerated treatment well. Patient would benefit from continued PT. Offered rectal treatment but patient deferred in lieu of vaginal assessment. Tension and mild tenderness reported intially along left iliococcygeus/pubo fascia which reduced well with MT. Marked tone/fascial restriction along right IC with very good release with MT while patient focused on diaphragmatic breathing for ANS quieting. Requested SPC from Dr. Greene due to balance/fall concerns during pain flares (Note - order placed).     Plan: Continue per plan of care.      Precautions:   Patient Active Problem List   Diagnosis    Chronic pelvic pain in female    Endometriosis of rectum    Chronic arthralgias of knees and hips    Fatigue    Anxiety    Vitamin D deficiency    Thyroid nodule    Dyspareunia in female    Chronic bilateral low back pain with bilateral sciatica    Anxiety and depression    Alternating constipation and " "diarrhea    Morbid obesity (HCC)    Menopausal symptoms    Sacroiliitis (HCC)    History of total abdominal hysterectomy and bilateral salpingo-oophorectomy    Postoperative pain    Incisional abscess    Primary insomnia         PRO EVAL RE-EVAL DISCHARGE   PFDI 84.38     LIDIA-18      VPQ      CPSI-NIH      PGQ          POC Expires Auth Status Start Date Exp Date PT Visit Limit DA expires DA provider   7/24/25               Date of Service 5/1 5/15 5/20         Visits Used            Visits Remaining                          Manuals            VM  LG nv         PFM cuing   20'         PFM stretching   20'         PFM stretching    15'         Neuro Re-Ed                        PPT  10 10         PPT + add  10          PPT + abd  YCO x10          PPT + marches  2x10                                              Ther Ex            Nustep aerobic warmup  L4x10'          HS stretch/hip circles w/ strap  5x30\" each                                                          Ther Activity            Education Anatomy and POC                                                                          Modalities                                           "

## 2025-05-19 NOTE — ASSESSMENT & PLAN NOTE
She has a history of widespread endometriosis and underwent she underwent robotic assisted lysis of adhesions, resection of peritoneal/rectal endometriosis, low anterior resection, flexible sigmoidoscopy. she experienced wound infection, requiring wound care and narcotic therapy for pain management. She is closely following with OB/GYN, we discussed that pathophysiology of endometriosis is complicated, different theories recommended, there is no unifying theory, one which is endocrine disrupting chemicals suggests estrogen sensitivity and progesterone resistance.   She is s/p oophorectomy and previously recommended aromatase inhibitors which she declined.   testosterone, DHEA, and thyroid tests were all within normal limits, no further endocrinology evaluation at this time required.  Given her surgical menopause status, there is a need to monitor her bone density closely. She was advised to engage in weight-bearing exercises and walking to improve bone density. A daily  calcium requirement of 1200 mg preferably from food was recommended, along with a daily dose of 2000 units of vitamin D.

## 2025-05-19 NOTE — TELEPHONE ENCOUNTER
Refill must be reviewed and completed by the office or provider. The refill is unable to be approved or denied by the medication management team.      Patient Id Prescription # Sold Filled Written Drug Label Qty Days Strength MME* Prescriber Pharmacy Payment REFILL #/Auth State Detail   1 [id number] ** 05/05/2025 05/02/2025 oxyCODONE HCL IR 10 MG TAB (Tablet) 55.0 22 10 MG 37.50 NADIRA ROSE [employer] [place], [facility] Commercial Insurance 0 / 0 [place]    1 [id number] ** 04/15/2025 04/14/2025 oxyCODONE HCL IR 10 MG TAB (Tablet) 55.0 22 10 MG 37.50 NADIRA Branch[facility] Commercial Insurance 0 / 0 [place]    1 [id number] ** 03/28/2025 03/27/2025 oxyCODONE HCL IR 10 MG TAB (Tablet) 55.0 18 10 MG 45.83 NADIRAHENOK ROSE [employer] [facility] Commercial Insurance 0 / 0 [place]    1 [id number] ** 03/12/2025 03/12/2025 oxyCODONE HCL IR 10 MG TAB (Tablet) 63.0 18 10 MG 52.50 MARTA FERRARO [employer] [facility] Commercial Insurance 0 / 0 [place]

## 2025-05-20 ENCOUNTER — OFFICE VISIT (OUTPATIENT)
Dept: PHYSICAL THERAPY | Facility: REHABILITATION | Age: 35
End: 2025-05-20
Attending: OBSTETRICS & GYNECOLOGY
Payer: COMMERCIAL

## 2025-05-20 ENCOUNTER — PATIENT MESSAGE (OUTPATIENT)
Dept: INTERNAL MEDICINE CLINIC | Age: 35
End: 2025-05-20

## 2025-05-20 DIAGNOSIS — R10.2 CHRONIC PELVIC PAIN IN FEMALE: ICD-10-CM

## 2025-05-20 DIAGNOSIS — N80.9 ENDOMETRIOSIS: ICD-10-CM

## 2025-05-20 DIAGNOSIS — M62.89 PELVIC FLOOR TENSION: ICD-10-CM

## 2025-05-20 DIAGNOSIS — R10.2 CHRONIC PELVIC PAIN IN FEMALE: Primary | ICD-10-CM

## 2025-05-20 DIAGNOSIS — R10.84 GENERALIZED ABDOMINAL PAIN: ICD-10-CM

## 2025-05-20 DIAGNOSIS — R26.89 BALANCE PROBLEM: Primary | ICD-10-CM

## 2025-05-20 DIAGNOSIS — G89.29 CHRONIC PELVIC PAIN IN FEMALE: ICD-10-CM

## 2025-05-20 DIAGNOSIS — L90.5 SCAR TISSUE: ICD-10-CM

## 2025-05-20 DIAGNOSIS — F51.01 PRIMARY INSOMNIA: ICD-10-CM

## 2025-05-20 DIAGNOSIS — G89.29 CHRONIC PELVIC PAIN IN FEMALE: Primary | ICD-10-CM

## 2025-05-20 PROCEDURE — 97140 MANUAL THERAPY 1/> REGIONS: CPT | Performed by: PHYSICAL THERAPIST

## 2025-05-20 RX ORDER — OXYCODONE HYDROCHLORIDE 10 MG/1
10 TABLET ORAL EVERY 8 HOURS PRN
Qty: 55 TABLET | Refills: 0 | Status: SHIPPED | OUTPATIENT
Start: 2025-05-20

## 2025-05-20 RX ORDER — METHOCARBAMOL 750 MG/1
750 TABLET, FILM COATED ORAL EVERY 6 HOURS PRN
Qty: 120 TABLET | Refills: 0 | Status: SHIPPED | OUTPATIENT
Start: 2025-05-20

## 2025-05-20 RX ORDER — METHOCARBAMOL 750 MG/1
750 TABLET, FILM COATED ORAL EVERY 6 HOURS PRN
Qty: 120 TABLET | Refills: 0 | OUTPATIENT
Start: 2025-05-20

## 2025-05-20 RX ORDER — TRAZODONE HYDROCHLORIDE 50 MG/1
50 TABLET ORAL
Qty: 90 TABLET | Refills: 3 | Status: SHIPPED | OUTPATIENT
Start: 2025-05-20

## 2025-05-23 ENCOUNTER — OFFICE VISIT (OUTPATIENT)
Dept: INTERNAL MEDICINE CLINIC | Age: 35
End: 2025-05-23

## 2025-05-23 VITALS
SYSTOLIC BLOOD PRESSURE: 126 MMHG | HEART RATE: 77 BPM | DIASTOLIC BLOOD PRESSURE: 84 MMHG | OXYGEN SATURATION: 99 % | BODY MASS INDEX: 38.15 KG/M2 | TEMPERATURE: 97.5 F | HEIGHT: 66 IN | WEIGHT: 237.4 LBS

## 2025-05-23 DIAGNOSIS — Z00.00 ANNUAL PHYSICAL EXAM: Primary | ICD-10-CM

## 2025-05-23 NOTE — PATIENT INSTRUCTIONS
"Patient Education     Routine physical for adults   The Basics   Written by the doctors and editors at [facility]   What is a physical? -- A physical is a routine visit, or \"check-up,\" with your doctor. You might also hear it called a \"wellness visit\" or \"preventive visit.\"  During each visit, the doctor will:   Ask about your physical and mental health   Ask about your habits, behaviors, and lifestyle   Do an exam   Give you vaccines if needed   Talk to you about any medicines you take   Give advice about your health   Answer your questions  Getting regular check-ups is an important part of taking care of your health. It can help your doctor find and treat any problems you have. But it's also important for preventing health problems.  A routine physical is different from a \"sick visit.\" A sick visit is when you see a doctor because of a health concern or problem. Since physicals are scheduled ahead of time, you can think about what you want to ask the doctor.  How often should I get a physical? -- It depends on your age and health. In general, for people age 21 years and older:   If you are younger than 50 years, you might be able to get a physical every 3 years.   If you are 50 years or older, your doctor might recommend a physical every year.  If you have an ongoing health condition, like diabetes or high blood pressure, your doctor will probably want to see you more often.  What happens during a physical? -- In general, each visit will include:   Physical exam - The doctor or nurse will check your height, weight, heart rate, and blood pressure. They will also look at your eyes and ears. They will ask about how you are feeling and whether you have any symptoms that bother you.   Medicines - It's a good idea to bring a list of all the medicines you take to each doctor visit. Your doctor will talk to you about your medicines and answer any questions. Tell them if you are having any side effects that bother you. You " "should also tell them if you are having trouble paying for any of your medicines.   Habits and behaviors - This includes:   Your diet   Your exercise habits   Whether you smoke, drink alcohol, or use drugs   Whether you are sexually active   Whether you feel safe at home  Your doctor will talk to you about things you can do to improve your health and lower your risk of health problems. They will also offer help and support. For example, if you want to quit smoking, they can give you advice and might prescribe medicines. If you want to improve your diet or get more physical activity, they can help you with this, too.   Lab tests, if needed - The tests you get will depend on your age and situation. For example, your doctor might want to check your:   Cholesterol   Blood sugar   Iron level   Vaccines - The recommended vaccines will depend on your age, health, and what vaccines you already had. Vaccines are very important because they can prevent certain serious or deadly infections.   Discussion of screening - \"Screening\" means checking for diseases or other health problems before they cause symptoms. Your doctor can recommend screening based on your age, risk, and preferences. This might include tests to check for:   Cancer, such as breast, prostate, cervical, ovarian, colorectal, prostate, lung, or skin cancer   Sexually transmitted infections, such as chlamydia and gonorrhea   Mental health conditions like depression and anxiety  Your doctor will talk to you about the different types of screening tests. They can help you decide which screenings to have. They can also explain what the results might mean.   Answering questions - The physical is a good time to ask the doctor or nurse questions about your health. If needed, they can refer you to other doctors or specialists, too.  Adults older than 65 years often need other care, too. As you get older, your doctor will talk to you about:   How to prevent falling at " home   Hearing or vision tests   Memory testing   How to take your medicines safely   Making sure that you have the help and support you need at home  All topics are updated as new evidence becomes available and our peer review process is complete.  This topic retrieved from [employer] on: May 02, 2024.  Topic 140047 Version 1.0  Release: 32.4.3 - C32.122  © 2024 UpToDate, Inc. and/or its affiliates. All rights reserved.  Consumer Information Use and Disclaimer   Disclaimer: This generalized information is a limited summary of diagnosis, treatment, and/or medication information. It is not meant to be comprehensive and should be used as a tool to help the user understand and/or assess potential diagnostic and treatment options. It does NOT include all information about conditions, treatments, medications, side effects, or risks that may apply to a specific patient. It is not intended to be medical advice or a substitute for the medical advice, diagnosis, or treatment of a health care provider based on the health care provider's examination and assessment of a patient's specific and unique circumstances. Patients must speak with a health care provider for complete information about their health, medical questions, and treatment options, including any risks or benefits regarding use of medications. This information does not endorse any treatments or medications as safe, effective, or approved for treating a specific patient. UpToDate, Inc. and its affiliates disclaim any warranty or liability relating to this information or the use thereof.The use of this information is governed by the Terms of Use, available at https://www.woltersDormifyuwer.com/en/know/clinical-effectiveness-terms. 2024© UpToDate, Inc. and its affiliates and/or licensors. All rights reserved.  Copyright   © 2024 UpToDate, Inc. and/or its affiliates. All rights reserved.

## 2025-05-23 NOTE — PROGRESS NOTES
Name: Lucie Camacho      : 1990      MRN: [id number]  Encounter Provider: Yulisa Dennison DO  Encounter Date: 2025   Encounter department: [facility]  :  Assessment & Plan  Annual physical exam  - History and physical examination done  - Pt was counseled to eat a heart healthy diet, to drink at least 2 L of water daily, to take a daily multivitamin and to exercise for at least 30 minutes of cardio exercise daily, for at least 5 days a week.  - CBC, CMP AND TSH has been done within the past 6 months.  Will order a lipid panel and follow-up with the results.  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  - follow up with PCP.    Orders:    Lipid panel; Future          Depression Screening and Follow-up Plan: Patient was screened for depression during today's encounter. They screened negative with a PHQ-9 score of 2.        History of Present Illness     HPI    Patient presents for an annual physical exam.    Last annual physical exam- a year ago    Past medical history- vit d def, anxiety, depression, chronic lbp, insomnia , thyroid nodule, endometriosis, arthritis , colonic polyps, atypical mole    Past surgical history-thyroid cyst sx, tonsillectomy, wisdom teeth extraction, c section x 2, multiple laparoscopic sx for endometriosis, hysterectomy and oophorectomy,  bowel resection, appendectomy    Medications-see list     Allergies-see list     Diet- balanced and drinks about 2-2.5 L of water     Exercise- 2-3 times a week    Alcohol use-none    Caffeine and soda use-one cup of coffee daily    Nicotine use-never    Recreational drug use- none    Work-     Sexual history, STD history and HIV testing- monogamous with male partner, std hx - never, hiv testing -done and neg    Gynecological history/Prostate health/testicular health history- s/p  hysterectomy    Colonoscopy-last colonoscopy - 2024 - showed polyps with a recommendation to follow up in 3 years    Immunization history-up to date with the COVID shots x 2    Dental visit-has been this year - has issues with her teeth 2/2 medications    Vision- reading glasses    Family history-  Colon ca - great uncle  Htn - MGM  CAD s/p stent - MGM  Svt - mom   Skin ca - skin ca     Today, patient 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  She admits to chronic lower abdominal pain, chronic arthralgia and back pain and insomnia but denies any  fever, chills, night sweats, headache, dizziness, nasal congestion, runny nose, pnd, sore throat, ear ache, sinus pain or pressure, wheezing, cough, chest pain, sob, palpitations, nausea, vomiting, diarrhea, constipation, hematochezia, hematuria, melena stools,   myalgias, feelings of anxiety, depression.      Review of Systems   Constitutional:  Negative for activity change, chills, fatigue, fever and unexpected weight change.   HENT:  Negative for ear pain, postnasal drip, rhinorrhea, sinus pressure and sore throat.    Eyes:  Negative for pain.   Respiratory:  Negative for cough, choking, chest tightness, shortness of breath and wheezing.    Cardiovascular:  Negative for chest pain, palpitations and leg swelling.   Gastrointestinal:  Positive for abdominal pain. Negative for constipation, diarrhea, nausea and vomiting.   Genitourinary:  Negative for dysuria and hematuria.   Musculoskeletal:  Positive for arthralgias and back pain. Negative for gait problem, joint swelling, myalgias and neck stiffness.   Skin:  Negative for pallor and rash.   Neurological:  Negative for dizziness, tremors, seizures, syncope, light-headedness and headaches.   Hematological:  Negative for adenopathy.   Psychiatric/Behavioral:  Positive for sleep disturbance. Negative for behavioral problems and dysphoric mood. The patient is not nervous/anxious.        Objective   BP  "126/84 (BP Location: Left arm, Patient Position: Sitting, Cuff Size: Large)   Pulse 77   Temp 97.5 °F (36.4 °C) (Temporal)   Ht 5' 5.55\" (1.665 m)   Wt 108 kg (237 lb 6.4 oz)   SpO2 99%   BMI 38.84 kg/m²      Physical Exam  Constitutional:       General: She is not in acute distress.     Appearance: She is well-developed. She is not diaphoretic.   HENT:      Head: Normocephalic and atraumatic.      Right Ear: External ear normal.      Left Ear: External ear normal.      Nose: Nose normal.      Mouth/Throat:      Mouth: Mucous membranes are dry.      Pharynx: Posterior oropharyngeal erythema present. No oropharyngeal exudate.     Eyes:      General: No scleral icterus.        Right eye: No discharge.         Left eye: No discharge.      Conjunctiva/sclera: Conjunctivae normal.      Pupils: Pupils are equal, round, and reactive to light.     Neck:      Thyroid: No thyromegaly.      Vascular: No JVD.      Trachea: No tracheal deviation.     Cardiovascular:      Rate and Rhythm: Normal rate and regular rhythm.      Heart sounds: Normal heart sounds. No murmur heard.     No friction rub. No gallop.   Pulmonary:      Effort: Pulmonary effort is normal. No respiratory distress.      Breath sounds: Normal breath sounds. No wheezing or rales.   Chest:      Chest wall: No tenderness.   Abdominal:      General: Bowel sounds are normal. There is no distension.      Palpations: Abdomen is soft. There is no mass.      Tenderness: There is abdominal tenderness (Mild tenderness in the lower abdomen) in the right lower quadrant, suprapubic area and left lower quadrant. There is no guarding or rebound.     Musculoskeletal:         General: No tenderness or deformity. Normal range of motion.      Cervical back: Normal range of motion and neck supple.   Lymphadenopathy:      Cervical: No cervical adenopathy.     Skin:     General: Skin is warm and dry.      Coloration: Skin is not pale.      Findings: No erythema or rash. "     Neurological:      Mental Status: She is alert and oriented to person, place, and time.      Cranial Nerves: No cranial nerve deficit.      Motor: No abnormal muscle tone.      Coordination: Coordination normal.      Deep Tendon Reflexes: Reflexes are normal and symmetric.     Psychiatric:         Behavior: Behavior normal.

## 2025-05-27 ENCOUNTER — OFFICE VISIT (OUTPATIENT)
Dept: GYNECOLOGIC ONCOLOGY | Facility: CLINIC | Age: 35
End: 2025-05-27
Payer: COMMERCIAL

## 2025-05-27 VITALS
HEART RATE: 83 BPM | WEIGHT: 238 LBS | OXYGEN SATURATION: 98 % | SYSTOLIC BLOOD PRESSURE: 116 MMHG | DIASTOLIC BLOOD PRESSURE: 70 MMHG | BODY MASS INDEX: 38.94 KG/M2 | TEMPERATURE: 98.3 F

## 2025-05-27 DIAGNOSIS — R10.2 CHRONIC PELVIC PAIN IN FEMALE: Primary | ICD-10-CM

## 2025-05-27 DIAGNOSIS — N95.1 MENOPAUSAL SYMPTOMS: ICD-10-CM

## 2025-05-27 DIAGNOSIS — N80.519 ENDOMETRIOSIS OF RECTUM: ICD-10-CM

## 2025-05-27 DIAGNOSIS — G89.29 CHRONIC PELVIC PAIN IN FEMALE: Primary | ICD-10-CM

## 2025-05-27 PROCEDURE — 99214 OFFICE O/P EST MOD 30 MIN: CPT | Performed by: OBSTETRICS & GYNECOLOGY

## 2025-05-27 NOTE — ASSESSMENT & PLAN NOTE
34-year-old status post robotic assisted lysis of adhesions, resection of peritoneal/rectal endometriosis, low anterior resection, flexible sigmoidoscopy 2/7/2025.  Her performance status is 1.  1.  We discussed the risks and benefits of starting combined hormone replacement therapy with Prempro at 0.625/5 mg.  She understands the risks of combined hormone replacement therapy including the potential additional risk of breast cancer and agrees to proceed as outlined.

## 2025-05-27 NOTE — PROGRESS NOTES
Name: Lucie Camacho      : 1990      MRN: [id number]  Encounter Provider: Popeye Greene MD  Encounter Date: 2025   [facility]  :  Assessment & Plan  Chronic pelvic pain in female  Pain continue status post most recent surgery.  She is taking a total of 25 mg of oxycodone per day.  She is seeing pelvic physical therapy.  I reviewed the pelvic PT notes.  Single-point cane was ordered for balance concerns.  1.  Continue pelvic PT, palliative oxycodone with goal to reduce oxycodone when pain improves.  2.  We discussed that it may take up to 6 months after low anterior resection to normalize function       Endometriosis of rectum  34-year-old status post robotic assisted lysis of adhesions, resection of peritoneal/rectal endometriosis, low anterior resection, flexible sigmoidoscopy 2025.  Her performance status is 1.  1.  We discussed the risks and benefits of starting combined hormone replacement therapy with Prempro at 0.625/5 mg.  She understands the risks of combined hormone replacement therapy including the potential additional risk of breast cancer and agrees to proceed as outlined.         Assessment & Plan            History of Present Illness   Reason for Visit / CC: Follow-up chronic pelvic pain, endometriosis   Lucie Camacho is a 34 y.o. female   History of Present Illness  Returns for evaluation of chronic pelvic pain.  She has been going to pelvic physical therapy.  She is taking 25 mg of oxycodone daily.  She has occasional balance issues.  Pelvic pain has not improved significantly since starting pelvic physical therapy although trigger points have been identified.  She is ambulatory.  No other interval change in medications or medical history since the last visit.  She had a CTA of the abdomen pelvis 2025 in the ED at [facility].  There is no evidence of pelvic pathology.  Pertinent Medical History     Opioid  dependence               Review of Systems   Constitutional:  Negative for activity change and unexpected weight change.   HENT: Negative.     Eyes: Negative.    Respiratory: Negative.     Cardiovascular: Negative.    Gastrointestinal:  Negative for abdominal distention and abdominal pain.   Endocrine: Negative.    Genitourinary:  Positive for pelvic pain. Negative for vaginal bleeding.   Musculoskeletal: Negative.    Skin: Negative.    Allergic/Immunologic: Negative.    Neurological: Negative.         Balance problems   Hematological: Negative.    Psychiatric/Behavioral: Negative.      A complete review of systems is negative other than that noted above in the HPI.  Medications Ordered Prior to Encounter[1]      Objective   There were no vitals taken for this visit.    There is no height or weight on file to calculate BMI.  Pain Screening:     ECOG   1  Physical Exam  Vitals reviewed.   Constitutional:       General: She is not in acute distress.     Appearance: Normal appearance. She is not ill-appearing.   HENT:      Head: Normocephalic and atraumatic.      Mouth/Throat:      Mouth: Mucous membranes are moist.     Eyes:      General: No scleral icterus.        Right eye: No discharge.         Left eye: No discharge.      Conjunctiva/sclera: Conjunctivae normal.     Pulmonary:      Effort: Pulmonary effort is normal.     Musculoskeletal:      Right lower leg: No edema.      Left lower leg: No edema.     Skin:     General: Skin is warm and dry.      Coloration: Skin is not jaundiced.      Findings: No rash.     Neurological:      General: No focal deficit present.      Mental Status: She is alert and oriented to person, place, and time.      Cranial Nerves: No cranial nerve deficit.      Sensory: No sensory deficit.      Motor: No weakness.      Gait: Gait normal.     Psychiatric:         Mood and Affect: Mood normal.         Behavior: Behavior normal.         Thought Content: Thought content normal.          "Judgment: Judgment normal.       Physical Exam       Results    Labs: I have reviewed pertinent labs.   No results found for: \"\"  Lab Results   Component Value Date/Time    Potassium 3.9 04/06/2025 10:10 PM    Chloride 107 04/06/2025 10:10 PM    Carbon Dioxide 26 04/06/2025 10:10 PM    BUN 11 04/06/2025 10:10 PM    Creatinine 0.68 04/06/2025 10:10 PM    Glucose, i-STAT 119 02/07/2025 09:14 AM    Glucose, Fasting 92 01/23/2025 12:16 PM    Calcium 9.5 04/06/2025 10:10 PM    AST 19 04/06/2025 10:10 PM    ALT 22 04/06/2025 10:10 PM    Alkaline Phosphatase 74 04/06/2025 10:10 PM    eGFRcr 118 04/06/2025 10:10 PM     Lab Results   Component Value Date/Time    WBC 15.91 (H) 03/01/2025 12:35 PM    Hemoglobin 12.7 03/01/2025 12:35 PM    Hematocrit 37.2 03/01/2025 12:35 PM    MCV 85 03/01/2025 12:35 PM    Platelets 239 03/01/2025 12:35 PM     Lab Results   Component Value Date/Time    Absolute Neutrophils 13.51 (H) 03/01/2025 12:35 PM        Trend:  Lab Results   Component Value Date     17.3 04/27/2023       Radiology Results Review: I have reviewed radiology reports from [facility] including: CT abdomen/pelvis.      Administrative Statements   I have spent a total time of 35 minutes in caring for this patient on the day of the visit/encounter including Prognosis, Instructions for management, Patient and family education, Importance of tx compliance, Risk factor reductions, Impressions, Counseling / Coordination of care, Documenting in the medical record, Reviewing/placing orders in the medical record (including tests, medications, and/or procedures), and Obtaining or reviewing history  .       [1]   Current Outpatient Medications on File Prior to Visit   Medication Sig Dispense Refill    acetaminophen (TYLENOL) 650 mg CR tablet Take 1 tablet (650 mg total) by mouth every 8 (eight) hours as needed for mild pain 30 tablet 0    methocarbamol (ROBAXIN) 750 mg tablet Take 1 tablet (750 mg total) by mouth every 6 (six) " hours as needed for muscle spasms 120 tablet 0    oxyCODONE (ROXICODONE) 10 MG TABS Take 1 tablet (10 mg total) by mouth every 8 (eight) hours as needed for severe pain Take 10 mg twice daily and 5mg once daily for a total daily dose of 25mg. Max Daily Amount: 30 mg 55 tablet 0    traZODone (DESYREL) 50 mg tablet take 1 tablet by mouth at bedtime 90 tablet 3    naloxone (NARCAN) 4 mg/0.1 mL nasal spray Administer 1 spray into a nostril. If no response after 2-3 minutes, give another dose in the other nostril using a new spray. 1 each 1     No current facility-administered medications on file prior to visit.

## 2025-05-27 NOTE — ASSESSMENT & PLAN NOTE
Pain continue status post most recent surgery.  She is taking a total of 25 mg of oxycodone per day.  She is seeing pelvic physical therapy.  I reviewed the pelvic PT notes.  Single-point cane was ordered for balance concerns.  1.  Continue pelvic PT, palliative oxycodone with goal to reduce oxycodone when pain improves.  2.  We discussed that it may take up to 6 months after low anterior resection to normalize function

## 2025-06-05 ENCOUNTER — OFFICE VISIT (OUTPATIENT)
Dept: PHYSICAL THERAPY | Facility: REHABILITATION | Age: 35
End: 2025-06-05
Attending: OBSTETRICS & GYNECOLOGY
Payer: COMMERCIAL

## 2025-06-05 DIAGNOSIS — R10.2 CHRONIC PELVIC PAIN IN FEMALE: Primary | ICD-10-CM

## 2025-06-05 DIAGNOSIS — M62.89 PELVIC FLOOR TENSION: ICD-10-CM

## 2025-06-05 DIAGNOSIS — N80.9 ENDOMETRIOSIS: ICD-10-CM

## 2025-06-05 DIAGNOSIS — G89.29 CHRONIC PELVIC PAIN IN FEMALE: Primary | ICD-10-CM

## 2025-06-05 DIAGNOSIS — L90.5 SCAR TISSUE: ICD-10-CM

## 2025-06-05 PROCEDURE — 97140 MANUAL THERAPY 1/> REGIONS: CPT | Performed by: PHYSICAL THERAPIST

## 2025-06-05 PROCEDURE — 97110 THERAPEUTIC EXERCISES: CPT | Performed by: PHYSICAL THERAPIST

## 2025-06-05 NOTE — PROGRESS NOTES
"Daily Note     Today's date: 2025  Patient name: Lucie Camacho  : 1990  MRN: [id number]  Referring provider: Popeye Greene*  Dx:   Encounter Diagnosis     ICD-10-CM    1. Chronic pelvic pain in female  R10.2     G89.29       2. Endometriosis  N80.9       3. Scar tissue  L90.5       4. Pelvic floor tension  M62.89           Patient reports that she is having period cramps and pain in her L LQ which she describes the way her ovarian cyst felt in the past. She also experiences pain in the rectum. Reports pelvic pain with increased activity, tight clothing and intercourse and when her back is hurting.      Age 25 - partial hysterectomy  Age 32 - ovary removal     2025 - robotic assisted lysis of adhesions, resection of peritoneal/rectal endometriosis, low anterior resection, flexible sigmoidoscopy      Start Time: 1500          Subjective: Is feeling more rectal pain. \"I don't necessariliy even have to go to the bathroom but the past couple of days it has been pretty bad again.\" Dyspareunia with deep penetration. Still taking oxycodone daily.       Objective: See treatment diary below      Assessment: Tolerated treatment well. Patient would benefit from continued PT. MT was initially painful but reduced significantly with MFR and DB. Added reformer slides at the end of session with education in neutral spine and foot and knee placement. Progress reformer as able.     Plan: Continue per plan of care.      Precautions:   Patient Active Problem List   Diagnosis    Chronic pelvic pain in female    Endometriosis of rectum    Chronic arthralgias of knees and hips    Fatigue    Anxiety    Vitamin D deficiency    Thyroid nodule    Dyspareunia in female    Chronic bilateral low back pain with bilateral sciatica    Anxiety and depression    Alternating constipation and diarrhea    Morbid obesity (HCC)    Menopausal symptoms    Sacroiliitis (HCC)    History of total abdominal hysterectomy and " "bilateral salpingo-oophorectomy    Postoperative pain    Incisional abscess    Primary insomnia         PRO EVAL RE-EVAL DISCHARGE   PFDI 84.38     LIDIA-18      VPQ      CPSI-NIH      PGQ          POC Expires Auth Status Start Date Exp Date PT Visit Limit DA expires DA provider   7/24/25               Date of Service 5/1 5/15 5/20 6/5        Visits Used            Visits Remaining                          Manuals            VM  LG nv         PFM cuing   20' 10'        PFM stretching   20' 20'        PFM stretching    15' 15'        Neuro Re-Ed                        PPT  10 10         PPT + add  10          PPT + abd  YCO x10          PPT + marches  2x10          Reformer slides    2'                                Ther Ex            Nustep aerobic warmup  L4x10'  L4x10'        HS stretch/hip circles w/ strap  5x30\" each                                                          Ther Activity            Education Anatomy and POC                                                                          Modalities                                           "

## 2025-06-10 DIAGNOSIS — R10.2 CHRONIC PELVIC PAIN IN FEMALE: ICD-10-CM

## 2025-06-10 DIAGNOSIS — N80.519 ENDOMETRIOSIS OF RECTUM: ICD-10-CM

## 2025-06-10 DIAGNOSIS — G89.29 CHRONIC PELVIC PAIN IN FEMALE: ICD-10-CM

## 2025-06-10 NOTE — TELEPHONE ENCOUNTER
Medication: Oxycodone 10mg  PDMP   05/25/2025 05/20/2025 oxyCODONE HCL IR 10 MG TAB (Tablet) 55.0 22 10 MG 37.50 NADIRA MILTON [facility] [employer] 0 / 0 [place]   05/05/2025 05/02/2025 oxyCODONE HCL IR 10 MG TAB (Tablet) 55.0 22 10 MG 37.50 NADIRA ROSE

## 2025-06-11 RX ORDER — OXYCODONE HYDROCHLORIDE 10 MG/1
10 TABLET ORAL EVERY 8 HOURS PRN
Qty: 55 TABLET | Refills: 0 | Status: SHIPPED | OUTPATIENT
Start: 2025-06-11

## 2025-06-12 DIAGNOSIS — G89.29 CHRONIC PELVIC PAIN IN FEMALE: ICD-10-CM

## 2025-06-12 DIAGNOSIS — N80.519 ENDOMETRIOSIS OF RECTUM: ICD-10-CM

## 2025-06-12 DIAGNOSIS — R10.2 CHRONIC PELVIC PAIN IN FEMALE: ICD-10-CM

## 2025-06-12 NOTE — PROGRESS NOTES
Daily Note     Today's date: 2025  Patient name: Lucie Camacho  : 1990  MRN: [id number]  Referring provider: Popeye Greene*  Dx:   Encounter Diagnosis     ICD-10-CM    1. Chronic pelvic pain in female  R10.2     G89.29       2. Endometriosis  N80.9       3. Scar tissue  L90.5       4. Pelvic floor tension  M62.89           Patient reports that she is having period cramps and pain in her L LQ which she describes the way her ovarian cyst felt in the past. She also experiences pain in the rectum. Reports pelvic pain with increased activity, tight clothing and intercourse and when her back is hurting.      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  States they are teaching her proper ergonomics w lifting. She potentially could be lifting up to 50-70lb, anything over she is required to get help and use a team approach.  Patient states her hips and lower back are bothering her the most today. Notes she is also going through a lot of things at home with family issues, relatives not being present for her daughter's 10th birthday party which is upsetting.           Objective: See treatment diary below      Assessment: Tolerated treatment well. Patient presents 15 min late today and in emotional distress due to subjective above.Today's session focused on PPT with proximal hip strengthening in order to address lower back and hip pain today. Added clamshells to further challenge hip/glutes with positive response. Cues provided for proper form and technique with good carryover. May benefit from  "functional activities nv to simulate work and or Reformer exercises. Assess for nv. Patient would benefit from ongoing PT.     Plan: Continue per plan of care.      Precautions:   Patient Active Problem List   Diagnosis    Chronic pelvic pain in female    Endometriosis of rectum    Chronic arthralgias of knees and hips    Fatigue    Anxiety    Vitamin D deficiency    Thyroid nodule    Dyspareunia in female    Chronic bilateral low back pain with bilateral sciatica    Anxiety and depression    Alternating constipation and diarrhea    Morbid obesity (HCC)    Menopausal symptoms    Sacroiliitis (HCC)    History of total abdominal hysterectomy and bilateral salpingo-oophorectomy    Postoperative pain    Incisional abscess    Primary insomnia         PRO EVAL RE-EVAL DISCHARGE   PFDI 84.38     LIDIA-18      VPQ      CPSI-NIH      PGQ          POC Expires Auth Status Start Date Exp Date PT Visit Limit DA expires DA provider   7/24/25               Date of Service 5/1 5/15 5/20 6/5 6/13       Visits Used            Visits Remaining                          Manuals            VM  LG nv         PFM cuing   20' 10' np       PFM stretching   20' 20' np       PFM stretching    15' 15' np       Neuro Re-Ed                        PPT  10 10  x15       PPT + add  10   2z10       PPT + abd  YCO x10   YCO  2x10       PPT + marches  2x10   2x10       Reformer slides    2' np       clamshell     2x10                   Ther Ex            Nustep aerobic warmup  L4x10'  L4x10' Np        HS stretch/hip circles w/ strap  5x30\" each                                                          Ther Activity            Education Anatomy and POC                                                                          Modalities                                           "

## 2025-06-13 ENCOUNTER — OFFICE VISIT (OUTPATIENT)
Dept: PHYSICAL THERAPY | Facility: REHABILITATION | Age: 35
End: 2025-06-13
Attending: OBSTETRICS & GYNECOLOGY
Payer: COMMERCIAL

## 2025-06-13 DIAGNOSIS — R10.84 GENERALIZED ABDOMINAL PAIN: ICD-10-CM

## 2025-06-13 DIAGNOSIS — M62.89 PELVIC FLOOR TENSION: ICD-10-CM

## 2025-06-13 DIAGNOSIS — G89.29 CHRONIC PELVIC PAIN IN FEMALE: ICD-10-CM

## 2025-06-13 DIAGNOSIS — R10.2 CHRONIC PELVIC PAIN IN FEMALE: Primary | ICD-10-CM

## 2025-06-13 DIAGNOSIS — N80.9 ENDOMETRIOSIS: ICD-10-CM

## 2025-06-13 DIAGNOSIS — R10.2 CHRONIC PELVIC PAIN IN FEMALE: ICD-10-CM

## 2025-06-13 DIAGNOSIS — G89.29 CHRONIC PELVIC PAIN IN FEMALE: Primary | ICD-10-CM

## 2025-06-13 DIAGNOSIS — L90.5 SCAR TISSUE: ICD-10-CM

## 2025-06-13 PROCEDURE — 97110 THERAPEUTIC EXERCISES: CPT

## 2025-06-14 ENCOUNTER — TELEPHONE (OUTPATIENT)
Dept: OTHER | Facility: OTHER | Age: 35
End: 2025-06-14

## 2025-06-14 DIAGNOSIS — G89.29 CHRONIC PELVIC PAIN IN FEMALE: Primary | ICD-10-CM

## 2025-06-14 DIAGNOSIS — R10.2 CHRONIC PELVIC PAIN IN FEMALE: Primary | ICD-10-CM

## 2025-06-14 RX ORDER — METHOCARBAMOL 750 MG/1
750 TABLET, FILM COATED ORAL EVERY 6 HOURS PRN
Qty: 120 TABLET | Refills: 0 | Status: SHIPPED | OUTPATIENT
Start: 2025-06-14 | End: 2025-06-14

## 2025-06-14 RX ORDER — METHOCARBAMOL 750 MG/1
750 TABLET, FILM COATED ORAL EVERY 6 HOURS PRN
Qty: 120 TABLET | Refills: 0 | Status: SHIPPED | OUTPATIENT
Start: 2025-06-14

## 2025-06-14 NOTE — TELEPHONE ENCOUNTER
Patient stated, “My pharmacy will not refill my oxyCODONE (ROXICODONE) 10 MG TABS due to the instructions: Take 1 tablet (10 mg total) by mouth every 8 (eight) hours as needed for severe pain Take 10 mg twice daily and 5mg once daily for a total daily dose of 25mg. Max Daily Amount: 30 mg.     Pharmacist stated, that the on- call provider has to call the pharmacy to clarify instructions prior to dispensing medication.  I am totally out of my medication. Please assist.”    [id number] - [address] [address]  Phone: [phone] Fax: [phone]  LESLY #: --     On call provider contacted via secure chat.

## 2025-06-18 ENCOUNTER — OFFICE VISIT (OUTPATIENT)
Dept: PHYSICAL THERAPY | Facility: REHABILITATION | Age: 35
End: 2025-06-18
Attending: OBSTETRICS & GYNECOLOGY
Payer: COMMERCIAL

## 2025-06-18 DIAGNOSIS — N80.9 ENDOMETRIOSIS: ICD-10-CM

## 2025-06-18 DIAGNOSIS — G89.29 CHRONIC PELVIC PAIN IN FEMALE: Primary | ICD-10-CM

## 2025-06-18 DIAGNOSIS — R10.2 CHRONIC PELVIC PAIN IN FEMALE: Primary | ICD-10-CM

## 2025-06-18 DIAGNOSIS — L90.5 SCAR TISSUE: ICD-10-CM

## 2025-06-18 PROCEDURE — 97112 NEUROMUSCULAR REEDUCATION: CPT

## 2025-06-18 PROCEDURE — 97140 MANUAL THERAPY 1/> REGIONS: CPT

## 2025-06-18 NOTE — PROGRESS NOTES
Daily Note     Today's date: 2025  Patient name: Lucie Camacho  : 1990  MRN: [id number]  Referring provider: Popeye Greene*  Dx:   Encounter Diagnosis     ICD-10-CM    1. Chronic pelvic pain in female  R10.2     G89.29       2. Endometriosis  N80.9       3. Scar tissue  L90.5             Patient reports that she is having period cramps and pain in her L LQ which she describes the way her ovarian cyst felt in the past. She also experiences pain in the rectum. Reports pelvic pain with increased activity, tight clothing and intercourse and when her back is hurting.      Age 25 - partial hysterectomy  Age 32 - ovary removal     2025 - robotic assisted lysis of adhesions, resection of peritoneal/rectal endometriosis, low anterior resection, flexible sigmoidoscopy      Start Time: 1600  Stop Time: 1640  Total time in clinic (min): 40 minutes    Subjective: Pt continues to be very bothered with her legs.       Objective: See treatment diary below      Assessment: Tolerated treatment well. Focussed on manual stretching of her hips and down training today.  Hopefully next session if she is up to it we can add reformer exercises as planned.  Patient would benefit from ongoing PT.     Plan: Continue per plan of care.      Precautions:   Patient Active Problem List   Diagnosis    Chronic pelvic pain in female    Endometriosis of rectum    Chronic arthralgias of knees and hips    Fatigue    Anxiety    Vitamin D deficiency    Thyroid nodule    Dyspareunia in female    Chronic bilateral low back pain with bilateral sciatica    Anxiety and depression    Alternating constipation and diarrhea    Morbid obesity (HCC)    Menopausal symptoms    Sacroiliitis (HCC)    History of total abdominal hysterectomy and bilateral salpingo-oophorectomy    Postoperative pain    Incisional abscess    Primary insomnia         PRO EVAL RE-EVAL DISCHARGE   PFDI 84.38     LIDIA-18      VPQ      CPSI-NIH      PGQ     "      POC Expires Auth Status Start Date Exp Date PT Visit Limit DA expires DA provider   7/24/25               Date of Service 5/1 5/15 5/20 6/5 6/13 6/18      Visits Used            Visits Remaining                          Manuals            VM  LG nv         PFM cuing   20' 10' np       PFM stretching   20' 20' np       hips      MFR/ stretching      PFM stretching    15' 15' np       Neuro Re-Ed            DB      & downtraining 15'      PPT  10 10  x15       PPT + add  10   2z10       PPT + abd  YCO x10   YCO  2x10       PPT + marches  2x10   2x10       Reformer slides    2' np       clamshell     2x10                   Ther Ex            Nustep aerobic warmup  L4x10'  L4x10' Np        HS stretch/hip circles w/ strap  5x30\" each                                                          Ther Activity            Education Anatomy and POC                                                                          Modalities                                           "

## 2025-06-30 DIAGNOSIS — G89.29 CHRONIC PELVIC PAIN IN FEMALE: ICD-10-CM

## 2025-06-30 DIAGNOSIS — R10.2 CHRONIC PELVIC PAIN IN FEMALE: ICD-10-CM

## 2025-06-30 DIAGNOSIS — N80.519 ENDOMETRIOSIS OF RECTUM: ICD-10-CM

## 2025-07-01 RX ORDER — OXYCODONE HYDROCHLORIDE 10 MG/1
10 TABLET ORAL EVERY 8 HOURS PRN
Qty: 55 TABLET | Refills: 0 | Status: SHIPPED | OUTPATIENT
Start: 2025-07-01

## 2025-07-02 ENCOUNTER — OFFICE VISIT (OUTPATIENT)
Dept: PHYSICAL THERAPY | Facility: REHABILITATION | Age: 35
End: 2025-07-02
Attending: OBSTETRICS & GYNECOLOGY
Payer: COMMERCIAL

## 2025-07-02 DIAGNOSIS — R10.2 CHRONIC PELVIC PAIN IN FEMALE: Primary | ICD-10-CM

## 2025-07-02 DIAGNOSIS — M62.89 PELVIC FLOOR TENSION: ICD-10-CM

## 2025-07-02 DIAGNOSIS — G89.29 CHRONIC PELVIC PAIN IN FEMALE: Primary | ICD-10-CM

## 2025-07-02 DIAGNOSIS — L90.5 SCAR TISSUE: ICD-10-CM

## 2025-07-02 DIAGNOSIS — N80.9 ENDOMETRIOSIS: ICD-10-CM

## 2025-07-02 PROCEDURE — 97110 THERAPEUTIC EXERCISES: CPT

## 2025-07-02 NOTE — PROGRESS NOTES
Daily Note     Today's date: 2025  Patient name: Lucie Camacho  : 1990  MRN: [id number]  Referring provider: Popeye Greene*  Dx:   Encounter Diagnosis     ICD-10-CM    1. Chronic pelvic pain in female  R10.2     G89.29       2. Endometriosis  N80.9       3. Scar tissue  L90.5       4. Pelvic floor tension  M62.89               Patient reports that she is having period cramps and pain in her L LQ which she describes the way her ovarian cyst felt in the past. She also experiences pain in the rectum. Reports pelvic pain with increased activity, tight clothing and intercourse and when her back is hurting.      Age 25 - partial hysterectomy  Age 32 - ovary removal     2025 - robotic assisted lysis of adhesions, resection of peritoneal/rectal endometriosis, low anterior resection, flexible sigmoidoscopy      Start Time: 1200  Stop Time: 1245  Total time in clinic (min): 45 minutes    Subjective:       Objective: See treatment diary below      Assessment: Tolerated treatment well.       Plan: Continue per plan of care.      Precautions:   Patient Active Problem List   Diagnosis    Chronic pelvic pain in female    Endometriosis of rectum    Chronic arthralgias of knees and hips    Fatigue    Anxiety    Vitamin D deficiency    Thyroid nodule    Dyspareunia in female    Chronic bilateral low back pain with bilateral sciatica    Anxiety and depression    Alternating constipation and diarrhea    Morbid obesity (HCC)    Menopausal symptoms    Sacroiliitis (HCC)    History of total abdominal hysterectomy and bilateral salpingo-oophorectomy    Postoperative pain    Incisional abscess    Primary insomnia         PRO EVAL RE-EVAL DISCHARGE   PFDI 84.38     LIDIA-18      VPQ      CPSI-NIH      PGQ          POC Expires Auth Status Start Date Exp Date PT Visit Limit DA expires DA provider   25               Date of Service 5/1 5/15 5/20 6/5 6/13 6/18      Visits Used            Visits Remaining        "                   Manuals            VM  LG nv         PFM cuing   20' 10' np       PFM stretching   20' 20' np       hips      MFR/ stretching      PFM stretching    15' 15' np       Neuro Re-Ed            DB      & downtraining 15'      PPT  10 10  x15       PPT + add  10   2z10       PPT + abd  YCO x10   YCO  2x10       PPT + marches  2x10   2x10       Reformer slides    2' np       clamshell     2x10                   Ther Ex            Nustep aerobic warmup  L4x10'  L4x10' Np        HS stretch/hip circles w/ strap  5x30\" each                                                          Ther Activity            Education Anatomy and POC                                                                          Modalities                                           "

## 2025-07-03 NOTE — PROGRESS NOTES
"Subjective: Pt reports feeling better this week just returned from a week at the beach with her children \"[place] happy [place]\"    Objective:    Assessment: Today, we were able to incorporate reformer exercises which she did well.  Appropriately challenged and benefit from cueing to maintain good form and breathing technique.    Plan: Continue with POC.  "

## 2025-07-09 ENCOUNTER — OFFICE VISIT (OUTPATIENT)
Dept: PHYSICAL THERAPY | Facility: REHABILITATION | Age: 35
End: 2025-07-09
Attending: OBSTETRICS & GYNECOLOGY
Payer: COMMERCIAL

## 2025-07-09 DIAGNOSIS — L90.5 SCAR TISSUE: ICD-10-CM

## 2025-07-09 DIAGNOSIS — R10.2 CHRONIC PELVIC PAIN IN FEMALE: Primary | ICD-10-CM

## 2025-07-09 DIAGNOSIS — R10.84 GENERALIZED ABDOMINAL PAIN: ICD-10-CM

## 2025-07-09 DIAGNOSIS — M62.89 PELVIC FLOOR TENSION: ICD-10-CM

## 2025-07-09 DIAGNOSIS — N80.9 ENDOMETRIOSIS: ICD-10-CM

## 2025-07-09 DIAGNOSIS — R10.2 CHRONIC PELVIC PAIN IN FEMALE: ICD-10-CM

## 2025-07-09 DIAGNOSIS — G89.29 CHRONIC PELVIC PAIN IN FEMALE: Primary | ICD-10-CM

## 2025-07-09 DIAGNOSIS — G89.29 CHRONIC PELVIC PAIN IN FEMALE: ICD-10-CM

## 2025-07-09 PROCEDURE — 97140 MANUAL THERAPY 1/> REGIONS: CPT

## 2025-07-09 PROCEDURE — 97110 THERAPEUTIC EXERCISES: CPT

## 2025-07-09 NOTE — PROGRESS NOTES
Daily Note     Today's date: 2025  Patient name: Lucie Camacho  : 1990  MRN: [id number]  Referring provider: Popeye Greene*  Dx:   Encounter Diagnosis     ICD-10-CM    1. Chronic pelvic pain in female  R10.2     G89.29       2. Endometriosis  N80.9       3. Scar tissue  L90.5       4. Pelvic floor tension  M62.89                 Patient reports that she is having period cramps and pain in her L LQ which she describes the way her ovarian cyst felt in the past. She also experiences pain in the rectum. Reports pelvic pain with increased activity, tight clothing and intercourse and when her back is hurting.      Age 25 - partial hysterectomy  Age 32 - ovary removal     2025 - robotic assisted lysis of adhesions, resection of peritoneal/rectal endometriosis, low anterior resection, flexible sigmoidoscopy      Start Time: 1255  Stop Time: 1335  Total time in clinic (min): 40 minutes    Subjective: Pt shares she's discouraged needs to have all of her teeth removed due to a genetic condition.  Feels overwhelmed.     Objective:    Assessment:  Due to symptoms and mental state, focussed more on downtraining and manual techniques over abdominal area.  Responded well to session with overall decreased symptoms. May benefit from rectal fascia release through vagina next session if amenable.    Plan: Continue per plan of care.      Precautions:   Patient Active Problem List   Diagnosis    Chronic pelvic pain in female    Endometriosis of rectum    Chronic arthralgias of knees and hips    Fatigue    Anxiety    Vitamin D deficiency    Thyroid nodule    Dyspareunia in female    Chronic bilateral low back pain with bilateral sciatica    Anxiety and depression    Alternating constipation and diarrhea    Morbid obesity (HCC)    Menopausal symptoms    Sacroiliitis (HCC)    History of total abdominal hysterectomy and bilateral salpingo-oophorectomy    Postoperative pain    Incisional abscess    Primary  "insomnia         PRO EVAL RE-EVAL DISCHARGE   PFDI 84.38     LIDIA-18      VPQ      CPSI-NIH      PGQ          POC Expires Auth Status Start Date Exp Date PT Visit Limit DA expires DA provider   7/24/25               Date of Service 5/1 5/15 5/20 6/5 6/13 6/18 7/09     Visits Used            Visits Remaining                          Manuals            VM  LG nv         PFM cuing   20' 10' np       PFM stretching   20' 20' np       hips      MFR/ stretching Abdominal wall 30' incl. downtraining     PFM stretching    15' 15' np       Neuro Re-Ed            DB      & downtraining 15'      PPT  10 10  x15       PPT + add  10   2z10       PPT + abd  YCO x10   YCO  2x10       PPT + marches  2x10   2x10       Reformer slides    2' np       clamshell     2x10                   Ther Ex            Nustep aerobic warmup  L4x10'  L4x10' Np   L5 8'     HS stretch/hip circles w/ strap  5x30\" each                                                          Ther Activity            Education Anatomy and POC                                                                          Modalities                                           "

## 2025-07-10 DIAGNOSIS — R10.84 GENERALIZED ABDOMINAL PAIN: ICD-10-CM

## 2025-07-10 DIAGNOSIS — R10.2 CHRONIC PELVIC PAIN IN FEMALE: ICD-10-CM

## 2025-07-10 DIAGNOSIS — N80.9 ENDOMETRIOSIS: ICD-10-CM

## 2025-07-10 DIAGNOSIS — G89.29 CHRONIC PELVIC PAIN IN FEMALE: ICD-10-CM

## 2025-07-11 RX ORDER — METHOCARBAMOL 750 MG/1
750 TABLET, FILM COATED ORAL EVERY 6 HOURS PRN
Qty: 120 TABLET | Refills: 0 | Status: SHIPPED | OUTPATIENT
Start: 2025-07-11

## 2025-07-11 RX ORDER — METHOCARBAMOL 750 MG/1
750 TABLET, FILM COATED ORAL EVERY 6 HOURS PRN
Qty: 120 TABLET | Refills: 0 | OUTPATIENT
Start: 2025-07-11

## 2025-07-16 ENCOUNTER — OFFICE VISIT (OUTPATIENT)
Dept: PHYSICAL THERAPY | Facility: REHABILITATION | Age: 35
End: 2025-07-16
Attending: OBSTETRICS & GYNECOLOGY
Payer: COMMERCIAL

## 2025-07-16 DIAGNOSIS — N80.9 ENDOMETRIOSIS: ICD-10-CM

## 2025-07-16 DIAGNOSIS — G89.29 CHRONIC PELVIC PAIN IN FEMALE: Primary | ICD-10-CM

## 2025-07-16 DIAGNOSIS — L90.5 SCAR TISSUE: ICD-10-CM

## 2025-07-16 DIAGNOSIS — R10.2 CHRONIC PELVIC PAIN IN FEMALE: Primary | ICD-10-CM

## 2025-07-16 PROCEDURE — 97140 MANUAL THERAPY 1/> REGIONS: CPT

## 2025-07-16 NOTE — PROGRESS NOTES
Daily Note     Today's date: 2025  Patient name: Lucie Camacho  : 1990  MRN: [id number]  Referring provider: Popeye Greene*  Dx:   Encounter Diagnosis     ICD-10-CM    1. Chronic pelvic pain in female  R10.2     G89.29       2. Endometriosis  N80.9       3. Scar tissue  L90.5                 Patient reports that she is having period cramps and pain in her L LQ which she describes the way her ovarian cyst felt in the past. She also experiences pain in the rectum. Reports pelvic pain with increased activity, tight clothing and intercourse and when her back is hurting.      Age 25 - partial hysterectomy  Age 32 - ovary removal     2025 - robotic assisted lysis of adhesions, resection of peritoneal/rectal endometriosis, low anterior resection, flexible sigmoidoscopy      Start Time:   Stop Time: 1900  Total time in clinic (min): 45 minutes    Subjective: More pain today, lower abdominal area, vaginally and her teeth.  Feels her symptoms are worsening.    Objective:    Assessment:  We used winback for the first time.  Able to detect heat well. Initially did lower abdominal and then PFM stretching including rectal fascia.  Responds well with decreased sympotms.  Encouraged to properly hydrate.    Plan: Continue per plan of care.      Precautions:   Patient Active Problem List   Diagnosis    Chronic pelvic pain in female    Endometriosis of rectum    Chronic arthralgias of knees and hips    Fatigue    Anxiety    Vitamin D deficiency    Thyroid nodule    Dyspareunia in female    Chronic bilateral low back pain with bilateral sciatica    Anxiety and depression    Alternating constipation and diarrhea    Morbid obesity (HCC)    Menopausal symptoms    Sacroiliitis (HCC)    History of total abdominal hysterectomy and bilateral salpingo-oophorectomy    Postoperative pain    Incisional abscess    Primary insomnia         PRO EVAL RE-EVAL DISCHARGE   PFDI 84.38     LIDIA-18      VPQ      CPSI-NIH  "     PGQ          POC Expires Auth Status Start Date Exp Date PT Visit Limit DA expires DA provider   7/24/25               Date of Service 5/1 5/15 5/20 6/5 6/13 6/18 7/09 7/16    Visits Used            Visits Remaining                          Manuals            VM  LG nv     Winback 20% CET 10'    PFM cuing   20' 10' np       PFM stretching   20' 20' np   30' vaginally    hips      MFR/ stretching Abdominal wall 30' incl. downtraining     PFM stretching    15' 15' np       Neuro Re-Ed            DB      & downtraining 15'      PPT  10 10  x15       PPT + add  10   2z10       PPT + abd  YCO x10   YCO  2x10       PPT + marches  2x10   2x10       Reformer slides    2' np       clamshell     2x10                   Ther Ex            Nustep aerobic warmup  L4x10'  L4x10' Np   L5 8' L5 5'    HS stretch/hip circles w/ strap  5x30\" each                                                          Ther Activity            Education Anatomy and POC                                                                          Modalities                                           "

## 2025-07-22 DIAGNOSIS — R10.2 CHRONIC PELVIC PAIN IN FEMALE: ICD-10-CM

## 2025-07-22 DIAGNOSIS — G89.29 CHRONIC PELVIC PAIN IN FEMALE: ICD-10-CM

## 2025-07-22 DIAGNOSIS — N80.519 ENDOMETRIOSIS OF RECTUM: ICD-10-CM

## 2025-07-22 RX ORDER — OXYCODONE HYDROCHLORIDE 10 MG/1
10 TABLET ORAL EVERY 8 HOURS PRN
Qty: 60 TABLET | Refills: 0 | Status: SHIPPED | OUTPATIENT
Start: 2025-07-22

## 2025-07-23 ENCOUNTER — OFFICE VISIT (OUTPATIENT)
Dept: PHYSICAL THERAPY | Facility: REHABILITATION | Age: 35
End: 2025-07-23
Attending: OBSTETRICS & GYNECOLOGY
Payer: COMMERCIAL

## 2025-07-23 DIAGNOSIS — R10.2 CHRONIC PELVIC PAIN IN FEMALE: Primary | ICD-10-CM

## 2025-07-23 DIAGNOSIS — M62.89 PELVIC FLOOR TENSION: ICD-10-CM

## 2025-07-23 DIAGNOSIS — G89.29 CHRONIC PELVIC PAIN IN FEMALE: Primary | ICD-10-CM

## 2025-07-23 DIAGNOSIS — L90.5 SCAR TISSUE: ICD-10-CM

## 2025-07-23 DIAGNOSIS — N80.9 ENDOMETRIOSIS: ICD-10-CM

## 2025-07-23 PROCEDURE — 97140 MANUAL THERAPY 1/> REGIONS: CPT

## 2025-07-23 PROCEDURE — 97110 THERAPEUTIC EXERCISES: CPT

## 2025-07-23 NOTE — PROGRESS NOTES
Daily Note     Today's date: 2025  Patient name: Lucie Camacho  : 1990  MRN: [id number]  Referring provider: Popeye Greene*  Dx:   Encounter Diagnosis     ICD-10-CM    1. Chronic pelvic pain in female  R10.2     G89.29       2. Endometriosis  N80.9       3. Scar tissue  L90.5       4. Pelvic floor tension  M62.89                 Patient reports that she is having period cramps and pain in her L LQ which she describes the way her ovarian cyst felt in the past. She also experiences pain in the rectum. Reports pelvic pain with increased activity, tight clothing and intercourse and when her back is hurting.      Age 25 - partial hysterectomy  Age 32 - ovary removal     2025 - robotic assisted lysis of adhesions, resection of peritoneal/rectal endometriosis, low anterior resection, flexible sigmoidoscopy      Start Time: 1245  Stop Time: 1330  Total time in clinic (min): 45 minutes    Subjective: Pain because of her mouth and also her back.  Is scheduled for a consultation tomorrow for her teeth.   Objective:    Assessment:  Daughter present t/o session, focussed on external techniques. Used winback with pt in prone position and with manual stretching of hip, more limited with IR. Pt needs to be re-evaluated next session, primary PT advised.    Plan: Continue per plan of care.      Precautions:   Patient Active Problem List   Diagnosis    Chronic pelvic pain in female    Endometriosis of rectum    Chronic arthralgias of knees and hips    Fatigue    Anxiety    Vitamin D deficiency    Thyroid nodule    Dyspareunia in female    Chronic bilateral low back pain with bilateral sciatica    Anxiety and depression    Alternating constipation and diarrhea    Morbid obesity (HCC)    Menopausal symptoms    Sacroiliitis (HCC)    History of total abdominal hysterectomy and bilateral salpingo-oophorectomy    Postoperative pain    Incisional abscess    Primary insomnia         PRO EVAL RE-EVAL DISCHARGE  "  PFDI 84.38     LIDIA-18      VPQ      CPSI-NIH      PGQ          POC Expires Auth Status Start Date Exp Date PT Visit Limit DA expires DA provider   7/24/25               Date of Service 5/1 5/15 5/20 6/5 6/13 6/18 7/09 7/16 7/23   Visits Used            Visits Remaining                          Manuals            VM  LG nv     Winback 20% CET 10' Winback CET 30% low back   PFM cuing   20' 10' np       PFM stretching   20' 20' np   30' vaginally    hips      MFR/ stretching Abdominal wall 30' incl. downtraining  15' hips   PFM stretching    15' 15' np       Neuro Re-Ed            DB      & downtraining 15'      PPT  10 10  x15       PPT + add  10   2z10       PPT + abd  YCO x10   YCO  2x10       PPT + marches  2x10   2x10       Reformer slides    2' np       clamshell     2x10                   Ther Ex            Nustep aerobic warmup  L4x10'  L4x10' Np   L5 8' L5 5' L5 8'   Windshield wipers         In prone 20x   HS stretch/hip circles w/ strap  5x30\" each                                                          Ther Activity            Education Anatomy and POC                                                                          Modalities                                           "

## 2025-07-28 ENCOUNTER — EVALUATION (OUTPATIENT)
Dept: PHYSICAL THERAPY | Facility: REHABILITATION | Age: 35
End: 2025-07-28
Attending: OBSTETRICS & GYNECOLOGY
Payer: COMMERCIAL

## 2025-07-28 DIAGNOSIS — L90.5 SCAR TISSUE: ICD-10-CM

## 2025-07-28 DIAGNOSIS — M62.89 PELVIC FLOOR TENSION: ICD-10-CM

## 2025-07-28 DIAGNOSIS — G89.29 CHRONIC PELVIC PAIN IN FEMALE: Primary | ICD-10-CM

## 2025-07-28 DIAGNOSIS — N80.9 ENDOMETRIOSIS: ICD-10-CM

## 2025-07-28 DIAGNOSIS — R10.2 CHRONIC PELVIC PAIN IN FEMALE: Primary | ICD-10-CM

## 2025-07-28 PROCEDURE — 97140 MANUAL THERAPY 1/> REGIONS: CPT | Performed by: PHYSICAL THERAPIST

## 2025-07-28 PROCEDURE — 97164 PT RE-EVAL EST PLAN CARE: CPT | Performed by: PHYSICAL THERAPIST

## 2025-07-30 ENCOUNTER — APPOINTMENT (OUTPATIENT)
Dept: PHYSICAL THERAPY | Facility: REHABILITATION | Age: 35
End: 2025-07-30
Attending: OBSTETRICS & GYNECOLOGY
Payer: COMMERCIAL

## 2025-08-01 ENCOUNTER — TELEPHONE (OUTPATIENT)
Age: 35
End: 2025-08-01

## 2025-08-04 DIAGNOSIS — R10.2 CHRONIC PELVIC PAIN IN FEMALE: ICD-10-CM

## 2025-08-04 DIAGNOSIS — G89.29 CHRONIC PELVIC PAIN IN FEMALE: ICD-10-CM

## 2025-08-04 DIAGNOSIS — N80.519 ENDOMETRIOSIS OF RECTUM: ICD-10-CM

## 2025-08-06 DIAGNOSIS — R10.2 CHRONIC PELVIC PAIN IN FEMALE: Primary | ICD-10-CM

## 2025-08-06 DIAGNOSIS — G89.29 CHRONIC PELVIC PAIN IN FEMALE: Primary | ICD-10-CM

## 2025-08-06 DIAGNOSIS — N80.519 ENDOMETRIOSIS OF RECTUM: ICD-10-CM

## 2025-08-06 RX ORDER — OXYCODONE HYDROCHLORIDE 10 MG/1
10 TABLET ORAL EVERY 8 HOURS PRN
Qty: 60 TABLET | Refills: 0 | Status: SHIPPED | OUTPATIENT
Start: 2025-08-06

## 2025-08-14 ENCOUNTER — OFFICE VISIT (OUTPATIENT)
Dept: PHYSICAL THERAPY | Facility: REHABILITATION | Age: 35
End: 2025-08-14
Attending: OBSTETRICS & GYNECOLOGY
Payer: COMMERCIAL

## 2025-08-20 DIAGNOSIS — N80.519 ENDOMETRIOSIS OF RECTUM: ICD-10-CM

## 2025-08-20 DIAGNOSIS — R10.2 CHRONIC PELVIC PAIN IN FEMALE: ICD-10-CM

## 2025-08-20 DIAGNOSIS — G89.29 CHRONIC PELVIC PAIN IN FEMALE: ICD-10-CM

## 2025-08-20 RX ORDER — OXYCODONE HYDROCHLORIDE 10 MG/1
10 TABLET ORAL EVERY 8 HOURS PRN
Qty: 60 TABLET | Refills: 0 | Status: SHIPPED | OUTPATIENT
Start: 2025-08-20

## 2025-08-21 ENCOUNTER — TELEPHONE (OUTPATIENT)
Dept: PHYSICAL THERAPY | Facility: REHABILITATION | Age: 35
End: 2025-08-21

## (undated) DEVICE — PLUMEPEN PRO 10FT

## (undated) DEVICE — TISSUE RETRIEVAL SYSTEM: Brand: INZII RETRIEVAL SYSTEM

## (undated) DEVICE — DRAPE SHEET X-LG

## (undated) DEVICE — SYRINGE BULB 2 OZ

## (undated) DEVICE — DRAPE,UNDERBUTTOCKS,PCH,STERILE: Brand: MEDLINE

## (undated) DEVICE — SUT PDS II 1 XLH 96 IN LOOPED Z881G

## (undated) DEVICE — INTENDED FOR TISSUE SEPARATION, AND OTHER PROCEDURES THAT REQUIRE A SHARP SURGICAL BLADE TO PUNCTURE OR CUT.: Brand: BARD-PARKER SAFETY BLADES SIZE 15, STERILE

## (undated) DEVICE — PROXIMATE SKIN STAPLERS (35 WIDE) CONTAINS 35 STAINLESS STEEL STAPLES (FIXED HEAD): Brand: PROXIMATE

## (undated) DEVICE — DRESSING MEPILEX BORDER 4 X 8 IN

## (undated) DEVICE — 3M™ STERI-STRIP™ REINFORCED ADHESIVE SKIN CLOSURES, R1547, 1/2 IN X 4 IN (12 MM X 100 MM), 6 STRIPS/ENVELOPE: Brand: 3M™ STERI-STRIP™

## (undated) DEVICE — MEDI-VAC YANK SUCT HNDL W/TPRD BULBOUS TIP: Brand: CARDINAL HEALTH

## (undated) DEVICE — ANTIBACTERIAL UNDYED BRAIDED (POLYGLACTIN 910), SYNTHETIC ABSORBABLE SUTURE: Brand: COATED VICRYL

## (undated) DEVICE — HEMOSTATIC MATRIX SURGIFLO 8ML W/THROMBIN

## (undated) DEVICE — GLOVE INDICATOR PI UNDERGLOVE SZ 8 BLUE

## (undated) DEVICE — GLOVE PI ULTRA TOUCH SZ.7.5

## (undated) DEVICE — 1820 FOAM BLOCK NEEDLE COUNTER: Brand: DEVON

## (undated) DEVICE — SUT VICRYL 3-0 SH C/R 18 IN J864D

## (undated) DEVICE — PACK PBDS STERILE LAP LITHOTOMY RF

## (undated) DEVICE — SUT VICRYL 0 REEL 54 IN J287G

## (undated) DEVICE — 3M™ IOBAN™ 2 ANTIMICROBIAL INCISE DRAPE 6650EZ: Brand: IOBAN™ 2

## (undated) DEVICE — TUBING SUCTION 5MM X 12 FT

## (undated) DEVICE — TOWEL SET X-RAY

## (undated) DEVICE — SUT STRATAFIX SPIRAL 2-0 CT-1 30 CM SXPP1B410

## (undated) DEVICE — VESSEL SEALER EXTEND: Brand: ENDOWRIST

## (undated) DEVICE — TROCAR PORT ACCESS 5 X120MML W/BLDLS OPTICAL TIP AIRSEAL

## (undated) DEVICE — CHLORAPREP HI-LITE 26ML ORANGE

## (undated) DEVICE — ECHELON CONTOUR W/ BLUE RELOAD: Brand: ECHELON

## (undated) DEVICE — ELECTRO LUBE IS A SINGLE PATIENT USE DEVICE THAT IS INTENDED TO BE USED ON ELECTROSURGICAL ELECTRODES TO REDUCE STICKING.: Brand: KEY SURGICAL ELECTRO LUBE

## (undated) DEVICE — LIGHT GLOVE GREEN

## (undated) DEVICE — DRAIN SPONGES,6 PLY: Brand: EXCILON

## (undated) DEVICE — SUREFORM 45 RELOAD BLUE: Brand: SUREFORM

## (undated) DEVICE — SUREFORM 45 CURVED-TIP: Brand: SUREFORM

## (undated) DEVICE — SUT VICRYL 3-0 SH 27 IN J416H

## (undated) DEVICE — MONOPOLAR CURVED SCISSORS: Brand: ENDOWRIST

## (undated) DEVICE — WOUND RETRACTOR AND PROTECTOR: Brand: ALEXIS O WOUND PROTECTOR-RETRACTOR

## (undated) DEVICE — PROXIMATE RELOADABLE LINEAR CUTTER WITH SAFETY LOCK-OUT, 75MM: Brand: PROXIMATE

## (undated) DEVICE — AIRSEAL TUBE SMOKE EVAC LUMENX3 FILTERED

## (undated) DEVICE — SUT VICRYL PLUS 0 UR-6 27IN VCP603H

## (undated) DEVICE — SUT PLAIN 2-0 CTX 27 IN 872H

## (undated) DEVICE — FENESTRATED BIPOLAR FORCEPS: Brand: ENDOWRIST

## (undated) DEVICE — TRAY FOLEY 16FR URIMETER SILICONE SURESTEP

## (undated) DEVICE — COLUMN DRAPE

## (undated) DEVICE — KIT ENDO BUTTON

## (undated) DEVICE — FIRST STEP BEDSIDE KIT - STAND-UP POUCH, ENDOSCOPIC CLEANING PAD - 1 POUCH: Brand: FIRST STEP BEDSIDE KIT - STAND-UP POUCH, ENDOSCOPIC CLEANING PAD

## (undated) DEVICE — Device: Brand: DISPOSABLE BULB & BLADDER

## (undated) DEVICE — GLOVE SRG LF STRL BGL SKNSNS 7.5 PF

## (undated) DEVICE — GLOVE INDICATOR PI UNDERGLOVE SZ 8.5 BLUE

## (undated) DEVICE — EXOFIN PRECISION PEN HIGH VISCOSITY TOPICAL SKIN ADHESIVE: Brand: EXOFIN PRECISION PEN, 1G

## (undated) DEVICE — TIP COVER ACCESSORY

## (undated) DEVICE — SUT VICRYL 0 CT-1 CR/8 27 IN JJ41G

## (undated) DEVICE — CO2 AND WATER TUBING/CAP SET FOR OLYMPUS® SCOPES & UCR: Brand: ERBE

## (undated) DEVICE — VISUALIZATION SYSTEM: Brand: CLEARIFY

## (undated) DEVICE — POOLE SUCTION HANDLE: Brand: CARDINAL HEALTH

## (undated) DEVICE — PAD GROUNDING DUAL ADULT

## (undated) DEVICE — ECHELON CIRCULAR POWERED STAPLER: Brand: ECHELON CIRCULAR

## (undated) DEVICE — SUT MONOCRYL 4-0 PS-2 18 IN Y496G

## (undated) DEVICE — ADHESIVE SKIN HIGH VISCOSITY EXOFIN 1ML

## (undated) DEVICE — SUT STRATAFIX SPIRAL 4-0 PGA/PCL 30 X 30 CM SXMD2B409

## (undated) DEVICE — REDUCER: Brand: ENDOWRIST

## (undated) DEVICE — SUT PROLENE 2-0 SH 36 IN 8523H

## (undated) DEVICE — SUT VICRYL PLUS 0 54IN VCP287G

## (undated) DEVICE — TUBING SMOKE EVAC W/FILTRATION DEVICE PLUMEPORT ACTIV

## (undated) DEVICE — CIRCULAR MECH XL SEAL 29MM

## (undated) DEVICE — 3000CC GUARDIAN II: Brand: GUARDIAN

## (undated) DEVICE — STERILE CYSTO PACK: Brand: CARDINAL HEALTH

## (undated) DEVICE — SEAL

## (undated) DEVICE — BETHLEHEM UNIVERSAL MINOR VAG: Brand: CARDINAL HEALTH

## (undated) DEVICE — GLOVE INDICATOR PI UNDERGLOVE SZ 7 BLUE

## (undated) DEVICE — TIP-UP FENESTRATED GRASPER: Brand: ENDOWRIST

## (undated) DEVICE — ANTIBACTERIAL VIOLET BRAIDED (POLYGLACTIN 910), SYNTHETIC ABSORBABLE SUTURE: Brand: COATED VICRYL

## (undated) DEVICE — MAYO STAND COVER: Brand: CONVERTORS

## (undated) DEVICE — SUT PDS PLUS 1 CTX 36IN PDP371T

## (undated) DEVICE — SUT MONOCRYL 4-0 PS-2 27 IN Y426H

## (undated) DEVICE — GLOVE SRG BIOGEL 8

## (undated) DEVICE — SYRINGE 10ML LL

## (undated) DEVICE — 40595 XL TRENDELENBURG POSITIONING KIT: Brand: 40595 XL TRENDELENBURG POSITIONING KIT

## (undated) DEVICE — PREMIUM DRY TRAY LF: Brand: MEDLINE INDUSTRIES, INC.

## (undated) DEVICE — INTENDED FOR TISSUE SEPARATION, AND OTHER PROCEDURES THAT REQUIRE A SHARP SURGICAL BLADE TO PUNCTURE OR CUT.: Brand: BARD-PARKER SAFETY BLADES SIZE 11, STERILE

## (undated) DEVICE — BLADELESS OBTURATOR: Brand: WECK VISTA

## (undated) DEVICE — CHLORHEXIDINE 4PCT 4 OZ

## (undated) DEVICE — DEFENDO AIR WATER SUCTION AND BIOPSY VALVE KIT FOR  OLYMPUS: Brand: DEFENDO AIR/WATER/SUCTION AND BIOPSY VALVE

## (undated) DEVICE — DRAPE SHEET THREE QUARTER

## (undated) DEVICE — DRESSING MEPILEX AG BORDER POST-OP 4 X 8 IN

## (undated) DEVICE — ECHELON FLEX 60 ARTICULATING ENDOSCOPIC LINEAR CUTTER (NO CARTRIDGE): Brand: ECHELON FLEX ENDOPATH

## (undated) DEVICE — ARM DRAPE

## (undated) DEVICE — ELECTRODE BLADE MOD  E-Z CLEAN 6.5IN -0014M

## (undated) DEVICE — PROXIMATE LINEAR CUTTER RELOAD, BLUE, 75MM: Brand: PROXIMATE

## (undated) DEVICE — SUT ETHILON 3-0 FS-1 18 IN 663G

## (undated) DEVICE — 60 ML SYRINGE,REGULAR TIP: Brand: MONOJECT